# Patient Record
Sex: FEMALE | Race: WHITE | NOT HISPANIC OR LATINO | Employment: FULL TIME | URBAN - METROPOLITAN AREA
[De-identification: names, ages, dates, MRNs, and addresses within clinical notes are randomized per-mention and may not be internally consistent; named-entity substitution may affect disease eponyms.]

---

## 2019-02-19 ENCOUNTER — HOSPITAL ENCOUNTER (OUTPATIENT)
Dept: URGENT CARE | Facility: CLINIC | Age: 24
Discharge: HOME | End: 2019-02-19
Attending: NURSE PRACTITIONER

## 2019-02-22 LAB — BACTERIA SPEC AEROBE CULT: NORMAL

## 2019-03-26 LAB
BASOPHILS # BLD AUTO: 0.02 10*3/UL (ref 0–0.2)
BASOPHILS NFR BLD AUTO: 0.4 % (ref 0–3)
CONV ABS IMM GRAN: 0.01 10*3/UL (ref 0–0.2)
CONV IMMATURE GRAN: 0.2 % (ref 0–1.8)
DEPRECATED RDW RBC AUTO: 41.1 FL (ref 36.4–46.3)
EOSINOPHIL # BLD AUTO: 0.04 10*3/UL (ref 0–0.7)
EOSINOPHIL # BLD AUTO: 0.8 % (ref 0–7)
ERYTHROCYTE [DISTWIDTH] IN BLOOD BY AUTOMATED COUNT: 13 % (ref 11.7–14.4)
HBA1C MFR BLD: 13.7 G/DL (ref 12–16)
HCG SERPL-SCNC: NEGATIVE MMOL/L
HCT VFR BLD AUTO: 41.4 % (ref 37–47)
LYMPHOCYTES # BLD AUTO: 2.24 10*3/UL (ref 1–5)
MCH RBC QN AUTO: 28.7 PG (ref 27–31)
MCHC RBC AUTO-ENTMCNC: 33.1 G/DL (ref 33–37)
MCV RBC AUTO: 86.8 FL (ref 81–99)
MONOCYTES # BLD AUTO: 0.32 10*3/UL (ref 0.2–1.2)
MONOCYTES NFR BLD AUTO: 6.3 % (ref 3–10)
NEUTROPHILS # BLD AUTO: 2.44 10*3/UL (ref 2–8)
NEUTROPHILS NFR BLD AUTO: 48.1 % (ref 30–85)
NRBC CBCN: 0 % (ref 0–0.7)
PLATELET # BLD AUTO: 174 10*3/UL (ref 130–400)
PMV BLD AUTO: 10.7 FL (ref 9.4–12.3)
RBC # BLD AUTO: 4.77 10*6/UL (ref 4.2–5.4)
VARIANT LYMPHS NFR BLD MANUAL: 44.2 % (ref 20–45)
WBC # BLD AUTO: 5.07 10*3/UL (ref 4.8–10.8)

## 2019-03-28 ENCOUNTER — HOSPITAL ENCOUNTER (OUTPATIENT)
Dept: PERIOP | Facility: HOSPITAL | Age: 24
Setting detail: HOSPITAL OUTPATIENT SURGERY
Discharge: HOME | End: 2019-03-28
Attending: OBSTETRICS & GYNECOLOGY

## 2020-01-27 ENCOUNTER — HOSPITAL ENCOUNTER (OUTPATIENT)
Dept: URGENT CARE | Facility: CLINIC | Age: 25
Discharge: HOME | End: 2020-01-27
Attending: NURSE PRACTITIONER

## 2020-01-29 LAB — BACTERIA SPEC AEROBE CULT: NORMAL

## 2021-04-08 ENCOUNTER — HOSPITAL ENCOUNTER (OUTPATIENT)
Dept: URGENT CARE | Facility: CLINIC | Age: 26
Discharge: HOME | End: 2021-04-08
Attending: NURSE PRACTITIONER

## 2021-04-23 ENCOUNTER — HOSPITAL ENCOUNTER (OUTPATIENT)
Dept: URGENT CARE | Facility: CLINIC | Age: 26
Discharge: HOME | End: 2021-04-23
Attending: FAMILY MEDICINE

## 2021-05-13 ENCOUNTER — HOSPITAL ENCOUNTER (OUTPATIENT)
Dept: URGENT CARE | Facility: CLINIC | Age: 26
Discharge: HOME | End: 2021-05-13
Attending: NURSE PRACTITIONER

## 2021-05-14 LAB — SARS-COV-2 RNA SPEC QL NAA+PROBE: NOT DETECTED

## 2021-11-30 PROCEDURE — 87635 SARS-COV-2 COVID-19 AMP PRB: CPT | Performed by: NURSE PRACTITIONER

## 2021-12-01 ENCOUNTER — TELEPHONE (OUTPATIENT)
Dept: URGENT CARE | Facility: CLINIC | Age: 26
End: 2021-12-01

## 2021-12-01 NOTE — TELEPHONE ENCOUNTER
----- Message from MARIO Griffin sent at 11/30/2021  4:57 PM EST -----  Please call the patient regarding her negative result.

## 2022-09-06 ENCOUNTER — OFFICE VISIT (OUTPATIENT)
Dept: FAMILY MEDICINE CLINIC | Age: 27
End: 2022-09-06

## 2022-09-06 VITALS
SYSTOLIC BLOOD PRESSURE: 115 MMHG | DIASTOLIC BLOOD PRESSURE: 67 MMHG | WEIGHT: 140.4 LBS | HEIGHT: 63 IN | TEMPERATURE: 98.1 F | BODY MASS INDEX: 24.88 KG/M2 | HEART RATE: 71 BPM

## 2022-09-06 DIAGNOSIS — D22.9 ATYPICAL MOLE: ICD-10-CM

## 2022-09-06 DIAGNOSIS — R49.0 HOARSENESS OF VOICE: ICD-10-CM

## 2022-09-06 DIAGNOSIS — K21.9 GASTROESOPHAGEAL REFLUX DISEASE, UNSPECIFIED WHETHER ESOPHAGITIS PRESENT: ICD-10-CM

## 2022-09-06 DIAGNOSIS — J30.9 ALLERGIC RHINITIS, UNSPECIFIED SEASONALITY, UNSPECIFIED TRIGGER: Primary | ICD-10-CM

## 2022-09-06 PROCEDURE — 99214 OFFICE O/P EST MOD 30 MIN: CPT | Performed by: NURSE PRACTITIONER

## 2022-09-06 RX ORDER — MONTELUKAST SODIUM 10 MG/1
10 TABLET ORAL NIGHTLY
Qty: 90 TABLET | Refills: 3 | Status: SHIPPED | OUTPATIENT
Start: 2022-09-06 | End: 2022-10-21

## 2022-09-06 RX ORDER — OMEPRAZOLE 20 MG/1
20 CAPSULE, DELAYED RELEASE ORAL DAILY
Qty: 14 CAPSULE | Refills: 0 | Status: SHIPPED | OUTPATIENT
Start: 2022-09-06 | End: 2022-11-02

## 2022-09-06 RX ORDER — FLUTICASONE PROPIONATE 50 MCG
2 SPRAY, SUSPENSION (ML) NASAL DAILY
Qty: 18 ML | Refills: 1 | Status: SHIPPED | OUTPATIENT
Start: 2022-09-06 | End: 2023-01-09 | Stop reason: SDUPTHER

## 2022-09-06 NOTE — PROGRESS NOTES
Assessment and Plan   Diagnoses and all orders for this visit:    1. Allergic rhinitis, unspecified seasonality, unspecified trigger (Primary)  Comments:  try allergy medication to improve symptoms    Orders:  -     montelukast (Singulair) 10 MG tablet; Take 1 tablet by mouth Every Night.  Dispense: 90 tablet; Refill: 3  -     fluticasone (Flonase) 50 MCG/ACT nasal spray; 2 sprays into the nostril(s) as directed by provider Daily.  Dispense: 18 mL; Refill: 1    2. Hoarseness of voice  Comments:  advised to try allergy medication and if persists, follow up     3. Gastroesophageal reflux disease, unspecified whether esophagitis present  -     omeprazole (priLOSEC) 20 MG capsule; Take 1 capsule by mouth Daily.  Dispense: 14 capsule; Refill: 0    4. Atypical mole  -     Ambulatory Referral to Dermatology                  Follow Up   No follow-ups on file.    Chief Complaint  Marilee Watts presents to [facility] for Earache (Left), Hoarse (Allergy related), and atypical mole (L side of cheek, need referral to derm)    Subjective          2 moles on left side face  Starting to irritate. She has had 2 removed in the past that are reported as benign.  She is wanting referral Derm.     Left ear has been painful x 2 weeks- was told in past fluid in ear - was given medication but unable to take due to drowsiness.  She does report that she has also noted hoarseness but will come and go   Denies any acid reflux  Positive for 'severe' allergies  - which she is taking zyrtec switching with  claritin         Review of Systems   Constitutional: Negative for chills and fever.   HENT: Positive for congestion, ear pain, sore throat and voice change. Negative for sneezing.    Respiratory: Positive for cough.    Allergic/Immunologic: Positive for environmental allergies.       Objective     Vitals:    09/06/22 1307   BP: 115/67   BP Location: Right arm   Patient Position: Sitting   Pulse: 71   Temp:  "98.1 °F (36.7 °C)   TempSrc: Oral   Weight: 63.7 kg (140 lb 6.4 oz)   Height: 160 cm (63\")     Body mass index is 24.87 kg/m².     Physical Exam  Constitutional:       General: She is not in acute distress.     Appearance: Normal appearance.   HENT:      Head: Normocephalic.   Cardiovascular:      Rate and Rhythm: Normal rate and regular rhythm.   Pulmonary:      Effort: Pulmonary effort is normal.      Breath sounds: Normal breath sounds.   Musculoskeletal:         General: Normal range of motion.   Skin:     Findings: Lesion present. Papular rash: mole.   Neurological:      General: No focal deficit present.      Mental Status: She is alert and oriented to person, place, and time.   Psychiatric:         Mood and Affect: Mood normal.         Behavior: Behavior normal.         Result Review                        No Known Allergies   Past Medical History:   Diagnosis Date   • Asthma     AS CHILD   • COVID-19 vaccine administered      Current Outpatient Medications   Medication Sig Dispense Refill   • fluticasone (Flonase) 50 MCG/ACT nasal spray 2 sprays into the nostril(s) as directed by provider Daily. 18 mL 1   • montelukast (Singulair) 10 MG tablet Take 1 tablet by mouth Every Night. 90 tablet 3   • omeprazole (priLOSEC) 20 MG capsule Take 1 capsule by mouth Daily. 14 capsule 0     No current facility-administered medications for this visit.     Past Surgical History:   Procedure Laterality Date   •  SECTION     • SKIN BIOPSY     • TUBAL ABDOMINAL LIGATION        Health Maintenance Due   Topic Date Due   • ANNUAL PHYSICAL  Never done   • COVID-19 Vaccine (3 - Booster for Pfizer series) 11/10/2021   • HEPATITIS C SCREENING  Never done      Immunization History   Administered Date(s) Administered   • COVID-19 (PFIZER) PURPLE CAP 2021, 06/10/2021   • DTaP, Unspecified 1999   • HPV Quadrivalent 2009, 2009, 12/15/2009   • Hepatitis A 2018   • MMR 1999   • Meningococcal, " Unspecified 02/24/2012   • OPV 04/16/1999   • Td 06/26/2006   • Tdap 07/09/2014   • Varicella 04/16/1999

## 2022-09-08 ENCOUNTER — PATIENT ROUNDING (BHMG ONLY) (OUTPATIENT)
Dept: FAMILY MEDICINE CLINIC | Age: 27
End: 2022-09-08

## 2022-09-08 NOTE — PROGRESS NOTES
September 8, 2022    Hello, may I speak with Marilee Watts?    My name is AJ     I am  with [facility]  [address] [facility] [address]  [facility] [address] [phone].    Before we get started may I verify your date of birth? 1995    I am calling to officially welcome you to our practice and ask about your recent visit. Is this a good time to talk? YES    Tell me about your visit with us. What things went well?  Everything went well. Sakina Alvarez was great.       We're always looking for ways to make our patients' experiences even better. Do you have recommendations on ways we may improve?  NO    Overall were you satisfied with your first visit to our practice? YES       I appreciate you taking the time to speak with me today. Is there anything else I can do for you? NO      Thank you, and have a great day.

## 2022-09-13 PROBLEM — J30.9 ALLERGIC RHINITIS: Status: ACTIVE | Noted: 2022-09-13

## 2022-09-13 PROBLEM — K21.9 GASTROESOPHAGEAL REFLUX DISEASE: Status: ACTIVE | Noted: 2022-09-13

## 2022-10-19 ENCOUNTER — TELEPHONE (OUTPATIENT)
Dept: FAMILY MEDICINE CLINIC | Age: 27
End: 2022-10-19

## 2022-10-21 ENCOUNTER — OFFICE VISIT (OUTPATIENT)
Dept: FAMILY MEDICINE CLINIC | Age: 27
End: 2022-10-21

## 2022-10-21 VITALS — HEIGHT: 63 IN | OXYGEN SATURATION: 100 % | WEIGHT: 140 LBS | BODY MASS INDEX: 24.8 KG/M2 | TEMPERATURE: 97.6 F

## 2022-10-21 DIAGNOSIS — Z13.6 SCREENING FOR CARDIOVASCULAR CONDITION: ICD-10-CM

## 2022-10-21 DIAGNOSIS — R42 DIZZINESS: Primary | ICD-10-CM

## 2022-10-21 PROCEDURE — 99214 OFFICE O/P EST MOD 30 MIN: CPT | Performed by: NURSE PRACTITIONER

## 2022-10-21 NOTE — PROGRESS NOTES
"Assessment and Plan   Diagnoses and all orders for this visit:    1. Dizziness (Primary)  Comments:  We will get a holter, labs   Orders:  -     Holter monitor - 48 hour; Future    2. Screening for cardiovascular condition  -     Lipid panel; Future  -     Comprehensive metabolic panel; Future  -     CBC No Differential; Future  -     TSH; Future                  Follow Up   Return if symptoms worsen or fail to improve.    Chief Complaint  Marilee Watts presents to [facility] for Dizziness (Bending over and doing activities trigger dizzy spells. Worsening over past 2 months )    Subjective          History of Present Illness  Dizziness  Started about 2 months ago.  Triggering activities include bending over, changing position .  She does have a history of allergies.  She notices it as she changes positions.  She is no longer taking singulair  Due to the drowsiness - using flonase daily   She does take zyrtec/claritin alternating  But has not seen improvement no associated HA        Review of Systems   HENT: Positive for congestion (mostly at night), postnasal drip and sinus pressure.    Respiratory: Negative for cough, chest tightness and wheezing.    Cardiovascular: Negative for chest pain.   Allergic/Immunologic: Positive for environmental allergies.   Neurological: Positive for dizziness.       Objective     Vitals:    10/21/22 1029   Temp: 97.6 °F (36.4 °C)   TempSrc: Oral   SpO2: 100%   Weight: 63.5 kg (140 lb)   Height: 160 cm (63\")     Body mass index is 24.8 kg/m².     Physical Exam  Constitutional:       General: She is not in acute distress.     Appearance: Normal appearance.   HENT:      Head: Normocephalic.      Right Ear: Tympanic membrane normal.      Left Ear: Tympanic membrane normal.   Cardiovascular:      Rate and Rhythm: Normal rate and regular rhythm.   Pulmonary:      Effort: Pulmonary effort is normal.      Breath sounds: Normal breath sounds. "   Musculoskeletal:         General: Normal range of motion.   Neurological:      General: No focal deficit present.      Mental Status: She is alert and oriented to person, place, and time.   Psychiatric:         Mood and Affect: Mood normal.         Behavior: Behavior normal.         Result Review                        No Known Allergies   Past Medical History:   Diagnosis Date   • Asthma     AS CHILD   • COVID-19 vaccine administered      Current Outpatient Medications   Medication Sig Dispense Refill   • fluticasone (Flonase) 50 MCG/ACT nasal spray 2 sprays into the nostril(s) as directed by provider Daily. 18 mL 1   • omeprazole (priLOSEC) 20 MG capsule Take 1 capsule by mouth Daily. 14 capsule 0     No current facility-administered medications for this visit.     Past Surgical History:   Procedure Laterality Date   •  SECTION     • SKIN BIOPSY     • TUBAL ABDOMINAL LIGATION        Health Maintenance Due   Topic Date Due   • ANNUAL PHYSICAL  Never done   • COVID-19 Vaccine (3 - Booster for Pfizer series) 2021   • INFLUENZA VACCINE  Never done   • HEPATITIS C SCREENING  Never done      Immunization History   Administered Date(s) Administered   • COVID-19 (PFIZER) PURPLE CAP 2021, 06/10/2021   • DTaP, Unspecified 1999   • HPV Quadrivalent 2009, 2009, 12/15/2009   • Hepatitis A 2018   • MMR 1999   • Meningococcal, Unspecified 2012   • OPV 1999   • Td 2006   • Tdap 2014   • Varicella 1999

## 2022-10-24 ENCOUNTER — LAB (OUTPATIENT)
Dept: LAB | Facility: HOSPITAL | Age: 27
End: 2022-10-24

## 2022-10-24 ENCOUNTER — CLINICAL SUPPORT (OUTPATIENT)
Dept: FAMILY MEDICINE CLINIC | Age: 27
End: 2022-10-24

## 2022-10-24 DIAGNOSIS — Z13.6 SCREENING FOR CARDIOVASCULAR CONDITION: ICD-10-CM

## 2022-10-24 LAB
ALBUMIN SERPL-MCNC: 4.1 G/DL (ref 3.5–5.2)
ALBUMIN/GLOB SERPL: 1.7 G/DL
ALP SERPL-CCNC: 54 U/L (ref 39–117)
ALT SERPL W P-5'-P-CCNC: 16 U/L (ref 1–33)
ANION GAP SERPL CALCULATED.3IONS-SCNC: 8 MMOL/L (ref 5–15)
AST SERPL-CCNC: 17 U/L (ref 1–32)
BILIRUB SERPL-MCNC: 0.4 MG/DL (ref 0–1.2)
BUN SERPL-MCNC: 11 MG/DL (ref 6–20)
BUN/CREAT SERPL: 20.4 (ref 7–25)
CALCIUM SPEC-SCNC: 8.9 MG/DL (ref 8.6–10.5)
CHLORIDE SERPL-SCNC: 108 MMOL/L (ref 98–107)
CHOLEST SERPL-MCNC: 171 MG/DL (ref 0–200)
CO2 SERPL-SCNC: 27 MMOL/L (ref 22–29)
CREAT SERPL-MCNC: 0.54 MG/DL (ref 0.57–1)
DEPRECATED RDW RBC AUTO: 40 FL (ref 37–54)
EGFRCR SERPLBLD CKD-EPI 2021: 128.8 ML/MIN/1.73
ERYTHROCYTE [DISTWIDTH] IN BLOOD BY AUTOMATED COUNT: 12.3 % (ref 12.3–15.4)
GLOBULIN UR ELPH-MCNC: 2.4 GM/DL
GLUCOSE SERPL-MCNC: 93 MG/DL (ref 65–99)
HCT VFR BLD AUTO: 40.6 % (ref 34–46.6)
HDLC SERPL-MCNC: 58 MG/DL (ref 40–60)
HGB BLD-MCNC: 13.1 G/DL (ref 12–15.9)
LDLC SERPL CALC-MCNC: 98 MG/DL (ref 0–100)
LDLC/HDLC SERPL: 1.67 {RATIO}
MCH RBC QN AUTO: 28.3 PG (ref 26.6–33)
MCHC RBC AUTO-ENTMCNC: 32.3 G/DL (ref 31.5–35.7)
MCV RBC AUTO: 87.7 FL (ref 79–97)
PLATELET # BLD AUTO: 177 10*3/MM3 (ref 140–450)
PMV BLD AUTO: 10.2 FL (ref 6–12)
POTASSIUM SERPL-SCNC: 4.1 MMOL/L (ref 3.5–5.2)
PROT SERPL-MCNC: 6.5 G/DL (ref 6–8.5)
RBC # BLD AUTO: 4.63 10*6/MM3 (ref 3.77–5.28)
SODIUM SERPL-SCNC: 143 MMOL/L (ref 136–145)
TRIGL SERPL-MCNC: 82 MG/DL (ref 0–150)
TSH SERPL DL<=0.05 MIU/L-ACNC: 1.73 UIU/ML (ref 0.27–4.2)
VLDLC SERPL-MCNC: 15 MG/DL (ref 5–40)
WBC NRBC COR # BLD: 4.78 10*3/MM3 (ref 3.4–10.8)

## 2022-10-24 PROCEDURE — 80061 LIPID PANEL: CPT

## 2022-10-24 PROCEDURE — 84443 ASSAY THYROID STIM HORMONE: CPT

## 2022-10-24 PROCEDURE — 85027 COMPLETE CBC AUTOMATED: CPT

## 2022-10-24 PROCEDURE — 36415 COLL VENOUS BLD VENIPUNCTURE: CPT

## 2022-10-24 PROCEDURE — 80053 COMPREHEN METABOLIC PANEL: CPT

## 2022-11-02 ENCOUNTER — OFFICE VISIT (OUTPATIENT)
Dept: FAMILY MEDICINE CLINIC | Age: 27
End: 2022-11-02

## 2022-11-02 VITALS
TEMPERATURE: 98.3 F | DIASTOLIC BLOOD PRESSURE: 70 MMHG | SYSTOLIC BLOOD PRESSURE: 101 MMHG | WEIGHT: 138.6 LBS | HEART RATE: 81 BPM | BODY MASS INDEX: 24.56 KG/M2 | HEIGHT: 63 IN | OXYGEN SATURATION: 100 %

## 2022-11-02 DIAGNOSIS — R94.31 ABNORMAL HOLTER EXAM: ICD-10-CM

## 2022-11-02 DIAGNOSIS — R00.0 TACHYCARDIA: ICD-10-CM

## 2022-11-02 DIAGNOSIS — F41.0 ANXIETY ATTACK: Primary | ICD-10-CM

## 2022-11-02 DIAGNOSIS — F41.1 GAD (GENERALIZED ANXIETY DISORDER): ICD-10-CM

## 2022-11-02 PROCEDURE — 99214 OFFICE O/P EST MOD 30 MIN: CPT | Performed by: NURSE PRACTITIONER

## 2022-11-02 RX ORDER — HYDROXYZINE 50 MG/1
25-50 TABLET, FILM COATED ORAL 3 TIMES DAILY PRN
Qty: 30 TABLET | Refills: 1 | Status: SHIPPED | OUTPATIENT
Start: 2022-11-02

## 2022-11-02 RX ORDER — DESVENLAFAXINE SUCCINATE 50 MG/1
50 TABLET, EXTENDED RELEASE ORAL DAILY
Qty: 30 TABLET | Refills: 1 | Status: SHIPPED | OUTPATIENT
Start: 2022-11-02 | End: 2022-11-30 | Stop reason: SDUPTHER

## 2022-11-02 NOTE — PROGRESS NOTES
Assessment and Plan   Diagnoses and all orders for this visit:    1. Anxiety attack (Primary)  Comments:  Will give Hydroxyzine to take PRN  follow up in 4 weeks- instructed that this may cause drowsiness - use caution  Orders:  -     hydrOXYzine (ATARAX) 50 MG tablet; Take 0.5-1 tablets by mouth 3 (Three) Times a Day As Needed for Anxiety. CAUTION:  MAY CAUSE DROWSINESS  Dispense: 30 tablet; Refill: 1    2. DEE (generalized anxiety disorder)  Comments:  will start on Pristique and follow up in 4 weeks  Orders:  -     desvenlafaxine (Pristiq) 50 MG 24 hr tablet; Take 1 tablet by mouth Daily.  Dispense: 30 tablet; Refill: 1    3. Tachycardia  Comments:  referral to cardiology - recommend continue avoidance of caffeine, stress reduction   Orders:  -     Ambulatory Referral to Cardiology    4. Abnormal Holter exam  Comments:  referral to cardiology - defer treatment to cardiology  discussed use of beta blocker for symptomatic treatment   Orders:  -     Ambulatory Referral to Cardiology                  Follow Up   Return in about 4 weeks (around 11/30/2022) for Recheck.    Chief Complaint  Marilee Watts presents to [facility] for Follow-up (Follow up on lab results and go over medications. /CC: Acid reflux, mood disorder, & allergic rhinitis. )    Subjective          History of Present Illness  Marilee presents today for follow up on anxiety.    Current treatment includes :  nothing   Current, ongoing symptoms include: chest pain, difficulty concentrating, dizziness, fatigue, feelings of losing control, irritable, palpitations, psychomotor agitation, racing thoughts, shortness of breath.    She denies current suicidal and homicidal ideation.   Marilee has previously tried multiple unknown meds but never took longer than 4 weeks at the most without benefit or have experienced side effects.     Psychotherapy : No  She feels that much of her anxiety is triggered by work and social  "situations    She did recently have a holter monitor that revealed some bradycardia as well as sinus tachy with ectopy that occurred during her symptoms.  She does report that she will experience dizziness/ palpitations and nausea when this occurs.  She has cut out the caffeine and energy drinks.  She reports that much of this is from  Her job and generally is worse at work.              Review of Systems    Objective     Vitals:    11/02/22 1058   BP: 101/70   BP Location: Left arm   Patient Position: Sitting   Cuff Size: Adult   Pulse: 81   Temp: 98.3 °F (36.8 °C)   TempSrc: Oral   SpO2: 100%   Weight: 62.9 kg (138 lb 9.6 oz)   Height: 160 cm (63\")     Body mass index is 24.55 kg/m².     Physical Exam  Constitutional:       General: She is not in acute distress.     Appearance: Normal appearance.   HENT:      Head: Normocephalic.   Cardiovascular:      Rate and Rhythm: Normal rate and regular rhythm.   Pulmonary:      Effort: Pulmonary effort is normal.      Breath sounds: Normal breath sounds.   Musculoskeletal:         General: Normal range of motion.   Neurological:      General: No focal deficit present.      Mental Status: She is alert and oriented to person, place, and time.   Psychiatric:         Mood and Affect: Mood is anxious.         Behavior: Behavior normal.         Result Review                        No Known Allergies   Past Medical History:   Diagnosis Date   • Asthma     AS CHILD   • COVID-19 vaccine administered      Current Outpatient Medications   Medication Sig Dispense Refill   • fluticasone (Flonase) 50 MCG/ACT nasal spray 2 sprays into the nostril(s) as directed by provider Daily. 18 mL 1   • desvenlafaxine (Pristiq) 50 MG 24 hr tablet Take 1 tablet by mouth Daily. 30 tablet 1   • hydrOXYzine (ATARAX) 50 MG tablet Take 0.5-1 tablets by mouth 3 (Three) Times a Day As Needed for Anxiety. CAUTION:  MAY CAUSE DROWSINESS 30 tablet 1     No current facility-administered medications for this " visit.     Past Surgical History:   Procedure Laterality Date   •  SECTION     • SKIN BIOPSY     • TUBAL ABDOMINAL LIGATION        Health Maintenance Due   Topic Date Due   • ANNUAL PHYSICAL  Never done   • COVID-19 Vaccine (3 - Booster for Pfizer series) 2021   • INFLUENZA VACCINE  Never done   • HEPATITIS C SCREENING  Never done      Immunization History   Administered Date(s) Administered   • COVID-19 (PFIZER) PURPLE CAP 2021, 06/10/2021   • DTaP, Unspecified 1999   • HPV Quadrivalent 2009, 2009, 12/15/2009   • Hepatitis A 2018   • MMR 1999   • Meningococcal, Unspecified 2012   • OPV 1999   • Td 2006   • Tdap 2014   • Varicella 1999

## 2022-11-30 ENCOUNTER — OFFICE VISIT (OUTPATIENT)
Dept: FAMILY MEDICINE CLINIC | Age: 27
End: 2022-11-30

## 2022-11-30 VITALS
BODY MASS INDEX: 24.77 KG/M2 | WEIGHT: 139.8 LBS | DIASTOLIC BLOOD PRESSURE: 65 MMHG | SYSTOLIC BLOOD PRESSURE: 103 MMHG | HEIGHT: 63 IN | OXYGEN SATURATION: 99 % | TEMPERATURE: 97.8 F | HEART RATE: 61 BPM

## 2022-11-30 DIAGNOSIS — F41.1 GAD (GENERALIZED ANXIETY DISORDER): ICD-10-CM

## 2022-11-30 PROCEDURE — 99213 OFFICE O/P EST LOW 20 MIN: CPT | Performed by: NURSE PRACTITIONER

## 2022-11-30 RX ORDER — DESVENLAFAXINE SUCCINATE 50 MG/1
50 TABLET, EXTENDED RELEASE ORAL DAILY
Qty: 30 TABLET | Refills: 3 | Status: SHIPPED | OUTPATIENT
Start: 2022-11-30 | End: 2022-11-30 | Stop reason: SDUPTHER

## 2022-11-30 RX ORDER — DESVENLAFAXINE SUCCINATE 50 MG/1
50 TABLET, EXTENDED RELEASE ORAL DAILY
Qty: 30 TABLET | Refills: 5 | Status: SHIPPED | OUTPATIENT
Start: 2022-11-30 | End: 2023-01-09 | Stop reason: SDUPTHER

## 2022-11-30 NOTE — PROGRESS NOTES
"Assessment and Plan   Diagnoses and all orders for this visit:    1. DEE (generalized anxiety disorder)  Comments:  much improvement - continue current treatment at curren dose -  OK to follow up in 6 months  sooner if symptoms return or changes   Orders:  -     Discontinue: desvenlafaxine (Pristiq) 50 MG 24 hr tablet; Take 1 tablet by mouth Daily.  Dispense: 30 tablet; Refill: 3  -     desvenlafaxine (Pristiq) 50 MG 24 hr tablet; Take 1 tablet by mouth Daily.  Dispense: 30 tablet; Refill: 5                  Follow Up   No follow-ups on file.    Chief Complaint  Marilee Watts presents to [facility] for Follow-up and Anxiety (4 week f/u /Patient feels like prestiq is helping significantly with her anxiety. )    Subjective          History of Present Illness  Marilee is here for follow up on anxiety/panic attack.  She is reporting that Her medication is working well without side effects.  Current treatment includes Pristique and hydroxyzine PRN .  She is really pleased with the medication .  SE may be HA but that may be related to something else as well as this has just been over the past few days.  Has not had to use the PRN medication at this time        Review of Systems    Objective     Vitals:    11/30/22 1133   BP: 103/65   BP Location: Right arm   Patient Position: Sitting   Cuff Size: Adult   Pulse: 61   Temp: 97.8 °F (36.6 °C)   TempSrc: Oral   SpO2: 99%   Weight: 63.4 kg (139 lb 12.8 oz)   Height: 160 cm (63\")     Body mass index is 24.76 kg/m².     Physical Exam  Constitutional:       General: She is not in acute distress.     Appearance: Normal appearance.   HENT:      Head: Normocephalic.   Cardiovascular:      Rate and Rhythm: Normal rate and regular rhythm.   Pulmonary:      Effort: Pulmonary effort is normal.      Breath sounds: Normal breath sounds.   Musculoskeletal:         General: Normal range of motion.   Neurological:      General: No focal deficit present. "      Mental Status: She is alert and oriented to person, place, and time.   Psychiatric:         Mood and Affect: Mood normal.         Behavior: Behavior normal.         Result Review                        No Known Allergies   Past Medical History:   Diagnosis Date   • Asthma     AS CHILD   • COVID-19 vaccine administered      Current Outpatient Medications   Medication Sig Dispense Refill   • desvenlafaxine (Pristiq) 50 MG 24 hr tablet Take 1 tablet by mouth Daily. 30 tablet 5   • fluticasone (Flonase) 50 MCG/ACT nasal spray 2 sprays into the nostril(s) as directed by provider Daily. 18 mL 1   • hydrOXYzine (ATARAX) 50 MG tablet Take 0.5-1 tablets by mouth 3 (Three) Times a Day As Needed for Anxiety. CAUTION:  MAY CAUSE DROWSINESS 30 tablet 1     No current facility-administered medications for this visit.     Past Surgical History:   Procedure Laterality Date   •  SECTION     • SKIN BIOPSY     • TUBAL ABDOMINAL LIGATION        Health Maintenance Due   Topic Date Due   • ANNUAL PHYSICAL  Never done   • COVID-19 Vaccine (3 - Booster for Pfizer series) 2021   • INFLUENZA VACCINE  Never done   • HEPATITIS C SCREENING  Never done      Immunization History   Administered Date(s) Administered   • COVID-19 (PFIZER) PURPLE CAP 2021, 06/10/2021   • DTaP, Unspecified 1999   • HPV Quadrivalent 2009, 2009, 12/15/2009   • Hepatitis A 2018   • MMR 1999   • Meningococcal, Unspecified 2012   • OPV 1999   • Td 2006   • Tdap 2014   • Varicella 1999

## 2023-01-04 ENCOUNTER — TELEPHONE (OUTPATIENT)
Dept: FAMILY MEDICINE CLINIC | Age: 28
End: 2023-01-04
Payer: COMMERCIAL

## 2023-01-04 DIAGNOSIS — F41.1 GAD (GENERALIZED ANXIETY DISORDER): ICD-10-CM

## 2023-01-04 RX ORDER — DESVENLAFAXINE SUCCINATE 50 MG/1
50 TABLET, EXTENDED RELEASE ORAL DAILY
Qty: 30 TABLET | Refills: 5 | OUTPATIENT
Start: 2023-01-04

## 2023-01-04 NOTE — TELEPHONE ENCOUNTER
Caller: Marilee Watts    Relationship: Self        Requested Prescriptions:   Requested Prescriptions     Pending Prescriptions Disp Refills   • desvenlafaxine (Pristiq) 50 MG 24 hr tablet 30 tablet 5     Sig: Take 1 tablet by mouth Daily.        Pharmacy where request should be sent:  [facility] [id number] [place], [place] - [address] - [phone]  - [phone] FX  [phone]        Does the patient have less than a 3 day supply:  [x] Yes  [] No    Would you like a call back once the refill request has been completed: [x] Yes [] No    If the office needs to give you a call back, can they leave a voicemail: [x] Yes [] No    Yvonne Dunaway Rep   01/04/23 11:47 EST

## 2023-01-05 NOTE — TELEPHONE ENCOUNTER
Pt is calling and she feels like her Pristiq is not doing what it was when she started taking it she is getting back to having as much anxiety as she was before she started it she is wanting to know if it can be increased or does she need to be seen ?

## 2023-01-09 ENCOUNTER — OFFICE VISIT (OUTPATIENT)
Dept: FAMILY MEDICINE CLINIC | Age: 28
End: 2023-01-09
Payer: COMMERCIAL

## 2023-01-09 VITALS
DIASTOLIC BLOOD PRESSURE: 75 MMHG | HEART RATE: 87 BPM | WEIGHT: 139.2 LBS | BODY MASS INDEX: 24.66 KG/M2 | TEMPERATURE: 98.1 F | OXYGEN SATURATION: 100 % | HEIGHT: 63 IN | SYSTOLIC BLOOD PRESSURE: 104 MMHG

## 2023-01-09 DIAGNOSIS — J30.9 ALLERGIC RHINITIS, UNSPECIFIED SEASONALITY, UNSPECIFIED TRIGGER: ICD-10-CM

## 2023-01-09 DIAGNOSIS — F41.1 GAD (GENERALIZED ANXIETY DISORDER): ICD-10-CM

## 2023-01-09 PROCEDURE — 99214 OFFICE O/P EST MOD 30 MIN: CPT | Performed by: NURSE PRACTITIONER

## 2023-01-09 RX ORDER — DESVENLAFAXINE SUCCINATE 50 MG/1
50 TABLET, EXTENDED RELEASE ORAL DAILY
Qty: 30 TABLET | Refills: 0 | Status: SHIPPED | OUTPATIENT
Start: 2023-01-09 | End: 2023-03-27 | Stop reason: SDUPTHER

## 2023-01-09 RX ORDER — FLUTICASONE PROPIONATE 50 MCG
2 SPRAY, SUSPENSION (ML) NASAL DAILY
Qty: 18 ML | Refills: 1 | Status: SHIPPED | OUTPATIENT
Start: 2023-01-09

## 2023-01-09 RX ORDER — DESVENLAFAXINE 100 MG/1
100 TABLET, EXTENDED RELEASE ORAL DAILY
Qty: 30 TABLET | Refills: 4 | Status: SHIPPED | OUTPATIENT
Start: 2023-01-09 | End: 2023-03-27 | Stop reason: DRUGHIGH

## 2023-01-09 NOTE — PROGRESS NOTES
Chief Complaint  Marilee Watts presents to [facility] for Follow-up (Follow up on medication, patient states that desvenlafaxine )      Subjective     History of Present Illness  Marilee is here today to follow-up on her anxiety.  She reports that she has been out of her medications for about 3 to 4 days and was unaware that she needed to pick her refills up from [facility].  She reports that she felt like the medicine was not as effective as it was in the beginning.  She is interested in increasing her dose.  She does report that having been off the medication for few days she has been having ongoing headaches but other than that no acute concerns or worse symptoms with the medication.    She is also reporting that she needs a refill on her allergy medication of Flonase.  She reports that this is working well for her        Assessment and Plan       Diagnoses and all orders for this visit:    1. DEE (generalized anxiety disorder)  Comments:  I will give her 30-day supply 50 mg so she can wean back into a higher dosing and have her increase to 100 mg after 1 to 2 weeks follow-up as scheduled in May  Orders:  -     desvenlafaxine (Pristiq) 50 MG 24 hr tablet; Take 1 tablet by mouth Daily. Take one per day x 1 week then double up to 100mg  Dispense: 30 tablet; Refill: 0  -     desvenlafaxine (Pristiq) 100 MG 24 hr tablet; Take 1 tablet by mouth Daily.  Dispense: 30 tablet; Refill: 4    2. Allergic rhinitis, unspecified seasonality, unspecified trigger  Comments:  Refills provided on Flonase today follow-up at next scheduled appointment  Orders:  -     fluticasone (Flonase) 50 MCG/ACT nasal spray; 2 sprays into the nostril(s) as directed by provider Daily.  Dispense: 18 mL; Refill: 1        New Medications Ordered This Visit   Medications   • desvenlafaxine (Pristiq) 50 MG 24 hr tablet     Sig: Take 1 tablet by mouth Daily. Take one per day x 1 week then double up to 100mg      Dispense:  30 tablet     Refill:  0     Updated refill  - 6 month supply   • fluticasone (Flonase) 50 MCG/ACT nasal spray     Si sprays into the nostril(s) as directed by provider Daily.     Dispense:  18 mL     Refill:  1   • desvenlafaxine (Pristiq) 100 MG 24 hr tablet     Sig: Take 1 tablet by mouth Daily.     Dispense:  30 tablet     Refill:  4       Medications Discontinued During This Encounter   Medication Reason   • fluticasone (Flonase) 50 MCG/ACT nasal spray Reorder   • desvenlafaxine (Pristiq) 50 MG 24 hr tablet Reorder       Follow Up   Return for Next scheduled follow up.         Review of Systems    Objective     Vitals:    23 1321   BP: 104/75   BP Location: Right arm   Patient Position: Sitting   Cuff Size: Adult   Pulse: 87   Temp: 98.1 °F (36.7 °C)   TempSrc: Oral   SpO2: 100%   Weight: 63.1 kg (139 lb 3.2 oz)   Height: 160 cm (63\")     Body mass index is 24.66 kg/m².     Physical Exam  Constitutional:       General: She is not in acute distress.     Appearance: Normal appearance.   HENT:      Head: Normocephalic.   Cardiovascular:      Rate and Rhythm: Normal rate and regular rhythm.   Pulmonary:      Effort: Pulmonary effort is normal.      Breath sounds: Normal breath sounds.   Musculoskeletal:         General: Normal range of motion.   Neurological:      General: No focal deficit present.      Mental Status: She is alert and oriented to person, place, and time.   Psychiatric:         Mood and Affect: Mood is anxious.         Behavior: Behavior normal.            Result Review                       No Known Allergies   Past Medical History:   Diagnosis Date   • Asthma     AS CHILD   • COVID-19 vaccine administered      Current Outpatient Medications   Medication Sig Dispense Refill   • desvenlafaxine (Pristiq) 50 MG 24 hr tablet Take 1 tablet by mouth Daily. Take one per day x 1 week then double up to 100mg 30 tablet 0   • fluticasone (Flonase) 50 MCG/ACT nasal spray 2 sprays into the  nostril(s) as directed by provider Daily. 18 mL 1   • hydrOXYzine (ATARAX) 50 MG tablet Take 0.5-1 tablets by mouth 3 (Three) Times a Day As Needed for Anxiety. CAUTION:  MAY CAUSE DROWSINESS 30 tablet 1   • desvenlafaxine (Pristiq) 100 MG 24 hr tablet Take 1 tablet by mouth Daily. 30 tablet 4     No current facility-administered medications for this visit.     Past Surgical History:   Procedure Laterality Date   •  SECTION     • SKIN BIOPSY     • TUBAL ABDOMINAL LIGATION        Health Maintenance Due   Topic Date Due   • COVID-19 Vaccine (3 - Booster for Pfizer series) 2021   • INFLUENZA VACCINE  Never done   • HEPATITIS C SCREENING  Never done   • ANNUAL PHYSICAL  Never done      Immunization History   Administered Date(s) Administered   • COVID-19 (PFIZER) PURPLE CAP 2021, 06/10/2021   • DTaP, Unspecified 1999   • HPV Quadrivalent 2009, 2009, 12/15/2009   • Hepatitis A 2018   • MMR 1999   • Meningococcal, Unspecified 2012   • OPV 1999   • Td 2006   • Tdap 2014   • Varicella 1999

## 2023-02-23 DIAGNOSIS — F41.1 GAD (GENERALIZED ANXIETY DISORDER): ICD-10-CM

## 2023-02-23 RX ORDER — DESVENLAFAXINE SUCCINATE 50 MG/1
TABLET, EXTENDED RELEASE ORAL
Qty: 30 TABLET | Refills: 0 | OUTPATIENT
Start: 2023-02-23

## 2023-03-27 ENCOUNTER — OFFICE VISIT (OUTPATIENT)
Dept: FAMILY MEDICINE CLINIC | Age: 28
End: 2023-03-27
Payer: COMMERCIAL

## 2023-03-27 VITALS
WEIGHT: 150.2 LBS | HEART RATE: 74 BPM | SYSTOLIC BLOOD PRESSURE: 114 MMHG | HEIGHT: 63 IN | OXYGEN SATURATION: 100 % | TEMPERATURE: 97.5 F | BODY MASS INDEX: 26.61 KG/M2 | DIASTOLIC BLOOD PRESSURE: 68 MMHG

## 2023-03-27 DIAGNOSIS — G43.009 MIGRAINE WITHOUT AURA AND WITHOUT STATUS MIGRAINOSUS, NOT INTRACTABLE: ICD-10-CM

## 2023-03-27 DIAGNOSIS — F41.1 GAD (GENERALIZED ANXIETY DISORDER): Primary | ICD-10-CM

## 2023-03-27 PROCEDURE — 1160F RVW MEDS BY RX/DR IN RCRD: CPT | Performed by: NURSE PRACTITIONER

## 2023-03-27 PROCEDURE — 1159F MED LIST DOCD IN RCRD: CPT | Performed by: NURSE PRACTITIONER

## 2023-03-27 PROCEDURE — 99214 OFFICE O/P EST MOD 30 MIN: CPT | Performed by: NURSE PRACTITIONER

## 2023-03-27 RX ORDER — IBUPROFEN 200 MG
400 TABLET ORAL EVERY 6 HOURS PRN
COMMUNITY

## 2023-03-27 RX ORDER — DESVENLAFAXINE SUCCINATE 50 MG/1
50 TABLET, EXTENDED RELEASE ORAL DAILY
Qty: 30 TABLET | Refills: 0 | Status: SHIPPED | OUTPATIENT
Start: 2023-03-27

## 2023-03-27 RX ORDER — ARIPIPRAZOLE 2 MG/1
2 TABLET ORAL DAILY
Qty: 30 TABLET | Refills: 1 | Status: SHIPPED | OUTPATIENT
Start: 2023-03-27 | End: 2023-03-30 | Stop reason: SDUPTHER

## 2023-03-27 RX ORDER — ACETAMINOPHEN 500 MG
500 TABLET ORAL EVERY 6 HOURS PRN
COMMUNITY

## 2023-03-27 RX ORDER — CETIRIZINE HYDROCHLORIDE 10 MG/1
2 CAPSULE, LIQUID FILLED ORAL DAILY
COMMUNITY

## 2023-03-27 NOTE — PROGRESS NOTES
Chief Complaint  Marilee Watts presents to [facility] for Anxiety (Pt states that medication is having adverse effects: increased anxiety & headaches )      Subjective     History of Present Illness  Marilee presents today for follow up on anxiety.   She reports that the current treatment is not working well at the current dose.  Current treatment includes : Pristiq 100 mg daily  Current, ongoing symptoms include: difficulty concentrating, feelings of losing control, palpitations, racing thoughts.    She denies current suicidal and homicidal ideation.   Marilee has previously tried Pristiq at a lower dose without benefit or have experienced side effects.     She complains of the following side effects from the treatment: headache.   Psychotherapy : No    Marilee has also been having problems with ongoing daily migraines.  She takes abortive therapy over-the-counter and has never been put on any medication for preventative treatment.  She has never had any as needed use of medication prescribed.  She reports that she was in the process of having an official evaluation but she wanted pregnant with her son and so that stopped and she did not follow back up since that time which was several years ago.  She is interested in pursuing treatment for her ongoing headaches          Assessment and Plan       Diagnoses and all orders for this visit:    1. DEE (generalized anxiety disorder) (Primary)  Comments:  We will reduce her back to 50 mg daily and add on Abilify if ineffective consider alternative  Orders:  -     desvenlafaxine (Pristiq) 50 MG 24 hr tablet; Take 1 tablet by mouth Daily.  Dispense: 30 tablet; Refill: 0  -     ARIPiprazole (Abilify) 2 MG tablet; Take 1 tablet by mouth Daily.  Dispense: 30 tablet; Refill: 1    2. Migraine without aura and without status migrainosus, not intractable  Comments:  To avoid restarting new medicines at the same time we will defer treatment and  "plan to start her on Topamax at her next appointment for preventative migraine th        Follow Up   Return in about 4 weeks (around 4/24/2023) for Recheck.      New Medications Ordered This Visit   Medications   • desvenlafaxine (Pristiq) 50 MG 24 hr tablet     Sig: Take 1 tablet by mouth Daily.     Dispense:  30 tablet     Refill:  0   • ARIPiprazole (Abilify) 2 MG tablet     Sig: Take 1 tablet by mouth Daily.     Dispense:  30 tablet     Refill:  1       Medications Discontinued During This Encounter   Medication Reason   • desvenlafaxine (Pristiq) 100 MG 24 hr tablet Dose adjustment   • desvenlafaxine (Pristiq) 50 MG 24 hr tablet Reorder            Review of Systems    Objective     Vitals:    03/27/23 1606   BP: 114/68   BP Location: Right arm   Patient Position: Sitting   Cuff Size: Adult   Pulse: 74   Temp: 97.5 °F (36.4 °C)   TempSrc: Oral   SpO2: 100%   Weight: 68.1 kg (150 lb 3.2 oz)   Height: 160 cm (63\")     Body mass index is 26.61 kg/m².     Physical Exam  Constitutional:       General: She is not in acute distress.     Appearance: Normal appearance.   HENT:      Head: Normocephalic.   Cardiovascular:      Rate and Rhythm: Normal rate and regular rhythm.   Pulmonary:      Effort: Pulmonary effort is normal.      Breath sounds: Normal breath sounds.   Musculoskeletal:         General: Normal range of motion.   Neurological:      General: No focal deficit present.      Mental Status: She is alert and oriented to person, place, and time.   Psychiatric:         Mood and Affect: Mood is anxious.         Behavior: Behavior normal.            Result Review                       No Known Allergies   Past Medical History:   Diagnosis Date   • Asthma     AS CHILD   • COVID-19 vaccine administered      Current Outpatient Medications   Medication Sig Dispense Refill   • Cetirizine HCl (ZyrTEC Allergy) 10 MG capsule Take 2 tablets by mouth Daily.     • desvenlafaxine (Pristiq) 50 MG 24 hr tablet Take 1 tablet by " mouth Daily. 30 tablet 0   • ibuprofen (ADVIL,MOTRIN) 200 MG tablet Take 2 tablets by mouth Every 6 (Six) Hours As Needed.     • acetaminophen (TYLENOL) 500 MG tablet Take 1 tablet by mouth Every 6 (Six) Hours As Needed. (Patient not taking: Reported on 3/27/2023)     • ARIPiprazole (Abilify) 2 MG tablet Take 1 tablet by mouth Daily. 30 tablet 1   • fluticasone (Flonase) 50 MCG/ACT nasal spray 2 sprays into the nostril(s) as directed by provider Daily. (Patient not taking: Reported on 3/27/2023) 18 mL 1   • hydrOXYzine (ATARAX) 50 MG tablet Take 0.5-1 tablets by mouth 3 (Three) Times a Day As Needed for Anxiety. CAUTION:  MAY CAUSE DROWSINESS (Patient not taking: Reported on 3/27/2023) 30 tablet 1     No current facility-administered medications for this visit.     Past Surgical History:   Procedure Laterality Date   •  SECTION     • SKIN BIOPSY     • TUBAL ABDOMINAL LIGATION        Health Maintenance Due   Topic Date Due   • HEPATITIS C SCREENING  Never done   • ANNUAL PHYSICAL  Never done      Immunization History   Administered Date(s) Administered   • COVID-19 (PFIZER) PURPLE CAP 2021, 06/10/2021   • DTaP, Unspecified 1999   • HPV Quadrivalent 2009, 2009, 12/15/2009   • Hepatitis A 2018   • MMR 1999   • Meningococcal, Unspecified 2012   • OPV 1999   • Td 2006   • Tdap 2014   • Varicella 1999         Part of this note may be an electronic transcription/translation of spoken language to printed   text using the Dragon Dictation System.      MARIO Bullock

## 2023-03-28 ENCOUNTER — PRIOR AUTHORIZATION (OUTPATIENT)
Dept: FAMILY MEDICINE CLINIC | Age: 28
End: 2023-03-28
Payer: COMMERCIAL

## 2023-03-28 NOTE — TELEPHONE ENCOUNTER
PA for Aripiprazole  Key: [id number]    Prescribed for Anxiety (F41.1) which would not be covered - does pt have any of the following?       Dementias (ICD-10 Disease Groups F01, F02, F03, F06)   Dissociative and conversion disorders (ICD-10 Disease Group F44)   Episodic Mood Disorders (ICD-10 Disease Groups F30, F31, F39)   Pondera's disease (ICD-10 Disease Group G10)   Major depressive disorder (ICD-10 Disease Groups F32, F33)   Oppositional defiant disorder (ICD-10 = F91.3)   Pervasive developmental disorders (ICD-10 Disease Group F84)   Schizophrenic Disorders (ICD-10 Disease Group F20, F25.9; ICD-10 = F60.1)   Substance use disorders and related conditions (F11.150, F11.250, F11.950, F12.150, F12.250, F12.950, F13.150, F13.250, F13.950, F14.150, F14.250, F14.950, F15.150, F15.250, F15.950, F16.150, F16.250, F16.950, F18.150, F18.250, F18.950, F19.150, F19.250, F19.950, F11.151, F11.251, F11.951, F12.151, F12.251, F12.951, F13.151, F13.251, F13.951, F14.151, F14.251, F14.951, F15.151, F15.251, F15.951, F16.151, F16.251, F16.951, F18.151, F18.251, F18.951, F19.151, F19.251, F19.951, F11.159)   Tic disorder (ICD-10 Disease Group F95)

## 2023-03-30 DIAGNOSIS — F39 MOOD DISORDER: Primary | ICD-10-CM

## 2023-03-30 DIAGNOSIS — F41.1 GAD (GENERALIZED ANXIETY DISORDER): ICD-10-CM

## 2023-03-30 RX ORDER — ARIPIPRAZOLE 2 MG/1
2 TABLET ORAL DAILY
Qty: 30 TABLET | Refills: 1 | Status: SHIPPED | OUTPATIENT
Start: 2023-03-30

## 2023-05-03 ENCOUNTER — OFFICE VISIT (OUTPATIENT)
Dept: FAMILY MEDICINE CLINIC | Age: 28
End: 2023-05-03
Payer: COMMERCIAL

## 2023-05-03 VITALS
HEIGHT: 63 IN | SYSTOLIC BLOOD PRESSURE: 101 MMHG | WEIGHT: 148.4 LBS | OXYGEN SATURATION: 100 % | DIASTOLIC BLOOD PRESSURE: 59 MMHG | BODY MASS INDEX: 26.29 KG/M2 | HEART RATE: 66 BPM | TEMPERATURE: 98.1 F

## 2023-05-03 DIAGNOSIS — G43.009 MIGRAINE WITHOUT AURA AND WITHOUT STATUS MIGRAINOSUS, NOT INTRACTABLE: Primary | ICD-10-CM

## 2023-05-03 DIAGNOSIS — F41.1 GAD (GENERALIZED ANXIETY DISORDER): ICD-10-CM

## 2023-05-03 PROCEDURE — 99214 OFFICE O/P EST MOD 30 MIN: CPT | Performed by: NURSE PRACTITIONER

## 2023-05-03 RX ORDER — TOPIRAMATE 25 MG/1
25 TABLET ORAL NIGHTLY
Qty: 30 TABLET | Refills: 1 | Status: SHIPPED | OUTPATIENT
Start: 2023-05-03

## 2023-05-03 RX ORDER — DESVENLAFAXINE SUCCINATE 50 MG/1
50 TABLET, EXTENDED RELEASE ORAL DAILY
Qty: 30 TABLET | Refills: 5 | Status: SHIPPED | OUTPATIENT
Start: 2023-05-03

## 2023-05-03 RX ORDER — DESVENLAFAXINE 25 MG/1
25 TABLET, EXTENDED RELEASE ORAL DAILY
Qty: 30 TABLET | Refills: 5 | Status: SHIPPED | OUTPATIENT
Start: 2023-05-03

## 2023-05-03 NOTE — PROGRESS NOTES
Chief Complaint  Marilee Watts presents to [facility] for Anxiety (6 mo. F/U )      Subjective     History of Present Illness  Last visit, Marilee was reduced on her Pristiq and added Abilify 2mg for her anxiety.  She was previously taking pristiq 100mg and felt like with the increase in her dose from 50-100mg, her symptoms worsened as well as worsened her headaches..  She reports today that she stopped the abilify and is still using the pristiq 50 mg      Regarding HA continueing   Ibuprofen is not helping- Excedrin Migraine is not working.  She reports this has been a long ongoing problem.  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  She does have a child at home so she reports that very rarely does she get more than 3 hours of sleep a day.  She reports that never having been on a daily headache regimen.  She has not been to neurology in the past for management.  She reports that her headaches are mostly like a tight band across the front of her head.  She has become more sensitive to lights and sounds.  She reports the headache is all day every day just lessens in severity.         Assessment and Plan   I have advised Marilee that preventative headache medication is probably in her best interest rather than using the amount of ibuprofen and Excedrin that she has been using which may be worsening her headaches.  I have advised her to cut back on the administration of these medicines and attempt to use Tylenol in the place.  I have started her on some Topamax today as a prevention.  She will reach out if this is either effective or ineffective and we will plan on a referral to neurology for further recommendations.  I suspect much of the component may be her lack of sleep.    Regarding her generalized anxiety disorder, she reports that she thinks the Pristiq is working throughout much of the  day however it feels like it is wearing off as the day goes on she would like to try 25 mg later in the day to see if this improves her symptoms.  Did discuss the possibility of those symptoms returning with the increased dose as had in the past.  She will try this over a 2-week timeframe and then if no problems, this is when she will start the Topamax trial.    She is okay to return in 6 months for follow-up unless problems arise she will come in sooner    Diagnoses and all orders for this visit:    1. Migraine without aura and without status migrainosus, not intractable (Primary)  -     topiramate (TOPAMAX) 25 MG tablet; Take 1 tablet by mouth Every Night.  Dispense: 30 tablet; Refill: 1    2. DEE (generalized anxiety disorder)  Comments:  We will reduce her back to 50 mg daily and add on Abilify if ineffective consider alternative  Orders:  -     Desvenlafaxine Succinate ER (Pristiq) 25 MG tablet sustained-release 24 hour; Take 1 tablet by mouth Daily. Total 75 mg per day  Dispense: 30 tablet; Refill: 5  -     desvenlafaxine (Pristiq) 50 MG 24 hr tablet; Take 1 tablet by mouth Daily. Take total 75mg daily  Dispense: 30 tablet; Refill: 5        Follow Up   Return in about 6 months (around 11/3/2023) for Recheck, Annual physical.      New Medications Ordered This Visit   Medications   • Desvenlafaxine Succinate ER (Pristiq) 25 MG tablet sustained-release 24 hour     Sig: Take 1 tablet by mouth Daily. Total 75 mg per day     Dispense:  30 tablet     Refill:  5   • desvenlafaxine (Pristiq) 50 MG 24 hr tablet     Sig: Take 1 tablet by mouth Daily. Take total 75mg daily     Dispense:  30 tablet     Refill:  5   • topiramate (TOPAMAX) 25 MG tablet     Sig: Take 1 tablet by mouth Every Night.     Dispense:  30 tablet     Refill:  1       Medications Discontinued During This Encounter   Medication Reason   • ARIPiprazole (Abilify) 2 MG tablet Side effects   • hydrOXYzine (ATARAX) 50 MG tablet Not Efficacious   •  "desvenlafaxine (Pristiq) 50 MG 24 hr tablet Reorder            Review of Systems    Objective     Vitals:    05/03/23 1129   BP: 101/59   BP Location: Left arm   Patient Position: Sitting   Cuff Size: Adult   Pulse: 66   Temp: 98.1 °F (36.7 °C)   TempSrc: Oral   SpO2: 100%   Weight: 67.3 kg (148 lb 6.4 oz)   Height: 160 cm (63\")     Body mass index is 26.29 kg/m².     Physical Exam  Constitutional:       General: She is not in acute distress.     Appearance: Normal appearance.   HENT:      Head: Normocephalic.   Cardiovascular:      Rate and Rhythm: Normal rate and regular rhythm.   Pulmonary:      Effort: Pulmonary effort is normal.      Breath sounds: Normal breath sounds.   Musculoskeletal:         General: Normal range of motion.   Neurological:      General: No focal deficit present.      Mental Status: She is alert and oriented to person, place, and time.      Cranial Nerves: No facial asymmetry.      Gait: Gait is intact.   Psychiatric:         Mood and Affect: Mood normal.         Behavior: Behavior normal.            Result Review                       No Known Allergies   Past Medical History:   Diagnosis Date   • Asthma     AS CHILD   • COVID-19 vaccine administered      Current Outpatient Medications   Medication Sig Dispense Refill   • acetaminophen (TYLENOL) 500 MG tablet Take 1 tablet by mouth Every 6 (Six) Hours As Needed.     • Cetirizine HCl (ZyrTEC Allergy) 10 MG capsule Take 2 tablets by mouth Daily.     • desvenlafaxine (Pristiq) 50 MG 24 hr tablet Take 1 tablet by mouth Daily. Take total 75mg daily 30 tablet 5   • fluticasone (Flonase) 50 MCG/ACT nasal spray 2 sprays into the nostril(s) as directed by provider Daily. 18 mL 1   • ibuprofen (ADVIL,MOTRIN) 200 MG tablet Take 2 tablets by mouth Every 6 (Six) Hours As Needed.     • Desvenlafaxine Succinate ER (Pristiq) 25 MG tablet sustained-release 24 hour Take 1 tablet by mouth Daily. Total 75 mg per day 30 tablet 5   • topiramate (TOPAMAX) 25 MG " tablet Take 1 tablet by mouth Every Night. 30 tablet 1     No current facility-administered medications for this visit.     Past Surgical History:   Procedure Laterality Date   •  SECTION     • SKIN BIOPSY     • TUBAL ABDOMINAL LIGATION        Health Maintenance Due   Topic Date Due   • HEPATITIS C SCREENING  Never done   • ANNUAL PHYSICAL  Never done      Immunization History   Administered Date(s) Administered   • COVID-19 (PFIZER) Purple Cap Monovalent 2021, 06/10/2021   • DTaP, Unspecified 1999   • HPV Quadrivalent 2009, 2009, 12/15/2009   • Hepatitis A 2018   • MMR 1999   • Meningococcal, Unspecified 2012   • OPV 1999   • Td 2006   • Tdap 2014   • Varicella 1999         Part of this note may be an electronic transcription/translation of spoken language to printed   text using the Dragon Dictation System.      MARIO Bullock

## 2023-05-15 ENCOUNTER — TELEPHONE (OUTPATIENT)
Dept: FAMILY MEDICINE CLINIC | Age: 28
End: 2023-05-15
Payer: COMMERCIAL

## 2023-05-15 DIAGNOSIS — F41.1 GAD (GENERALIZED ANXIETY DISORDER): Primary | ICD-10-CM

## 2023-05-15 DIAGNOSIS — F41.0 ANXIETY ATTACK: ICD-10-CM

## 2023-05-15 DIAGNOSIS — F39 MOOD DISORDER: ICD-10-CM

## 2023-05-15 NOTE — TELEPHONE ENCOUNTER
Caller: Marilee Watts    Relationship to patient: Self    Best call back number: [phone]    Patient is needing: PATIENT STATED THAT WAS PRESCRIBED THE  Desvenlafaxine Succinate ER (Pristiq) 25 MG tablet sustained-release 24 hour A COUPLE WEEKS AGO AND IT IS MAKING HER JITTERY AND IS GIVING HER HEADACHES. 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 PLEASE CALL THE PATIENT AS SOON AS POSSIBLE.

## 2023-05-19 ENCOUNTER — OFFICE VISIT (OUTPATIENT)
Dept: FAMILY MEDICINE CLINIC | Age: 28
End: 2023-05-19
Payer: COMMERCIAL

## 2023-05-19 ENCOUNTER — TELEPHONE (OUTPATIENT)
Dept: FAMILY MEDICINE CLINIC | Age: 28
End: 2023-05-19

## 2023-05-19 VITALS
TEMPERATURE: 97.9 F | OXYGEN SATURATION: 97 % | WEIGHT: 144.4 LBS | HEART RATE: 67 BPM | DIASTOLIC BLOOD PRESSURE: 89 MMHG | HEIGHT: 63 IN | SYSTOLIC BLOOD PRESSURE: 96 MMHG | BODY MASS INDEX: 25.59 KG/M2

## 2023-05-19 DIAGNOSIS — J01.01 ACUTE RECURRENT MAXILLARY SINUSITIS: Primary | ICD-10-CM

## 2023-05-19 DIAGNOSIS — J02.9 SORE THROAT: ICD-10-CM

## 2023-05-19 DIAGNOSIS — G43.009 MIGRAINE WITHOUT AURA AND WITHOUT STATUS MIGRAINOSUS, NOT INTRACTABLE: ICD-10-CM

## 2023-05-19 LAB
EXPIRATION DATE: NORMAL
INTERNAL CONTROL: NORMAL
Lab: NORMAL
S PYO AG THROAT QL: NEGATIVE

## 2023-05-19 PROCEDURE — 99213 OFFICE O/P EST LOW 20 MIN: CPT | Performed by: NURSE PRACTITIONER

## 2023-05-19 PROCEDURE — 87880 STREP A ASSAY W/OPTIC: CPT | Performed by: NURSE PRACTITIONER

## 2023-05-19 PROCEDURE — 87081 CULTURE SCREEN ONLY: CPT | Performed by: NURSE PRACTITIONER

## 2023-05-19 RX ORDER — DESVENLAFAXINE SUCCINATE 50 MG/1
50 TABLET, EXTENDED RELEASE ORAL DAILY
COMMUNITY
Start: 2023-05-03

## 2023-05-19 RX ORDER — RIMEGEPANT SULFATE 75 MG/75MG
75 TABLET, ORALLY DISINTEGRATING ORAL EVERY OTHER DAY
Qty: 15 TABLET | Refills: 1 | Status: SHIPPED | OUTPATIENT
Start: 2023-05-19

## 2023-05-19 RX ORDER — AMOXICILLIN AND CLAVULANATE POTASSIUM 875; 125 MG/1; MG/1
1 TABLET, FILM COATED ORAL 2 TIMES DAILY
Qty: 14 TABLET | Refills: 0 | Status: SHIPPED | OUTPATIENT
Start: 2023-05-19 | End: 2023-05-26

## 2023-05-19 RX ORDER — FLUCONAZOLE 150 MG/1
150 TABLET ORAL ONCE
Qty: 1 TABLET | Refills: 0 | Status: SHIPPED | OUTPATIENT
Start: 2023-05-19 | End: 2023-05-19

## 2023-05-19 NOTE — PROGRESS NOTES
Chief Complaint  Marilee Watts presents to [facility] for Sore Throat (X 1 week ), Migraine (1 week ), Cough (Coughing up green mucus X 1 week ), Chest Pressure (X 1 week ), and Generalized Body Aches      Subjective     History of Present Illness  Marilee  here today for concerns of URI symptoms     Known Exposure to positive case?  No  Date of exposure?  n/a  Date of symptoms start?   7 days   (Symptoms may appear 2-14 days after exposure )    Fever or chills?  No  Cough?   yes  Shortness of breath or difficulty breathing?  no  Fatigue?   no  Muscle or body aches?  yes   Headache?   yes  New loss of taste or smell? no  Sore throat?  yes  Congestion or runny nose? yes  Nausea or vomiting?   no  Diarrhea?   no    Taking any medications at home to help with symptoms?Yes, describe: allegra/aleve d, ibuprofen  Nasal spray  Any prior vaccine to covid?   no  Any prior vaccine to flu this season? no  Any significant health problems / existing lung / heart problems?  No      Any  emergency warning signs for COVID-19. No  Trouble breathing?    Persistent pain or pressure in the chest?    New confusion?   Inability to wake or stay awake?  Pale, gray, or blue-colored skin, lips, or nail beds, depending on skin tone?         Also regarding headaches, Marilee reports that the Topamax that we did try on her last visit caused severe headaches and severe insomnia.  She has tried preventative medicine over the years but is unable to tolerate a lot of these medications due to the side effect of drowsiness.  She is wishing to have a referral to neurologist for possible Botox injections.  She reports that she has had a similar type of injection in her forehead and temples for cosmetics and does state that she thought that this helped in the past from what she can recall and is interested in pursuing possibility of this.  We have not tried her on the new her treatment regimens and we will plan on  trying those while waiting on a referral appointment for neurology.        Assessment and Plan       Diagnoses and all orders for this visit:    1. Acute recurrent maxillary sinusitis (Primary)  -     amoxicillin-clavulanate (Augmentin) 875-125 MG per tablet; Take 1 tablet by mouth 2 (Two) Times a Day for 7 days.  Dispense: 14 tablet; Refill: 0  -     fluconazole (Diflucan) 150 MG tablet; Take 1 tablet by mouth 1 (One) Time for 1 dose.  Dispense: 1 tablet; Refill: 0    2. Sore throat  -     POCT rapid strep A  -     Beta Strep Culture, Throat - , Throat; Future  -     Beta Strep Culture, Throat - Swab, Throat    3. Migraine without aura and without status migrainosus, not intractable  Comments:  will try nurtec and refer to neurology - Ben for management of HA  Orders:  -     Rimegepant Sulfate (Nurtec) 75 MG tablet dispersible tablet; Take 1 tablet by mouth Every Other Day. Or may choose to take PRN one time daily at the onset of migraine.  Dispense: 15 tablet; Refill: 1  -     Ambulatory Referral to Neurology        Follow Up   No follow-ups on file.      New Medications Ordered This Visit   Medications   • amoxicillin-clavulanate (Augmentin) 875-125 MG per tablet     Sig: Take 1 tablet by mouth 2 (Two) Times a Day for 7 days.     Dispense:  14 tablet     Refill:  0   • fluconazole (Diflucan) 150 MG tablet     Sig: Take 1 tablet by mouth 1 (One) Time for 1 dose.     Dispense:  1 tablet     Refill:  0   • Rimegepant Sulfate (Nurtec) 75 MG tablet dispersible tablet     Sig: Take 1 tablet by mouth Every Other Day. Or may choose to take PRN one time daily at the onset of migraine.     Dispense:  15 tablet     Refill:  1       Medications Discontinued During This Encounter   Medication Reason   • topiramate (TOPAMAX) 25 MG tablet Side effects            Review of Systems    Objective     Vitals:    05/19/23 0905   BP: 96/89   BP Location: Left arm   Patient Position: Sitting   Cuff Size: Adult   Pulse: 67   Temp:  "97.9 °F (36.6 °C)   TempSrc: Oral   SpO2: 97%   Weight: 65.5 kg (144 lb 6.4 oz)   Height: 160 cm (63\")     Body mass index is 25.58 kg/m².     Physical Exam  Constitutional:       General: She is not in acute distress.     Appearance: Normal appearance. She is ill-appearing.   HENT:      Head: Normocephalic.      Right Ear: Swelling present. No middle ear effusion. Tympanic membrane is erythematous.      Left Ear: Swelling present.  No middle ear effusion.   Cardiovascular:      Rate and Rhythm: Normal rate and regular rhythm.   Pulmonary:      Effort: Pulmonary effort is normal.      Breath sounds: Normal breath sounds.   Musculoskeletal:         General: Normal range of motion.   Neurological:      General: No focal deficit present.      Mental Status: She is alert and oriented to person, place, and time.   Psychiatric:         Mood and Affect: Mood normal.         Behavior: Behavior normal.            Result Review                       No Known Allergies   Past Medical History:   Diagnosis Date   • Asthma     AS CHILD   • COVID-19 vaccine administered      Current Outpatient Medications   Medication Sig Dispense Refill   • acetaminophen (TYLENOL) 500 MG tablet Take 1 tablet by mouth Every 6 (Six) Hours As Needed.     • Cetirizine HCl (ZyrTEC Allergy) 10 MG capsule Take 2 tablets by mouth Daily.     • desvenlafaxine (PRISTIQ) 50 MG 24 hr tablet Take 1 tablet by mouth Daily.     • fluticasone (Flonase) 50 MCG/ACT nasal spray 2 sprays into the nostril(s) as directed by provider Daily. 18 mL 1   • ibuprofen (ADVIL,MOTRIN) 200 MG tablet Take 2 tablets by mouth Every 6 (Six) Hours As Needed.     • amoxicillin-clavulanate (Augmentin) 875-125 MG per tablet Take 1 tablet by mouth 2 (Two) Times a Day for 7 days. 14 tablet 0   • fluconazole (Diflucan) 150 MG tablet Take 1 tablet by mouth 1 (One) Time for 1 dose. 1 tablet 0   • Rimegepant Sulfate (Nurtec) 75 MG tablet dispersible tablet Take 1 tablet by mouth Every Other " Day. Or may choose to take PRN one time daily at the onset of migraine. 15 tablet 1     No current facility-administered medications for this visit.     Past Surgical History:   Procedure Laterality Date   •  SECTION     • SKIN BIOPSY     • TUBAL ABDOMINAL LIGATION        Health Maintenance Due   Topic Date Due   • HEPATITIS C SCREENING  Never done   • ANNUAL PHYSICAL  Never done      Immunization History   Administered Date(s) Administered   • COVID-19 (PFIZER) Purple Cap Monovalent 2021, 06/10/2021   • DTaP, Unspecified 1999   • HPV Quadrivalent 2009, 2009, 12/15/2009   • Hepatitis A 2018   • MMR 1999   • Meningococcal, Unspecified 2012   • OPV 1999   • Td 2006   • Tdap 2014   • Varicella 1999         Part of this note may be an electronic transcription/translation of spoken language to printed   text using the Dragon Dictation System.      MARIO Bullock

## 2023-05-22 LAB — BACTERIA SPEC AEROBE CULT: NORMAL

## 2023-05-23 NOTE — TELEPHONE ENCOUNTER
Approved 05/19/2023-08/18/2023  Quantity limit of 18 tablets per 30 days.    Pt informed.   Parent(s)

## 2023-06-16 ENCOUNTER — CONSULT (OUTPATIENT)
Dept: ONCOLOGY | Facility: HOSPITAL | Age: 28
End: 2023-06-16
Payer: COMMERCIAL

## 2023-06-16 VITALS
TEMPERATURE: 97.8 F | BODY MASS INDEX: 25.93 KG/M2 | WEIGHT: 146.39 LBS | RESPIRATION RATE: 18 BRPM | DIASTOLIC BLOOD PRESSURE: 75 MMHG | SYSTOLIC BLOOD PRESSURE: 103 MMHG | HEART RATE: 69 BPM | OXYGEN SATURATION: 100 %

## 2023-06-16 DIAGNOSIS — Z01.818 ENCOUNTER FOR ECHOCARDIOGRAM BEFORE INITIATION OF CHEMOTHERAPY: Primary | ICD-10-CM

## 2023-06-16 DIAGNOSIS — C50.919 METAPLASTIC CARCINOMA OF BREAST: ICD-10-CM

## 2023-06-16 PROBLEM — D24.1 BREAST FIBROADENOMA, RIGHT: Status: ACTIVE | Noted: 2023-05-22

## 2023-06-16 PROCEDURE — G0463 HOSPITAL OUTPT CLINIC VISIT: HCPCS | Performed by: INTERNAL MEDICINE

## 2023-06-16 RX ORDER — HYDROXYZINE HYDROCHLORIDE 25 MG/1
TABLET, FILM COATED ORAL
COMMUNITY
Start: 2023-06-07

## 2023-06-16 RX ORDER — DOCUSATE SODIUM 100 MG/1
CAPSULE, LIQUID FILLED ORAL
COMMUNITY
Start: 2023-05-26

## 2023-06-16 RX ORDER — OXYCODONE HYDROCHLORIDE 5 MG/1
TABLET ORAL
COMMUNITY
Start: 2023-05-26

## 2023-06-16 RX ORDER — ARIPIPRAZOLE 2 MG/1
2 TABLET ORAL DAILY
COMMUNITY
Start: 2023-03-30

## 2023-06-16 RX ORDER — FLUCONAZOLE 150 MG/1
TABLET ORAL
COMMUNITY
Start: 2023-05-19

## 2023-06-16 RX ORDER — TOPIRAMATE 25 MG/1
25 TABLET ORAL DAILY
COMMUNITY
Start: 2023-05-03

## 2023-06-16 NOTE — PROGRESS NOTES
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 She has history of fibroadenoma of right breast that was removed over 10 years ago. She had it return and was stable for several years. It was about 2 cm in size. She then got it biopsied and a few days after the biopsy it became painful and swollen. It then became red. These symptoms then worsened. Biopsy returned as fibroadenoma, but due to the symptoms she elected to have it removed. Lumpectomy on 5/26/23 per Dr. Laz Velazquez showed a 4.5 cm mass with metaplastic triple negative cancer interspersed with fibroadenoma. She has upcoming breast MRI, ultrasound, and mammogram. She reports it is  and swollen. She is worried about it coming back in other breast. No family history of breast cancer. Aunt with ovarian cancer. Works part-time. Has two sons, age 5 and 8. Often works at night.     Oncology/Hematology History Overview Note   2012: Lumpectomy of fibroadenoma of right breast    3/2023: Started to have pain and swelling of known area of fibroadenoma of right breast. Started after trauma to breast.     5/26/23: Right breast lumpectomy: Invasive metaplastic carcinoma, high-grade, 4.5 x 4.0 x 3.3 cm with interspersed fibroadenoma, negative margins. ER 0%, DE 0%, HER2 0 by IHC. PDL1 CPS of 50.      Metaplastic carcinoma of breast   6/16/2023 Initial Diagnosis    Metaplastic carcinoma of breast           Review of Systems   Genitourinary:  Positive for breast pain (RIght breast discomfort).   Neurological:  Positive for headache.   All other systems reviewed and are  negative.  Current Outpatient Medications on File Prior to Visit   Medication Sig Dispense Refill   • ARIPiprazole (ABILIFY) 2 MG tablet Take 1 tablet by mouth Daily.     • docusate sodium (COLACE) 100 MG capsule      • fluconazole (DIFLUCAN) 150 MG tablet      • hydrOXYzine (ATARAX) 25 MG tablet Take  by mouth.     • oxyCODONE (ROXICODONE) 5 MG immediate release tablet      • topiramate (TOPAMAX) 25 MG tablet Take 1 tablet by mouth Daily.     • acetaminophen (TYLENOL) 500 MG tablet Take 1 tablet by mouth Every 6 (Six) Hours As Needed.     • Cetirizine HCl (ZyrTEC Allergy) 10 MG capsule Take 2 tablets by mouth Daily.     • desvenlafaxine (PRISTIQ) 50 MG 24 hr tablet Take 1 tablet by mouth Daily.     • fluticasone (Flonase) 50 MCG/ACT nasal spray 2 sprays into the nostril(s) as directed by provider Daily. 18 mL 1   • ibuprofen (ADVIL,MOTRIN) 200 MG tablet Take 2 tablets by mouth Every 6 (Six) Hours As Needed.     • Rimegepant Sulfate (Nurtec) 75 MG tablet dispersible tablet Take 1 tablet by mouth Every Other Day. Or may choose to take PRN one time daily at the onset of migraine. 15 tablet 1     No current facility-administered medications on file prior to visit.       No Known Allergies  Past Medical History:   Diagnosis Date   • Asthma     AS CHILD   • COVID-19 vaccine administered      Past Surgical History:   Procedure Laterality Date   •  SECTION     • SKIN BIOPSY     • TUBAL ABDOMINAL LIGATION       Social History     Socioeconomic History   • Marital status: Single   • Number of children: 2   Tobacco Use   • Smoking status: Never   • Smokeless tobacco: Never   Vaping Use   • Vaping Use: Never used   Substance and Sexual Activity   • Alcohol use: Never   • Drug use: Never     Family History   Problem Relation Age of Onset   • COPD Mother    • Hypertension Father    • Hyperlipidemia Father    • Diabetes Father    • Skin cancer Paternal Grandmother        Objective   Physical Exam  Well appearing patient  in no acute distress on RA  Anicteric sclerae, no rash on exposed skin  Respirations non-labored  Awake, alert, and oriented x 4. Speech intact. No gross neurologic deficit  Appropriate mood and affect    Vitals:    06/16/23 1300   BP: 103/75   Pulse: 69   Resp: 18   Temp: 97.8 °F (36.6 °C)   TempSrc: Temporal   SpO2: 100%   Weight: 66.4 kg (146 lb 6.2 oz)   PainSc:   5   PainLoc: Breast     ECOG score: 0         PHQ-9 Total Score:                      Result Review :   The following data was reviewed by: Ramin Shine MD on 06/16/23:  Lab Results   Component Value Date    HGB 13.1 10/24/2022    HCT 40.6 10/24/2022    MCV 87.7 10/24/2022     10/24/2022    WBC 4.78 10/24/2022    NEUTROABS 2.44 03/26/2019    LYMPHSABS 2.24 03/26/2019    MONOSABS 0.32 03/26/2019    EOSABS 0.04 03/26/2019    BASOSABS 0.02 03/26/2019     Lab Results   Component Value Date    GLUCOSE 93 10/24/2022    BUN 11 10/24/2022    CREATININE 0.54 (L) 10/24/2022     10/24/2022    K 4.1 10/24/2022     (H) 10/24/2022    CO2 27.0 10/24/2022    CALCIUM 8.9 10/24/2022    PROTEINTOT 6.5 10/24/2022    ALBUMIN 4.10 10/24/2022    BILITOT 0.4 10/24/2022    ALKPHOS 54 10/24/2022    AST 17 10/24/2022    ALT 16 10/24/2022     Lab Results   Component Value Date    TSH 1.730 10/24/2022     Labs personally reviewed    Pathology report personally reviewed           Surgery notes by Dr. Velazquez personally reviewed      Ultrasound-guided needle placement    Result Date: 5/26/2023  Successful needle localization of the right breast mass which has increased in size from 2022. It now measures approximately 4 cm. Satisfactory lumpectomy. Images reviewed, interpreted, and dictated by Kristian Welsh DO MM digital mammo diagnostic right    Result Date: 5/26/2023  Successful needle localization of the right breast mass which has increased in size from 2022. It now measures approximately 4 cm. Satisfactory lumpectomy. Images reviewed, interpreted, and  dictated by Kristian Welsh DO    Mammography breast specimen    Result Date: 5/26/2023  Successful needle localization of the right breast mass which has increased in size from 2022. It now measures approximately 4 cm. Satisfactory lumpectomy. Images reviewed, interpreted, and dictated by Kristian Welsh DO         Assessment and Plan    Diagnoses and all orders for this visit:    1. Encounter for echocardiogram before initiation of chemotherapy (Primary)  -     Adult Transthoracic Echo Complete W/ Cont if Necessary Per Protocol; Future    2. Metaplastic carcinoma of breast  -     Adult Transthoracic Echo Complete W/ Cont if Necessary Per Protocol; Future  -     Ambulatory Referral to General Surgery  -     Ambulatory Referral to Plastic Surgery    Metaplastic Carcinoma of Right Breast  Patient is s/p lumpectomy of 4.5 cm lesion with triple negative metaplastic carcinoma, interspersed with fibroadenoma. Current staging is cT0Nx. We are awaiting MRI breast, u/s, and mammogram for further workup. Patient is deciding between double mastectomy or not. Believe patient would benefit from sentinel lymph node biopsy as she did not have this done at time of lumpectomy. Due to size and triple negative status, recommend adjuvant chemotherapy. She has been referred for port. Chemotherapy would be dose dense Doxorubicin combined with Cyclophosphamide for 4 cycles every 2 weeks followed by weekly Paclitaxel. Risk, benefit, and side effect profile discussed with patient. If mastectomy performed, would wait about 1 month post surgery to start. If patient decides against mastectomy would prefer to start after sentinel node biopsy if performed. Patient understands. PRN anti-emetics prescribed.    Plan to refer to radiation oncology after completion of chemotherapy for consideration of adjuvant radiation.     Genetic testing has been completed and pending    This is an acute or chronic illness that poses a threat to life or bodily function.  The above treatment plan involves a high risk of complications and/or mortality of patient management.    The patient was seen and examined. Work by the provider also included review and/or ordering of lab tests, review and/or ordering of radiology tests, review and/or ordering of medicine tests, discussion with other physicians or providers, independent review of data, obtaining old records, review/summation of old records, and/or other review.     I have reviewed the family history, social history, and past medical history for this patient. Previous information and data has been reviewed and updated as needed. I have reviewed and verified the chief complaint, history, and other documentation. The patient was interviewed and examined at the bedside and the chart reviewed. The previous observations, recommendations, and conclusions were reviewed including those of other providers.     The plan was discussed with the patient and/or family. The patient was given time to ask questions and these questions were answered. At the conclusion of their visit they had no additional questions or concerns and all questions were answered to their satisfaction.      I spent 75 minutes caring for Marilee on this date of service. This time includes time spent by me in the following activities:preparing for the visit, reviewing tests, obtaining and/or reviewing a separately obtained history, performing a medically appropriate examination and/or evaluation , counseling and educating the patient/family/caregiver, ordering medications, tests, or procedures, referring and communicating with other health care professionals , documenting information in the medical record, independently interpreting results and communicating that information with the patient/family/caregiver and care coordination    Patient Follow Up: 6 weeks or sooner if needed  Patient was given instructions and counseling regarding her condition or for health maintenance  advice. Please see specific information pulled into the AVS if appropriate.

## 2023-06-19 DIAGNOSIS — C50.919 METAPLASTIC CARCINOMA OF BREAST: Primary | ICD-10-CM

## 2023-06-23 PROBLEM — N65.1 DISPROPORTION BETWEEN NATIVE BREAST AND RECONSTRUCTED BREAST: Status: ACTIVE | Noted: 2023-06-23

## 2023-06-30 PROBLEM — Z17.421 TRIPLE NEGATIVE BREAST CANCER: Chronic | Status: ACTIVE | Noted: 2023-06-28

## 2023-06-30 PROBLEM — C50.919 TRIPLE NEGATIVE BREAST CANCER: Chronic | Status: ACTIVE | Noted: 2023-06-28

## 2023-07-11 PROBLEM — Z45.2 FITTING AND ADJUSTMENT OF VASCULAR CATHETER: Status: ACTIVE | Noted: 2023-07-11

## 2023-07-12 ENCOUNTER — TELEPHONE (OUTPATIENT)
Dept: ONCOLOGY | Facility: HOSPITAL | Age: 28
End: 2023-07-12

## 2023-07-12 NOTE — TELEPHONE ENCOUNTER
Caller: Marilee Watts    Relationship: Self    Best call back number: [phone]    What is the best time to reach you: ANYTIME    Who are you requesting to speak with (clinical staff, provider,  specific staff member): NURSE      What was the call regarding:     HAD MEDICATION QUESTION AND CHEMO FRIDAY HAD SOME QUESTIONS ABOUT THAT WOULD LIKE TO DISCUSS WITH NURSE.       Is it okay if the provider responds through MyChart: YES

## 2023-07-13 PROBLEM — Z17.421 TRIPLE NEGATIVE BREAST CANCER: Chronic | Status: RESOLVED | Noted: 2023-06-28 | Resolved: 2023-07-13

## 2023-07-13 PROBLEM — C50.919 TRIPLE NEGATIVE BREAST CANCER: Chronic | Status: RESOLVED | Noted: 2023-06-28 | Resolved: 2023-07-13

## 2023-07-14 ENCOUNTER — TELEPHONE (OUTPATIENT)
Dept: ONCOLOGY | Facility: HOSPITAL | Age: 28
End: 2023-07-14

## 2023-07-14 NOTE — TELEPHONE ENCOUNTER
Caller: MAYO    Relationship: [facility]    Best call back number: [phone]     What is the best time to reach you: ANYTIME AFTER 1:30 PM - OKAY TO LEAVE A VM THE PHARMACIST CHECK EVERY 45 MINUTES (PLEASE USE PROVIDER LINE)    Who are you requesting to speak with (clinical staff, provider,  specific staff member): CLINICAL    What was the call regardin [employer] IS ON BACKORDER, BUT THEY ARE ABLE TO ORDER FIRST [employer] M[employer]THWASH WHICH IS ALREADY MIXED AND IS ALL OF THE COMPONENTS PLUS SIM[employer]. IF THAT IS OKAY THEY CAN GET THAT ORDERED AND HAVE IT IN BY MONDAY.

## 2023-07-25 ENCOUNTER — TELEPHONE (OUTPATIENT)
Dept: ONCOLOGY | Facility: HOSPITAL | Age: 28
End: 2023-07-25
Payer: COMMERCIAL

## 2023-07-25 DIAGNOSIS — C50.919 METAPLASTIC CARCINOMA OF BREAST: Primary | ICD-10-CM

## 2023-07-25 RX ORDER — PROPRANOLOL HYDROCHLORIDE 20 MG/1
20 TABLET ORAL 2 TIMES DAILY
Qty: 60 TABLET | Refills: 5 | Status: SHIPPED | OUTPATIENT
Start: 2023-07-25

## 2023-07-25 RX ORDER — IBUPROFEN 600 MG/1
600 TABLET ORAL EVERY 6 HOURS PRN
Qty: 60 TABLET | Refills: 5 | Status: SHIPPED | OUTPATIENT
Start: 2023-07-25

## 2023-07-25 NOTE — TELEPHONE ENCOUNTER
Caller: LISA    Relationship: Other    Best call back number: [phone]    What is the best time to reach you: ANYTIME    Who are you requesting to speak with (clinical staff, provider, specific staff member): CLINICAL    What was the call regarding: LISA WITH [facility] CALLING FOR CLARIFICATION ON PATIENT'S IBUPROFEN - THEY ONLY HAVE LIQUID FORM  MG. LISA IF  MG TABLET CAN BE PRESCRIBED INSTEAD OF 6 TABLETS  MG. ALSO PLEASE CLARIFY THE QUANTITY.

## 2023-07-26 ENCOUNTER — TELEPHONE (OUTPATIENT)
Dept: ONCOLOGY | Facility: HOSPITAL | Age: 28
End: 2023-07-26
Payer: COMMERCIAL

## 2023-07-26 NOTE — TELEPHONE ENCOUNTER
Caller: Marilee Watts    Relationship: Self    Best call back number: [phone]    What is the best time to reach you: ANYTIME    Who are you requesting to speak with (clinical staff, provider, specific staff member): SAVANNAH - INFUSION SCHEDULING    What was the call regarding: PATIENT NEEDS TO R/S HER 7/28 INF APPT TIME FROM 8 AM -9 AM. SHE ACTUALLY NEEDS TO R/S ALL OF HER INF APPT TIMES  AM INSTEAD OF 8 AM. PLEASE CALL TO ADVISE.

## 2023-07-27 ENCOUNTER — TELEPHONE (OUTPATIENT)
Dept: SURGERY | Facility: CLINIC | Age: 28
End: 2023-07-27
Payer: COMMERCIAL

## 2023-07-28 ENCOUNTER — HOSPITAL ENCOUNTER (OUTPATIENT)
Dept: ONCOLOGY | Facility: HOSPITAL | Age: 28
Discharge: HOME | End: 2023-07-28
Payer: COMMERCIAL

## 2023-07-28 ENCOUNTER — TELEPHONE (OUTPATIENT)
Dept: SURGERY | Facility: CLINIC | Age: 28
End: 2023-07-28
Payer: COMMERCIAL

## 2023-07-28 VITALS
BODY MASS INDEX: 26.67 KG/M2 | HEART RATE: 71 BPM | TEMPERATURE: 97.6 F | WEIGHT: 153 LBS | SYSTOLIC BLOOD PRESSURE: 120 MMHG | DIASTOLIC BLOOD PRESSURE: 69 MMHG | OXYGEN SATURATION: 100 %

## 2023-07-28 DIAGNOSIS — Z45.2 FITTING AND ADJUSTMENT OF VASCULAR CATHETER: Primary | ICD-10-CM

## 2023-07-28 DIAGNOSIS — C50.919 METAPLASTIC CARCINOMA OF BREAST: ICD-10-CM

## 2023-07-28 LAB
ALBUMIN SERPL-MCNC: 3.7 G/DL (ref 3.5–5.2)
ALBUMIN/GLOB SERPL: 1.8 G/DL
ALP SERPL-CCNC: 69 U/L (ref 39–117)
ALT SERPL W P-5'-P-CCNC: 23 U/L (ref 1–33)
ANION GAP SERPL CALCULATED.3IONS-SCNC: 7.8 MMOL/L (ref 5–15)
AST SERPL-CCNC: 18 U/L (ref 1–32)
BASOPHILS # BLD AUTO: 0.02 10*3/MM3 (ref 0–0.2)
BASOPHILS NFR BLD AUTO: 0.7 % (ref 0–1.5)
BILIRUB SERPL-MCNC: <0.2 MG/DL (ref 0–1.2)
BUN SERPL-MCNC: 11 MG/DL (ref 6–20)
BUN/CREAT SERPL: 26.2 (ref 7–25)
CALCIUM SPEC-SCNC: 8 MG/DL (ref 8.6–10.5)
CHLORIDE SERPL-SCNC: 112 MMOL/L (ref 98–107)
CO2 SERPL-SCNC: 18.2 MMOL/L (ref 22–29)
CREAT SERPL-MCNC: 0.42 MG/DL (ref 0.57–1)
DEPRECATED RDW RBC AUTO: 42.7 FL (ref 37–54)
EGFRCR SERPLBLD CKD-EPI 2021: 136 ML/MIN/1.73
EOSINOPHIL # BLD AUTO: 0 10*3/MM3 (ref 0–0.4)
EOSINOPHIL NFR BLD AUTO: 0 % (ref 0.3–6.2)
ERYTHROCYTE [DISTWIDTH] IN BLOOD BY AUTOMATED COUNT: 14.1 % (ref 12.3–15.4)
GLOBULIN UR ELPH-MCNC: 2.1 GM/DL
GLUCOSE SERPL-MCNC: 86 MG/DL (ref 65–99)
HCT VFR BLD AUTO: 33.8 % (ref 34–46.6)
HGB BLD-MCNC: 10.9 G/DL (ref 12–15.9)
IMM GRANULOCYTES # BLD AUTO: 0.01 10*3/MM3 (ref 0–0.05)
IMM GRANULOCYTES NFR BLD AUTO: 0.3 % (ref 0–0.5)
LYMPHOCYTES # BLD AUTO: 1.56 10*3/MM3 (ref 0.7–3.1)
LYMPHOCYTES NFR BLD AUTO: 52.5 % (ref 19.6–45.3)
MCH RBC QN AUTO: 27.5 PG (ref 26.6–33)
MCHC RBC AUTO-ENTMCNC: 32.2 G/DL (ref 31.5–35.7)
MCV RBC AUTO: 85.1 FL (ref 79–97)
MONOCYTES # BLD AUTO: 0.26 10*3/MM3 (ref 0.1–0.9)
MONOCYTES NFR BLD AUTO: 8.8 % (ref 5–12)
NEUTROPHILS NFR BLD AUTO: 1.12 10*3/MM3 (ref 1.7–7)
NEUTROPHILS NFR BLD AUTO: 37.7 % (ref 42.7–76)
PLATELET # BLD AUTO: 182 10*3/MM3 (ref 140–450)
PMV BLD AUTO: 9.7 FL (ref 6–12)
POTASSIUM SERPL-SCNC: 3.5 MMOL/L (ref 3.5–5.2)
PROT SERPL-MCNC: 5.8 G/DL (ref 6–8.5)
RBC # BLD AUTO: 3.97 10*6/MM3 (ref 3.77–5.28)
SODIUM SERPL-SCNC: 138 MMOL/L (ref 136–145)
WBC NRBC COR # BLD: 2.97 10*3/MM3 (ref 3.4–10.8)

## 2023-07-28 PROCEDURE — 25010000002 DIPHENHYDRAMINE PER 50 MG: Performed by: INTERNAL MEDICINE

## 2023-07-28 PROCEDURE — 96375 TX/PRO/DX INJ NEW DRUG ADDON: CPT

## 2023-07-28 PROCEDURE — 85025 COMPLETE CBC W/AUTO DIFF WBC: CPT | Performed by: INTERNAL MEDICINE

## 2023-07-28 PROCEDURE — 80053 COMPREHEN METABOLIC PANEL: CPT | Performed by: INTERNAL MEDICINE

## 2023-07-28 PROCEDURE — 25010000002 DEXAMETHASONE PER 1 MG: Performed by: INTERNAL MEDICINE

## 2023-07-28 PROCEDURE — 96413 CHEMO IV INFUSION 1 HR: CPT

## 2023-07-28 PROCEDURE — 25010000002 PACLITAXEL PER 1 MG: Performed by: INTERNAL MEDICINE

## 2023-07-28 PROCEDURE — 25010000002 HEPARIN LOCK FLUSH PER 10 UNITS: Performed by: INTERNAL MEDICINE

## 2023-07-28 RX ORDER — SODIUM CHLORIDE 0.9 % (FLUSH) 0.9 %
20 SYRINGE (ML) INJECTION AS NEEDED
OUTPATIENT
Start: 2023-07-28

## 2023-07-28 RX ORDER — HEPARIN SODIUM (PORCINE) LOCK FLUSH IV SOLN 100 UNIT/ML 100 UNIT/ML
500 SOLUTION INTRAVENOUS AS NEEDED
OUTPATIENT
Start: 2023-07-28

## 2023-07-28 RX ORDER — SODIUM CHLORIDE 9 MG/ML
250 INJECTION, SOLUTION INTRAVENOUS ONCE
Status: COMPLETED | OUTPATIENT
Start: 2023-07-28 | End: 2023-07-28

## 2023-07-28 RX ORDER — HEPARIN SODIUM (PORCINE) LOCK FLUSH IV SOLN 100 UNIT/ML 100 UNIT/ML
500 SOLUTION INTRAVENOUS AS NEEDED
Status: DISCONTINUED | OUTPATIENT
Start: 2023-07-28 | End: 2023-07-29 | Stop reason: HOSPADM

## 2023-07-28 RX ORDER — SODIUM CHLORIDE 0.9 % (FLUSH) 0.9 %
20 SYRINGE (ML) INJECTION AS NEEDED
Status: DISCONTINUED | OUTPATIENT
Start: 2023-07-28 | End: 2023-07-29 | Stop reason: HOSPADM

## 2023-07-28 RX ORDER — DEXAMETHASONE SODIUM PHOSPHATE 4 MG/ML
4 INJECTION, SOLUTION INTRA-ARTICULAR; INTRALESIONAL; INTRAMUSCULAR; INTRAVENOUS; SOFT TISSUE ONCE
Status: COMPLETED | OUTPATIENT
Start: 2023-07-28 | End: 2023-07-28

## 2023-07-28 RX ORDER — FAMOTIDINE 10 MG/ML
20 INJECTION, SOLUTION INTRAVENOUS ONCE
Status: COMPLETED | OUTPATIENT
Start: 2023-07-28 | End: 2023-07-28

## 2023-07-28 RX ADMIN — FAMOTIDINE 20 MG: 10 INJECTION INTRAVENOUS at 10:11

## 2023-07-28 RX ADMIN — PACLITAXEL 135 MG: 6 INJECTION, SOLUTION, CONCENTRATE INTRAVENOUS at 10:40

## 2023-07-28 RX ADMIN — DIPHENHYDRAMINE HYDROCHLORIDE 25 MG: 50 INJECTION, SOLUTION INTRAMUSCULAR; INTRAVENOUS at 10:13

## 2023-07-28 RX ADMIN — Medication 20 ML: at 11:56

## 2023-07-28 RX ADMIN — HEPARIN SODIUM (PORCINE) LOCK FLUSH IV SOLN 100 UNIT/ML 500 UNITS: 100 SOLUTION at 11:57

## 2023-07-28 RX ADMIN — SODIUM CHLORIDE 250 ML: 9 INJECTION, SOLUTION INTRAVENOUS at 09:52

## 2023-07-28 RX ADMIN — DEXAMETHASONE SODIUM PHOSPHATE 4 MG: 4 INJECTION, SOLUTION INTRA-ARTICULAR; INTRALESIONAL; INTRAMUSCULAR; INTRAVENOUS; SOFT TISSUE at 10:07

## 2023-08-03 ENCOUNTER — HOSPITAL ENCOUNTER (OUTPATIENT)
Dept: ONCOLOGY | Facility: HOSPITAL | Age: 28
Discharge: HOME | End: 2023-08-03
Admitting: INTERNAL MEDICINE
Payer: COMMERCIAL

## 2023-08-03 ENCOUNTER — OFFICE VISIT (OUTPATIENT)
Dept: ONCOLOGY | Facility: HOSPITAL | Age: 28
End: 2023-08-03
Payer: COMMERCIAL

## 2023-08-03 VITALS
RESPIRATION RATE: 18 BRPM | DIASTOLIC BLOOD PRESSURE: 68 MMHG | TEMPERATURE: 97.5 F | HEIGHT: 64 IN | SYSTOLIC BLOOD PRESSURE: 103 MMHG | WEIGHT: 154.54 LBS | HEART RATE: 69 BPM | OXYGEN SATURATION: 100 % | BODY MASS INDEX: 26.38 KG/M2

## 2023-08-03 DIAGNOSIS — C50.919 METAPLASTIC CARCINOMA OF BREAST: Primary | ICD-10-CM

## 2023-08-03 LAB
ALBUMIN SERPL-MCNC: 4.4 G/DL (ref 3.5–5.2)
ALBUMIN/GLOB SERPL: 1.8 G/DL
ALP SERPL-CCNC: 76 U/L (ref 39–117)
ALT SERPL W P-5'-P-CCNC: 58 U/L (ref 1–33)
ANION GAP SERPL CALCULATED.3IONS-SCNC: 9.6 MMOL/L (ref 5–15)
AST SERPL-CCNC: 28 U/L (ref 1–32)
BASOPHILS # BLD AUTO: 0.02 10*3/MM3 (ref 0–0.2)
BASOPHILS NFR BLD AUTO: 0.7 % (ref 0–1.5)
BILIRUB SERPL-MCNC: 0.3 MG/DL (ref 0–1.2)
BUN SERPL-MCNC: 9 MG/DL (ref 6–20)
BUN/CREAT SERPL: 18.4 (ref 7–25)
CALCIUM SPEC-SCNC: 9 MG/DL (ref 8.6–10.5)
CHLORIDE SERPL-SCNC: 106 MMOL/L (ref 98–107)
CO2 SERPL-SCNC: 23.4 MMOL/L (ref 22–29)
CREAT SERPL-MCNC: 0.49 MG/DL (ref 0.57–1)
DEPRECATED RDW RBC AUTO: 46.8 FL (ref 37–54)
EGFRCR SERPLBLD CKD-EPI 2021: 131 ML/MIN/1.73
EOSINOPHIL # BLD AUTO: 0.01 10*3/MM3 (ref 0–0.4)
EOSINOPHIL NFR BLD AUTO: 0.3 % (ref 0.3–6.2)
ERYTHROCYTE [DISTWIDTH] IN BLOOD BY AUTOMATED COUNT: 15 % (ref 12.3–15.4)
GLOBULIN UR ELPH-MCNC: 2.5 GM/DL
GLUCOSE SERPL-MCNC: 95 MG/DL (ref 65–99)
HCT VFR BLD AUTO: 32.7 % (ref 34–46.6)
HGB BLD-MCNC: 10.8 G/DL (ref 12–15.9)
IMM GRANULOCYTES # BLD AUTO: 0.02 10*3/MM3 (ref 0–0.05)
IMM GRANULOCYTES NFR BLD AUTO: 0.7 % (ref 0–0.5)
LYMPHOCYTES # BLD AUTO: 1.54 10*3/MM3 (ref 0.7–3.1)
LYMPHOCYTES NFR BLD AUTO: 52 % (ref 19.6–45.3)
MCH RBC QN AUTO: 28.6 PG (ref 26.6–33)
MCHC RBC AUTO-ENTMCNC: 33 G/DL (ref 31.5–35.7)
MCV RBC AUTO: 86.5 FL (ref 79–97)
MONOCYTES # BLD AUTO: 0.17 10*3/MM3 (ref 0.1–0.9)
MONOCYTES NFR BLD AUTO: 5.7 % (ref 5–12)
NEUTROPHILS NFR BLD AUTO: 1.2 10*3/MM3 (ref 1.7–7)
NEUTROPHILS NFR BLD AUTO: 40.6 % (ref 42.7–76)
NRBC BLD AUTO-RTO: 0 /100 WBC (ref 0–0.2)
PLATELET # BLD AUTO: 119 10*3/MM3 (ref 140–450)
PMV BLD AUTO: 9.5 FL (ref 6–12)
POTASSIUM SERPL-SCNC: 3.8 MMOL/L (ref 3.5–5.2)
PROT SERPL-MCNC: 6.9 G/DL (ref 6–8.5)
RBC # BLD AUTO: 3.78 10*6/MM3 (ref 3.77–5.28)
SODIUM SERPL-SCNC: 139 MMOL/L (ref 136–145)
WBC NRBC COR # BLD: 2.96 10*3/MM3 (ref 3.4–10.8)

## 2023-08-03 PROCEDURE — 80053 COMPREHEN METABOLIC PANEL: CPT | Performed by: INTERNAL MEDICINE

## 2023-08-03 PROCEDURE — 36415 COLL VENOUS BLD VENIPUNCTURE: CPT

## 2023-08-03 PROCEDURE — G0463 HOSPITAL OUTPT CLINIC VISIT: HCPCS | Performed by: INTERNAL MEDICINE

## 2023-08-03 PROCEDURE — 85025 COMPLETE CBC W/AUTO DIFF WBC: CPT | Performed by: INTERNAL MEDICINE

## 2023-08-03 RX ORDER — FAMOTIDINE 10 MG/ML
20 INJECTION, SOLUTION INTRAVENOUS ONCE
Status: CANCELLED | OUTPATIENT
Start: 2023-08-04

## 2023-08-03 RX ORDER — OLANZAPINE 5 MG/1
5 TABLET ORAL ONCE
Status: CANCELLED | OUTPATIENT
Start: 2023-08-04 | End: 2023-08-04

## 2023-08-03 RX ORDER — DIPHENHYDRAMINE HYDROCHLORIDE 50 MG/ML
50 INJECTION INTRAMUSCULAR; INTRAVENOUS AS NEEDED
Status: CANCELLED | OUTPATIENT
Start: 2023-08-04

## 2023-08-03 RX ORDER — FAMOTIDINE 10 MG/ML
20 INJECTION, SOLUTION INTRAVENOUS AS NEEDED
Status: CANCELLED | OUTPATIENT
Start: 2023-08-04

## 2023-08-03 RX ORDER — SODIUM CHLORIDE 9 MG/ML
250 INJECTION, SOLUTION INTRAVENOUS ONCE
Status: CANCELLED | OUTPATIENT
Start: 2023-08-04

## 2023-08-03 RX ORDER — PALONOSETRON 0.05 MG/ML
0.25 INJECTION, SOLUTION INTRAVENOUS ONCE
Status: CANCELLED | OUTPATIENT
Start: 2023-08-04

## 2023-08-03 RX ORDER — DEXAMETHASONE SODIUM PHOSPHATE 4 MG/ML
4 INJECTION, SOLUTION INTRA-ARTICULAR; INTRALESIONAL; INTRAMUSCULAR; INTRAVENOUS; SOFT TISSUE ONCE
Status: CANCELLED
Start: 2023-08-04 | End: 2023-08-04

## 2023-08-04 ENCOUNTER — HOSPITAL ENCOUNTER (OUTPATIENT)
Dept: ONCOLOGY | Facility: HOSPITAL | Age: 28
Discharge: HOME | End: 2023-08-04
Payer: COMMERCIAL

## 2023-08-04 ENCOUNTER — DOCUMENTATION (OUTPATIENT)
Dept: ONCOLOGY | Facility: HOSPITAL | Age: 28
End: 2023-08-04
Payer: COMMERCIAL

## 2023-08-04 VITALS
TEMPERATURE: 97.9 F | DIASTOLIC BLOOD PRESSURE: 56 MMHG | BODY MASS INDEX: 26.48 KG/M2 | RESPIRATION RATE: 20 BRPM | WEIGHT: 151.9 LBS | SYSTOLIC BLOOD PRESSURE: 96 MMHG | OXYGEN SATURATION: 100 % | HEART RATE: 60 BPM

## 2023-08-04 DIAGNOSIS — Z45.2 FITTING AND ADJUSTMENT OF VASCULAR CATHETER: ICD-10-CM

## 2023-08-04 DIAGNOSIS — C50.919 METAPLASTIC CARCINOMA OF BREAST: Primary | ICD-10-CM

## 2023-08-04 PROCEDURE — 25010000002 DEXAMETHASONE PER 1 MG: Performed by: INTERNAL MEDICINE

## 2023-08-04 PROCEDURE — 96417 CHEMO IV INFUS EACH ADDL SEQ: CPT

## 2023-08-04 PROCEDURE — 96413 CHEMO IV INFUSION 1 HR: CPT

## 2023-08-04 PROCEDURE — 25010000002 FOSAPREPITANT PER 1 MG: Performed by: INTERNAL MEDICINE

## 2023-08-04 PROCEDURE — 96367 TX/PROPH/DG ADDL SEQ IV INF: CPT

## 2023-08-04 PROCEDURE — 25010000002 DIPHENHYDRAMINE PER 50 MG: Performed by: INTERNAL MEDICINE

## 2023-08-04 PROCEDURE — 25010000002 PALONOSETRON PER 25 MCG: Performed by: INTERNAL MEDICINE

## 2023-08-04 PROCEDURE — 25010000002 CARBOPLATIN PER 50 MG: Performed by: INTERNAL MEDICINE

## 2023-08-04 PROCEDURE — 25010000002 PACLITAXEL PER 1 MG: Performed by: INTERNAL MEDICINE

## 2023-08-04 PROCEDURE — 25010000002 HEPARIN LOCK FLUSH PER 10 UNITS: Performed by: INTERNAL MEDICINE

## 2023-08-04 PROCEDURE — 96375 TX/PRO/DX INJ NEW DRUG ADDON: CPT

## 2023-08-04 RX ORDER — SODIUM CHLORIDE 9 MG/ML
250 INJECTION, SOLUTION INTRAVENOUS ONCE
Status: COMPLETED | OUTPATIENT
Start: 2023-08-04 | End: 2023-08-04

## 2023-08-04 RX ORDER — SODIUM CHLORIDE 0.9 % (FLUSH) 0.9 %
20 SYRINGE (ML) INJECTION AS NEEDED
Status: DISCONTINUED | OUTPATIENT
Start: 2023-08-04 | End: 2023-08-05 | Stop reason: HOSPADM

## 2023-08-04 RX ORDER — HEPARIN SODIUM (PORCINE) LOCK FLUSH IV SOLN 100 UNIT/ML 100 UNIT/ML
500 SOLUTION INTRAVENOUS AS NEEDED
OUTPATIENT
Start: 2023-08-04

## 2023-08-04 RX ORDER — SODIUM CHLORIDE 0.9 % (FLUSH) 0.9 %
20 SYRINGE (ML) INJECTION AS NEEDED
OUTPATIENT
Start: 2023-08-04

## 2023-08-04 RX ORDER — FAMOTIDINE 10 MG/ML
20 INJECTION, SOLUTION INTRAVENOUS ONCE
Status: COMPLETED | OUTPATIENT
Start: 2023-08-04 | End: 2023-08-04

## 2023-08-04 RX ORDER — DEXAMETHASONE SODIUM PHOSPHATE 4 MG/ML
4 INJECTION, SOLUTION INTRA-ARTICULAR; INTRALESIONAL; INTRAMUSCULAR; INTRAVENOUS; SOFT TISSUE ONCE
Status: COMPLETED | OUTPATIENT
Start: 2023-08-04 | End: 2023-08-04

## 2023-08-04 RX ORDER — HEPARIN SODIUM (PORCINE) LOCK FLUSH IV SOLN 100 UNIT/ML 100 UNIT/ML
500 SOLUTION INTRAVENOUS AS NEEDED
Status: DISCONTINUED | OUTPATIENT
Start: 2023-08-04 | End: 2023-08-05 | Stop reason: HOSPADM

## 2023-08-04 RX ORDER — OLANZAPINE 2.5 MG/1
5 TABLET ORAL ONCE
Status: COMPLETED | OUTPATIENT
Start: 2023-08-04 | End: 2023-08-04

## 2023-08-04 RX ORDER — PALONOSETRON 0.05 MG/ML
0.25 INJECTION, SOLUTION INTRAVENOUS ONCE
Status: COMPLETED | OUTPATIENT
Start: 2023-08-04 | End: 2023-08-04

## 2023-08-04 RX ADMIN — HEPARIN SODIUM (PORCINE) LOCK FLUSH IV SOLN 100 UNIT/ML 500 UNITS: 100 SOLUTION at 12:02

## 2023-08-04 RX ADMIN — CARBOPLATIN 750 MG: 10 INJECTION, SOLUTION INTRAVENOUS at 11:26

## 2023-08-04 RX ADMIN — OLANZAPINE 5 MG: 2.5 TABLET, FILM COATED ORAL at 09:11

## 2023-08-04 RX ADMIN — PALONOSETRON 0.25 MG: 0.05 INJECTION, SOLUTION INTRAVENOUS at 09:18

## 2023-08-04 RX ADMIN — Medication 20 ML: at 12:02

## 2023-08-04 RX ADMIN — DIPHENHYDRAMINE HYDROCHLORIDE 25 MG: 50 INJECTION, SOLUTION INTRAMUSCULAR; INTRAVENOUS at 09:10

## 2023-08-04 RX ADMIN — SODIUM CHLORIDE 250 ML: 9 INJECTION, SOLUTION INTRAVENOUS at 09:09

## 2023-08-04 RX ADMIN — DEXAMETHASONE SODIUM PHOSPHATE 4 MG: 4 INJECTION, SOLUTION INTRA-ARTICULAR; INTRALESIONAL; INTRAMUSCULAR; INTRAVENOUS; SOFT TISSUE at 09:15

## 2023-08-04 RX ADMIN — FAMOTIDINE 20 MG: 10 INJECTION INTRAVENOUS at 09:26

## 2023-08-04 RX ADMIN — PACLITAXEL 135 MG: 6 INJECTION, SOLUTION, CONCENTRATE INTRAVENOUS at 10:00

## 2023-08-04 RX ADMIN — FOSAPREPITANT 100 ML: 150 INJECTION, POWDER, LYOPHILIZED, FOR SOLUTION INTRAVENOUS at 09:25

## 2023-08-09 ENCOUNTER — TELEPHONE (OUTPATIENT)
Dept: ONCOLOGY | Facility: HOSPITAL | Age: 28
End: 2023-08-09
Payer: COMMERCIAL

## 2023-08-09 NOTE — TELEPHONE ENCOUNTER
Diagnosis: Triple negative breast cancer    Reason for Referral: Financial assistance    Content of Visit: OSW received a VM from Ms. Watts this afternoon inquiring an update on her [employer] and [employer] applications. OSW attempted to return her phone call, but was unable to leave a VM due to not having a VM box set up at at this time. Will send Ms. Watts a message through [employer] to provide her with a status update on these applications.

## 2023-08-11 ENCOUNTER — HOSPITAL ENCOUNTER (OUTPATIENT)
Dept: ONCOLOGY | Facility: HOSPITAL | Age: 28
Discharge: HOME | End: 2023-08-11
Payer: COMMERCIAL

## 2023-08-11 VITALS
TEMPERATURE: 97.5 F | SYSTOLIC BLOOD PRESSURE: 108 MMHG | OXYGEN SATURATION: 100 % | RESPIRATION RATE: 17 BRPM | BODY MASS INDEX: 26.02 KG/M2 | WEIGHT: 149.25 LBS | DIASTOLIC BLOOD PRESSURE: 84 MMHG | HEART RATE: 80 BPM

## 2023-08-11 DIAGNOSIS — C50.919 METAPLASTIC CARCINOMA OF BREAST: Primary | ICD-10-CM

## 2023-08-11 DIAGNOSIS — Z45.2 FITTING AND ADJUSTMENT OF VASCULAR CATHETER: ICD-10-CM

## 2023-08-11 LAB
ALBUMIN SERPL-MCNC: 4.4 G/DL (ref 3.5–5.2)
ALBUMIN/GLOB SERPL: 2 G/DL
ALP SERPL-CCNC: 80 U/L (ref 39–117)
ALT SERPL W P-5'-P-CCNC: 34 U/L (ref 1–33)
ANION GAP SERPL CALCULATED.3IONS-SCNC: 9.1 MMOL/L (ref 5–15)
AST SERPL-CCNC: 27 U/L (ref 1–32)
BASOPHILS # BLD AUTO: 0.01 10*3/MM3 (ref 0–0.2)
BASOPHILS NFR BLD AUTO: 0.4 % (ref 0–1.5)
BILIRUB SERPL-MCNC: 0.6 MG/DL (ref 0–1.2)
BUN SERPL-MCNC: 13 MG/DL (ref 6–20)
BUN/CREAT SERPL: 23.2 (ref 7–25)
CALCIUM SPEC-SCNC: 9.4 MG/DL (ref 8.6–10.5)
CHLORIDE SERPL-SCNC: 100 MMOL/L (ref 98–107)
CO2 SERPL-SCNC: 26.9 MMOL/L (ref 22–29)
CREAT SERPL-MCNC: 0.56 MG/DL (ref 0.57–1)
DEPRECATED RDW RBC AUTO: 44.3 FL (ref 37–54)
EGFRCR SERPLBLD CKD-EPI 2021: 126.9 ML/MIN/1.73
EOSINOPHIL # BLD AUTO: 0 10*3/MM3 (ref 0–0.4)
EOSINOPHIL NFR BLD AUTO: 0 % (ref 0.3–6.2)
ERYTHROCYTE [DISTWIDTH] IN BLOOD BY AUTOMATED COUNT: 14.3 % (ref 12.3–15.4)
GLOBULIN UR ELPH-MCNC: 2.2 GM/DL
GLUCOSE SERPL-MCNC: 147 MG/DL (ref 65–99)
HCT VFR BLD AUTO: 33 % (ref 34–46.6)
HGB BLD-MCNC: 10.7 G/DL (ref 12–15.9)
IMM GRANULOCYTES # BLD AUTO: 0.01 10*3/MM3 (ref 0–0.05)
IMM GRANULOCYTES NFR BLD AUTO: 0.4 % (ref 0–0.5)
LYMPHOCYTES # BLD AUTO: 1.38 10*3/MM3 (ref 0.7–3.1)
LYMPHOCYTES NFR BLD AUTO: 53.9 % (ref 19.6–45.3)
MCH RBC QN AUTO: 27.7 PG (ref 26.6–33)
MCHC RBC AUTO-ENTMCNC: 32.4 G/DL (ref 31.5–35.7)
MCV RBC AUTO: 85.5 FL (ref 79–97)
MONOCYTES # BLD AUTO: 0.14 10*3/MM3 (ref 0.1–0.9)
MONOCYTES NFR BLD AUTO: 5.5 % (ref 5–12)
NEUTROPHILS NFR BLD AUTO: 1.02 10*3/MM3 (ref 1.7–7)
NEUTROPHILS NFR BLD AUTO: 39.8 % (ref 42.7–76)
PLATELET # BLD AUTO: 110 10*3/MM3 (ref 140–450)
PMV BLD AUTO: 9.2 FL (ref 6–12)
POTASSIUM SERPL-SCNC: 3 MMOL/L (ref 3.5–5.2)
PROT SERPL-MCNC: 6.6 G/DL (ref 6–8.5)
RBC # BLD AUTO: 3.86 10*6/MM3 (ref 3.77–5.28)
SODIUM SERPL-SCNC: 136 MMOL/L (ref 136–145)
WBC NRBC COR # BLD: 2.56 10*3/MM3 (ref 3.4–10.8)

## 2023-08-11 PROCEDURE — 96375 TX/PRO/DX INJ NEW DRUG ADDON: CPT

## 2023-08-11 PROCEDURE — 25010000002 PACLITAXEL PER 1 MG: Performed by: INTERNAL MEDICINE

## 2023-08-11 PROCEDURE — 25010000002 DIPHENHYDRAMINE PER 50 MG: Performed by: INTERNAL MEDICINE

## 2023-08-11 PROCEDURE — 25010000002 DEXAMETHASONE PER 1 MG: Performed by: INTERNAL MEDICINE

## 2023-08-11 PROCEDURE — 96367 TX/PROPH/DG ADDL SEQ IV INF: CPT

## 2023-08-11 PROCEDURE — 80053 COMPREHEN METABOLIC PANEL: CPT | Performed by: INTERNAL MEDICINE

## 2023-08-11 PROCEDURE — 96413 CHEMO IV INFUSION 1 HR: CPT

## 2023-08-11 PROCEDURE — 25010000002 HEPARIN LOCK FLUSH PER 10 UNITS: Performed by: INTERNAL MEDICINE

## 2023-08-11 PROCEDURE — 85025 COMPLETE CBC W/AUTO DIFF WBC: CPT | Performed by: INTERNAL MEDICINE

## 2023-08-11 RX ORDER — SODIUM CHLORIDE 9 MG/ML
250 INJECTION, SOLUTION INTRAVENOUS ONCE
Status: COMPLETED | OUTPATIENT
Start: 2023-08-11 | End: 2023-08-11

## 2023-08-11 RX ORDER — DIPHENHYDRAMINE HYDROCHLORIDE 50 MG/ML
50 INJECTION INTRAMUSCULAR; INTRAVENOUS AS NEEDED
Status: CANCELLED | OUTPATIENT
Start: 2023-08-11

## 2023-08-11 RX ORDER — SODIUM CHLORIDE 0.9 % (FLUSH) 0.9 %
20 SYRINGE (ML) INJECTION AS NEEDED
OUTPATIENT
Start: 2023-08-11

## 2023-08-11 RX ORDER — HEPARIN SODIUM (PORCINE) LOCK FLUSH IV SOLN 100 UNIT/ML 100 UNIT/ML
500 SOLUTION INTRAVENOUS AS NEEDED
OUTPATIENT
Start: 2023-08-11

## 2023-08-11 RX ORDER — DIPHENHYDRAMINE HYDROCHLORIDE 50 MG/ML
50 INJECTION INTRAMUSCULAR; INTRAVENOUS AS NEEDED
Status: DISCONTINUED | OUTPATIENT
Start: 2023-08-11 | End: 2023-08-12 | Stop reason: HOSPADM

## 2023-08-11 RX ORDER — SODIUM CHLORIDE 9 MG/ML
250 INJECTION, SOLUTION INTRAVENOUS ONCE
Status: CANCELLED | OUTPATIENT
Start: 2023-08-11

## 2023-08-11 RX ORDER — POTASSIUM CHLORIDE 20 MEQ/1
20 TABLET, EXTENDED RELEASE ORAL DAILY
Qty: 14 TABLET | Refills: 2 | Status: SHIPPED | OUTPATIENT
Start: 2023-08-11

## 2023-08-11 RX ORDER — DESVENLAFAXINE SUCCINATE 50 MG/1
50 TABLET, EXTENDED RELEASE ORAL DAILY
Qty: 90 TABLET | Refills: 1 | Status: SHIPPED | OUTPATIENT
Start: 2023-08-11

## 2023-08-11 RX ORDER — DEXAMETHASONE SODIUM PHOSPHATE 4 MG/ML
4 INJECTION, SOLUTION INTRA-ARTICULAR; INTRALESIONAL; INTRAMUSCULAR; INTRAVENOUS; SOFT TISSUE ONCE
Status: COMPLETED | OUTPATIENT
Start: 2023-08-11 | End: 2023-08-11

## 2023-08-11 RX ORDER — FAMOTIDINE 10 MG/ML
20 INJECTION, SOLUTION INTRAVENOUS AS NEEDED
OUTPATIENT
Start: 2023-08-18

## 2023-08-11 RX ORDER — DEXAMETHASONE SODIUM PHOSPHATE 4 MG/ML
4 INJECTION, SOLUTION INTRA-ARTICULAR; INTRALESIONAL; INTRAMUSCULAR; INTRAVENOUS; SOFT TISSUE ONCE
Status: CANCELLED
Start: 2023-08-11 | End: 2023-08-11

## 2023-08-11 RX ORDER — FAMOTIDINE 10 MG/ML
20 INJECTION, SOLUTION INTRAVENOUS ONCE
Status: COMPLETED | OUTPATIENT
Start: 2023-08-11 | End: 2023-08-11

## 2023-08-11 RX ORDER — FAMOTIDINE 10 MG/ML
20 INJECTION, SOLUTION INTRAVENOUS ONCE
OUTPATIENT
Start: 2023-08-18

## 2023-08-11 RX ORDER — FAMOTIDINE 10 MG/ML
20 INJECTION, SOLUTION INTRAVENOUS AS NEEDED
Status: DISCONTINUED | OUTPATIENT
Start: 2023-08-11 | End: 2023-08-12 | Stop reason: HOSPADM

## 2023-08-11 RX ORDER — SODIUM CHLORIDE 9 MG/ML
250 INJECTION, SOLUTION INTRAVENOUS ONCE
OUTPATIENT
Start: 2023-08-18

## 2023-08-11 RX ORDER — DEXAMETHASONE SODIUM PHOSPHATE 4 MG/ML
4 INJECTION, SOLUTION INTRA-ARTICULAR; INTRALESIONAL; INTRAMUSCULAR; INTRAVENOUS; SOFT TISSUE ONCE
Start: 2023-08-18 | End: 2023-08-18

## 2023-08-11 RX ORDER — FAMOTIDINE 10 MG/ML
20 INJECTION, SOLUTION INTRAVENOUS ONCE
Status: CANCELLED | OUTPATIENT
Start: 2023-08-11

## 2023-08-11 RX ORDER — SODIUM CHLORIDE 0.9 % (FLUSH) 0.9 %
20 SYRINGE (ML) INJECTION AS NEEDED
Status: DISCONTINUED | OUTPATIENT
Start: 2023-08-11 | End: 2023-08-12 | Stop reason: HOSPADM

## 2023-08-11 RX ORDER — DIPHENHYDRAMINE HYDROCHLORIDE 50 MG/ML
50 INJECTION INTRAMUSCULAR; INTRAVENOUS AS NEEDED
OUTPATIENT
Start: 2023-08-18

## 2023-08-11 RX ORDER — HEPARIN SODIUM (PORCINE) LOCK FLUSH IV SOLN 100 UNIT/ML 100 UNIT/ML
500 SOLUTION INTRAVENOUS AS NEEDED
Status: DISCONTINUED | OUTPATIENT
Start: 2023-08-11 | End: 2023-08-12 | Stop reason: HOSPADM

## 2023-08-11 RX ORDER — FAMOTIDINE 10 MG/ML
20 INJECTION, SOLUTION INTRAVENOUS AS NEEDED
Status: CANCELLED | OUTPATIENT
Start: 2023-08-11

## 2023-08-11 RX ADMIN — HEPARIN SODIUM (PORCINE) LOCK FLUSH IV SOLN 100 UNIT/ML 500 UNITS: 100 SOLUTION at 12:17

## 2023-08-11 RX ADMIN — FAMOTIDINE 20 MG: 10 INJECTION INTRAVENOUS at 10:31

## 2023-08-11 RX ADMIN — Medication 20 ML: at 12:17

## 2023-08-11 RX ADMIN — PACLITAXEL 135 MG: 6 INJECTION, SOLUTION, CONCENTRATE INTRAVENOUS at 11:01

## 2023-08-11 RX ADMIN — DEXAMETHASONE SODIUM PHOSPHATE 4 MG: 4 INJECTION, SOLUTION INTRA-ARTICULAR; INTRALESIONAL; INTRAMUSCULAR; INTRAVENOUS; SOFT TISSUE at 10:34

## 2023-08-11 RX ADMIN — SODIUM CHLORIDE 250 ML: 9 INJECTION, SOLUTION INTRAVENOUS at 10:20

## 2023-08-11 RX ADMIN — DIPHENHYDRAMINE HYDROCHLORIDE 25 MG: 50 INJECTION, SOLUTION INTRAMUSCULAR; INTRAVENOUS at 10:39

## 2023-08-14 ENCOUNTER — TELEPHONE (OUTPATIENT)
Dept: ONCOLOGY | Facility: HOSPITAL | Age: 28
End: 2023-08-14
Payer: COMMERCIAL

## 2023-08-14 NOTE — TELEPHONE ENCOUNTER
This is likely not an infusion reaction as it did not occur while getting the paclitaxel. Some facial edema can occur as a side effect of chemotherapy. She can try benadryl at night.

## 2023-08-14 NOTE — TELEPHONE ENCOUNTER
This nurse called the pt and instructed her to take Benadryl at HS until her s/s resolve. This nurse instructed the pt to call back if not better in a couple days. The pt voiced understanding.

## 2023-08-14 NOTE — TELEPHONE ENCOUNTER
The pt called and c/o upper eys lids swollen upon waking but improving through out the day x2 days, swelling under her chin x2 days, and tight throat/painful swallowing. The pt states that she had a Paclitaxel infusion on 8/11/23. The pt states that when she woke up yesterday morning the her bilateral upper eye lids were swollen but her lower eye lids were not. The pt states that she also had swelling under her chin. The pt states that it hurts when she swallows, it hurts her throat and it feels like it puts pressure on the side next to her port and makes a clicking feeling. No sob noted, also the pt's voice is loud and clear on the phone. The pt states that her swelling improved throughout the day yesterday but was back this am. The pt states that the swelling is not as bad this am but not by much. The pt states that she is still having the painful swallowing with the clicking feeling today as well.

## 2023-08-16 ENCOUNTER — DOCUMENTATION (OUTPATIENT)
Dept: ONCOLOGY | Facility: HOSPITAL | Age: 28
End: 2023-08-16
Payer: COMMERCIAL

## 2023-08-16 NOTE — PROGRESS NOTES
Diagnosis: Triple negative breast cancer    Reason for Referral: Financial assistance with monthly living expenses    Content of Visit: OSW received notification from [employer] that they spoke with Ms. Watts this morning regarding a payment they sent to [employer] in the amount of $284.07. Martir Lazo is also mailing a check to [employer] for her vehicle payment. OSW support remains available.     OSW also received a notification from [employer] that they've approved Ms. Watts for assistance and paid $250 to [employer]. OSW forwarded this notification to her via secure email. She should now have a credit on her [employer] account moving forward. OSW support remains available.     Resources/Referrals Provided: Dolores Avalos

## 2023-08-18 ENCOUNTER — HOSPITAL ENCOUNTER (OUTPATIENT)
Dept: ONCOLOGY | Facility: HOSPITAL | Age: 28
Discharge: HOME | End: 2023-08-18
Payer: COMMERCIAL

## 2023-08-18 VITALS
BODY MASS INDEX: 26.2 KG/M2 | SYSTOLIC BLOOD PRESSURE: 107 MMHG | DIASTOLIC BLOOD PRESSURE: 73 MMHG | WEIGHT: 153.44 LBS | OXYGEN SATURATION: 100 % | HEIGHT: 64 IN | RESPIRATION RATE: 16 BRPM | TEMPERATURE: 98.5 F | HEART RATE: 69 BPM

## 2023-08-18 DIAGNOSIS — C50.919 METAPLASTIC CARCINOMA OF BREAST: ICD-10-CM

## 2023-08-18 DIAGNOSIS — Z45.2 FITTING AND ADJUSTMENT OF VASCULAR CATHETER: Primary | ICD-10-CM

## 2023-08-18 LAB
ALBUMIN SERPL-MCNC: 4.1 G/DL (ref 3.5–5.2)
ALBUMIN/GLOB SERPL: 1.9 G/DL
ALP SERPL-CCNC: 71 U/L (ref 39–117)
ALT SERPL W P-5'-P-CCNC: 31 U/L (ref 1–33)
ANION GAP SERPL CALCULATED.3IONS-SCNC: 9.2 MMOL/L (ref 5–15)
AST SERPL-CCNC: 23 U/L (ref 1–32)
BASOPHILS # BLD AUTO: 0.01 10*3/MM3 (ref 0–0.2)
BASOPHILS NFR BLD AUTO: 0.4 % (ref 0–1.5)
BILIRUB SERPL-MCNC: 0.2 MG/DL (ref 0–1.2)
BUN SERPL-MCNC: 16 MG/DL (ref 6–20)
BUN/CREAT SERPL: 34 (ref 7–25)
CALCIUM SPEC-SCNC: 8.8 MG/DL (ref 8.6–10.5)
CHLORIDE SERPL-SCNC: 106 MMOL/L (ref 98–107)
CO2 SERPL-SCNC: 22.8 MMOL/L (ref 22–29)
CREAT SERPL-MCNC: 0.47 MG/DL (ref 0.57–1)
DEPRECATED RDW RBC AUTO: 46 FL (ref 37–54)
EGFRCR SERPLBLD CKD-EPI 2021: 132.4 ML/MIN/1.73
EOSINOPHIL # BLD AUTO: 0 10*3/MM3 (ref 0–0.4)
EOSINOPHIL NFR BLD AUTO: 0 % (ref 0.3–6.2)
ERYTHROCYTE [DISTWIDTH] IN BLOOD BY AUTOMATED COUNT: 15 % (ref 12.3–15.4)
GLOBULIN UR ELPH-MCNC: 2.2 GM/DL
GLUCOSE SERPL-MCNC: 110 MG/DL (ref 65–99)
HCT VFR BLD AUTO: 30.9 % (ref 34–46.6)
HGB BLD-MCNC: 10.3 G/DL (ref 12–15.9)
IMM GRANULOCYTES # BLD AUTO: 0.01 10*3/MM3 (ref 0–0.05)
IMM GRANULOCYTES NFR BLD AUTO: 0.4 % (ref 0–0.5)
LYMPHOCYTES # BLD AUTO: 1.55 10*3/MM3 (ref 0.7–3.1)
LYMPHOCYTES NFR BLD AUTO: 60.5 % (ref 19.6–45.3)
MCH RBC QN AUTO: 28.5 PG (ref 26.6–33)
MCHC RBC AUTO-ENTMCNC: 33.3 G/DL (ref 31.5–35.7)
MCV RBC AUTO: 85.6 FL (ref 79–97)
MONOCYTES # BLD AUTO: 0.28 10*3/MM3 (ref 0.1–0.9)
MONOCYTES NFR BLD AUTO: 10.9 % (ref 5–12)
NEUTROPHILS NFR BLD AUTO: 0.71 10*3/MM3 (ref 1.7–7)
NEUTROPHILS NFR BLD AUTO: 27.8 % (ref 42.7–76)
PLATELET # BLD AUTO: 176 10*3/MM3 (ref 140–450)
PMV BLD AUTO: 9.2 FL (ref 6–12)
POTASSIUM SERPL-SCNC: 3.7 MMOL/L (ref 3.5–5.2)
PROT SERPL-MCNC: 6.3 G/DL (ref 6–8.5)
RBC # BLD AUTO: 3.61 10*6/MM3 (ref 3.77–5.28)
SODIUM SERPL-SCNC: 138 MMOL/L (ref 136–145)
WBC NRBC COR # BLD: 2.56 10*3/MM3 (ref 3.4–10.8)

## 2023-08-18 PROCEDURE — 85025 COMPLETE CBC W/AUTO DIFF WBC: CPT | Performed by: INTERNAL MEDICINE

## 2023-08-18 PROCEDURE — 25010000002 HEPARIN LOCK FLUSH PER 10 UNITS: Performed by: INTERNAL MEDICINE

## 2023-08-18 PROCEDURE — 36591 DRAW BLOOD OFF VENOUS DEVICE: CPT

## 2023-08-18 PROCEDURE — 80053 COMPREHEN METABOLIC PANEL: CPT | Performed by: INTERNAL MEDICINE

## 2023-08-18 RX ORDER — HEPARIN SODIUM (PORCINE) LOCK FLUSH IV SOLN 100 UNIT/ML 100 UNIT/ML
500 SOLUTION INTRAVENOUS AS NEEDED
OUTPATIENT
Start: 2023-08-18

## 2023-08-18 RX ORDER — SODIUM CHLORIDE 0.9 % (FLUSH) 0.9 %
20 SYRINGE (ML) INJECTION AS NEEDED
Status: DISCONTINUED | OUTPATIENT
Start: 2023-08-18 | End: 2023-08-19 | Stop reason: HOSPADM

## 2023-08-18 RX ORDER — SODIUM CHLORIDE 0.9 % (FLUSH) 0.9 %
20 SYRINGE (ML) INJECTION AS NEEDED
OUTPATIENT
Start: 2023-08-18

## 2023-08-18 RX ORDER — HEPARIN SODIUM (PORCINE) LOCK FLUSH IV SOLN 100 UNIT/ML 100 UNIT/ML
500 SOLUTION INTRAVENOUS AS NEEDED
Status: DISCONTINUED | OUTPATIENT
Start: 2023-08-18 | End: 2023-08-19 | Stop reason: HOSPADM

## 2023-08-18 RX ADMIN — HEPARIN SODIUM (PORCINE) LOCK FLUSH IV SOLN 100 UNIT/ML 500 UNITS: 100 SOLUTION at 10:06

## 2023-08-18 RX ADMIN — Medication 20 ML: at 10:05

## 2023-08-18 NOTE — NURSING NOTE
"At 0900 contacted  in regards to patient reporting 2 days after her last treatment having swelling above her eyes and in her neck. Reports that the swelling in her neck was so significant that when she swallowed it would feel like something popped. She said that it took about 3 days for the swelling to go away. Patient states she called in and the issue was addressed. She scheduled a scan as ordered. I wanted to verify that we are ok to proceed as planned.     At approximately 0910  responded, \"yes it's fine to proceed\"    At approximately 0936 contacted  regarding ANC resulting below parameters.     ordered to hold treatment today and resume with cycle 3 next week as scheduled.     Approximately 1002 contacted  about CMP results. Potassium level had previously low and patient ordered to take potassium replacement. Today potassium is 3.7. Patient asked if she needs to continue to take potassium pills.  said that she can hold.     "

## 2023-08-22 ENCOUNTER — OFFICE VISIT (OUTPATIENT)
Dept: NEUROLOGY | Facility: CLINIC | Age: 28
End: 2023-08-22
Payer: COMMERCIAL

## 2023-08-22 VITALS
BODY MASS INDEX: 27.07 KG/M2 | HEIGHT: 63 IN | DIASTOLIC BLOOD PRESSURE: 78 MMHG | SYSTOLIC BLOOD PRESSURE: 109 MMHG | WEIGHT: 152.8 LBS | HEART RATE: 74 BPM

## 2023-08-22 DIAGNOSIS — G43.019 INTRACTABLE MIGRAINE WITHOUT AURA AND WITHOUT STATUS MIGRAINOSUS: Primary | ICD-10-CM

## 2023-08-22 RX ORDER — GALCANEZUMAB 120 MG/ML
120 INJECTION, SOLUTION SUBCUTANEOUS
Qty: 1 EACH | Refills: 5 | Status: SHIPPED | OUTPATIENT
Start: 2023-08-22

## 2023-08-22 NOTE — PATIENT INSTRUCTIONS
Migraine Headache  A migraine headache is an intense, throbbing pain on one side or both sides of the head. Migraine headaches may also cause other symptoms, such as nausea, vomiting, and sensitivity to light and noise. A migraine headache can last from 4 hours to 3 days. Talk with your doctor about what things may bring on (trigger) your migraine headaches.  What are the causes?  The exact cause of this condition is not known. However, a migraine may be caused when nerves in the brain become irritated and release chemicals that cause inflammation of blood vessels. This inflammation causes pain. This condition may be triggered or caused by:  Drinking alcohol.  Smoking.  Taking medicines, such as:  Medicine used to treat chest pain (nitroglycerin).  Birth control pills.  Estrogen.  Certain blood pressure medicines.  Eating or drinking products that contain nitrates, glutamate, aspartame, or tyramine. Aged cheeses, chocolate, or caffeine may also be triggers.  Doing physical activity.  Other things that may trigger a migraine headache include:  Menstruation.  Pregnancy.  Hunger.  Stress.  Lack of sleep or too much sleep.  Weather changes.  Fatigue.  What increases the risk?  The following factors may make you more likely to experience migraine headaches:  Being a certain age. This condition is more common in people who are 25-55 years old.  Being female.  Having a family history of migraine headaches.  Being .  Having a mental health condition, such as depression or anxiety.  Being obese.  What are the signs or symptoms?  The main symptom of this condition is pulsating or throbbing pain. This pain may:  Happen in any area of the head, such as on one side or both sides.  Interfere with daily activities.  Get worse with physical activity.  Get worse with exposure to bright lights or loud noises.  Other symptoms may include:  Nausea.  Vomiting.  Dizziness.  General sensitivity to bright lights, loud noises, or  smells.  Before you get a migraine headache, you may get warning signs (an aura). An aura may include:  Seeing flashing lights or having blind spots.  Seeing bright spots, halos, or zigzag lines.  Having tunnel vision or blurred vision.  Having numbness or a tingling feeling.  Having trouble talking.  Having muscle weakness.  Some people have symptoms after a migraine headache (postdromal phase), such as:  Feeling tired.  Difficulty concentrating.  How is this diagnosed?  A migraine headache can be diagnosed based on:  Your symptoms.  A physical exam.  Tests, such as:  CT scan or an MRI of the head. These imaging tests can help rule out other causes of headaches.  Taking fluid from the spine (lumbar puncture) and analyzing it (cerebrospinal fluid analysis, or CSF analysis).  How is this treated?  This condition may be treated with medicines that:  Relieve pain.  Relieve nausea.  Prevent migraine headaches.  Treatment for this condition may also include:  Acupuncture.  Lifestyle changes like avoiding foods that trigger migraine headaches.  Biofeedback.  Cognitive behavioral therapy.  Follow these instructions at home:  Medicines  Take over-the-counter and prescription medicines only as told by your health care provider.  Ask your health care provider if the medicine prescribed to you:  Requires you to avoid driving or using heavy machinery.  Can cause constipation. You may need to take these actions to prevent or treat constipation:  Drink enough fluid to keep your urine pale yellow.  Take over-the-counter or prescription medicines.  Eat foods that are high in fiber, such as beans, whole grains, and fresh fruits and vegetables.  Limit foods that are high in fat and processed sugars, such as fried or sweet foods.  Lifestyle  Do not drink alcohol.  Do not use any products that contain nicotine or tobacco, such as cigarettes, e-cigarettes, and chewing tobacco. If you need help quitting, ask your health care  provider.  Get at least 8 hours of sleep every night.  Find ways to manage stress, such as meditation, deep breathing, or yoga.  General instructions         Keep a journal to find out what may trigger your migraine headaches. For example, write down:  What you eat and drink.  How much sleep you get.  Any change to your diet or medicines.  If you have a migraine headache:  Avoid things that make your symptoms worse, such as bright lights.  It may help to lie down in a dark, quiet room.  Do not drive or use heavy machinery.  Ask your health care provider what activities are safe for you while you are experiencing symptoms.  Keep all follow-up visits as told by your health care provider. This is important.  Contact a health care provider if:  You develop symptoms that are different or more severe than your usual migraine headache symptoms.  You have more than 15 headache days in one month.  Get help right away if:  Your migraine headache becomes severe.  Your migraine headache lasts longer than 72 hours.  You have a fever.  You have a stiff neck.  You have vision loss.  Your muscles feel weak or like you cannot control them.  You start to lose your balance often.  You have trouble walking.  You faint.  You have a seizure.  Summary  A migraine headache is an intense, throbbing pain on one side or both sides of the head. Migraines may also cause other symptoms, such as nausea, vomiting, and sensitivity to light and noise.  This condition may be treated with medicines and lifestyle changes. You may also need to avoid certain things that trigger a migraine headache.  Keep a journal to find out what may trigger your migraine headaches.  Contact your health care provider if you have more than 15 headache days in a month or you develop symptoms that are different or more severe than your usual migraine headache symptoms.  This information is not intended to replace advice given to you by your health care provider. Make sure  you discuss any questions you have with your health care provider.  Document Revised: 04/10/2020 Document Reviewed: 01/30/2020  Elsevier Patient Education c 2023 [employer]

## 2023-08-22 NOTE — PROGRESS NOTES
"Chief Complaint  Migraine    Subjective          Marilee Watts presents to [facility] NEUROLOGY & NEUROSURGERY  History of Present Illness  She reports that she developed headaches many years ago. Since that time, her headaches have progressively worsened.   Currently, she reports headaches that are located frontal. She characterizes the headaches as 8/10 in severity, throbbing  in nature with associated photophobia, phonophobia, and nausea. She reports headaches last 8+ hours. She reports 25 headache days per month. She denies associated aura. She denies focal numbness, weakness, speech, and vision changes.   Triggers: stress and menstrual cycle   Symptoms improved by: Dark quiet room and Sleep   She states she is sleeping fairly well. Reports getting 6 hours of sleep per night. denies snoring. Reports refreshing sleep.   Prior prophylactic medications include: topiramate, propranolol, TCAs contraindicated d/t medication interactions  She  uses abortive therapy such as: triptans contraindicated d/t risk for serotonin syndrome, Nurtec,   Caffeine Use: 2 servings daily  Childbearing potential : tubal ligation   History of Kidney Stones: No  She denies a family history of cerebral aneurysm.          Objective   Vital Signs:   /78   Pulse 74   Ht 160 cm (63\")   Wt 69.3 kg (152 lb 12.8 oz)   BMI 27.07 kg/mý     Physical Exam  HENT:      Head: Normocephalic.   Pulmonary:      Effort: Pulmonary effort is normal.   Neurological:      Mental Status: She is alert and oriented to person, place, and time.      Sensory: Sensation is intact.      Motor: Motor function is intact.      Coordination: Coordination is intact.      Deep Tendon Reflexes: Reflexes are normal and symmetric.      Neurologic Exam     Mental Status   Oriented to person, place, and time.      Result Review :               Assessment and Plan    Diagnoses and all orders for this visit:    1. Intractable migraine without aura and " without status migrainosus (Primary)  Assessment & Plan:  Will start Emgality for preventative therapy and Ubrelvy PRN for abortive therapy.          Other orders  -     galcanezumab-gnlm (Emgality) 120 MG/ML auto-injector pen; Inject 1 mL under the skin into the appropriate area as directed Every 30 (Thirty) Days.  Dispense: 1 each; Refill: 5  -     ubrogepant (Ubrelvy) 100 MG tablet; Take 1 tablet by mouth 1 (One) Time As Needed (migraine) for up to 1 dose. One tablet at HA onset, may repeat once in 2 hours if needed.  Dispense: 10 tablet; Refill: 5        Follow Up   Return in about 4 months (around 12/22/2023) for Migraine f/u.  Patient was given instructions and counseling regarding her condition or for health maintenance advice. Please see specific information pulled into the AVS if appropriate.

## 2023-08-23 ENCOUNTER — TELEPHONE (OUTPATIENT)
Dept: ONCOLOGY | Facility: HOSPITAL | Age: 28
End: 2023-08-23

## 2023-08-23 NOTE — TELEPHONE ENCOUNTER
Caller: Marilee Watts    Relationship: Self    Best call back number: [phone]     Who are you requesting to speak with (clinical staff, provider,  specific staff member): REFERRALS    What was the call regarding: PATIENT REQUESTING CONTACT INFORMATION FO BREAST SURGEON MD. [facility]

## 2023-08-24 ENCOUNTER — OFFICE VISIT (OUTPATIENT)
Dept: ONCOLOGY | Facility: HOSPITAL | Age: 28
End: 2023-08-24
Payer: COMMERCIAL

## 2023-08-24 ENCOUNTER — PATIENT OUTREACH (OUTPATIENT)
Dept: ONCOLOGY | Facility: HOSPITAL | Age: 28
End: 2023-08-24
Payer: COMMERCIAL

## 2023-08-24 ENCOUNTER — HOSPITAL ENCOUNTER (OUTPATIENT)
Dept: ONCOLOGY | Facility: HOSPITAL | Age: 28
Discharge: HOME | End: 2023-08-24
Payer: COMMERCIAL

## 2023-08-24 VITALS
DIASTOLIC BLOOD PRESSURE: 69 MMHG | OXYGEN SATURATION: 100 % | RESPIRATION RATE: 16 BRPM | SYSTOLIC BLOOD PRESSURE: 115 MMHG | WEIGHT: 152.56 LBS | TEMPERATURE: 97.8 F | HEART RATE: 71 BPM | BODY MASS INDEX: 27.03 KG/M2 | HEIGHT: 63 IN

## 2023-08-24 DIAGNOSIS — Z45.2 FITTING AND ADJUSTMENT OF VASCULAR CATHETER: ICD-10-CM

## 2023-08-24 DIAGNOSIS — G25.81 RESTLESS LEG: ICD-10-CM

## 2023-08-24 DIAGNOSIS — C50.919 METAPLASTIC CARCINOMA OF BREAST: Primary | ICD-10-CM

## 2023-08-24 LAB
ALBUMIN SERPL-MCNC: 4.2 G/DL (ref 3.5–5.2)
ALBUMIN/GLOB SERPL: 1.9 G/DL
ALP SERPL-CCNC: 82 U/L (ref 39–117)
ALT SERPL W P-5'-P-CCNC: 31 U/L (ref 1–33)
ANION GAP SERPL CALCULATED.3IONS-SCNC: 10.1 MMOL/L (ref 5–15)
AST SERPL-CCNC: 23 U/L (ref 1–32)
BASOPHILS # BLD AUTO: 0 10*3/MM3 (ref 0–0.2)
BASOPHILS NFR BLD AUTO: 0 % (ref 0–1.5)
BILIRUB SERPL-MCNC: 0.2 MG/DL (ref 0–1.2)
BUN SERPL-MCNC: 13 MG/DL (ref 6–20)
BUN/CREAT SERPL: 24.5 (ref 7–25)
CALCIUM SPEC-SCNC: 9.2 MG/DL (ref 8.6–10.5)
CHLORIDE SERPL-SCNC: 106 MMOL/L (ref 98–107)
CO2 SERPL-SCNC: 24.9 MMOL/L (ref 22–29)
CREAT SERPL-MCNC: 0.53 MG/DL (ref 0.57–1)
DEPRECATED RDW RBC AUTO: 46.2 FL (ref 37–54)
EGFRCR SERPLBLD CKD-EPI 2021: 128.6 ML/MIN/1.73
EOSINOPHIL # BLD AUTO: 0 10*3/MM3 (ref 0–0.4)
EOSINOPHIL NFR BLD AUTO: 0 % (ref 0.3–6.2)
ERYTHROCYTE [DISTWIDTH] IN BLOOD BY AUTOMATED COUNT: 14.9 % (ref 12.3–15.4)
GLOBULIN UR ELPH-MCNC: 2.2 GM/DL
GLUCOSE SERPL-MCNC: 105 MG/DL (ref 65–99)
HCT VFR BLD AUTO: 32 % (ref 34–46.6)
HGB BLD-MCNC: 10.6 G/DL (ref 12–15.9)
IMM GRANULOCYTES # BLD AUTO: 0.01 10*3/MM3 (ref 0–0.05)
IMM GRANULOCYTES NFR BLD AUTO: 0.3 % (ref 0–0.5)
LYMPHOCYTES # BLD AUTO: 1.71 10*3/MM3 (ref 0.7–3.1)
LYMPHOCYTES NFR BLD AUTO: 55.3 % (ref 19.6–45.3)
MCH RBC QN AUTO: 28.3 PG (ref 26.6–33)
MCHC RBC AUTO-ENTMCNC: 33.1 G/DL (ref 31.5–35.7)
MCV RBC AUTO: 85.3 FL (ref 79–97)
MONOCYTES # BLD AUTO: 0.27 10*3/MM3 (ref 0.1–0.9)
MONOCYTES NFR BLD AUTO: 8.7 % (ref 5–12)
NEUTROPHILS NFR BLD AUTO: 1.1 10*3/MM3 (ref 1.7–7)
NEUTROPHILS NFR BLD AUTO: 35.7 % (ref 42.7–76)
PLATELET # BLD AUTO: 154 10*3/MM3 (ref 140–450)
PMV BLD AUTO: 9.2 FL (ref 6–12)
POTASSIUM SERPL-SCNC: 3.7 MMOL/L (ref 3.5–5.2)
PROT SERPL-MCNC: 6.4 G/DL (ref 6–8.5)
RBC # BLD AUTO: 3.75 10*6/MM3 (ref 3.77–5.28)
SODIUM SERPL-SCNC: 141 MMOL/L (ref 136–145)
T4 FREE SERPL-MCNC: 1.21 NG/DL (ref 0.93–1.7)
TSH SERPL DL<=0.05 MIU/L-ACNC: 1.73 UIU/ML (ref 0.27–4.2)
WBC NRBC COR # BLD: 3.09 10*3/MM3 (ref 3.4–10.8)

## 2023-08-24 PROCEDURE — 80053 COMPREHEN METABOLIC PANEL: CPT | Performed by: INTERNAL MEDICINE

## 2023-08-24 PROCEDURE — 84439 ASSAY OF FREE THYROXINE: CPT | Performed by: INTERNAL MEDICINE

## 2023-08-24 PROCEDURE — 36591 DRAW BLOOD OFF VENOUS DEVICE: CPT

## 2023-08-24 PROCEDURE — 84443 ASSAY THYROID STIM HORMONE: CPT | Performed by: INTERNAL MEDICINE

## 2023-08-24 PROCEDURE — 85025 COMPLETE CBC W/AUTO DIFF WBC: CPT | Performed by: INTERNAL MEDICINE

## 2023-08-24 PROCEDURE — 25010000002 HEPARIN LOCK FLUSH PER 10 UNITS: Performed by: INTERNAL MEDICINE

## 2023-08-24 RX ORDER — SODIUM CHLORIDE 0.9 % (FLUSH) 0.9 %
20 SYRINGE (ML) INJECTION AS NEEDED
Status: CANCELLED | OUTPATIENT
Start: 2023-08-24

## 2023-08-24 RX ORDER — FAMOTIDINE 10 MG/ML
20 INJECTION, SOLUTION INTRAVENOUS ONCE
Status: CANCELLED | OUTPATIENT
Start: 2023-08-25

## 2023-08-24 RX ORDER — PALONOSETRON 0.05 MG/ML
0.25 INJECTION, SOLUTION INTRAVENOUS ONCE
Status: CANCELLED | OUTPATIENT
Start: 2023-08-25

## 2023-08-24 RX ORDER — SODIUM CHLORIDE 9 MG/ML
250 INJECTION, SOLUTION INTRAVENOUS ONCE
OUTPATIENT
Start: 2023-09-08

## 2023-08-24 RX ORDER — HEPARIN SODIUM (PORCINE) LOCK FLUSH IV SOLN 100 UNIT/ML 100 UNIT/ML
500 SOLUTION INTRAVENOUS AS NEEDED
Status: DISCONTINUED | OUTPATIENT
Start: 2023-08-24 | End: 2023-08-25 | Stop reason: HOSPADM

## 2023-08-24 RX ORDER — HEPARIN SODIUM (PORCINE) LOCK FLUSH IV SOLN 100 UNIT/ML 100 UNIT/ML
500 SOLUTION INTRAVENOUS AS NEEDED
Status: CANCELLED | OUTPATIENT
Start: 2023-08-24

## 2023-08-24 RX ORDER — SODIUM CHLORIDE 9 MG/ML
250 INJECTION, SOLUTION INTRAVENOUS ONCE
OUTPATIENT
Start: 2023-09-01

## 2023-08-24 RX ORDER — FAMOTIDINE 10 MG/ML
20 INJECTION, SOLUTION INTRAVENOUS AS NEEDED
OUTPATIENT
Start: 2023-09-01

## 2023-08-24 RX ORDER — DIPHENHYDRAMINE HYDROCHLORIDE 50 MG/ML
50 INJECTION INTRAMUSCULAR; INTRAVENOUS AS NEEDED
Status: CANCELLED | OUTPATIENT
Start: 2023-08-25

## 2023-08-24 RX ORDER — DEXAMETHASONE SODIUM PHOSPHATE 4 MG/ML
4 INJECTION, SOLUTION INTRA-ARTICULAR; INTRALESIONAL; INTRAMUSCULAR; INTRAVENOUS; SOFT TISSUE ONCE
Start: 2023-09-01 | End: 2023-09-01

## 2023-08-24 RX ORDER — FAMOTIDINE 10 MG/ML
20 INJECTION, SOLUTION INTRAVENOUS ONCE
OUTPATIENT
Start: 2023-09-01

## 2023-08-24 RX ORDER — SODIUM CHLORIDE 9 MG/ML
250 INJECTION, SOLUTION INTRAVENOUS ONCE
Status: CANCELLED | OUTPATIENT
Start: 2023-08-25

## 2023-08-24 RX ORDER — FAMOTIDINE 10 MG/ML
20 INJECTION, SOLUTION INTRAVENOUS AS NEEDED
OUTPATIENT
Start: 2023-09-08

## 2023-08-24 RX ORDER — DEXAMETHASONE SODIUM PHOSPHATE 4 MG/ML
4 INJECTION, SOLUTION INTRA-ARTICULAR; INTRALESIONAL; INTRAMUSCULAR; INTRAVENOUS; SOFT TISSUE ONCE
Start: 2023-09-08 | End: 2023-09-08

## 2023-08-24 RX ORDER — DIPHENHYDRAMINE HYDROCHLORIDE 50 MG/ML
50 INJECTION INTRAMUSCULAR; INTRAVENOUS AS NEEDED
OUTPATIENT
Start: 2023-09-01

## 2023-08-24 RX ORDER — FAMOTIDINE 10 MG/ML
20 INJECTION, SOLUTION INTRAVENOUS AS NEEDED
Status: CANCELLED | OUTPATIENT
Start: 2023-08-25

## 2023-08-24 RX ORDER — FAMOTIDINE 10 MG/ML
20 INJECTION, SOLUTION INTRAVENOUS ONCE
OUTPATIENT
Start: 2023-09-08

## 2023-08-24 RX ORDER — DIPHENHYDRAMINE HYDROCHLORIDE 50 MG/ML
50 INJECTION INTRAMUSCULAR; INTRAVENOUS AS NEEDED
OUTPATIENT
Start: 2023-09-08

## 2023-08-24 RX ORDER — DEXAMETHASONE SODIUM PHOSPHATE 4 MG/ML
4 INJECTION, SOLUTION INTRA-ARTICULAR; INTRALESIONAL; INTRAMUSCULAR; INTRAVENOUS; SOFT TISSUE ONCE
Status: CANCELLED
Start: 2023-08-25 | End: 2023-08-25

## 2023-08-24 RX ORDER — SODIUM CHLORIDE 0.9 % (FLUSH) 0.9 %
20 SYRINGE (ML) INJECTION AS NEEDED
Status: DISCONTINUED | OUTPATIENT
Start: 2023-08-24 | End: 2023-08-25 | Stop reason: HOSPADM

## 2023-08-24 RX ADMIN — HEPARIN SODIUM (PORCINE) LOCK FLUSH IV SOLN 100 UNIT/ML 500 UNITS: 100 SOLUTION at 09:57

## 2023-08-24 RX ADMIN — Medication 20 ML: at 09:57

## 2023-08-24 NOTE — PROGRESS NOTES
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 MRI with concern right axilla LN involvement. Right axilla LN biopsied on 7/7 consistent with metastatic breast carcinoma. Due for C3 chemoimmunotherapy tomorrow. Overall tolerating well. She has lost her hair. She does have eye and neck swelling occur after the chemotherapy. This does resolve on its own. She has mild trouble swallowing from the neck swelling. It is equal on both sides and not painful. Recent ultrasound was normal. Scar from port has become red but her breast inflammation has improved. She has mild nausea some mornings but this resolves with eating. She has not needed the anti-emetics. No diarrhea, abdominal pain, constipation neuropathy. She continues to work. She continues to work. She is very active at work and has muscle pains from all the activity. Ropinirole helping with leg cramps/pain. She has been approved for short term disability. She has chronic headaches with migraines.  D15 paclitaxel with C2 held due to neutropenia. Olanzapine to be held due to prior sedation while on it.     Oncology/Hematology History Overview Note   2012: Lumpectomy of fibroadenoma of right breast    3/2023: Started to have pain and swelling of known area of fibroadenoma of right breast. Started after trauma to breast.     5/26/23: Right breast lumpectomy: Invasive metaplastic carcinoma, high-grade, 4.5 x 4.0 x 3.3 cm with interspersed fibroadenoma, negative margins. ER 0%, OR 0%, HER2 0 by IHC. PDL1 CPS of 50.     6/21/23: MRI breast: Diffuse edema, hypervascularity, and  skin thickening of the right breast raising the possibility of inflammatory breast cancer. 2. 4.7 cm seroma in the lateral breast in area of known recent malignant lumpectomy. 3. Metastatic right axillary adenopathy. 4. No evidence of contralateral left breast malignancy. BI-RADS Category 6, known biopsy-proven malignancy.     7/7/23: Right axillary LN biopsy: metastatic adenocarcinoma, consistent with metaplastic breast carcinoma.     7/11/23: CT Chest, abdomen, pelvis: No definite metastatic disease. 1. Chest:  Pathologically enlarged right axillary lymphadenopathy.  Masslike opacity right upper outer breast with postsurgical changes.  Skin thickening right breast.  Correlate with tissue diagnosis and breast MRI.Small semi solid nodules in the right lung.  These are nonspecific.  A follow-up CT chest in 3 months time is recommended.  Abdomen pelvis:  No findings of metastatic disease to the abdomen or pelvis.     7/12/23: NM bone scan: negative for osseous mets    7/14/23: C1D1 Carboplatin/Paclitaxel/Pembrolizumab  8/4/23: C2D1 Carboplatin/Paclitaxel/Pembrolizumab. C2D15 held due to neutropenia  8/25/23: C3D1 Carboplatin/Paclitaxel/Pembrolizumab     Metaplastic carcinoma of breast   6/16/2023 Initial Diagnosis    Metaplastic carcinoma of breast     6/16/2023 - 6/16/2023 Chemotherapy    OP BREAST AC DD DOXOrubicin / Cyclophosphamide       7/13/2023 Cancer Staged    Staging form: Breast, AJCC 8th Edition  - Clinical: Stage IIIC (cT4d, cN2, cM0, G3, ER-, GA-, HER2-) - Signed by Ramin Shine MD on 7/13/2023 7/14/2023 -  Chemotherapy    OP BREAST Pembrolizumab 400 mg / PACLitaxel / CARBOplatin AUC=5       8/12/2023 - 8/12/2023 Chemotherapy    OP BREAST PACLitaxel Adjuvant (Weekly X 12)       10/7/2023 -  Chemotherapy    OP BREAST Pembrolizumab 200 mg / DOXOrubicin / Cyclophosphamide        Triple negative breast cancer (Resolved)   6/28/2023 Initial Diagnosis    Triple negative breast cancer            Review of Systems   Constitutional:  Positive for fatigue.   Genitourinary:  Negative for breast pain.   Neurological:  Positive for headache (Getting better).   Psychiatric/Behavioral:  The patient is nervous/anxious (worsening).    All other systems reviewed and are negative.  Current Outpatient Medications on File Prior to Visit   Medication Sig Dispense Refill    acetaminophen (TYLENOL) 500 MG tablet Take 1 tablet by mouth Every 6 (Six) Hours As Needed.      ALPRAZolam (XANAX) 0.5 MG tablet Take 1 tablet by mouth 2 (Two) Times a Day As Needed for Anxiety. (Patient not taking: Reported on 8/22/2023) 60 tablet 0    ARIPiprazole (ABILIFY) 2 MG tablet Take 1 tablet by mouth Daily. (Patient not taking: Reported on 6/21/2023)      Cetirizine HCl (ZyrTEC Allergy) 10 MG capsule Take 2 tablets by mouth Daily. (Patient not taking: Reported on 6/21/2023)      desvenlafaxine (PRISTIQ) 50 MG 24 hr tablet Take 1 tablet by mouth Daily. 90 tablet 1    docusate sodium (COLACE) 100 MG capsule  (Patient not taking: Reported on 6/21/2023)      fluconazole (DIFLUCAN) 150 MG tablet  (Patient not taking: Reported on 6/21/2023)      galcanezumab-gnlm (Emgality) 120 MG/ML auto-injector pen Inject 1 mL under the skin into the appropriate area as directed Every 30 (Thirty) Days. 1 each 5    hydrOXYzine (ATARAX) 25 MG tablet Take  by mouth. (Patient not taking: Reported on 6/21/2023)      ibuprofen (ADVIL,MOTRIN) 600 MG tablet  (Patient not taking: Reported on 6/21/2023)      ibuprofen (ADVIL,MOTRIN) 600 MG tablet Take 1 tablet by mouth Every 6 (Six) Hours As Needed for Mild Pain. 60 tablet 5    lidocaine-prilocaine (EMLA) 2.5-2.5 % cream Apply 1 application  topically to the appropriate area as directed As Needed for Mild Pain. 30 g 1    LORazepam (ATIVAN) 0.5 MG tablet Take 1 tablet by mouth Every 8 (Eight) Hours As Needed for Anxiety. (Patient not taking: Reported on 8/22/2023) 90 tablet 0    OLANZapine (zyPREXA) 5 MG tablet  Take 1 tablet by mouth Every Night. Take on days 2, 3 and 4 after chemotherapy. (Patient not taking: Reported on 2023) 3 tablet 3    ondansetron (ZOFRAN) 8 MG tablet Take 1 tablet by mouth 3 (Three) Times a Day As Needed for Nausea or Vomiting. (Patient not taking: Reported on 2023) 30 tablet 3    potassium chloride (K-DUR,KLOR-CON) 20 MEQ CR tablet Take 1 tablet by mouth Daily. (Patient not taking: Reported on 2023) 14 tablet 2    propranolol (INDERAL) 20 MG tablet Take 1 tablet by mouth 2 (Two) Times a Day. (Patient not taking: Reported on 2023) 60 tablet 5    rOPINIRole (REQUIP) 0.25 MG tablet Take 1 tablet by mouth Every Night. Take 1 hour before bedtime. 30 tablet 5    topiramate (TOPAMAX) 25 MG tablet Take 1 tablet by mouth Daily.      ubrogepant (Ubrelvy) 100 MG tablet Take 1 tablet by mouth 1 (One) Time As Needed (migraine) for up to 1 dose. One tablet at HA onset, may repeat once in 2 hours if needed. 10 tablet 5     Current Facility-Administered Medications on File Prior to Visit   Medication Dose Route Frequency Provider Last Rate Last Admin    heparin injection 500 Units  500 Units Intravenous PRN Ramin Shine MD        sodium chloride 0.9 % flush 20 mL  20 mL Intravenous PRN Ramin Shine MD           Allergies   Allergen Reactions    Tegaderm Ag Mesh [Silver] Itching     Past Medical History:   Diagnosis Date    Asthma     AS CHILD    Breast cancer     COVID-19 vaccine administered      Past Surgical History:   Procedure Laterality Date     SECTION      SKIN BIOPSY      TUBAL ABDOMINAL LIGATION       Social History     Socioeconomic History    Marital status: Single    Number of children: 2   Tobacco Use    Smoking status: Never     Passive exposure: Never    Smokeless tobacco: Never   Vaping Use    Vaping Use: Never used   Substance and Sexual Activity    Alcohol use: Never    Drug use: Never    Sexual activity: Defer     Family History   Problem  "Relation Age of Onset    COPD Mother     Hypertension Father     Hyperlipidemia Father     Diabetes Father     Skin cancer Paternal Grandmother        Objective   Physical Exam  Well appearing patient in no acute distress on RA  Anicteric sclerae, right breast erythema much improved, port site scar appears healing   Respirations non-labored  Awake, alert, and oriented x 4. Speech intact. No gross neurologic deficit  Appropriate mood and affect    Vitals:    08/24/23 1001   BP: 115/69   Pulse: 71   Resp: 16   Temp: 97.8 øF (36.6 øC)   SpO2: 100%   Weight: 69.2 kg (152 lb 8.9 oz)   Height: 160 cm (62.99\")   PainSc: 0-No pain               PHQ-9 Total Score:           Result Review :   The following data was reviewed by: Ramin Shine MD on 08/24/23:  Lab Results   Component Value Date    HGB 10.6 (L) 08/24/2023    HCT 32.0 (L) 08/24/2023    MCV 85.3 08/24/2023     08/24/2023    WBC 3.09 (L) 08/24/2023    NEUTROABS 1.10 (L) 08/24/2023    LYMPHSABS 1.71 08/24/2023    MONOSABS 0.27 08/24/2023    EOSABS 0.00 08/24/2023    BASOSABS 0.00 08/24/2023     Lab Results   Component Value Date    GLUCOSE 110 (H) 08/18/2023    BUN 16 08/18/2023    CREATININE 0.47 (L) 08/18/2023     08/18/2023    K 3.7 08/18/2023     08/18/2023    CO2 22.8 08/18/2023    CALCIUM 8.8 08/18/2023    PROTEINTOT 6.3 08/18/2023    ALBUMIN 4.1 08/18/2023    BILITOT 0.2 08/18/2023    ALKPHOS 71 08/18/2023    AST 23 08/18/2023    ALT 31 08/18/2023     Lab Results   Component Value Date    FREET4 1.12 07/14/2023    TSH 2.020 07/14/2023     Labs personally reviewed. Mild anemia. ANC normal. Normal creatinine    Pathology report personally reviewed    Surgery notes by Dr. Velazquez personally reviewed    Us duplex 8/23/23 personally reviewed      US DOPPLER VENOUS ARM RIGHT    Result Date: 8/23/2023  No evidence of thrombosis of the venous system of the upper extremity . Images reviewed, interpreted, and dictated by ELISABETH Davis, " MD         Assessment and Plan    Diagnoses and all orders for this visit:    1. Metaplastic carcinoma of breast (Primary)    2. Restless leg      Metaplastic Carcinoma of Right Breast, triple negative  Patient is s/p lumpectomy of 4.5 cm lesion with triple negative metaplastic carcinoma, interspersed with fibroadenoma. Patient has clinical inflammatory breast cancer. MRI breast with concern for inflammatory breast cancer as well as right sided axillary involvement. Left breast without lesions per MRI. Lymph node biopsy 7/7/23 confirmed right axillary involvement by breast carcinoma.  Will proceed with neoadjuvant chemotherapy combined with immunotherapy. This is with IV Carboplatin and Paclitaxel combined with Pembrolizumab every 3 weeks for 4 cycles followed by Doxorubicin and Cyclophosphamide with Pembro every 3 weeks for 4 additional cycles. Patient will then get surgery: patient is deciding between single or double mastectomy. This will be followed by adjuvant immunotherapy and radiation. Baseline ECHO normal. Baseline CT CAP without any definite metastatic disease. Bone scan negative for osseous mets. C1D1 Carboplatin/Paclitaxel/Pembrolizumab 7/14/23.     C2D15 paclitaxel held due to neutropenia.     C3D1 Carboplatin/Paclitaxel/Pembrolizumab 8/25/23. Labs appropriate to proceed. Hold olanzapine pre-med due to sedation.     Genetic testing has been completed and pending.    This is an acute or chronic illness that poses a threat to life or bodily function. The above treatment plan involves a high risk of complications and/or mortality of patient management. Continue to monitor for severe toxicities with labs     Neck swelling  Non-painful. Likely effect from chemo due to time course. Vascular U/s 8/23/23 with no thrombosis. Symptoms resolve on their own    Right breast erythema and firmness  Likely inflammatory breast cancer. Patient finished course of antibiotics to treat any co-existing cellulitis. Improving  with above chemotherapy     Anxiety  Xanax PRN added.     Restless leg  Low dose ropinirole helps    Pulmonary nodules  Too small for biopsy or characterization by PET. Repeat CT Chest in 3 months.     Please note that portions of this note were completed with a voice recognition program.        I spent 25 minutes caring for Marilee on this date of service. This time includes time spent by me in the following activities:preparing for the visit, reviewing tests, obtaining and/or reviewing a separately obtained history, performing a medically appropriate examination and/or evaluation , counseling and educating the patient/family/caregiver, ordering medications, tests, or procedures, referring and communicating with other health care professionals , documenting information in the medical record, independently interpreting results and communicating that information with the patient/family/caregiver and care coordination    Patient Follow Up: C4 chemoimmuno  Patient was given instructions and counseling regarding her condition or for health maintenance advice. Please see specific information pulled into the AVS if appropriate.

## 2023-08-25 ENCOUNTER — HOSPITAL ENCOUNTER (OUTPATIENT)
Dept: ONCOLOGY | Facility: HOSPITAL | Age: 28
Discharge: HOME | End: 2023-08-25
Payer: COMMERCIAL

## 2023-08-25 ENCOUNTER — DOCUMENTATION (OUTPATIENT)
Dept: ONCOLOGY | Facility: HOSPITAL | Age: 28
End: 2023-08-25
Payer: COMMERCIAL

## 2023-08-25 VITALS
TEMPERATURE: 97.6 F | OXYGEN SATURATION: 100 % | DIASTOLIC BLOOD PRESSURE: 73 MMHG | WEIGHT: 153.44 LBS | BODY MASS INDEX: 27.19 KG/M2 | HEIGHT: 63 IN | RESPIRATION RATE: 18 BRPM | SYSTOLIC BLOOD PRESSURE: 108 MMHG | HEART RATE: 77 BPM

## 2023-08-25 DIAGNOSIS — Z45.2 FITTING AND ADJUSTMENT OF VASCULAR CATHETER: ICD-10-CM

## 2023-08-25 DIAGNOSIS — C50.919 METAPLASTIC CARCINOMA OF BREAST: Primary | ICD-10-CM

## 2023-08-25 PROBLEM — G43.019 INTRACTABLE MIGRAINE WITHOUT AURA AND WITHOUT STATUS MIGRAINOSUS: Status: ACTIVE | Noted: 2023-08-25

## 2023-08-25 PROCEDURE — 25010000002 DIPHENHYDRAMINE PER 50 MG: Performed by: INTERNAL MEDICINE

## 2023-08-25 PROCEDURE — 25010000002 CARBOPLATIN PER 50 MG: Performed by: INTERNAL MEDICINE

## 2023-08-25 PROCEDURE — 25010000002 PEMBROLIZUMAB 100 MG/4ML SOLUTION 4 ML VIAL: Performed by: INTERNAL MEDICINE

## 2023-08-25 PROCEDURE — 25010000002 PACLITAXEL PER 1 MG: Performed by: INTERNAL MEDICINE

## 2023-08-25 PROCEDURE — 96413 CHEMO IV INFUSION 1 HR: CPT

## 2023-08-25 PROCEDURE — 96417 CHEMO IV INFUS EACH ADDL SEQ: CPT

## 2023-08-25 PROCEDURE — 25010000002 FOSAPREPITANT PER 1 MG: Performed by: INTERNAL MEDICINE

## 2023-08-25 PROCEDURE — 25010000002 HEPARIN LOCK FLUSH PER 10 UNITS: Performed by: INTERNAL MEDICINE

## 2023-08-25 PROCEDURE — 25010000002 PALONOSETRON PER 25 MCG: Performed by: INTERNAL MEDICINE

## 2023-08-25 PROCEDURE — 25010000002 DEXAMETHASONE PER 1 MG: Performed by: INTERNAL MEDICINE

## 2023-08-25 PROCEDURE — 96375 TX/PRO/DX INJ NEW DRUG ADDON: CPT

## 2023-08-25 RX ORDER — SODIUM CHLORIDE 9 MG/ML
250 INJECTION, SOLUTION INTRAVENOUS ONCE
Status: COMPLETED | OUTPATIENT
Start: 2023-08-25 | End: 2023-08-25

## 2023-08-25 RX ORDER — SODIUM CHLORIDE 0.9 % (FLUSH) 0.9 %
20 SYRINGE (ML) INJECTION AS NEEDED
Status: DISCONTINUED | OUTPATIENT
Start: 2023-08-25 | End: 2023-08-26 | Stop reason: HOSPADM

## 2023-08-25 RX ORDER — HEPARIN SODIUM (PORCINE) LOCK FLUSH IV SOLN 100 UNIT/ML 100 UNIT/ML
500 SOLUTION INTRAVENOUS AS NEEDED
OUTPATIENT
Start: 2023-08-25

## 2023-08-25 RX ORDER — FAMOTIDINE 10 MG/ML
20 INJECTION, SOLUTION INTRAVENOUS ONCE
Status: COMPLETED | OUTPATIENT
Start: 2023-08-25 | End: 2023-08-25

## 2023-08-25 RX ORDER — DEXAMETHASONE SODIUM PHOSPHATE 4 MG/ML
4 INJECTION, SOLUTION INTRA-ARTICULAR; INTRALESIONAL; INTRAMUSCULAR; INTRAVENOUS; SOFT TISSUE ONCE
Status: COMPLETED | OUTPATIENT
Start: 2023-08-25 | End: 2023-08-25

## 2023-08-25 RX ORDER — PALONOSETRON 0.05 MG/ML
0.25 INJECTION, SOLUTION INTRAVENOUS ONCE
Status: COMPLETED | OUTPATIENT
Start: 2023-08-25 | End: 2023-08-25

## 2023-08-25 RX ORDER — HEPARIN SODIUM (PORCINE) LOCK FLUSH IV SOLN 100 UNIT/ML 100 UNIT/ML
500 SOLUTION INTRAVENOUS AS NEEDED
Status: DISCONTINUED | OUTPATIENT
Start: 2023-08-25 | End: 2023-08-26 | Stop reason: HOSPADM

## 2023-08-25 RX ORDER — SODIUM CHLORIDE 0.9 % (FLUSH) 0.9 %
20 SYRINGE (ML) INJECTION AS NEEDED
OUTPATIENT
Start: 2023-08-25

## 2023-08-25 RX ADMIN — PACLITAXEL 135 MG: 6 INJECTION, SOLUTION, CONCENTRATE INTRAVENOUS at 10:57

## 2023-08-25 RX ADMIN — FOSAPREPITANT 100 ML: 150 INJECTION, POWDER, LYOPHILIZED, FOR SOLUTION INTRAVENOUS at 10:19

## 2023-08-25 RX ADMIN — CARBOPLATIN 750 MG: 450 INJECTION, SOLUTION INTRAVENOUS at 12:19

## 2023-08-25 RX ADMIN — FAMOTIDINE 20 MG: 10 INJECTION INTRAVENOUS at 10:15

## 2023-08-25 RX ADMIN — Medication 20 ML: at 12:59

## 2023-08-25 RX ADMIN — PALONOSETRON 0.25 MG: 0.05 INJECTION, SOLUTION INTRAVENOUS at 10:09

## 2023-08-25 RX ADMIN — SODIUM CHLORIDE 400 MG: 9 INJECTION, SOLUTION INTRAVENOUS at 09:58

## 2023-08-25 RX ADMIN — DEXAMETHASONE SODIUM PHOSPHATE 4 MG: 4 INJECTION, SOLUTION INTRA-ARTICULAR; INTRALESIONAL; INTRAMUSCULAR; INTRAVENOUS; SOFT TISSUE at 09:13

## 2023-08-25 RX ADMIN — SODIUM CHLORIDE 250 ML: 9 INJECTION, SOLUTION INTRAVENOUS at 09:07

## 2023-08-25 RX ADMIN — HEPARIN SODIUM (PORCINE) LOCK FLUSH IV SOLN 100 UNIT/ML 500 UNITS: 100 SOLUTION at 12:59

## 2023-08-25 NOTE — PROGRESS NOTES
Diagnosis: Triple negative breast cancer    Reason for Referral: Financial assistance with monthly living expenses    Content of Visit: OSW met with Ms. Watts in the medical oncology infusion center this afternoon. She provided her recent paycheck stubs, W-2s, and letter from employer. Advised Ms. Watts that she also needs to provide the first 2 pages of her tax return and 2 months bank statements to complete her [employer] application.   Ms. Watts reports she's been approved social security disability benefits that will begin in November, however, will receive SSI in the interim. She is eagerly waiting to receive $500 mohinder through [employer] to use until her SSI benefits begin. She has continued to work some in the meantime, but reports difficulty due to the nature of her job and side effects related to chemotherapy. OSW support remains available.

## 2023-08-28 ENCOUNTER — PATIENT OUTREACH (OUTPATIENT)
Dept: ONCOLOGY | Facility: HOSPITAL | Age: 28
End: 2023-08-28
Payer: COMMERCIAL

## 2023-08-28 ENCOUNTER — PRIOR AUTHORIZATION (OUTPATIENT)
Dept: NEUROLOGY | Facility: CLINIC | Age: 28
End: 2023-08-28
Payer: COMMERCIAL

## 2023-08-28 NOTE — TELEPHONE ENCOUNTER
Approvedtoday  The request has been approved. The authorization is effective from 08/28/2023 to 11/27/2023, as long as the member is enrolled in their current health plan. The request was approved as submitted. A written notification letter will follow with additional details.

## 2023-09-01 ENCOUNTER — HOSPITAL ENCOUNTER (OUTPATIENT)
Dept: ONCOLOGY | Facility: HOSPITAL | Age: 28
Discharge: HOME | End: 2023-09-01
Payer: COMMERCIAL

## 2023-09-01 VITALS
WEIGHT: 153.88 LBS | BODY MASS INDEX: 27.27 KG/M2 | SYSTOLIC BLOOD PRESSURE: 109 MMHG | DIASTOLIC BLOOD PRESSURE: 81 MMHG | OXYGEN SATURATION: 100 % | HEART RATE: 81 BPM | RESPIRATION RATE: 18 BRPM | HEIGHT: 63 IN | TEMPERATURE: 98 F

## 2023-09-01 DIAGNOSIS — R30.0 DYSURIA: Primary | ICD-10-CM

## 2023-09-01 DIAGNOSIS — R30.0 DYSURIA: ICD-10-CM

## 2023-09-01 DIAGNOSIS — C50.919 METAPLASTIC CARCINOMA OF BREAST: ICD-10-CM

## 2023-09-01 DIAGNOSIS — Z45.2 FITTING AND ADJUSTMENT OF VASCULAR CATHETER: Primary | ICD-10-CM

## 2023-09-01 LAB
ALBUMIN SERPL-MCNC: 4.2 G/DL (ref 3.5–5.2)
ALBUMIN/GLOB SERPL: 1.9 G/DL
ALP SERPL-CCNC: 87 U/L (ref 39–117)
ALT SERPL W P-5'-P-CCNC: 30 U/L (ref 1–33)
ANION GAP SERPL CALCULATED.3IONS-SCNC: 8.1 MMOL/L (ref 5–15)
AST SERPL-CCNC: 24 U/L (ref 1–32)
BACTERIA UR QL AUTO: ABNORMAL /HPF
BASOPHILS # BLD AUTO: 0.01 10*3/MM3 (ref 0–0.2)
BASOPHILS NFR BLD AUTO: 0.3 % (ref 0–1.5)
BILIRUB SERPL-MCNC: 0.2 MG/DL (ref 0–1.2)
BILIRUB UR QL STRIP: NEGATIVE
BUN SERPL-MCNC: 13 MG/DL (ref 6–20)
BUN/CREAT SERPL: 27.1 (ref 7–25)
CALCIUM SPEC-SCNC: 9.2 MG/DL (ref 8.6–10.5)
CHLORIDE SERPL-SCNC: 105 MMOL/L (ref 98–107)
CLARITY UR: CLEAR
CO2 SERPL-SCNC: 25.9 MMOL/L (ref 22–29)
COLOR UR: YELLOW
CREAT SERPL-MCNC: 0.48 MG/DL (ref 0.57–1)
DEPRECATED RDW RBC AUTO: 45.4 FL (ref 37–54)
EGFRCR SERPLBLD CKD-EPI 2021: 131.7 ML/MIN/1.73
EOSINOPHIL # BLD AUTO: 0 10*3/MM3 (ref 0–0.4)
EOSINOPHIL NFR BLD AUTO: 0 % (ref 0.3–6.2)
ERYTHROCYTE [DISTWIDTH] IN BLOOD BY AUTOMATED COUNT: 14.4 % (ref 12.3–15.4)
GLOBULIN UR ELPH-MCNC: 2.2 GM/DL
GLUCOSE SERPL-MCNC: 108 MG/DL (ref 65–99)
GLUCOSE UR STRIP-MCNC: NEGATIVE MG/DL
HCT VFR BLD AUTO: 32.4 % (ref 34–46.6)
HGB BLD-MCNC: 10.7 G/DL (ref 12–15.9)
HGB UR QL STRIP.AUTO: NEGATIVE
HYALINE CASTS UR QL AUTO: ABNORMAL /LPF
IMM GRANULOCYTES # BLD AUTO: 0.01 10*3/MM3 (ref 0–0.05)
IMM GRANULOCYTES NFR BLD AUTO: 0.3 % (ref 0–0.5)
KETONES UR QL STRIP: NEGATIVE
LEUKOCYTE ESTERASE UR QL STRIP.AUTO: NEGATIVE
LYMPHOCYTES # BLD AUTO: 1.34 10*3/MM3 (ref 0.7–3.1)
LYMPHOCYTES NFR BLD AUTO: 40.6 % (ref 19.6–45.3)
MCH RBC QN AUTO: 28.2 PG (ref 26.6–33)
MCHC RBC AUTO-ENTMCNC: 33 G/DL (ref 31.5–35.7)
MCV RBC AUTO: 85.3 FL (ref 79–97)
MONOCYTES # BLD AUTO: 0.18 10*3/MM3 (ref 0.1–0.9)
MONOCYTES NFR BLD AUTO: 5.5 % (ref 5–12)
NEUTROPHILS NFR BLD AUTO: 1.76 10*3/MM3 (ref 1.7–7)
NEUTROPHILS NFR BLD AUTO: 53.3 % (ref 42.7–76)
NITRITE UR QL STRIP: NEGATIVE
PH UR STRIP.AUTO: 5.5 [PH] (ref 5–8)
PLATELET # BLD AUTO: 143 10*3/MM3 (ref 140–450)
PMV BLD AUTO: 9.2 FL (ref 6–12)
POTASSIUM SERPL-SCNC: 3.3 MMOL/L (ref 3.5–5.2)
PROT SERPL-MCNC: 6.4 G/DL (ref 6–8.5)
PROT UR QL STRIP: NEGATIVE
RBC # BLD AUTO: 3.8 10*6/MM3 (ref 3.77–5.28)
RBC # UR STRIP: ABNORMAL /HPF
REF LAB TEST METHOD: ABNORMAL
SODIUM SERPL-SCNC: 139 MMOL/L (ref 136–145)
SP GR UR STRIP: 1.02 (ref 1–1.03)
SQUAMOUS #/AREA URNS HPF: ABNORMAL /HPF
UROBILINOGEN UR QL STRIP: NORMAL
WBC # UR STRIP: ABNORMAL /HPF
WBC NRBC COR # BLD: 3.3 10*3/MM3 (ref 3.4–10.8)

## 2023-09-01 PROCEDURE — 25010000002 PACLITAXEL PER 1 MG: Performed by: INTERNAL MEDICINE

## 2023-09-01 PROCEDURE — 85025 COMPLETE CBC W/AUTO DIFF WBC: CPT | Performed by: INTERNAL MEDICINE

## 2023-09-01 PROCEDURE — 25010000002 DIPHENHYDRAMINE PER 50 MG: Performed by: INTERNAL MEDICINE

## 2023-09-01 PROCEDURE — 25010000002 HEPARIN LOCK FLUSH PER 10 UNITS: Performed by: INTERNAL MEDICINE

## 2023-09-01 PROCEDURE — 96413 CHEMO IV INFUSION 1 HR: CPT

## 2023-09-01 PROCEDURE — 25010000002 DEXAMETHASONE PER 1 MG: Performed by: INTERNAL MEDICINE

## 2023-09-01 PROCEDURE — 81001 URINALYSIS AUTO W/SCOPE: CPT | Performed by: INTERNAL MEDICINE

## 2023-09-01 PROCEDURE — 96375 TX/PRO/DX INJ NEW DRUG ADDON: CPT

## 2023-09-01 PROCEDURE — 80053 COMPREHEN METABOLIC PANEL: CPT | Performed by: INTERNAL MEDICINE

## 2023-09-01 RX ORDER — HEPARIN SODIUM (PORCINE) LOCK FLUSH IV SOLN 100 UNIT/ML 100 UNIT/ML
500 SOLUTION INTRAVENOUS AS NEEDED
Status: DISCONTINUED | OUTPATIENT
Start: 2023-09-01 | End: 2023-09-02 | Stop reason: HOSPADM

## 2023-09-01 RX ORDER — DEXAMETHASONE SODIUM PHOSPHATE 4 MG/ML
4 INJECTION, SOLUTION INTRA-ARTICULAR; INTRALESIONAL; INTRAMUSCULAR; INTRAVENOUS; SOFT TISSUE ONCE
Status: COMPLETED | OUTPATIENT
Start: 2023-09-01 | End: 2023-09-01

## 2023-09-01 RX ORDER — SODIUM CHLORIDE 9 MG/ML
250 INJECTION, SOLUTION INTRAVENOUS ONCE
Status: COMPLETED | OUTPATIENT
Start: 2023-09-01 | End: 2023-09-01

## 2023-09-01 RX ORDER — HEPARIN SODIUM (PORCINE) LOCK FLUSH IV SOLN 100 UNIT/ML 100 UNIT/ML
500 SOLUTION INTRAVENOUS AS NEEDED
OUTPATIENT
Start: 2023-09-01

## 2023-09-01 RX ORDER — SODIUM CHLORIDE 0.9 % (FLUSH) 0.9 %
20 SYRINGE (ML) INJECTION AS NEEDED
OUTPATIENT
Start: 2023-09-01

## 2023-09-01 RX ORDER — SODIUM CHLORIDE 0.9 % (FLUSH) 0.9 %
20 SYRINGE (ML) INJECTION AS NEEDED
Status: DISCONTINUED | OUTPATIENT
Start: 2023-09-01 | End: 2023-09-02 | Stop reason: HOSPADM

## 2023-09-01 RX ORDER — FAMOTIDINE 10 MG/ML
20 INJECTION, SOLUTION INTRAVENOUS ONCE
Status: COMPLETED | OUTPATIENT
Start: 2023-09-01 | End: 2023-09-01

## 2023-09-01 RX ADMIN — HEPARIN SODIUM (PORCINE) LOCK FLUSH IV SOLN 100 UNIT/ML 500 UNITS: 100 SOLUTION at 12:08

## 2023-09-01 RX ADMIN — DIPHENHYDRAMINE HYDROCHLORIDE 25 MG: 50 INJECTION, SOLUTION INTRAMUSCULAR; INTRAVENOUS at 10:32

## 2023-09-01 RX ADMIN — SODIUM CHLORIDE 250 ML: 9 INJECTION, SOLUTION INTRAVENOUS at 10:26

## 2023-09-01 RX ADMIN — FAMOTIDINE 20 MG: 10 INJECTION INTRAVENOUS at 10:29

## 2023-09-01 RX ADMIN — PACLITAXEL 135 MG: 6 INJECTION, SOLUTION, CONCENTRATE INTRAVENOUS at 11:00

## 2023-09-01 RX ADMIN — Medication 20 ML: at 12:08

## 2023-09-01 RX ADMIN — DEXAMETHASONE SODIUM PHOSPHATE 4 MG: 4 INJECTION, SOLUTION INTRA-ARTICULAR; INTRALESIONAL; INTRAMUSCULAR; INTRAVENOUS; SOFT TISSUE at 10:27

## 2023-09-08 ENCOUNTER — DOCUMENTATION (OUTPATIENT)
Dept: ONCOLOGY | Facility: HOSPITAL | Age: 28
End: 2023-09-08
Payer: COMMERCIAL

## 2023-09-08 ENCOUNTER — HOSPITAL ENCOUNTER (OUTPATIENT)
Dept: ONCOLOGY | Facility: HOSPITAL | Age: 28
Discharge: HOME | End: 2023-09-08
Payer: COMMERCIAL

## 2023-09-08 VITALS
DIASTOLIC BLOOD PRESSURE: 70 MMHG | TEMPERATURE: 97.5 F | HEART RATE: 83 BPM | HEIGHT: 63 IN | SYSTOLIC BLOOD PRESSURE: 110 MMHG | OXYGEN SATURATION: 100 % | BODY MASS INDEX: 27.46 KG/M2 | WEIGHT: 154.98 LBS

## 2023-09-08 DIAGNOSIS — C50.919 METAPLASTIC CARCINOMA OF BREAST: Primary | ICD-10-CM

## 2023-09-08 DIAGNOSIS — Z45.2 FITTING AND ADJUSTMENT OF VASCULAR CATHETER: ICD-10-CM

## 2023-09-08 LAB
ALBUMIN SERPL-MCNC: 4.1 G/DL (ref 3.5–5.2)
ALBUMIN/GLOB SERPL: 2 G/DL
ALP SERPL-CCNC: 84 U/L (ref 39–117)
ALT SERPL W P-5'-P-CCNC: 28 U/L (ref 1–33)
ANION GAP SERPL CALCULATED.3IONS-SCNC: 6.9 MMOL/L (ref 5–15)
AST SERPL-CCNC: 23 U/L (ref 1–32)
BASOPHILS # BLD AUTO: 0 10*3/MM3 (ref 0–0.2)
BASOPHILS NFR BLD AUTO: 0 % (ref 0–1.5)
BILIRUB SERPL-MCNC: 0.2 MG/DL (ref 0–1.2)
BUN SERPL-MCNC: 11 MG/DL (ref 6–20)
BUN/CREAT SERPL: 23.9 (ref 7–25)
CALCIUM SPEC-SCNC: 9 MG/DL (ref 8.6–10.5)
CHLORIDE SERPL-SCNC: 107 MMOL/L (ref 98–107)
CO2 SERPL-SCNC: 24.1 MMOL/L (ref 22–29)
CREAT SERPL-MCNC: 0.46 MG/DL (ref 0.57–1)
DEPRECATED RDW RBC AUTO: 47 FL (ref 37–54)
EGFRCR SERPLBLD CKD-EPI 2021: 133 ML/MIN/1.73
EOSINOPHIL # BLD AUTO: 0.01 10*3/MM3 (ref 0–0.4)
EOSINOPHIL NFR BLD AUTO: 0.3 % (ref 0.3–6.2)
ERYTHROCYTE [DISTWIDTH] IN BLOOD BY AUTOMATED COUNT: 14.9 % (ref 12.3–15.4)
GLOBULIN UR ELPH-MCNC: 2.1 GM/DL
GLUCOSE SERPL-MCNC: 105 MG/DL (ref 65–99)
HCT VFR BLD AUTO: 31.8 % (ref 34–46.6)
HGB BLD-MCNC: 10.5 G/DL (ref 12–15.9)
IMM GRANULOCYTES # BLD AUTO: 0 10*3/MM3 (ref 0–0.05)
IMM GRANULOCYTES NFR BLD AUTO: 0 % (ref 0–0.5)
LYMPHOCYTES # BLD AUTO: 1.62 10*3/MM3 (ref 0.7–3.1)
LYMPHOCYTES NFR BLD AUTO: 50.9 % (ref 19.6–45.3)
MCH RBC QN AUTO: 28.4 PG (ref 26.6–33)
MCHC RBC AUTO-ENTMCNC: 33 G/DL (ref 31.5–35.7)
MCV RBC AUTO: 85.9 FL (ref 79–97)
MONOCYTES # BLD AUTO: 0.25 10*3/MM3 (ref 0.1–0.9)
MONOCYTES NFR BLD AUTO: 7.9 % (ref 5–12)
NEUTROPHILS NFR BLD AUTO: 1.3 10*3/MM3 (ref 1.7–7)
NEUTROPHILS NFR BLD AUTO: 40.9 % (ref 42.7–76)
PLATELET # BLD AUTO: 191 10*3/MM3 (ref 140–450)
PMV BLD AUTO: 9.1 FL (ref 6–12)
POTASSIUM SERPL-SCNC: 3.4 MMOL/L (ref 3.5–5.2)
PROT SERPL-MCNC: 6.2 G/DL (ref 6–8.5)
RBC # BLD AUTO: 3.7 10*6/MM3 (ref 3.77–5.28)
SODIUM SERPL-SCNC: 138 MMOL/L (ref 136–145)
WBC NRBC COR # BLD: 3.18 10*3/MM3 (ref 3.4–10.8)

## 2023-09-08 PROCEDURE — 25010000002 HEPARIN LOCK FLUSH PER 10 UNITS: Performed by: INTERNAL MEDICINE

## 2023-09-08 PROCEDURE — 96413 CHEMO IV INFUSION 1 HR: CPT

## 2023-09-08 PROCEDURE — 96375 TX/PRO/DX INJ NEW DRUG ADDON: CPT

## 2023-09-08 PROCEDURE — 25010000002 DEXAMETHASONE PER 1 MG: Performed by: INTERNAL MEDICINE

## 2023-09-08 PROCEDURE — 25010000002 DIPHENHYDRAMINE PER 50 MG: Performed by: INTERNAL MEDICINE

## 2023-09-08 PROCEDURE — 25010000002 PACLITAXEL PER 1 MG: Performed by: INTERNAL MEDICINE

## 2023-09-08 PROCEDURE — 85025 COMPLETE CBC W/AUTO DIFF WBC: CPT | Performed by: INTERNAL MEDICINE

## 2023-09-08 PROCEDURE — 80053 COMPREHEN METABOLIC PANEL: CPT | Performed by: INTERNAL MEDICINE

## 2023-09-08 RX ORDER — DEXAMETHASONE SODIUM PHOSPHATE 4 MG/ML
4 INJECTION, SOLUTION INTRA-ARTICULAR; INTRALESIONAL; INTRAMUSCULAR; INTRAVENOUS; SOFT TISSUE ONCE
Status: COMPLETED | OUTPATIENT
Start: 2023-09-08 | End: 2023-09-08

## 2023-09-08 RX ORDER — HEPARIN SODIUM (PORCINE) LOCK FLUSH IV SOLN 100 UNIT/ML 100 UNIT/ML
500 SOLUTION INTRAVENOUS AS NEEDED
Status: DISCONTINUED | OUTPATIENT
Start: 2023-09-08 | End: 2023-09-09 | Stop reason: HOSPADM

## 2023-09-08 RX ORDER — SODIUM CHLORIDE 0.9 % (FLUSH) 0.9 %
20 SYRINGE (ML) INJECTION AS NEEDED
OUTPATIENT
Start: 2023-09-08

## 2023-09-08 RX ORDER — SODIUM CHLORIDE 9 MG/ML
250 INJECTION, SOLUTION INTRAVENOUS ONCE
Status: COMPLETED | OUTPATIENT
Start: 2023-09-08 | End: 2023-09-08

## 2023-09-08 RX ORDER — FAMOTIDINE 10 MG/ML
20 INJECTION, SOLUTION INTRAVENOUS ONCE
Status: COMPLETED | OUTPATIENT
Start: 2023-09-08 | End: 2023-09-08

## 2023-09-08 RX ORDER — HEPARIN SODIUM (PORCINE) LOCK FLUSH IV SOLN 100 UNIT/ML 100 UNIT/ML
500 SOLUTION INTRAVENOUS AS NEEDED
OUTPATIENT
Start: 2023-09-08

## 2023-09-08 RX ORDER — SODIUM CHLORIDE 0.9 % (FLUSH) 0.9 %
20 SYRINGE (ML) INJECTION AS NEEDED
Status: DISCONTINUED | OUTPATIENT
Start: 2023-09-08 | End: 2023-09-09 | Stop reason: HOSPADM

## 2023-09-08 RX ADMIN — DIPHENHYDRAMINE HYDROCHLORIDE 25 MG: 50 INJECTION, SOLUTION INTRAMUSCULAR; INTRAVENOUS at 10:29

## 2023-09-08 RX ADMIN — DEXAMETHASONE SODIUM PHOSPHATE 4 MG: 4 INJECTION, SOLUTION INTRA-ARTICULAR; INTRALESIONAL; INTRAMUSCULAR; INTRAVENOUS; SOFT TISSUE at 10:26

## 2023-09-08 RX ADMIN — FAMOTIDINE 20 MG: 10 INJECTION INTRAVENOUS at 10:23

## 2023-09-08 RX ADMIN — SODIUM CHLORIDE 250 ML: 9 INJECTION, SOLUTION INTRAVENOUS at 10:22

## 2023-09-08 RX ADMIN — HEPARIN SODIUM (PORCINE) LOCK FLUSH IV SOLN 100 UNIT/ML 500 UNITS: 100 SOLUTION at 11:59

## 2023-09-08 RX ADMIN — Medication 20 ML: at 11:59

## 2023-09-08 RX ADMIN — PACLITAXEL 135 MG: 6 INJECTION, SOLUTION, CONCENTRATE INTRAVENOUS at 10:50

## 2023-09-08 NOTE — PROGRESS NOTES
Diagnosis: Triple negative breast cancer    Reason for Referral: Financial assistance with monthly expenses     Content of Visit: OSW met with Ms. Watts in the medical oncology infusion center this morning to assist her in finalizing her [employer] application. OSW is waiting to receive her last two months bank statements from her bank in order to submit this application. She no longer has her apartment due to financial constraints and is living with her significant other again. Ms. Watts confirmed she received her $500 mohinder from [employer] this morning. OSW attempted to contact [facility] with Ms. Watts this morning to see if they have a financial mohinder available to her, but unfortunately all of their social workers were busy at the time. Will try to facilitate this call another time.  OSW received a phone call from Tina with the [facility] ([phone]) to confirm Ms. Watts is under active treatment. Provided confirmation and advised she is in fact here today. OSW support remains available.

## 2023-09-11 NOTE — PROGRESS NOTES
"Discuss Breast Reconstruction  (Discuss options for Reconstruction)            History of Present Illness  Marilee Watts is a 27 y.o. female who presents to [facility] as a consult from Dr. Whiting for combo case for breast recon, and to discuss breast reconstructive options.  Patient considering nipple sparing mastectomy.      Last chemo treatment is in 1/2024  She wears 34C-D bra size and would like to be same size.        Subjective      Tegaderm ag mesh [silver]  Allergies Reconciled.    Review of Systems  All system were reviewed and were negative, except the ones noted above.     @Objective     /83 (BP Location: Left arm)   Pulse 87   Temp 98 °F (36.7 °C) (Temporal)   Ht 160 cm (63\")   Wt 70.5 kg (155 lb 6.4 oz)   SpO2 97%   BMI 27.53 kg/m²     Body mass index is 27.53 kg/m².    Physical Exam   Cardiovascular: Normal rate.     Pulmonary/Chest  Effort normal.   Physical exam:  Patient awake, alert, oriented  Respirations are non elaborated  Patient is not tachycardic    Breast exam:   Rt breast previous lumpectomy scar.    Axillary Lymphadenopathy: No axillary lymphadenopathy  SN-N (Right Breast): 21.5  SN-N (Left Breast): 22  N-IMF (Right Breast): 9  N-IMF (Left Breast): 8  Base Width (Right Breast): 15  Base Width (Left Breast): 15.5      Result Review :              Assessment and Plan      Diagnoses and all orders for this visit:    1. Metaplastic carcinoma of breast (Primary)    2. Disproportion between native breast and reconstructed breast          Additional Order(s):         Breast reconstruction options (Tissue Expander/Implant versus Autologous) were all discussed with the patient at length.  In addition, we discussed options for symmetry procedures and nipple reconstruction.  The patient understands that her reconstructed breast will not have the same sensation as a natural breast and that visible incision lines will always be " present.  In addition, patient understand that breast reconstruction is a multi-stage procedure that can take months to years to complete.   After thorough discussion of risk and benefits of each procedure the patient has elected to proceed.   We will send information for prior authorization.  Photos were obtained.   Patient was given the breast reconstruction pamphlet as well as directed to the ASPS website for additional information.   The patient was made aware that the FDA has discovered rare occurrences between an uncommon form of lymphoma (anaplastic large cell) and women with breast implants.  While the incidence is quite low, the risk of development is real; therefore, any unusual symptoms or signs (most commonly a late fluid collection years later) should be brought to the attention of your physician.  Patient also counseled on risks and benefits of saline versus silicone implants, lifting restrictions, scar placement, risk of capsular contracture, animation deformity, need for likely revision of breast implants at some point in her life, FDA recommendation of MRI every three years to monitor for rupture, and continued mammography for breast cancer surveillance.   We had a long discussion with the patient and her family today regarding her reconstructive options.  These include no reconstruction, reconstruction at the time of mastectomy or lumpectomy, and finally delayed reconstruction.  We discussed specific types of reconstruction, including: tissue expander and/or implants, and autologous reconstruction with either pedicled or free flap.  We also covered the later stages of reconstruction, including nipple reconstruction and areola tattooing, fat grafting, and symmetry procedures if indicated or desired.   I described the typical ellen-operative course of each procedure, including the nature of the procedure, hospital stay, necessity for drains, post-operative activity restriction, and postoperative  visits (including those for tissue expansion).  We also discussed the time frame for reconstruction.  I shared information about saline vs. silicone implants, including their differences, risk of capsular contracture, rippling, or leak/rupture, and safety/monitoring issues.  I described Alloderm and its use for implant reconstruction. We discussed that radiation therapy, if she ultimately requires it, may alter the reconstructive options.     We reviewed surgical risks including, but not limited to, infection, bleeding, hematoma, seroma, pain, scarring, numbness, skin or nipple loss, wound healing problems, flap failures including partial or complete flap loss, asymmetry, need for revisions or further surgeries,  and risks associated with anesthesia.   Additional risks with autologous reconstruction include donor wound morbidities, such as hernias or bulges, wound healing problems, muscle weakness, partial or complete flap loss, and typical surgical risks as listed above.   The use of biological mesh is not for soft tissue reinforcement. The use of mesh is necessary because the mastectomy dissection pocket is larger than the implant itself. The intention of the mesh is to restrain the displacement of the implant to the axilla, which is a very common complication requiring revision. In my experience, the use of mesh avoids that specific complication and very often avoids a second surgery. The use of mesh also allows me to perform a safer procedure since it holds the implant in place and alleviates the pressure over the skin that depends only on the subdermal blood supply. Without the mesh, I believe I wouldn't be able to perform direct implant reconstruction and give the optimal result that patient desires and deserves.      Specifically on her case,  I understand she is having bilateral skin sparing mastectomies. In this case, I recommend bilateral breast reconstruction with pre pectoral placement of implant and  mesh, use of spy. There is also a possibility of use of expanders.   Consent:  bilateral breast reconstruction with implants and mesh, use of spy, possible use of expanders.   Target implant size 550 cc Extra Projection       CPT codes:   03575-01 - immediate insertion of breast prosthesis following reconstruction   93930-04  implantation of biologic implant  72863- intravenous injection of agent (SPY)      Implants Sientra:  Sientra Smooth round gel implant  HP 87017-709, 505, 535, 565 x2                                                          XP 94723-577, 510 x2  Expander: [id number] x2  Single use sizer [id number] x1    Mesh:    Mesh:  alloderm rectangle 16x20 perforated [id number] x2   OR time: 3 hours     Jackie Sanders MD, PhD  NPI: [id number]    Women's Health and Cancer Rights Act (WHCRA)  The Women's Health and Cancer Rights Act of 1998 (WHCRA) is a federal law that provides protections to patients who choose to have breast reconstruction in connection with a mastectomy.  Coverage must be provided for:    1.All stages of reconstruction of the breast on which the mastectomy has been performed;  2.Surgery and reconstruction of the other breast to produce a symmetrical appearance; and  Prostheses and treatment of physical complications of all stages of the mastectomy, including lymphedema.    This law applies to two different types of coverage:    1.Group health plans (provided by an employer or union);  2.Individual health insurance policies (not based on employment).    Plan:  Given these options, the patient has verbally expressed an understanding of the risks of surgery and finds these risks acceptable. We will proceed with surgery as soon as possible.    Discussed surgery after chemo 1/24        Scribed by Kate Sharma, acting as a scribe for Jackie Sanders MD, 06/21/23 15:39 EDT.  Jackie Sanders MD's signature on the note affirms that the note adequately documents the care  provided.        I spent 60 minutes caring for Marilee on this date of service. This time includes time spent by me in the following activities:performing a medically appropriate examination and/or evaluation , counseling and educating the patient/family/caregiver, documenting information in the medical record, and care coordination        Follow Up     Return Pt will reach out to schedule after chemo.    Patient was given instructions and counseling regarding her condition. Please see specific information pulled into the AVS if appropriate.     Jackie Sanders MD  09/20/2023

## 2023-09-13 ENCOUNTER — OFFICE VISIT (OUTPATIENT)
Dept: SURGERY | Facility: CLINIC | Age: 28
End: 2023-09-13
Payer: COMMERCIAL

## 2023-09-13 DIAGNOSIS — C50.919 METAPLASTIC CARCINOMA OF BREAST: Primary | ICD-10-CM

## 2023-09-13 PROCEDURE — 1159F MED LIST DOCD IN RCRD: CPT | Performed by: SURGERY

## 2023-09-13 PROCEDURE — 1160F RVW MEDS BY RX/DR IN RCRD: CPT | Performed by: SURGERY

## 2023-09-13 PROCEDURE — 99213 OFFICE O/P EST LOW 20 MIN: CPT | Performed by: SURGERY

## 2023-09-13 NOTE — PROGRESS NOTES
Chief Complaint: Follow-up    Subjective         History of Present Illness  Marilee Watts is a 27 y.o. female presents to [facility] to be seen for right breast cancer.   Her pathology and imaging are shown below:    DIAGNOSIS:   RIGHT BREAST, MASS, LUMPECTOMY:   Invasive metaplastic carcinoma, high grade   Maximum dimensions: 4.5 x 4.0 x 3.3 cm   Interspersed fibroadenomata   Margins free of tumor   Estrogen receptor: Negative   Progesterone receptor: Negative   HER2/suzie: Negative   PD-L1 (22C3) Combined Positive Score: 50   See CAP Template     INVASIVE CARCINOMA OF THE BREAST     SPECIMEN:   Procedure: Excision (less than total mastectomy)   Specimen Laterality: Right     TUMOR:   Histologic Type: Metaplastic carcinoma   Ductal Carcinoma In Situ (DCIS): Not identified   Lymphovascular Invasion: Not identified   Treatment Effect in the Breast: No known presurgical therapy     MARGINS:   Margin Status for Invasive Carcinoma:   - All margins negative for invasive carcinoma   Distance from Invasive Carcinoma to Closest Margin: 1 mm, posterior     REGIONAL LYMPH NODES:   Regional Lymph Node Status: Not applicable (no regional lymph nodes   submitted or found)     ADDITIONAL FINDINGS: There are interspersed fibroadenomata     PATHOLOGIC STAGE CLASSIFICATION (pTNM, AJCC 8th Edition): pT2 NX    CLINICAL HISTORY:   Benign neoplasm of right breast     SPECIMENS RECEIVED:   RIGHT BREAST , MASS     MICROSCOPIC DESCRIPTION:   Tissue blocks are prepared and slides are examined microscopically on all   specimens. See diagnosis for details. IMMUNOHISTOCHEMICAL RESULTS   (IHC):   Estrogen Receptor      RESULT: Negative   0%   0+ (INTENSITY SCORE)     Progesterone Receptor         RESULT:  Negative  0%   0+   (INTENSITY SCORE)     Her2/suzie oncoprotein    RESULT: Negative   0%   0+ (INTENSITY   SCORE)       Imaging:  Right axillary adenopathy, likely metastatic disease related to  recently  diagnosed right breast carcinoma developed in prior area of  fibroadenoma.    BI-RADS CATEGORY: 6 , KNOWN BIOPSY PROVEN MALIGNANCY.        RECOMMENDED FOLLOW-UP:  Core biopsy of the right axillary adenopathy.    Narrative    BILATERAL DIAGNOSTIC DIGITAL MAMMOGRAPHY, RIGHT AXILLARY ULTRASOUND,  LEFT AXILLARY/BREAST ULTRASOUND    CLINICAL INDICATION: Triple negative right breast cancer.    TECHNIQUE: Bilateral CC, exaggerated CC and MLO views were obtained with  2-D and 3-D digital acquisitions. The study was read with the assistance  of CAD.      COMPARISON: Previous studies back to 2022.    FINDINGS:    Heterogeneously dense: The breasts are heterogeneously dense, which may  obscure small masses.    Surgical clips in the lateral right breast from lumpectomy. No mass in  the right breast. There are enlarged lymph nodes in the right axilla.  Ultrasound shows two abnormal lymph nodes in the right axilla, the  largest 2.9 x 1.7 cm with a markedly thickened cortex up to 1.4 cm. The  other adjacent node measures 2.5 x 1.3 cm with a cortical thickness of 5  mm. Findings are consistent with metastatic adenopathy.    Complete left breast ultrasound is normal. Left axillary ultrasound is  normal.      She is here today to review surgical options.       Objective     Past Medical History:   Diagnosis Date    Asthma     AS CHILD    Breast cancer     COVID-19 vaccine administered        Past Surgical History:   Procedure Laterality Date     SECTION      SKIN BIOPSY      TUBAL ABDOMINAL LIGATION           Current Outpatient Medications:     acetaminophen (TYLENOL) 500 MG tablet, Take 1 tablet by mouth Every 6 (Six) Hours As Needed., Disp: , Rfl:     desvenlafaxine (PRISTIQ) 50 MG 24 hr tablet, Take 1 tablet by mouth Daily., Disp: 90 tablet, Rfl: 1    fluconazole (DIFLUCAN) 150 MG tablet, , Disp: , Rfl:     galcanezumab-gnlm (Emgality) 120 MG/ML auto-injector pen, Inject 1 mL under the skin into the  appropriate area as directed Every 30 (Thirty) Days., Disp: 1 each, Rfl: 5    ibuprofen (ADVIL,MOTRIN) 600 MG tablet, Take 1 tablet by mouth Every 6 (Six) Hours As Needed for Mild Pain., Disp: 60 tablet, Rfl: 5    lidocaine-prilocaine (EMLA) 2.5-2.5 % cream, Apply 1 application  topically to the appropriate area as directed As Needed for Mild Pain., Disp: 30 g, Rfl: 1    OLANZapine (zyPREXA) 5 MG tablet, Take 1 tablet by mouth Every Night. Take on days 2, 3 and 4 after chemotherapy., Disp: 3 tablet, Rfl: 3    ondansetron (ZOFRAN) 8 MG tablet, Take 1 tablet by mouth 3 (Three) Times a Day As Needed for Nausea or Vomiting., Disp: 30 tablet, Rfl: 3    rOPINIRole (REQUIP) 0.25 MG tablet, Take 1 tablet by mouth Every Night. Take 1 hour before bedtime., Disp: 30 tablet, Rfl: 5    ubrogepant (Ubrelvy) 100 MG tablet, Take 1 tablet by mouth 1 (One) Time As Needed (migraine) for up to 1 dose. One tablet at HA onset, may repeat once in 2 hours if needed., Disp: 10 tablet, Rfl: 5    ALPRAZolam (XANAX) 0.5 MG tablet, Take 1 tablet by mouth 2 (Two) Times a Day As Needed for Anxiety. (Patient not taking: Reported on 8/22/2023), Disp: 60 tablet, Rfl: 0    ARIPiprazole (ABILIFY) 2 MG tablet, Take 1 tablet by mouth Daily. (Patient not taking: Reported on 6/21/2023), Disp: , Rfl:     Cetirizine HCl (ZyrTEC Allergy) 10 MG capsule, Take 2 tablets by mouth Daily. (Patient not taking: Reported on 6/21/2023), Disp: , Rfl:     docusate sodium (COLACE) 100 MG capsule, , Disp: , Rfl:     hydrOXYzine (ATARAX) 25 MG tablet, Take  by mouth. (Patient not taking: Reported on 6/21/2023), Disp: , Rfl:     ibuprofen (ADVIL,MOTRIN) 600 MG tablet, , Disp: , Rfl:     LORazepam (ATIVAN) 0.5 MG tablet, Take 1 tablet by mouth Every 8 (Eight) Hours As Needed for Anxiety. (Patient not taking: Reported on 8/22/2023), Disp: 90 tablet, Rfl: 0    potassium chloride (K-DUR,KLOR-CON) 20 MEQ CR tablet, Take 1 tablet by mouth Daily. (Patient not taking: Reported on  8/22/2023), Disp: 14 tablet, Rfl: 2    propranolol (INDERAL) 20 MG tablet, Take 1 tablet by mouth 2 (Two) Times a Day. (Patient not taking: Reported on 8/22/2023), Disp: 60 tablet, Rfl: 5    topiramate (TOPAMAX) 25 MG tablet, Take 1 tablet by mouth Daily. (Patient not taking: Reported on 9/8/2023), Disp: , Rfl:     Allergies   Allergen Reactions    Tegaderm Ag Mesh [Silver] Itching        Family History   Problem Relation Age of Onset    COPD Mother     Hypertension Father     Hyperlipidemia Father     Diabetes Father     Skin cancer Paternal Grandmother        Social History     Socioeconomic History    Marital status: Single    Number of children: 2   Tobacco Use    Smoking status: Never     Passive exposure: Never    Smokeless tobacco: Never   Vaping Use    Vaping Use: Never used   Substance and Sexual Activity    Alcohol use: Never    Drug use: Never    Sexual activity: Defer       Vital Signs:   There were no vitals taken for this visit.   Review of Systems    Physical Exam  Vitals and nursing note reviewed.   Constitutional:       Appearance: Normal appearance.   HENT:      Head: Normocephalic and atraumatic.   Eyes:      Extraocular Movements: Extraocular movements intact.      Pupils: Pupils are equal, round, and reactive to light.   Cardiovascular:      Pulses: Normal pulses.   Pulmonary:      Effort: Pulmonary effort is normal. No accessory muscle usage or respiratory distress.   Chest:   Breasts:     Right: Normal. Mass present. No inverted nipple, nipple discharge or skin change.      Left: Normal. No inverted nipple, nipple discharge or skin change.   Abdominal:      General: Abdomen is flat.      Palpations: Abdomen is soft.      Tenderness: There is no abdominal tenderness. There is no guarding.   Musculoskeletal:         General: No swelling, tenderness or deformity.      Cervical back: Neck supple.   Lymphadenopathy:      Upper Body:      Right upper body: Axillary adenopathy present. No  supraclavicular adenopathy.      Left upper body: No supraclavicular or axillary adenopathy.   Skin:     General: Skin is warm and dry.   Neurological:      General: No focal deficit present.      Mental Status: She is alert and oriented to person, place, and time.   Psychiatric:         Mood and Affect: Mood normal.         Thought Content: Thought content normal.        Result Review :               Assessment and Plan    Diagnoses and all orders for this visit:    1. Metaplastic carcinoma of breast (Primary)    Will plan for combination surgery with plastics- discussed surgical options with her again today and will plan for MRI when closer to end of chemotherapy    Follow Up   No follow-ups on file.  Patient was given instructions and counseling regarding her condition or for health maintenance advice. Please see specific information pulled into the AVS if appropriate.         This document has been electronically signed by Christiana Whiting MD  September 13, 2023 11:36 EDT

## 2023-09-14 ENCOUNTER — HOSPITAL ENCOUNTER (OUTPATIENT)
Dept: ONCOLOGY | Facility: HOSPITAL | Age: 28
Discharge: HOME | End: 2023-09-14
Payer: COMMERCIAL

## 2023-09-14 ENCOUNTER — OFFICE VISIT (OUTPATIENT)
Dept: ONCOLOGY | Facility: HOSPITAL | Age: 28
End: 2023-09-14
Payer: COMMERCIAL

## 2023-09-14 VITALS
BODY MASS INDEX: 27.5 KG/M2 | WEIGHT: 155.2 LBS | SYSTOLIC BLOOD PRESSURE: 112 MMHG | DIASTOLIC BLOOD PRESSURE: 86 MMHG | TEMPERATURE: 97.8 F | OXYGEN SATURATION: 99 % | RESPIRATION RATE: 18 BRPM | HEART RATE: 69 BPM

## 2023-09-14 DIAGNOSIS — R11.0 CHEMOTHERAPY-INDUCED NAUSEA: ICD-10-CM

## 2023-09-14 DIAGNOSIS — C50.919 METAPLASTIC CARCINOMA OF BREAST: Primary | ICD-10-CM

## 2023-09-14 DIAGNOSIS — Z45.2 FITTING AND ADJUSTMENT OF VASCULAR CATHETER: ICD-10-CM

## 2023-09-14 DIAGNOSIS — T45.1X5A CHEMOTHERAPY-INDUCED NAUSEA: ICD-10-CM

## 2023-09-14 LAB
ALBUMIN SERPL-MCNC: 3.9 G/DL (ref 3.5–5.2)
ALBUMIN/GLOB SERPL: 1.6 G/DL
ALP SERPL-CCNC: 87 U/L (ref 39–117)
ALT SERPL W P-5'-P-CCNC: 39 U/L (ref 1–33)
ANION GAP SERPL CALCULATED.3IONS-SCNC: 6.7 MMOL/L (ref 5–15)
AST SERPL-CCNC: 30 U/L (ref 1–32)
BASOPHILS # BLD AUTO: 0.01 10*3/MM3 (ref 0–0.2)
BASOPHILS NFR BLD AUTO: 0.4 % (ref 0–1.5)
BILIRUB SERPL-MCNC: 0.3 MG/DL (ref 0–1.2)
BUN SERPL-MCNC: 10 MG/DL (ref 6–20)
BUN/CREAT SERPL: 17.9 (ref 7–25)
CALCIUM SPEC-SCNC: 8.9 MG/DL (ref 8.6–10.5)
CHLORIDE SERPL-SCNC: 106 MMOL/L (ref 98–107)
CO2 SERPL-SCNC: 23.3 MMOL/L (ref 22–29)
CREAT SERPL-MCNC: 0.56 MG/DL (ref 0.57–1)
DEPRECATED RDW RBC AUTO: 46.4 FL (ref 37–54)
EGFRCR SERPLBLD CKD-EPI 2021: 126.9 ML/MIN/1.73
EOSINOPHIL # BLD AUTO: 0 10*3/MM3 (ref 0–0.4)
EOSINOPHIL NFR BLD AUTO: 0 % (ref 0.3–6.2)
ERYTHROCYTE [DISTWIDTH] IN BLOOD BY AUTOMATED COUNT: 14.9 % (ref 12.3–15.4)
GLOBULIN UR ELPH-MCNC: 2.5 GM/DL
GLUCOSE SERPL-MCNC: 133 MG/DL (ref 65–99)
HCT VFR BLD AUTO: 32.5 % (ref 34–46.6)
HGB BLD-MCNC: 10.9 G/DL (ref 12–15.9)
IMM GRANULOCYTES # BLD AUTO: 0.01 10*3/MM3 (ref 0–0.05)
IMM GRANULOCYTES NFR BLD AUTO: 0.4 % (ref 0–0.5)
LYMPHOCYTES # BLD AUTO: 1.48 10*3/MM3 (ref 0.7–3.1)
LYMPHOCYTES NFR BLD AUTO: 57.6 % (ref 19.6–45.3)
MCH RBC QN AUTO: 28.6 PG (ref 26.6–33)
MCHC RBC AUTO-ENTMCNC: 33.5 G/DL (ref 31.5–35.7)
MCV RBC AUTO: 85.3 FL (ref 79–97)
MONOCYTES # BLD AUTO: 0.17 10*3/MM3 (ref 0.1–0.9)
MONOCYTES NFR BLD AUTO: 6.6 % (ref 5–12)
NEUTROPHILS NFR BLD AUTO: 0.9 10*3/MM3 (ref 1.7–7)
NEUTROPHILS NFR BLD AUTO: 35 % (ref 42.7–76)
PLATELET # BLD AUTO: 122 10*3/MM3 (ref 140–450)
PMV BLD AUTO: 9.4 FL (ref 6–12)
POTASSIUM SERPL-SCNC: 3.6 MMOL/L (ref 3.5–5.2)
PROT SERPL-MCNC: 6.4 G/DL (ref 6–8.5)
RBC # BLD AUTO: 3.81 10*6/MM3 (ref 3.77–5.28)
SODIUM SERPL-SCNC: 136 MMOL/L (ref 136–145)
WBC NRBC COR # BLD: 2.57 10*3/MM3 (ref 3.4–10.8)

## 2023-09-14 PROCEDURE — G0463 HOSPITAL OUTPT CLINIC VISIT: HCPCS | Performed by: INTERNAL MEDICINE

## 2023-09-14 PROCEDURE — 80053 COMPREHEN METABOLIC PANEL: CPT | Performed by: INTERNAL MEDICINE

## 2023-09-14 PROCEDURE — 36415 COLL VENOUS BLD VENIPUNCTURE: CPT

## 2023-09-14 PROCEDURE — 85025 COMPLETE CBC W/AUTO DIFF WBC: CPT | Performed by: INTERNAL MEDICINE

## 2023-09-14 RX ORDER — DIPHENHYDRAMINE HYDROCHLORIDE 50 MG/ML
50 INJECTION INTRAMUSCULAR; INTRAVENOUS AS NEEDED
OUTPATIENT
Start: 2023-09-29

## 2023-09-14 RX ORDER — HEPARIN SODIUM (PORCINE) LOCK FLUSH IV SOLN 100 UNIT/ML 100 UNIT/ML
500 SOLUTION INTRAVENOUS AS NEEDED
Status: CANCELLED | OUTPATIENT
Start: 2023-09-14

## 2023-09-14 RX ORDER — DEXAMETHASONE SODIUM PHOSPHATE 4 MG/ML
4 INJECTION, SOLUTION INTRA-ARTICULAR; INTRALESIONAL; INTRAMUSCULAR; INTRAVENOUS; SOFT TISSUE ONCE
Status: CANCELLED
Start: 2023-09-15 | End: 2023-09-15

## 2023-09-14 RX ORDER — SODIUM CHLORIDE 0.9 % (FLUSH) 0.9 %
20 SYRINGE (ML) INJECTION AS NEEDED
Status: DISCONTINUED | OUTPATIENT
Start: 2023-09-14 | End: 2023-09-15 | Stop reason: HOSPADM

## 2023-09-14 RX ORDER — FAMOTIDINE 10 MG/ML
20 INJECTION, SOLUTION INTRAVENOUS AS NEEDED
Status: CANCELLED | OUTPATIENT
Start: 2023-09-15

## 2023-09-14 RX ORDER — SODIUM CHLORIDE 9 MG/ML
250 INJECTION, SOLUTION INTRAVENOUS ONCE
OUTPATIENT
Start: 2023-09-29

## 2023-09-14 RX ORDER — FAMOTIDINE 10 MG/ML
20 INJECTION, SOLUTION INTRAVENOUS AS NEEDED
OUTPATIENT
Start: 2023-09-29

## 2023-09-14 RX ORDER — SODIUM CHLORIDE 9 MG/ML
250 INJECTION, SOLUTION INTRAVENOUS ONCE
OUTPATIENT
Start: 2023-09-22

## 2023-09-14 RX ORDER — HEPARIN SODIUM (PORCINE) LOCK FLUSH IV SOLN 100 UNIT/ML 100 UNIT/ML
500 SOLUTION INTRAVENOUS AS NEEDED
Status: DISCONTINUED | OUTPATIENT
Start: 2023-09-14 | End: 2023-09-15 | Stop reason: HOSPADM

## 2023-09-14 RX ORDER — DEXAMETHASONE SODIUM PHOSPHATE 4 MG/ML
4 INJECTION, SOLUTION INTRA-ARTICULAR; INTRALESIONAL; INTRAMUSCULAR; INTRAVENOUS; SOFT TISSUE ONCE
Start: 2023-09-22 | End: 2023-09-22

## 2023-09-14 RX ORDER — DIPHENHYDRAMINE HYDROCHLORIDE 50 MG/ML
50 INJECTION INTRAMUSCULAR; INTRAVENOUS AS NEEDED
Status: CANCELLED | OUTPATIENT
Start: 2023-09-15

## 2023-09-14 RX ORDER — DEXAMETHASONE SODIUM PHOSPHATE 4 MG/ML
4 INJECTION, SOLUTION INTRA-ARTICULAR; INTRALESIONAL; INTRAMUSCULAR; INTRAVENOUS; SOFT TISSUE ONCE
Start: 2023-09-29 | End: 2023-09-29

## 2023-09-14 RX ORDER — FAMOTIDINE 10 MG/ML
20 INJECTION, SOLUTION INTRAVENOUS ONCE
OUTPATIENT
Start: 2023-09-29

## 2023-09-14 RX ORDER — SODIUM CHLORIDE 0.9 % (FLUSH) 0.9 %
20 SYRINGE (ML) INJECTION AS NEEDED
Status: CANCELLED | OUTPATIENT
Start: 2023-09-14

## 2023-09-14 RX ORDER — DIPHENHYDRAMINE HYDROCHLORIDE 50 MG/ML
50 INJECTION INTRAMUSCULAR; INTRAVENOUS AS NEEDED
OUTPATIENT
Start: 2023-09-22

## 2023-09-14 RX ORDER — FAMOTIDINE 10 MG/ML
20 INJECTION, SOLUTION INTRAVENOUS ONCE
OUTPATIENT
Start: 2023-09-22

## 2023-09-14 RX ORDER — FAMOTIDINE 10 MG/ML
20 INJECTION, SOLUTION INTRAVENOUS ONCE
Status: CANCELLED | OUTPATIENT
Start: 2023-09-15

## 2023-09-14 RX ORDER — PALONOSETRON 0.05 MG/ML
0.25 INJECTION, SOLUTION INTRAVENOUS ONCE
Status: CANCELLED | OUTPATIENT
Start: 2023-09-15

## 2023-09-14 RX ORDER — FAMOTIDINE 10 MG/ML
20 INJECTION, SOLUTION INTRAVENOUS AS NEEDED
OUTPATIENT
Start: 2023-09-22

## 2023-09-14 RX ORDER — SODIUM CHLORIDE 9 MG/ML
250 INJECTION, SOLUTION INTRAVENOUS ONCE
Status: CANCELLED | OUTPATIENT
Start: 2023-09-15

## 2023-09-14 RX ORDER — PROMETHAZINE HYDROCHLORIDE 25 MG/1
25 TABLET ORAL EVERY 6 HOURS PRN
Qty: 30 TABLET | Refills: 3 | Status: SHIPPED | OUTPATIENT
Start: 2023-09-14

## 2023-09-14 RX ORDER — LIDOCAINE AND PRILOCAINE 25; 25 MG/G; MG/G
1 CREAM TOPICAL AS NEEDED
Qty: 30 G | Refills: 1 | Status: SHIPPED | OUTPATIENT
Start: 2023-09-14

## 2023-09-14 NOTE — PROGRESS NOTES
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 MRI with concern right axilla LN involvement. Right axilla LN biopsied on 7/7 consistent with metastatic breast carcinoma. Due for C4 chemoimmunotherapy tomorrow. Overall tolerating chemotherapy well.  Eye and neck swelling has resolved.  Port area has become sore but the EMLA cream helps. She reports nausea with the D8 paclitaxel that did not resolve with zofran and was associated with vomiting. Normally nausea resolves with eating.  No diarrhea, abdominal pain, constipation, neuropathy. She is now off work. Muscle cramps at night still occur but ropinirole helps.  She has chronic headaches with migraines but the rescue medicine she is on helps with them.      Oncology/Hematology History Overview Note   2012: Lumpectomy of fibroadenoma of right breast    3/2023: Started to have pain and swelling of known area of fibroadenoma of right breast. Started after trauma to breast.     5/26/23: Right breast lumpectomy: Invasive metaplastic carcinoma, high-grade, 4.5 x 4.0 x 3.3 cm with interspersed fibroadenoma, negative margins. ER 0%, MD 0%, HER2 0 by IHC. PDL1 CPS of 50.     6/21/23: MRI breast: Diffuse edema, hypervascularity, and skin thickening of the right breast raising the possibility of inflammatory breast cancer. 2. 4.7 cm seroma in the lateral breast in area of known recent malignant lumpectomy. 3. Metastatic right axillary adenopathy. 4. No evidence of contralateral left breast malignancy. BI-RADS Category 6, known biopsy-proven malignancy.     7/7/23: Right axillary LN  biopsy: metastatic adenocarcinoma, consistent with metaplastic breast carcinoma.     7/11/23: CT Chest, abdomen, pelvis: No definite metastatic disease. 1. Chest:  Pathologically enlarged right axillary lymphadenopathy.  Masslike opacity right upper outer breast with postsurgical changes.  Skin thickening right breast.  Correlate with tissue diagnosis and breast MRI.Small semi solid nodules in the right lung.  These are nonspecific.  A follow-up CT chest in 3 months time is recommended.  Abdomen pelvis:  No findings of metastatic disease to the abdomen or pelvis.     7/12/23: NM bone scan: negative for osseous mets    7/14/23: C1D1 Carboplatin/Paclitaxel/Pembrolizumab  8/4/23: C2D1 Carboplatin/Paclitaxel/Pembrolizumab. C2D15 held due to neutropenia  8/25/23: C3D1 Carboplatin/Paclitaxel/Pembrolizumab. All doses given  9/15/23: C4D1 Carboplatin/Paclitaxel/Pembrolizumab     Metaplastic carcinoma of breast   6/16/2023 Initial Diagnosis    Metaplastic carcinoma of breast     6/16/2023 - 6/16/2023 Chemotherapy    OP BREAST AC DD DOXOrubicin / Cyclophosphamide       7/13/2023 Cancer Staged    Staging form: Breast, AJCC 8th Edition  - Clinical: Stage IIIC (cT4d, cN2, cM0, G3, ER-, IN-, HER2-) - Signed by Ramin Shine MD on 7/13/2023 7/14/2023 -  Chemotherapy    OP BREAST Pembrolizumab 400 mg / PACLitaxel / CARBOplatin AUC=5       8/12/2023 - 8/12/2023 Chemotherapy    OP BREAST PACLitaxel Adjuvant (Weekly X 12)       10/7/2023 -  Chemotherapy    OP BREAST Pembrolizumab 200 mg / DOXOrubicin / Cyclophosphamide        Triple negative breast cancer (Resolved)   6/28/2023 Initial Diagnosis    Triple negative breast cancer           Review of Systems   Constitutional:  Negative for fatigue.   Genitourinary:  Negative for breast pain.   Neurological:  Positive for headache (Getting better).   Psychiatric/Behavioral:  The patient is nervous/anxious (worsening).    All other systems reviewed and are negative.  Current  Outpatient Medications on File Prior to Visit   Medication Sig Dispense Refill    acetaminophen (TYLENOL) 500 MG tablet Take 1 tablet by mouth Every 6 (Six) Hours As Needed.      ALPRAZolam (XANAX) 0.5 MG tablet Take 1 tablet by mouth 2 (Two) Times a Day As Needed for Anxiety. (Patient not taking: Reported on 8/22/2023) 60 tablet 0    ARIPiprazole (ABILIFY) 2 MG tablet Take 1 tablet by mouth Daily. (Patient not taking: Reported on 6/21/2023)      Cetirizine HCl (ZyrTEC Allergy) 10 MG capsule Take 2 tablets by mouth Daily. (Patient not taking: Reported on 6/21/2023)      desvenlafaxine (PRISTIQ) 50 MG 24 hr tablet Take 1 tablet by mouth Daily. 90 tablet 1    docusate sodium (COLACE) 100 MG capsule  (Patient not taking: Reported on 6/21/2023)      fluconazole (DIFLUCAN) 150 MG tablet       galcanezumab-gnlm (Emgality) 120 MG/ML auto-injector pen Inject 1 mL under the skin into the appropriate area as directed Every 30 (Thirty) Days. 1 each 5    hydrOXYzine (ATARAX) 25 MG tablet Take  by mouth. (Patient not taking: Reported on 6/21/2023)      ibuprofen (ADVIL,MOTRIN) 600 MG tablet  (Patient not taking: Reported on 6/21/2023)      ibuprofen (ADVIL,MOTRIN) 600 MG tablet Take 1 tablet by mouth Every 6 (Six) Hours As Needed for Mild Pain. 60 tablet 5    lidocaine-prilocaine (EMLA) 2.5-2.5 % cream Apply 1 application  topically to the appropriate area as directed As Needed for Mild Pain. 30 g 1    LORazepam (ATIVAN) 0.5 MG tablet Take 1 tablet by mouth Every 8 (Eight) Hours As Needed for Anxiety. (Patient not taking: Reported on 8/22/2023) 90 tablet 0    OLANZapine (zyPREXA) 5 MG tablet Take 1 tablet by mouth Every Night. Take on days 2, 3 and 4 after chemotherapy. 3 tablet 3    ondansetron (ZOFRAN) 8 MG tablet Take 1 tablet by mouth 3 (Three) Times a Day As Needed for Nausea or Vomiting. 30 tablet 3    potassium chloride (K-DUR,KLOR-CON) 20 MEQ CR tablet Take 1 tablet by mouth Daily. (Patient not taking: Reported on  2023) 14 tablet 2    propranolol (INDERAL) 20 MG tablet Take 1 tablet by mouth 2 (Two) Times a Day. (Patient not taking: Reported on 2023) 60 tablet 5    rOPINIRole (REQUIP) 0.25 MG tablet Take 1 tablet by mouth Every Night. Take 1 hour before bedtime. 30 tablet 5    topiramate (TOPAMAX) 25 MG tablet Take 1 tablet by mouth Daily. (Patient not taking: Reported on 2023)      ubrogepant (Ubrelvy) 100 MG tablet Take 1 tablet by mouth 1 (One) Time As Needed (migraine) for up to 1 dose. One tablet at HA onset, may repeat once in 2 hours if needed. 10 tablet 5     Current Facility-Administered Medications on File Prior to Visit   Medication Dose Route Frequency Provider Last Rate Last Admin    heparin injection 500 Units  500 Units Intravenous PRN Ramin Shine MD        sodium chloride 0.9 % flush 20 mL  20 mL Intravenous PRN Ramin Shine MD           Allergies   Allergen Reactions    Tegaderm Ag Mesh [Silver] Itching     Past Medical History:   Diagnosis Date    Asthma     AS CHILD    Breast cancer     COVID-19 vaccine administered      Past Surgical History:   Procedure Laterality Date     SECTION      SKIN BIOPSY      TUBAL ABDOMINAL LIGATION       Social History     Socioeconomic History    Marital status: Single    Number of children: 2   Tobacco Use    Smoking status: Never     Passive exposure: Never    Smokeless tobacco: Never   Vaping Use    Vaping Use: Never used   Substance and Sexual Activity    Alcohol use: Never    Drug use: Never    Sexual activity: Defer     Family History   Problem Relation Age of Onset    COPD Mother     Hypertension Father     Hyperlipidemia Father     Diabetes Father     Skin cancer Paternal Grandmother        Objective   Physical Exam  Well appearing patient in no acute distress on RA  Anicteric sclerae, right breast erythema much improved, port site scar appears healing   Respirations non-labored  Awake, alert, and oriented x 4. Speech  intact. No gross neurologic deficit  Appropriate mood and affect    Vitals:    09/14/23 0901   BP: 112/86   Pulse: 69   Resp: 18   Temp: 97.8 °F (36.6 °C)   TempSrc: Temporal   SpO2: 99%   Weight: 70.4 kg (155 lb 3.3 oz)   PainSc: 0-No pain               PHQ-9 Total Score:           Result Review :   The following data was reviewed by: Ramin Shine MD on 09/14/23:  Lab Results   Component Value Date    HGB 10.9 (L) 09/14/2023    HCT 32.5 (L) 09/14/2023    MCV 85.3 09/14/2023     (L) 09/14/2023    WBC 2.57 (L) 09/14/2023    NEUTROABS 0.90 (L) 09/14/2023    LYMPHSABS 1.48 09/14/2023    MONOSABS 0.17 09/14/2023    EOSABS 0.00 09/14/2023    BASOSABS 0.01 09/14/2023     Lab Results   Component Value Date    GLUCOSE 133 (H) 09/14/2023    BUN 10 09/14/2023    CREATININE 0.56 (L) 09/14/2023     09/14/2023    K 3.6 09/14/2023     09/14/2023    CO2 23.3 09/14/2023    CALCIUM 8.9 09/14/2023    PROTEINTOT 6.4 09/14/2023    ALBUMIN 3.9 09/14/2023    BILITOT 0.3 09/14/2023    ALKPHOS 87 09/14/2023    AST 30 09/14/2023    ALT 39 (H) 09/14/2023     Lab Results   Component Value Date    FREET4 1.21 08/24/2023    TSH 1.730 08/24/2023     Labs personally reviewed. Mild anemia. ANC low. Normal creatinine    Pathology report personally reviewed    Surgery notes by Dr. Velazquez personally reviewed    Us duplex 8/23/23 personally reviewed      US DOPPLER VENOUS ARM RIGHT    Result Date: 8/23/2023  No evidence of thrombosis of the venous system of the upper extremity . Images reviewed, interpreted, and dictated by ELISABETH Davis MD         Assessment and Plan    Diagnoses and all orders for this visit:    1. Metaplastic carcinoma of breast (Primary)  -     CBC and Differential; Future  -     Comprehensive metabolic panel; Future  -     CBC and Differential; Future  -     Comprehensive metabolic panel; Future    2. Fitting and adjustment of vascular catheter  -     lidocaine-prilocaine (EMLA) 2.5-2.5 % cream;  Apply 1 application  topically to the appropriate area as directed As Needed for Mild Pain.  Dispense: 30 g; Refill: 1    3. Chemotherapy-induced nausea    Other orders  -     promethazine (PHENERGAN) 25 MG tablet; Take 1 tablet by mouth Every 6 (Six) Hours As Needed for Nausea or Vomiting.  Dispense: 30 tablet; Refill: 3  -     sodium chloride 0.9 % infusion 250 mL  -     dexAMETHasone (DECADRON) injection 4 mg  -     famotidine (PEPCID) injection 20 mg  -     diphenhydrAMINE (BENADRYL) IVPB 25 mg  -     palonosetron (ALOXI) injection 0.25 mg  -     fosaprepitant (EMEND) 150 mg in sodium chloride 0.9 % 100 mL IVPB  -     PACLitaxel (TAXOL) 135 mg in sodium chloride 0.9 % 272.5 mL chemo IVPB  -     CARBOplatin (PARAPLATIN) 750 mg in sodium chloride 0.9 % 175 mL chemo IVPB  -     Hydrocortisone Sod Suc (PF) (Solu-CORTEF) injection 100 mg  -     diphenhydrAMINE (BENADRYL) injection 50 mg  -     famotidine (PEPCID) injection 20 mg  -     sodium chloride 0.9 % infusion 250 mL  -     dexAMETHasone (DECADRON) injection 4 mg  -     famotidine (PEPCID) injection 20 mg  -     diphenhydrAMINE (BENADRYL) IVPB 25 mg  -     PACLitaxel (TAXOL) 135 mg in sodium chloride 0.9 % 272.5 mL chemo IVPB  -     Hydrocortisone Sod Suc (PF) (Solu-CORTEF) injection 100 mg  -     diphenhydrAMINE (BENADRYL) injection 50 mg  -     famotidine (PEPCID) injection 20 mg  -     sodium chloride 0.9 % infusion 250 mL  -     dexAMETHasone (DECADRON) injection 4 mg  -     famotidine (PEPCID) injection 20 mg  -     diphenhydrAMINE (BENADRYL) IVPB 25 mg  -     PACLitaxel (TAXOL) 135 mg in sodium chloride 0.9 % 272.5 mL chemo IVPB  -     Hydrocortisone Sod Suc (PF) (Solu-CORTEF) injection 100 mg  -     diphenhydrAMINE (BENADRYL) injection 50 mg  -     famotidine (PEPCID) injection 20 mg      Metaplastic Carcinoma of Right Breast, triple negative  Patient is s/p lumpectomy of 4.5 cm lesion with triple negative metaplastic carcinoma, interspersed with  fibroadenoma. Patient has clinical inflammatory breast cancer. MRI breast with concern for inflammatory breast cancer as well as right sided axillary involvement. Left breast without lesions per MRI. Lymph node biopsy 7/7/23 confirmed right axillary involvement by breast carcinoma.  Will proceed with neoadjuvant chemotherapy combined with immunotherapy. This is with IV Carboplatin and Paclitaxel combined with Pembrolizumab every 3 weeks for 4 cycles followed by Doxorubicin and Cyclophosphamide with Pembro every 3 weeks for 4 additional cycles. Patient will then get surgery: patient is deciding between single or double mastectomy. This will be followed by adjuvant immunotherapy and radiation. Baseline ECHO normal. Baseline CT CAP without any definite metastatic disease. Bone scan negative for osseous mets. C1D1 Carboplatin/Paclitaxel/Pembrolizumab 7/14/23.       C4D1 Carboplatin/Paclitaxel/Pembrolizumab 9/15/23. Labs appropriate to proceed. Ok to treat for ANC of 0.9. Hold olanzapine pre-med due to sedation.     This is last cycle of this component of therapy. RTC 3 weeks to start first cycle of doxorubicin, cyclophosphamide and pembrolizumab, planned for 4 additional cycles.     Genetic testing has been completed and pending.    She has met with Dr. Whiting with general surgery with plans for breast MRI near completion of chemo and then proceeding with mastectomy.     This is an acute or chronic illness that poses a threat to life or bodily function. The above treatment plan involves a high risk of complications and/or mortality of patient management. Continue to monitor for severe toxicities with labs     Neck swelling  Non-painful. Likely effect from chemo due to time course. Vascular U/s 8/23/23 with no thrombosis. Currently resolved    Chemo induced nausea  G1. Phenergan to alternate with zofran.     Right breast erythema and firmness  2/2 inflammatory breast cancer. Patient finished course of antibiotics to  treat any co-existing cellulitis. Improving with above chemotherapy     Anxiety  Xanax PRN added.     Restless leg  Low dose ropinirole helps    Pulmonary nodules  Too small for biopsy or characterization by PET. Repeat CT Chest in 3 months.     Please note that portions of this note were completed with a voice recognition program.      I spent 25 minutes caring for Marilee on this date of service. This time includes time spent by me in the following activities:preparing for the visit, reviewing tests, obtaining and/or reviewing a separately obtained history, performing a medically appropriate examination and/or evaluation , counseling and educating the patient/family/caregiver, ordering medications, tests, or procedures, referring and communicating with other health care professionals , documenting information in the medical record, independently interpreting results and communicating that information with the patient/family/caregiver and care coordination    Patient Follow Up: C5 chemoimmuno  Patient was given instructions and counseling regarding her condition or for health maintenance advice. Please see specific information pulled into the AVS if appropriate.

## 2023-09-15 ENCOUNTER — DOCUMENTATION (OUTPATIENT)
Dept: ONCOLOGY | Facility: HOSPITAL | Age: 28
End: 2023-09-15
Payer: COMMERCIAL

## 2023-09-15 ENCOUNTER — HOSPITAL ENCOUNTER (OUTPATIENT)
Dept: ONCOLOGY | Facility: HOSPITAL | Age: 28
Discharge: HOME | End: 2023-09-15
Payer: COMMERCIAL

## 2023-09-15 VITALS
OXYGEN SATURATION: 100 % | RESPIRATION RATE: 16 BRPM | DIASTOLIC BLOOD PRESSURE: 74 MMHG | SYSTOLIC BLOOD PRESSURE: 108 MMHG | BODY MASS INDEX: 27.42 KG/M2 | TEMPERATURE: 97.7 F | WEIGHT: 154.76 LBS | HEART RATE: 74 BPM

## 2023-09-15 DIAGNOSIS — Z45.2 FITTING AND ADJUSTMENT OF VASCULAR CATHETER: ICD-10-CM

## 2023-09-15 DIAGNOSIS — C50.919 METAPLASTIC CARCINOMA OF BREAST: Primary | ICD-10-CM

## 2023-09-15 PROCEDURE — 25010000002 DEXAMETHASONE PER 1 MG: Performed by: INTERNAL MEDICINE

## 2023-09-15 PROCEDURE — 96417 CHEMO IV INFUS EACH ADDL SEQ: CPT

## 2023-09-15 PROCEDURE — 96413 CHEMO IV INFUSION 1 HR: CPT

## 2023-09-15 PROCEDURE — 25010000002 CARBOPLATIN PER 50 MG: Performed by: INTERNAL MEDICINE

## 2023-09-15 PROCEDURE — 25010000002 FOSAPREPITANT PER 1 MG: Performed by: INTERNAL MEDICINE

## 2023-09-15 PROCEDURE — 96367 TX/PROPH/DG ADDL SEQ IV INF: CPT

## 2023-09-15 PROCEDURE — 25010000002 DIPHENHYDRAMINE PER 50 MG: Performed by: INTERNAL MEDICINE

## 2023-09-15 PROCEDURE — 96375 TX/PRO/DX INJ NEW DRUG ADDON: CPT

## 2023-09-15 PROCEDURE — 25010000002 PALONOSETRON PER 25 MCG: Performed by: INTERNAL MEDICINE

## 2023-09-15 PROCEDURE — 25010000002 HEPARIN LOCK FLUSH PER 10 UNITS: Performed by: INTERNAL MEDICINE

## 2023-09-15 PROCEDURE — 25010000002 PACLITAXEL PER 1 MG: Performed by: INTERNAL MEDICINE

## 2023-09-15 RX ORDER — SODIUM CHLORIDE 9 MG/ML
250 INJECTION, SOLUTION INTRAVENOUS ONCE
Status: COMPLETED | OUTPATIENT
Start: 2023-09-15 | End: 2023-09-15

## 2023-09-15 RX ORDER — HEPARIN SODIUM (PORCINE) LOCK FLUSH IV SOLN 100 UNIT/ML 100 UNIT/ML
500 SOLUTION INTRAVENOUS AS NEEDED
Status: DISCONTINUED | OUTPATIENT
Start: 2023-09-15 | End: 2023-09-16 | Stop reason: HOSPADM

## 2023-09-15 RX ORDER — FAMOTIDINE 10 MG/ML
20 INJECTION, SOLUTION INTRAVENOUS ONCE
Status: COMPLETED | OUTPATIENT
Start: 2023-09-15 | End: 2023-09-15

## 2023-09-15 RX ORDER — PALONOSETRON 0.05 MG/ML
0.25 INJECTION, SOLUTION INTRAVENOUS ONCE
Status: COMPLETED | OUTPATIENT
Start: 2023-09-15 | End: 2023-09-15

## 2023-09-15 RX ORDER — SODIUM CHLORIDE 0.9 % (FLUSH) 0.9 %
20 SYRINGE (ML) INJECTION AS NEEDED
Status: DISCONTINUED | OUTPATIENT
Start: 2023-09-15 | End: 2023-09-16 | Stop reason: HOSPADM

## 2023-09-15 RX ORDER — DEXAMETHASONE SODIUM PHOSPHATE 4 MG/ML
4 INJECTION, SOLUTION INTRA-ARTICULAR; INTRALESIONAL; INTRAMUSCULAR; INTRAVENOUS; SOFT TISSUE ONCE
Status: COMPLETED | OUTPATIENT
Start: 2023-09-15 | End: 2023-09-15

## 2023-09-15 RX ORDER — HEPARIN SODIUM (PORCINE) LOCK FLUSH IV SOLN 100 UNIT/ML 100 UNIT/ML
500 SOLUTION INTRAVENOUS AS NEEDED
OUTPATIENT
Start: 2023-09-15

## 2023-09-15 RX ORDER — ROPINIROLE 0.5 MG/1
0.5 TABLET, FILM COATED ORAL NIGHTLY
Qty: 30 TABLET | Refills: 3 | Status: SHIPPED | OUTPATIENT
Start: 2023-09-15

## 2023-09-15 RX ORDER — SODIUM CHLORIDE 0.9 % (FLUSH) 0.9 %
20 SYRINGE (ML) INJECTION AS NEEDED
OUTPATIENT
Start: 2023-09-15

## 2023-09-15 RX ADMIN — DEXAMETHASONE SODIUM PHOSPHATE 4 MG: 4 INJECTION, SOLUTION INTRA-ARTICULAR; INTRALESIONAL; INTRAMUSCULAR; INTRAVENOUS; SOFT TISSUE at 09:33

## 2023-09-15 RX ADMIN — DIPHENHYDRAMINE HYDROCHLORIDE 25 MG: 50 INJECTION, SOLUTION INTRAMUSCULAR; INTRAVENOUS at 09:35

## 2023-09-15 RX ADMIN — FOSAPREPITANT 100 ML: 150 INJECTION, POWDER, LYOPHILIZED, FOR SOLUTION INTRAVENOUS at 09:55

## 2023-09-15 RX ADMIN — PALONOSETRON 0.25 MG: 0.05 INJECTION, SOLUTION INTRAVENOUS at 09:28

## 2023-09-15 RX ADMIN — PACLITAXEL 135 MG: 6 INJECTION, SOLUTION, CONCENTRATE INTRAVENOUS at 10:42

## 2023-09-15 RX ADMIN — HEPARIN SODIUM (PORCINE) LOCK FLUSH IV SOLN 100 UNIT/ML 500 UNITS: 100 SOLUTION at 12:29

## 2023-09-15 RX ADMIN — FAMOTIDINE 20 MG: 10 INJECTION INTRAVENOUS at 09:30

## 2023-09-15 RX ADMIN — CARBOPLATIN 750 MG: 10 INJECTION, SOLUTION INTRAVENOUS at 11:55

## 2023-09-15 RX ADMIN — Medication 20 ML: at 12:29

## 2023-09-15 RX ADMIN — SODIUM CHLORIDE 250 ML: 9 INJECTION, SOLUTION INTRAVENOUS at 09:20

## 2023-09-15 NOTE — PROGRESS NOTES
Diagnosis: Triple negative breast cancer    Reason for Referral: Financial assistance     Content of Visit: OSW met with Ms. Watts in the medical oncology infusion center this morning. Advised if she provides copies of her utility bills, [employer] may be able to assist with these expenses as well. Ms. Watts v/u and will send these to [facility] shortly. Ms. Watts repors she received an SSI award letter recently, indicating her benefits will only be $116/month, less than the initial quoted amount. Her full disability benefits won't begin until November/December. Fortunately, her bank is working with her and has placed a hold on her car loan, which won't affect her payments. OSW assisted Ms. Watts in contacting [facility] via telephone this morning to apply for their financial grants, including a pet care mohinder for a [employer] gift card. [facility] submitted Ms. Watts's finalized application via fax this afternoon. Ms. Watts will get her pet verification form completed by the vet office to apply for that program. She has two dachshunds under a year of age. Ms. Watts was very pleasant this morning as usual. She is hopeful to have minimal side effects from today's treatment so she can enjoy her weekend outdoors with her children and family. Encouraged [employer] support remains available. She expressed gratitude.    Ms. Watts sent [facility] an email Friday afternoon inquiring about food resources. She currently receives SNAP benefits, but is worried she will lose these due to moving back in with her significant other. Advised Ms. Watts that since her income has decreased substantially, even with her significant other's income the household may still qualify for assistance. However, provided her with a list of local food pantries in [facility]. [facility] support remains available.    Resources/Referrals Provided: Financial assistance - [facility], food resources

## 2023-09-20 ENCOUNTER — OFFICE VISIT (OUTPATIENT)
Dept: PLASTIC SURGERY | Facility: CLINIC | Age: 28
End: 2023-09-20
Payer: COMMERCIAL

## 2023-09-20 VITALS
SYSTOLIC BLOOD PRESSURE: 120 MMHG | HEIGHT: 63 IN | OXYGEN SATURATION: 97 % | HEART RATE: 87 BPM | BODY MASS INDEX: 27.54 KG/M2 | DIASTOLIC BLOOD PRESSURE: 83 MMHG | WEIGHT: 155.4 LBS | TEMPERATURE: 98 F

## 2023-09-20 DIAGNOSIS — C50.919 METAPLASTIC CARCINOMA OF BREAST: Primary | ICD-10-CM

## 2023-09-20 DIAGNOSIS — N65.1 DISPROPORTION BETWEEN NATIVE BREAST AND RECONSTRUCTED BREAST: ICD-10-CM

## 2023-09-20 PROCEDURE — 1159F MED LIST DOCD IN RCRD: CPT | Performed by: SURGERY

## 2023-09-20 PROCEDURE — 99205 OFFICE O/P NEW HI 60 MIN: CPT | Performed by: SURGERY

## 2023-09-20 PROCEDURE — 1160F RVW MEDS BY RX/DR IN RCRD: CPT | Performed by: SURGERY

## 2023-09-20 RX ORDER — AMOXICILLIN 875 MG/1
875 TABLET, COATED ORAL 2 TIMES DAILY
COMMUNITY
Start: 2023-09-14

## 2023-09-22 ENCOUNTER — HOSPITAL ENCOUNTER (OUTPATIENT)
Dept: ONCOLOGY | Facility: HOSPITAL | Age: 28
Discharge: HOME | End: 2023-09-22
Payer: COMMERCIAL

## 2023-09-22 ENCOUNTER — TELEPHONE (OUTPATIENT)
Dept: NEUROLOGY | Facility: OTHER | Age: 28
End: 2023-09-22
Payer: COMMERCIAL

## 2023-09-22 ENCOUNTER — PATIENT MESSAGE (OUTPATIENT)
Dept: NEUROLOGY | Facility: CLINIC | Age: 28
End: 2023-09-22

## 2023-09-22 ENCOUNTER — DOCUMENTATION (OUTPATIENT)
Dept: ONCOLOGY | Facility: HOSPITAL | Age: 28
End: 2023-09-22
Payer: COMMERCIAL

## 2023-09-22 VITALS
HEART RATE: 88 BPM | HEIGHT: 63 IN | RESPIRATION RATE: 20 BRPM | DIASTOLIC BLOOD PRESSURE: 80 MMHG | OXYGEN SATURATION: 100 % | BODY MASS INDEX: 27.77 KG/M2 | WEIGHT: 156.75 LBS | SYSTOLIC BLOOD PRESSURE: 125 MMHG | TEMPERATURE: 98.1 F

## 2023-09-22 DIAGNOSIS — Z45.2 FITTING AND ADJUSTMENT OF VASCULAR CATHETER: ICD-10-CM

## 2023-09-22 DIAGNOSIS — C50.919 METAPLASTIC CARCINOMA OF BREAST: Primary | ICD-10-CM

## 2023-09-22 LAB
ALBUMIN SERPL-MCNC: 3.9 G/DL (ref 3.5–5.2)
ALBUMIN/GLOB SERPL: 1.8 G/DL
ALP SERPL-CCNC: 83 U/L (ref 39–117)
ALT SERPL W P-5'-P-CCNC: 29 U/L (ref 1–33)
ANION GAP SERPL CALCULATED.3IONS-SCNC: 7.5 MMOL/L (ref 5–15)
AST SERPL-CCNC: 22 U/L (ref 1–32)
BASOPHILS # BLD AUTO: 0.01 10*3/MM3 (ref 0–0.2)
BASOPHILS NFR BLD AUTO: 0.3 % (ref 0–1.5)
BILIRUB SERPL-MCNC: 0.2 MG/DL (ref 0–1.2)
BUN SERPL-MCNC: 11 MG/DL (ref 6–20)
BUN/CREAT SERPL: 22.4 (ref 7–25)
CALCIUM SPEC-SCNC: 8.6 MG/DL (ref 8.6–10.5)
CHLORIDE SERPL-SCNC: 106 MMOL/L (ref 98–107)
CO2 SERPL-SCNC: 24.5 MMOL/L (ref 22–29)
CREAT SERPL-MCNC: 0.49 MG/DL (ref 0.57–1)
DEPRECATED RDW RBC AUTO: 46.5 FL (ref 37–54)
EGFRCR SERPLBLD CKD-EPI 2021: 131 ML/MIN/1.73
EOSINOPHIL # BLD AUTO: 0 10*3/MM3 (ref 0–0.4)
EOSINOPHIL NFR BLD AUTO: 0 % (ref 0.3–6.2)
ERYTHROCYTE [DISTWIDTH] IN BLOOD BY AUTOMATED COUNT: 14.7 % (ref 12.3–15.4)
GLOBULIN UR ELPH-MCNC: 2.2 GM/DL
GLUCOSE SERPL-MCNC: 108 MG/DL (ref 65–99)
HCT VFR BLD AUTO: 30.3 % (ref 34–46.6)
HGB BLD-MCNC: 10 G/DL (ref 12–15.9)
IMM GRANULOCYTES # BLD AUTO: 0.01 10*3/MM3 (ref 0–0.05)
IMM GRANULOCYTES NFR BLD AUTO: 0.3 % (ref 0–0.5)
LYMPHOCYTES # BLD AUTO: 1.8 10*3/MM3 (ref 0.7–3.1)
LYMPHOCYTES NFR BLD AUTO: 55.2 % (ref 19.6–45.3)
MCH RBC QN AUTO: 28.8 PG (ref 26.6–33)
MCHC RBC AUTO-ENTMCNC: 33 G/DL (ref 31.5–35.7)
MCV RBC AUTO: 87.3 FL (ref 79–97)
MONOCYTES # BLD AUTO: 0.17 10*3/MM3 (ref 0.1–0.9)
MONOCYTES NFR BLD AUTO: 5.2 % (ref 5–12)
NEUTROPHILS NFR BLD AUTO: 1.27 10*3/MM3 (ref 1.7–7)
NEUTROPHILS NFR BLD AUTO: 39 % (ref 42.7–76)
PLATELET # BLD AUTO: 86 10*3/MM3 (ref 140–450)
PMV BLD AUTO: 9.6 FL (ref 6–12)
POTASSIUM SERPL-SCNC: 3.5 MMOL/L (ref 3.5–5.2)
PROT SERPL-MCNC: 6.1 G/DL (ref 6–8.5)
RBC # BLD AUTO: 3.47 10*6/MM3 (ref 3.77–5.28)
SODIUM SERPL-SCNC: 138 MMOL/L (ref 136–145)
WBC NRBC COR # BLD: 3.26 10*3/MM3 (ref 3.4–10.8)

## 2023-09-22 PROCEDURE — 25010000002 PACLITAXEL PER 1 MG: Performed by: INTERNAL MEDICINE

## 2023-09-22 PROCEDURE — 25010000002 HEPARIN LOCK FLUSH PER 10 UNITS: Performed by: INTERNAL MEDICINE

## 2023-09-22 PROCEDURE — 25010000002 DIPHENHYDRAMINE PER 50 MG: Performed by: INTERNAL MEDICINE

## 2023-09-22 PROCEDURE — 80053 COMPREHEN METABOLIC PANEL: CPT | Performed by: INTERNAL MEDICINE

## 2023-09-22 PROCEDURE — 96375 TX/PRO/DX INJ NEW DRUG ADDON: CPT

## 2023-09-22 PROCEDURE — 85025 COMPLETE CBC W/AUTO DIFF WBC: CPT | Performed by: INTERNAL MEDICINE

## 2023-09-22 PROCEDURE — 96413 CHEMO IV INFUSION 1 HR: CPT

## 2023-09-22 PROCEDURE — 25010000002 DEXAMETHASONE PER 1 MG: Performed by: INTERNAL MEDICINE

## 2023-09-22 RX ORDER — DEXAMETHASONE SODIUM PHOSPHATE 4 MG/ML
4 INJECTION, SOLUTION INTRA-ARTICULAR; INTRALESIONAL; INTRAMUSCULAR; INTRAVENOUS; SOFT TISSUE ONCE
Status: COMPLETED | OUTPATIENT
Start: 2023-09-22 | End: 2023-09-22

## 2023-09-22 RX ORDER — FAMOTIDINE 10 MG/ML
20 INJECTION, SOLUTION INTRAVENOUS ONCE
Status: COMPLETED | OUTPATIENT
Start: 2023-09-22 | End: 2023-09-22

## 2023-09-22 RX ORDER — SODIUM CHLORIDE 0.9 % (FLUSH) 0.9 %
20 SYRINGE (ML) INJECTION AS NEEDED
Status: DISCONTINUED | OUTPATIENT
Start: 2023-09-22 | End: 2023-09-23 | Stop reason: HOSPADM

## 2023-09-22 RX ORDER — HEPARIN SODIUM (PORCINE) LOCK FLUSH IV SOLN 100 UNIT/ML 100 UNIT/ML
500 SOLUTION INTRAVENOUS AS NEEDED
Status: DISCONTINUED | OUTPATIENT
Start: 2023-09-22 | End: 2023-09-23 | Stop reason: HOSPADM

## 2023-09-22 RX ORDER — SODIUM CHLORIDE 0.9 % (FLUSH) 0.9 %
20 SYRINGE (ML) INJECTION AS NEEDED
OUTPATIENT
Start: 2023-09-22

## 2023-09-22 RX ORDER — SODIUM CHLORIDE 9 MG/ML
250 INJECTION, SOLUTION INTRAVENOUS ONCE
Status: COMPLETED | OUTPATIENT
Start: 2023-09-22 | End: 2023-09-22

## 2023-09-22 RX ORDER — HEPARIN SODIUM (PORCINE) LOCK FLUSH IV SOLN 100 UNIT/ML 100 UNIT/ML
500 SOLUTION INTRAVENOUS AS NEEDED
OUTPATIENT
Start: 2023-09-22

## 2023-09-22 RX ADMIN — DIPHENHYDRAMINE HYDROCHLORIDE 25 MG: 50 INJECTION, SOLUTION INTRAMUSCULAR; INTRAVENOUS at 10:24

## 2023-09-22 RX ADMIN — PACLITAXEL 135 MG: 6 INJECTION, SOLUTION, CONCENTRATE INTRAVENOUS at 10:44

## 2023-09-22 RX ADMIN — Medication 20 ML: at 11:51

## 2023-09-22 RX ADMIN — DEXAMETHASONE SODIUM PHOSPHATE 4 MG: 4 INJECTION, SOLUTION INTRA-ARTICULAR; INTRALESIONAL; INTRAMUSCULAR; INTRAVENOUS; SOFT TISSUE at 10:25

## 2023-09-22 RX ADMIN — FAMOTIDINE 20 MG: 10 INJECTION INTRAVENOUS at 10:24

## 2023-09-22 RX ADMIN — HEPARIN SODIUM (PORCINE) LOCK FLUSH IV SOLN 100 UNIT/ML 500 UNITS: 100 SOLUTION at 11:52

## 2023-09-22 RX ADMIN — SODIUM CHLORIDE 250 ML: 9 INJECTION, SOLUTION INTRAVENOUS at 10:22

## 2023-09-22 NOTE — PROGRESS NOTES
Diagnosis: Triple negative breast cancer    Reason for Referral: Financial assistance    Content of Visit: [facility] assistance requested by Ms. Watts this morning. OSW met with her in the medical oncology infusion center. Ms. Watts presented in a very pleasant mood and provided OSW with her completed pet verification form to submit to [facility]. [facility]W successfully faxed this to [facility] this morning. [employer] support remains available.    Resources/Referrals Provided: [employer] - pet care assistance

## 2023-09-22 NOTE — TELEPHONE ENCOUNTER
PATIENT CALLING TO ADVISE, INJECTION DID NOT WORK, SHE STILL HAS MIGRAINE.    IS THERE ANOTHER MEDICATION SHE CAN TAKE?    PHARMACY IS:    [facility] [id number] - [place] - [address] [facility] [phone] [facility] [phone] FX     PLEASE ADVISE PATIENT    THANK YOU

## 2023-09-25 ENCOUNTER — TELEPHONE (OUTPATIENT)
Dept: ONCOLOGY | Facility: HOSPITAL | Age: 28
End: 2023-09-25

## 2023-09-25 RX ORDER — POTASSIUM CHLORIDE 1500 MG/1
TABLET, EXTENDED RELEASE ORAL
Qty: 14 TABLET | Refills: 2 | Status: SHIPPED | OUTPATIENT
Start: 2023-09-25

## 2023-09-25 NOTE — TELEPHONE ENCOUNTER
Spoke with pt on the phone. Stated she does not remember the date that she took the loading dose of Emgality. Let her know that she has a 6 month supply available at her pharmacy. Instructed pt to keep up with injection dates and be sure she takes them every 30 days consistently as prescribed for best efficacy and to use Ubrelvy for break through migraine. Reinforced that it would take 2-3 months to feel relief after starting injections.     Asked if there was anything she could take until injections take effect. States she has a migraine just about every day currently.  Reinforced to refrain from OTC's as they can cause rebound headaches.

## 2023-09-25 NOTE — TELEPHONE ENCOUNTER
Can have some injection bruising and redness, that is normal. Shot is every 30 days. Needs to give the Emgality more time

## 2023-09-25 NOTE — TELEPHONE ENCOUNTER
Patient had questions about scheduling a root canal while she is on chemotherapy. Advised it would be better to wait until chemo is completed before scheduling the root canal. If she needs it done sooner, we would delay her chemotherapy. Patient does not want to delay treatment and will hold off on root canal for now.

## 2023-09-25 NOTE — TELEPHONE ENCOUNTER
Unfortunately there is nothing she can take daily for migraine, that is why we have started the preventative. Have to give it time.

## 2023-09-27 DIAGNOSIS — C50.919 METAPLASTIC CARCINOMA OF BREAST: ICD-10-CM

## 2023-09-27 RX ORDER — OLANZAPINE 5 MG/1
5 TABLET ORAL NIGHTLY
Qty: 3 TABLET | Refills: 3 | Status: SHIPPED | OUTPATIENT
Start: 2023-09-27

## 2023-09-29 ENCOUNTER — HOSPITAL ENCOUNTER (OUTPATIENT)
Dept: ONCOLOGY | Facility: HOSPITAL | Age: 28
Discharge: HOME | End: 2023-09-29
Payer: COMMERCIAL

## 2023-09-29 VITALS
HEART RATE: 92 BPM | HEIGHT: 63 IN | TEMPERATURE: 98.4 F | OXYGEN SATURATION: 100 % | DIASTOLIC BLOOD PRESSURE: 80 MMHG | RESPIRATION RATE: 18 BRPM | SYSTOLIC BLOOD PRESSURE: 119 MMHG | BODY MASS INDEX: 27.89 KG/M2 | WEIGHT: 157.41 LBS

## 2023-09-29 DIAGNOSIS — C50.919 METAPLASTIC CARCINOMA OF BREAST: ICD-10-CM

## 2023-09-29 DIAGNOSIS — Z45.2 FITTING AND ADJUSTMENT OF VASCULAR CATHETER: Primary | ICD-10-CM

## 2023-09-29 LAB
ALBUMIN SERPL-MCNC: 4 G/DL (ref 3.5–5.2)
ALBUMIN/GLOB SERPL: 1.7 G/DL
ALP SERPL-CCNC: 81 U/L (ref 39–117)
ALT SERPL W P-5'-P-CCNC: 36 U/L (ref 1–33)
ANION GAP SERPL CALCULATED.3IONS-SCNC: 5.5 MMOL/L (ref 5–15)
AST SERPL-CCNC: 25 U/L (ref 1–32)
BASOPHILS # BLD AUTO: 0 10*3/MM3 (ref 0–0.2)
BASOPHILS NFR BLD AUTO: 0 % (ref 0–1.5)
BILIRUB SERPL-MCNC: 0.2 MG/DL (ref 0–1.2)
BUN SERPL-MCNC: 12 MG/DL (ref 6–20)
BUN/CREAT SERPL: 25.5 (ref 7–25)
CALCIUM SPEC-SCNC: 9.3 MG/DL (ref 8.6–10.5)
CHLORIDE SERPL-SCNC: 107 MMOL/L (ref 98–107)
CO2 SERPL-SCNC: 26.5 MMOL/L (ref 22–29)
CREAT SERPL-MCNC: 0.47 MG/DL (ref 0.57–1)
DEPRECATED RDW RBC AUTO: 48.9 FL (ref 37–54)
EGFRCR SERPLBLD CKD-EPI 2021: 132.4 ML/MIN/1.73
EOSINOPHIL # BLD AUTO: 0 10*3/MM3 (ref 0–0.4)
EOSINOPHIL NFR BLD AUTO: 0 % (ref 0.3–6.2)
ERYTHROCYTE [DISTWIDTH] IN BLOOD BY AUTOMATED COUNT: 15.5 % (ref 12.3–15.4)
GLOBULIN UR ELPH-MCNC: 2.3 GM/DL
GLUCOSE SERPL-MCNC: 109 MG/DL (ref 65–99)
HCT VFR BLD AUTO: 30.1 % (ref 34–46.6)
HGB BLD-MCNC: 10.1 G/DL (ref 12–15.9)
IMM GRANULOCYTES # BLD AUTO: 0 10*3/MM3 (ref 0–0.05)
IMM GRANULOCYTES NFR BLD AUTO: 0 % (ref 0–0.5)
LYMPHOCYTES # BLD AUTO: 1.48 10*3/MM3 (ref 0.7–3.1)
LYMPHOCYTES NFR BLD AUTO: 56.5 % (ref 19.6–45.3)
MCH RBC QN AUTO: 29.6 PG (ref 26.6–33)
MCHC RBC AUTO-ENTMCNC: 33.6 G/DL (ref 31.5–35.7)
MCV RBC AUTO: 88.3 FL (ref 79–97)
MONOCYTES # BLD AUTO: 0.25 10*3/MM3 (ref 0.1–0.9)
MONOCYTES NFR BLD AUTO: 9.5 % (ref 5–12)
NEUTROPHILS NFR BLD AUTO: 0.89 10*3/MM3 (ref 1.7–7)
NEUTROPHILS NFR BLD AUTO: 34 % (ref 42.7–76)
PLATELET # BLD AUTO: 132 10*3/MM3 (ref 140–450)
PMV BLD AUTO: 10.1 FL (ref 6–12)
POTASSIUM SERPL-SCNC: 3.6 MMOL/L (ref 3.5–5.2)
PROT SERPL-MCNC: 6.3 G/DL (ref 6–8.5)
RBC # BLD AUTO: 3.41 10*6/MM3 (ref 3.77–5.28)
SODIUM SERPL-SCNC: 139 MMOL/L (ref 136–145)
WBC NRBC COR # BLD: 2.62 10*3/MM3 (ref 3.4–10.8)

## 2023-09-29 PROCEDURE — 96413 CHEMO IV INFUSION 1 HR: CPT

## 2023-09-29 PROCEDURE — 85025 COMPLETE CBC W/AUTO DIFF WBC: CPT | Performed by: INTERNAL MEDICINE

## 2023-09-29 PROCEDURE — 36591 DRAW BLOOD OFF VENOUS DEVICE: CPT

## 2023-09-29 PROCEDURE — 25010000002 HEPARIN LOCK FLUSH PER 10 UNITS: Performed by: INTERNAL MEDICINE

## 2023-09-29 PROCEDURE — 80053 COMPREHEN METABOLIC PANEL: CPT | Performed by: INTERNAL MEDICINE

## 2023-09-29 RX ORDER — HEPARIN SODIUM (PORCINE) LOCK FLUSH IV SOLN 100 UNIT/ML 100 UNIT/ML
500 SOLUTION INTRAVENOUS AS NEEDED
Status: DISCONTINUED | OUTPATIENT
Start: 2023-09-29 | End: 2023-09-30 | Stop reason: HOSPADM

## 2023-09-29 RX ORDER — HEPARIN SODIUM (PORCINE) LOCK FLUSH IV SOLN 100 UNIT/ML 100 UNIT/ML
500 SOLUTION INTRAVENOUS AS NEEDED
OUTPATIENT
Start: 2023-09-29

## 2023-09-29 RX ORDER — SODIUM CHLORIDE 0.9 % (FLUSH) 0.9 %
20 SYRINGE (ML) INJECTION AS NEEDED
OUTPATIENT
Start: 2023-09-29

## 2023-09-29 RX ORDER — SODIUM CHLORIDE 0.9 % (FLUSH) 0.9 %
20 SYRINGE (ML) INJECTION AS NEEDED
Status: DISCONTINUED | OUTPATIENT
Start: 2023-09-29 | End: 2023-09-30 | Stop reason: HOSPADM

## 2023-09-29 RX ADMIN — Medication 20 ML: at 10:40

## 2023-09-29 RX ADMIN — HEPARIN SODIUM (PORCINE) LOCK FLUSH IV SOLN 100 UNIT/ML 500 UNITS: 100 SOLUTION at 10:40

## 2023-10-04 ENCOUNTER — PATIENT OUTREACH (OUTPATIENT)
Dept: ONCOLOGY | Facility: HOSPITAL | Age: 28
End: 2023-10-04
Payer: COMMERCIAL

## 2023-10-04 ENCOUNTER — DOCUMENTATION (OUTPATIENT)
Dept: ONCOLOGY | Facility: HOSPITAL | Age: 28
End: 2023-10-04
Payer: COMMERCIAL

## 2023-10-04 NOTE — PROGRESS NOTES
MICHI and Ms. Stefanie received an email from [employer] confirming that they've received her application and will process this within the next 60 days.

## 2023-10-05 ENCOUNTER — TELEPHONE (OUTPATIENT)
Dept: ONCOLOGY | Facility: HOSPITAL | Age: 28
End: 2023-10-05

## 2023-10-05 ENCOUNTER — OFFICE VISIT (OUTPATIENT)
Dept: ONCOLOGY | Facility: HOSPITAL | Age: 28
End: 2023-10-05
Payer: COMMERCIAL

## 2023-10-05 ENCOUNTER — HOSPITAL ENCOUNTER (OUTPATIENT)
Dept: ONCOLOGY | Facility: HOSPITAL | Age: 28
Discharge: HOME | End: 2023-10-05
Admitting: INTERNAL MEDICINE
Payer: COMMERCIAL

## 2023-10-05 VITALS
WEIGHT: 158 LBS | SYSTOLIC BLOOD PRESSURE: 110 MMHG | BODY MASS INDEX: 28 KG/M2 | TEMPERATURE: 98.1 F | DIASTOLIC BLOOD PRESSURE: 84 MMHG | HEART RATE: 68 BPM | OXYGEN SATURATION: 100 % | RESPIRATION RATE: 18 BRPM

## 2023-10-05 DIAGNOSIS — Z45.2 FITTING AND ADJUSTMENT OF VASCULAR CATHETER: ICD-10-CM

## 2023-10-05 DIAGNOSIS — Z01.818 ENCOUNTER FOR ECHOCARDIOGRAM BEFORE INITIATION OF CHEMOTHERAPY: Primary | ICD-10-CM

## 2023-10-05 DIAGNOSIS — C50.919 METAPLASTIC CARCINOMA OF BREAST: ICD-10-CM

## 2023-10-05 DIAGNOSIS — C50.919 METAPLASTIC CARCINOMA OF BREAST: Primary | ICD-10-CM

## 2023-10-05 DIAGNOSIS — K12.1 STOMATITIS: ICD-10-CM

## 2023-10-05 LAB
ALBUMIN SERPL-MCNC: 4.4 G/DL (ref 3.5–5.2)
ALBUMIN/GLOB SERPL: 1.6 G/DL
ALP SERPL-CCNC: 96 U/L (ref 39–117)
ALT SERPL W P-5'-P-CCNC: 31 U/L (ref 1–33)
ANION GAP SERPL CALCULATED.3IONS-SCNC: 10.1 MMOL/L (ref 5–15)
AST SERPL-CCNC: 22 U/L (ref 1–32)
B-HCG UR QL: NEGATIVE
BASOPHILS # BLD AUTO: 0.01 10*3/MM3 (ref 0–0.2)
BASOPHILS NFR BLD AUTO: 0.3 % (ref 0–1.5)
BILIRUB SERPL-MCNC: 0.2 MG/DL (ref 0–1.2)
BUN SERPL-MCNC: 12 MG/DL (ref 6–20)
BUN/CREAT SERPL: 25 (ref 7–25)
CALCIUM SPEC-SCNC: 9.6 MG/DL (ref 8.6–10.5)
CHLORIDE SERPL-SCNC: 106 MMOL/L (ref 98–107)
CO2 SERPL-SCNC: 23.9 MMOL/L (ref 22–29)
CORTIS SERPL-MCNC: 7.74 MCG/DL
CREAT SERPL-MCNC: 0.48 MG/DL (ref 0.57–1)
DEPRECATED RDW RBC AUTO: 52.5 FL (ref 37–54)
EGFRCR SERPLBLD CKD-EPI 2021: 131.7 ML/MIN/1.73
EOSINOPHIL # BLD AUTO: 0 10*3/MM3 (ref 0–0.4)
EOSINOPHIL NFR BLD AUTO: 0 % (ref 0.3–6.2)
ERYTHROCYTE [DISTWIDTH] IN BLOOD BY AUTOMATED COUNT: 16.3 % (ref 12.3–15.4)
GLOBULIN UR ELPH-MCNC: 2.7 GM/DL
GLUCOSE SERPL-MCNC: 87 MG/DL (ref 65–99)
HCT VFR BLD AUTO: 34.1 % (ref 34–46.6)
HGB BLD-MCNC: 11.7 G/DL (ref 12–15.9)
IMM GRANULOCYTES # BLD AUTO: 0 10*3/MM3 (ref 0–0.05)
IMM GRANULOCYTES NFR BLD AUTO: 0 % (ref 0–0.5)
LYMPHOCYTES # BLD AUTO: 1.68 10*3/MM3 (ref 0.7–3.1)
LYMPHOCYTES NFR BLD AUTO: 54.7 % (ref 19.6–45.3)
MCH RBC QN AUTO: 30.3 PG (ref 26.6–33)
MCHC RBC AUTO-ENTMCNC: 34.3 G/DL (ref 31.5–35.7)
MCV RBC AUTO: 88.3 FL (ref 79–97)
MONOCYTES # BLD AUTO: 0.33 10*3/MM3 (ref 0.1–0.9)
MONOCYTES NFR BLD AUTO: 10.7 % (ref 5–12)
NEUTROPHILS NFR BLD AUTO: 1.05 10*3/MM3 (ref 1.7–7)
NEUTROPHILS NFR BLD AUTO: 34.3 % (ref 42.7–76)
PLATELET # BLD AUTO: 129 10*3/MM3 (ref 140–450)
PMV BLD AUTO: 9 FL (ref 6–12)
POTASSIUM SERPL-SCNC: 3.5 MMOL/L (ref 3.5–5.2)
PROT SERPL-MCNC: 7.1 G/DL (ref 6–8.5)
RBC # BLD AUTO: 3.86 10*6/MM3 (ref 3.77–5.28)
SODIUM SERPL-SCNC: 140 MMOL/L (ref 136–145)
T4 FREE SERPL-MCNC: 1.12 NG/DL (ref 0.93–1.7)
TSH SERPL DL<=0.05 MIU/L-ACNC: 3.68 UIU/ML (ref 0.27–4.2)
WBC NRBC COR # BLD: 3.07 10*3/MM3 (ref 3.4–10.8)

## 2023-10-05 PROCEDURE — 81025 URINE PREGNANCY TEST: CPT | Performed by: INTERNAL MEDICINE

## 2023-10-05 PROCEDURE — 84443 ASSAY THYROID STIM HORMONE: CPT | Performed by: INTERNAL MEDICINE

## 2023-10-05 PROCEDURE — 82533 TOTAL CORTISOL: CPT | Performed by: INTERNAL MEDICINE

## 2023-10-05 PROCEDURE — 85025 COMPLETE CBC W/AUTO DIFF WBC: CPT | Performed by: INTERNAL MEDICINE

## 2023-10-05 PROCEDURE — 84439 ASSAY OF FREE THYROXINE: CPT | Performed by: INTERNAL MEDICINE

## 2023-10-05 PROCEDURE — 80053 COMPREHEN METABOLIC PANEL: CPT | Performed by: INTERNAL MEDICINE

## 2023-10-05 PROCEDURE — 36415 COLL VENOUS BLD VENIPUNCTURE: CPT

## 2023-10-05 RX ORDER — SODIUM CHLORIDE 0.9 % (FLUSH) 0.9 %
20 SYRINGE (ML) INJECTION AS NEEDED
Status: CANCELLED | OUTPATIENT
Start: 2023-10-05

## 2023-10-05 RX ORDER — HEPARIN SODIUM (PORCINE) LOCK FLUSH IV SOLN 100 UNIT/ML 100 UNIT/ML
500 SOLUTION INTRAVENOUS AS NEEDED
Status: CANCELLED | OUTPATIENT
Start: 2023-10-05

## 2023-10-05 RX ORDER — CAMPHOR/PHENOL 10.8-4.7%
1 GEL (GRAM) TOPICAL EVERY 4 HOURS PRN
Qty: 6.5 G | Refills: 2 | Status: SHIPPED | OUTPATIENT
Start: 2023-10-05

## 2023-10-05 RX ORDER — SODIUM CHLORIDE 9 MG/ML
250 INJECTION, SOLUTION INTRAVENOUS ONCE
Status: CANCELLED | OUTPATIENT
Start: 2023-10-06

## 2023-10-05 RX ORDER — PALONOSETRON 0.05 MG/ML
0.25 INJECTION, SOLUTION INTRAVENOUS ONCE
Status: CANCELLED | OUTPATIENT
Start: 2023-10-06

## 2023-10-05 RX ORDER — HEPARIN SODIUM (PORCINE) LOCK FLUSH IV SOLN 100 UNIT/ML 100 UNIT/ML
500 SOLUTION INTRAVENOUS AS NEEDED
Status: DISCONTINUED | OUTPATIENT
Start: 2023-10-05 | End: 2023-10-06 | Stop reason: HOSPADM

## 2023-10-05 RX ORDER — PROCHLORPERAZINE MALEATE 5 MG/1
5 TABLET ORAL ONCE
Status: CANCELLED
Start: 2023-10-06 | End: 2023-10-07

## 2023-10-05 RX ORDER — DOXORUBICIN HYDROCHLORIDE 2 MG/ML
60 INJECTION, SOLUTION INTRAVENOUS ONCE
Status: CANCELLED | OUTPATIENT
Start: 2023-10-06

## 2023-10-05 RX ORDER — CAMPHOR/PHENOL 10.8-4.7%
1 GEL (GRAM) TOPICAL EVERY 4 HOURS PRN
Qty: 6.5 G | Refills: 2 | Status: SHIPPED | OUTPATIENT
Start: 2023-10-05 | End: 2023-10-05 | Stop reason: SDUPTHER

## 2023-10-05 RX ORDER — SODIUM CHLORIDE 0.9 % (FLUSH) 0.9 %
20 SYRINGE (ML) INJECTION AS NEEDED
Status: DISCONTINUED | OUTPATIENT
Start: 2023-10-05 | End: 2023-10-06 | Stop reason: HOSPADM

## 2023-10-05 NOTE — PROGRESS NOTES
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 MRI with concern right axilla LN involvement. Right axilla LN biopsied on 7/7 consistent with metastatic breast carcinoma. Due for C5 chemoimmunotherapy tomorrow with first dose of Doxorubicin and Cyclophosphamide.  Overall tolerating chemotherapy well.  She is nervous about the new chemo. She has mouth sore that has been gettign bigger and is more painful. She is in need of both a new filling and a root canal on her back teeth. She feels well overall. She is fatigued with the chemo but is staying active. Some days are much better than others. Gaining weight. Reports some hair growth.     Oncology/Hematology History Overview Note   2012: Lumpectomy of fibroadenoma of right breast    3/2023: Started to have pain and swelling of known area of fibroadenoma of right breast. Started after trauma to breast.     5/26/23: Right breast lumpectomy: Invasive metaplastic carcinoma, high-grade, 4.5 x 4.0 x 3.3 cm with interspersed fibroadenoma, negative margins. ER 0%, KS 0%, HER2 0 by IHC. PDL1 CPS of 50.     6/21/23: MRI breast: Diffuse edema, hypervascularity, and skin thickening of the right breast raising the possibility of inflammatory breast cancer. 2. 4.7 cm seroma in the lateral breast in area of known recent malignant lumpectomy. 3. Metastatic right axillary adenopathy. 4. No evidence of contralateral left breast malignancy. BI-RADS Category 6, known biopsy-proven malignancy.     7/7/23: Right axillary LN biopsy: metastatic adenocarcinoma, consistent with metaplastic breast  carcinoma.     7/11/23: CT Chest, abdomen, pelvis: No definite metastatic disease. 1. Chest:  Pathologically enlarged right axillary lymphadenopathy.  Masslike opacity right upper outer breast with postsurgical changes.  Skin thickening right breast.  Correlate with tissue diagnosis and breast MRI.Small semi solid nodules in the right lung.  These are nonspecific.  A follow-up CT chest in 3 months time is recommended.  Abdomen pelvis:  No findings of metastatic disease to the abdomen or pelvis.     7/12/23: NM bone scan: negative for osseous mets    7/14/23: C1D1 Carboplatin/Paclitaxel/Pembrolizumab  8/4/23: C2D1 Carboplatin/Paclitaxel/Pembrolizumab. C2D15 held due to neutropenia  8/25/23: C3D1 Carboplatin/Paclitaxel/Pembrolizumab. All doses given  9/15/23: C4D1 Carboplatin/Paclitaxel/Pembrolizumab. C4D15 held due to neutropenia  10/6/23: C1D1 Doxorubicin/Cyclophosphamide/Pembrolizumab     Metaplastic carcinoma of breast   6/16/2023 Initial Diagnosis    Metaplastic carcinoma of breast     6/16/2023 - 6/16/2023 Chemotherapy    OP BREAST AC DD DOXOrubicin / Cyclophosphamide       7/13/2023 Cancer Staged    Staging form: Breast, AJCC 8th Edition  - Clinical: Stage IIIC (cT4d, cN2, cM0, G3, ER-, MI-, HER2-) - Signed by Ramin Shine MD on 7/13/2023 7/14/2023 - 9/22/2023 Chemotherapy    OP BREAST Pembrolizumab 400 mg / PACLitaxel / CARBOplatin AUC=5       8/12/2023 - 8/12/2023 Chemotherapy    OP BREAST PACLitaxel Adjuvant (Weekly X 12)       10/7/2023 -  Chemotherapy    OP BREAST Pembrolizumab 200 mg / DOXOrubicin / Cyclophosphamide        Triple negative breast cancer (Resolved)   6/28/2023 Initial Diagnosis    Triple negative breast cancer           Review of Systems   Constitutional:  Positive for diaphoresis (PT having hot flashed after treatment). Negative for fatigue.   Genitourinary:  Negative for breast pain.   Neurological:  Positive for headache (Getting better).   Psychiatric/Behavioral:   Positive for sleep disturbance. The patient is nervous/anxious (worsening).    All other systems reviewed and are negative.  Current Outpatient Medications on File Prior to Visit   Medication Sig Dispense Refill    acetaminophen (TYLENOL) 500 MG tablet Take 1 tablet by mouth Every 6 (Six) Hours As Needed. (Patient not taking: Reported on 9/20/2023)      ALPRAZolam (XANAX) 0.5 MG tablet Take 1 tablet by mouth 2 (Two) Times a Day As Needed for Anxiety. (Patient not taking: Reported on 9/20/2023) 60 tablet 0    amoxicillin (AMOXIL) 875 MG tablet Take 1 tablet by mouth 2 (Two) Times a Day.      ARIPiprazole (ABILIFY) 2 MG tablet Take 1 tablet by mouth Daily. (Patient not taking: Reported on 9/20/2023)      Cetirizine HCl (ZyrTEC Allergy) 10 MG capsule Take 2 tablets by mouth Daily. (Patient not taking: Reported on 6/21/2023)      desvenlafaxine (PRISTIQ) 50 MG 24 hr tablet Take 1 tablet by mouth Daily. 90 tablet 1    docusate sodium (COLACE) 100 MG capsule  (Patient not taking: Reported on 6/21/2023)      fluconazole (DIFLUCAN) 150 MG tablet  (Patient not taking: Reported on 9/20/2023)      galcanezumab-gnlm (Emgality) 120 MG/ML auto-injector pen Inject 1 mL under the skin into the appropriate area as directed Every 30 (Thirty) Days. (Patient not taking: Reported on 9/20/2023) 1 each 5    hydrOXYzine (ATARAX) 25 MG tablet Take  by mouth. (Patient not taking: Reported on 6/21/2023)      ibuprofen (ADVIL,MOTRIN) 600 MG tablet  (Patient not taking: Reported on 6/21/2023)      ibuprofen (ADVIL,MOTRIN) 600 MG tablet Take 1 tablet by mouth Every 6 (Six) Hours As Needed for Mild Pain. (Patient not taking: Reported on 9/20/2023) 60 tablet 5    KLOR-CON 20 MEQ CR tablet TAKE 1 TABLET BY MOUTH DAILY 14 tablet 2    lidocaine-prilocaine (EMLA) 2.5-2.5 % cream Apply 1 application  topically to the appropriate area as directed As Needed for Mild Pain. 30 g 1    LORazepam (ATIVAN) 0.5 MG tablet Take 1 tablet by mouth Every 8 (Eight)  Hours As Needed for Anxiety. 90 tablet 0    OLANZapine (zyPREXA) 5 MG tablet Take 1 tablet by mouth Every Night. Take on days 2, 3 and 4 after chemotherapy. 3 tablet 3    ondansetron (ZOFRAN) 8 MG tablet Take 1 tablet by mouth 3 (Three) Times a Day As Needed for Nausea or Vomiting. (Patient not taking: Reported on 2023) 30 tablet 3    promethazine (PHENERGAN) 25 MG tablet Take 1 tablet by mouth Every 6 (Six) Hours As Needed for Nausea or Vomiting. (Patient not taking: Reported on 2023) 30 tablet 3    propranolol (INDERAL) 20 MG tablet Take 1 tablet by mouth 2 (Two) Times a Day. (Patient not taking: Reported on 2023) 60 tablet 5    rOPINIRole (REQUIP) 0.5 MG tablet Take 1 tablet by mouth Every Night. Take 1 hour before bedtime. 30 tablet 3    topiramate (TOPAMAX) 25 MG tablet Take 1 tablet by mouth Daily. (Patient not taking: Reported on 2023)      ubrogepant (Ubrelvy) 100 MG tablet Take 1 tablet by mouth 1 (One) Time As Needed (migraine) for up to 1 dose. One tablet at HA onset, may repeat once in 2 hours if needed. 10 tablet 5     Current Facility-Administered Medications on File Prior to Visit   Medication Dose Route Frequency Provider Last Rate Last Admin    heparin injection 500 Units  500 Units Intravenous PRN Ramin Shine MD        sodium chloride 0.9 % flush 20 mL  20 mL Intravenous PRN Ramin Shine MD           Allergies   Allergen Reactions    Tegaderm Ag Mesh [Silver] Itching     Past Medical History:   Diagnosis Date    Asthma     AS CHILD    Breast cancer     COVID-19 vaccine administered      Past Surgical History:   Procedure Laterality Date     SECTION      SKIN BIOPSY      TUBAL ABDOMINAL LIGATION       Social History     Socioeconomic History    Marital status: Single    Number of children: 2   Tobacco Use    Smoking status: Never     Passive exposure: Never    Smokeless tobacco: Never   Vaping Use    Vaping Use: Never used   Substance and Sexual  Activity    Alcohol use: Never    Drug use: Never    Sexual activity: Defer     Family History   Problem Relation Age of Onset    COPD Mother     Hypertension Father     Hyperlipidemia Father     Diabetes Father     Skin cancer Paternal Grandmother        Objective   Physical Exam  Well appearing patient in no acute distress on RA  Anicteric sclerae, right breast erythema much improved, port site scar appears healing   Respirations non-labored  Awake, alert, and oriented x 4. Speech intact. No gross neurologic deficit  Appropriate mood and affect    There were no vitals filed for this visit.              PHQ-9 Total Score:           Result Review :   The following data was reviewed by: Ramin Shine MD on 10/05/23:  Lab Results   Component Value Date    HGB 11.7 (L) 10/05/2023    HCT 34.1 10/05/2023    MCV 88.3 10/05/2023     (L) 10/05/2023    WBC 3.07 (L) 10/05/2023    NEUTROABS 1.05 (L) 10/05/2023    LYMPHSABS 1.68 10/05/2023    MONOSABS 0.33 10/05/2023    EOSABS 0.00 10/05/2023    BASOSABS 0.01 10/05/2023     Lab Results   Component Value Date    GLUCOSE 87 10/05/2023    BUN 12 10/05/2023    CREATININE 0.48 (L) 10/05/2023     10/05/2023    K 3.5 10/05/2023     10/05/2023    CO2 23.9 10/05/2023    CALCIUM 9.6 10/05/2023    PROTEINTOT 7.1 10/05/2023    ALBUMIN 4.4 10/05/2023    BILITOT 0.2 10/05/2023    ALKPHOS 96 10/05/2023    AST 22 10/05/2023    ALT 31 10/05/2023     Lab Results   Component Value Date    FREET4 1.21 08/24/2023    TSH 1.730 08/24/2023     Labs personally reviewed. Mild anemia. ANC low. Normal creatinine    Pathology report personally reviewed    Surgery notes by Dr. Velazquez personally reviewed    Us duplex 8/23/23 personally reviewed      No radiology results for the last 30 days.        Assessment and Plan    Diagnoses and all orders for this visit:    1. Encounter for echocardiogram before initiation of chemotherapy (Primary)  -     CT Chest With Contrast; Future    2.  Stomatitis  -     Discontinue: MAGIC MOUTHWASH 1/1/1 SUSP; Swish and spit 5 mL Every 4 (Four) Hours As Needed for Stomatitis or Mouth Pain. Swish and spit 5 ml  every 4 hours as needed  Dispense: 600 mL; Refill: 1  -     Discontinue: Camphor-Phenol (Campho Phenique Maximum St) 10.8-4.7 % gel; Apply 1 application  topically Every 4 (Four) Hours As Needed (mouth pain).  Dispense: 6.5 g; Refill: 2  -     MAGIC MOUTHWASH 1/1/1 SUSP; Swish and spit 5 mL Every 4 (Four) Hours As Needed for Stomatitis or Mouth Pain. Swish and spit 5 ml  every 4 hours as needed  Dispense: 600 mL; Refill: 1  -     Camphor-Phenol (Campho Phenique Maximum St) 10.8-4.7 % gel; Apply 1 application  topically Every 4 (Four) Hours As Needed (mouth pain).  Dispense: 6.5 g; Refill: 2    3. Metaplastic carcinoma of breast  -     CT Chest With Contrast; Future  -     Lab Appointment Request; Future  -     Clinic Appointment Request; Future  -     Infusion Appointment Request 3; Future    Other orders  -     sodium chloride 0.9 % infusion 250 mL  -     Pembrolizumab (KEYTRUDA) 200 mg in sodium chloride 0.9 % 50 mL chemo IVPB  -     prochlorperazine (COMPAZINE) tablet 5 mg  -     palonosetron (ALOXI) injection 0.25 mg  -     fosaprepitant (EMEND) 150 mg in sodium chloride 0.9 % 100 mL IVPB  -     dexAMETHasone (DECADRON) 12 mg in sodium chloride 0.9 % IVPB  -     DOXOrubicin (ADRIAMYCIN) chemo injection 102 mg  -     cyclophosphamide (CYTOXAN) 1,010 mg in sodium chloride 0.9 % 300.5 mL chemo IVPB      Metaplastic Carcinoma of Right Breast, triple negative  Patient is s/p lumpectomy of 4.5 cm lesion with triple negative metaplastic carcinoma, interspersed with fibroadenoma. Patient has clinical inflammatory breast cancer. MRI breast with concern for inflammatory breast cancer as well as right sided axillary involvement. Left breast without lesions per MRI. Lymph node biopsy 7/7/23 confirmed right axillary involvement by breast carcinoma.  Will proceed with  neoadjuvant chemotherapy combined with immunotherapy. This is with IV Carboplatin and Paclitaxel combined with Pembrolizumab every 3 weeks for 4 cycles followed by Doxorubicin and Cyclophosphamide with Pembro every 3 weeks for 4 additional cycles. Patient will then get surgery: patient is deciding between single or double mastectomy. This will be followed by adjuvant immunotherapy and radiation. Baseline ECHO normal. Baseline CT CAP without any definite metastatic disease. Bone scan negative for osseous mets. C1D1 Carboplatin/Paclitaxel/Pembrolizumab 7/14/23. C4D1 Carboplatin/Paclitaxel/Pembrolizumab 9/15/23.     C5 chemo, C1D1 Doxorubicin, cyclophosphamide, pembrolizumab 10/6/23. Labs appropriate to proceed. Hold pre-med of olanzapine due to sedation. Compazine oral in place of.     Labs appropriate to proceed.     Plan for 4 additional cycles chemotherapy.     Genetic testing has been completed and pending.    She has met with Dr. Whiting with general surgery with plans for breast MRI near completion of chemo and then proceeding with mastectomy.     This is an acute or chronic illness that poses a threat to life or bodily function. The above treatment plan involves a high risk of complications and/or mortality of patient management. Continue to monitor for severe toxicities with labs       Chemo induced nausea  G1. Phenergan to alternate with zofran.     Right breast erythema and firmness  2/2 inflammatory breast cancer. Patient finished course of antibiotics to treat any co-existing cellulitis. Improving with above chemotherapy     Anxiety  Xanax PRN added.     Restless leg  Low dose ropinirole helps, now up to 0.5 mg    Pulmonary nodules  Too small for biopsy or characterization by PET. Repeat CT Chest in 3 months is due, will order.     Please note that portions of this note were completed with a voice recognition program.      I spent 35 minutes caring for Marilee on this date of service. This time includes  time spent by me in the following activities:preparing for the visit, reviewing tests, obtaining and/or reviewing a separately obtained history, performing a medically appropriate examination and/or evaluation , counseling and educating the patient/family/caregiver, ordering medications, tests, or procedures, referring and communicating with other health care professionals , documenting information in the medical record, independently interpreting results and communicating that information with the patient/family/caregiver and care coordination    Patient Follow Up: C6 chemoimmuno  Patient was given instructions and counseling regarding her condition or for health maintenance advice. Please see specific information pulled into the AVS if appropriate.

## 2023-10-05 NOTE — TELEPHONE ENCOUNTER
Caller: [facility] [id number] - [place], [place] - [address] [facility] - [phone]  - [phone]    Relationship: Pharmacy    Best call back number: [phone]    What was the call regarding: PHARMACY CALLED REGARDING MAGIC MOUTH WASH THEY DO NOT COMPOUND BUT THEY HAVE A SIMILAR MOUTH WASH THEY CAN FILL FOR PATIENT    PLEASE CALL TO ADVISE

## 2023-10-06 ENCOUNTER — HOSPITAL ENCOUNTER (OUTPATIENT)
Dept: ONCOLOGY | Facility: HOSPITAL | Age: 28
Discharge: HOME | End: 2023-10-06
Payer: COMMERCIAL

## 2023-10-06 VITALS
DIASTOLIC BLOOD PRESSURE: 73 MMHG | BODY MASS INDEX: 28.2 KG/M2 | OXYGEN SATURATION: 100 % | HEART RATE: 79 BPM | TEMPERATURE: 98.2 F | RESPIRATION RATE: 16 BRPM | WEIGHT: 159.17 LBS | HEIGHT: 63 IN | SYSTOLIC BLOOD PRESSURE: 108 MMHG

## 2023-10-06 DIAGNOSIS — C50.919 METAPLASTIC CARCINOMA OF BREAST: Primary | ICD-10-CM

## 2023-10-06 DIAGNOSIS — Z45.2 FITTING AND ADJUSTMENT OF VASCULAR CATHETER: ICD-10-CM

## 2023-10-06 PROCEDURE — 96375 TX/PRO/DX INJ NEW DRUG ADDON: CPT

## 2023-10-06 PROCEDURE — 25810000003 SODIUM CHLORIDE 0.9 % SOLUTION: Performed by: INTERNAL MEDICINE

## 2023-10-06 PROCEDURE — 25810000003 SODIUM CHLORIDE 0.9 % SOLUTION 250 ML FLEX CONT: Performed by: INTERNAL MEDICINE

## 2023-10-06 PROCEDURE — 25010000002 CYCLOPHOSPHAMIDE 1 GM/5ML SOLUTION 5 ML VIAL: Performed by: INTERNAL MEDICINE

## 2023-10-06 PROCEDURE — 96417 CHEMO IV INFUS EACH ADDL SEQ: CPT

## 2023-10-06 PROCEDURE — 96411 CHEMO IV PUSH ADDL DRUG: CPT

## 2023-10-06 PROCEDURE — 96367 TX/PROPH/DG ADDL SEQ IV INF: CPT

## 2023-10-06 PROCEDURE — 96413 CHEMO IV INFUSION 1 HR: CPT

## 2023-10-06 PROCEDURE — 63710000001 PROCHLORPERAZINE MALEATE PER 5 MG: Performed by: INTERNAL MEDICINE

## 2023-10-06 PROCEDURE — 25010000002 PEMBROLIZUMAB 100 MG/4ML SOLUTION 4 ML VIAL: Performed by: INTERNAL MEDICINE

## 2023-10-06 PROCEDURE — 25010000002 DOXORUBICIN PER 10 MG: Performed by: INTERNAL MEDICINE

## 2023-10-06 PROCEDURE — 25010000002 FOSAPREPITANT PER 1 MG: Performed by: INTERNAL MEDICINE

## 2023-10-06 PROCEDURE — 25010000002 DEXAMETHASONE SODIUM PHOSPHATE 120 MG/30ML SOLUTION: Performed by: INTERNAL MEDICINE

## 2023-10-06 PROCEDURE — 25010000002 PALONOSETRON PER 25 MCG: Performed by: INTERNAL MEDICINE

## 2023-10-06 PROCEDURE — 25010000002 HEPARIN LOCK FLUSH PER 10 UNITS: Performed by: INTERNAL MEDICINE

## 2023-10-06 RX ORDER — SODIUM CHLORIDE 9 MG/ML
250 INJECTION, SOLUTION INTRAVENOUS ONCE
Status: COMPLETED | OUTPATIENT
Start: 2023-10-06 | End: 2023-10-06

## 2023-10-06 RX ORDER — DOXORUBICIN HYDROCHLORIDE 2 MG/ML
60 INJECTION, SOLUTION INTRAVENOUS ONCE
Status: COMPLETED | OUTPATIENT
Start: 2023-10-06 | End: 2023-10-06

## 2023-10-06 RX ORDER — SODIUM CHLORIDE 0.9 % (FLUSH) 0.9 %
20 SYRINGE (ML) INJECTION AS NEEDED
Status: DISCONTINUED | OUTPATIENT
Start: 2023-10-06 | End: 2023-10-07 | Stop reason: HOSPADM

## 2023-10-06 RX ORDER — PROCHLORPERAZINE MALEATE 5 MG/1
5 TABLET ORAL ONCE
Status: COMPLETED | OUTPATIENT
Start: 2023-10-06 | End: 2023-10-06

## 2023-10-06 RX ORDER — HEPARIN SODIUM (PORCINE) LOCK FLUSH IV SOLN 100 UNIT/ML 100 UNIT/ML
500 SOLUTION INTRAVENOUS AS NEEDED
OUTPATIENT
Start: 2023-10-06

## 2023-10-06 RX ORDER — PALONOSETRON 0.05 MG/ML
0.25 INJECTION, SOLUTION INTRAVENOUS ONCE
Status: COMPLETED | OUTPATIENT
Start: 2023-10-06 | End: 2023-10-06

## 2023-10-06 RX ORDER — HEPARIN SODIUM (PORCINE) LOCK FLUSH IV SOLN 100 UNIT/ML 100 UNIT/ML
500 SOLUTION INTRAVENOUS AS NEEDED
Status: DISCONTINUED | OUTPATIENT
Start: 2023-10-06 | End: 2023-10-07 | Stop reason: HOSPADM

## 2023-10-06 RX ORDER — SODIUM CHLORIDE 0.9 % (FLUSH) 0.9 %
20 SYRINGE (ML) INJECTION AS NEEDED
OUTPATIENT
Start: 2023-10-06

## 2023-10-06 RX ADMIN — Medication 20 ML: at 12:43

## 2023-10-06 RX ADMIN — PALONOSETRON 0.25 MG: 0.05 INJECTION, SOLUTION INTRAVENOUS at 10:12

## 2023-10-06 RX ADMIN — DEXAMETHASONE SODIUM PHOSPHATE 12 MG: 4 INJECTION, SOLUTION INTRA-ARTICULAR; INTRALESIONAL; INTRAMUSCULAR; INTRAVENOUS; SOFT TISSUE at 10:02

## 2023-10-06 RX ADMIN — HEPARIN SODIUM (PORCINE) LOCK FLUSH IV SOLN 100 UNIT/ML 500 UNITS: 100 SOLUTION at 12:44

## 2023-10-06 RX ADMIN — PROCHLORPERAZINE MALEATE 5 MG: 5 TABLET ORAL at 10:12

## 2023-10-06 RX ADMIN — DOXORUBICIN HYDROCHLORIDE 102 MG: 2 INJECTION, SOLUTION INTRAVENOUS at 11:36

## 2023-10-06 RX ADMIN — SODIUM CHLORIDE 250 ML: 9 INJECTION, SOLUTION INTRAVENOUS at 10:00

## 2023-10-06 RX ADMIN — CYCLOPHOSPHAMIDE 1000 MG: 200 INJECTION, SOLUTION INTRAVENOUS at 11:59

## 2023-10-06 RX ADMIN — FOSAPREPITANT 100 ML: 150 INJECTION, POWDER, LYOPHILIZED, FOR SOLUTION INTRAVENOUS at 10:15

## 2023-10-06 RX ADMIN — SODIUM CHLORIDE 200 MG: 9 INJECTION, SOLUTION INTRAVENOUS at 10:49

## 2023-10-17 ENCOUNTER — HOSPITAL ENCOUNTER (OUTPATIENT)
Dept: CT IMAGING | Facility: HOSPITAL | Age: 28
Discharge: HOME | End: 2023-10-17
Admitting: INTERNAL MEDICINE
Payer: COMMERCIAL

## 2023-10-17 DIAGNOSIS — Z01.818 ENCOUNTER FOR ECHOCARDIOGRAM BEFORE INITIATION OF CHEMOTHERAPY: ICD-10-CM

## 2023-10-17 DIAGNOSIS — C50.919 METAPLASTIC CARCINOMA OF BREAST: ICD-10-CM

## 2023-10-17 PROCEDURE — 25510000001 IOPAMIDOL PER 1 ML: Performed by: INTERNAL MEDICINE

## 2023-10-17 PROCEDURE — 71260 CT THORAX DX C+: CPT

## 2023-10-17 RX ADMIN — IOPAMIDOL 75 ML: 755 INJECTION, SOLUTION INTRAVENOUS at 14:23

## 2023-10-24 ENCOUNTER — TELEPHONE (OUTPATIENT)
Dept: ONCOLOGY | Facility: HOSPITAL | Age: 28
End: 2023-10-24
Payer: COMMERCIAL

## 2023-10-24 NOTE — TELEPHONE ENCOUNTER
Caller: Marilee Watts    Relationship to patient: Self    Best call back number: [phone]    Patient is needing: TO GO OVER CT SCAN RESULTS FROM 10-17-23 AND HAS A COUPLE SYMPTOMS THAT SHE HAS QUESTIONS ABOUT. SHE HAS A CONSISTENT SWELLING THAT SHE IS WONDERING IF THAT IS NORMAL OR NOT.

## 2023-10-26 ENCOUNTER — HOSPITAL ENCOUNTER (OUTPATIENT)
Dept: ONCOLOGY | Facility: HOSPITAL | Age: 28
Discharge: HOME | End: 2023-10-26
Admitting: INTERNAL MEDICINE
Payer: COMMERCIAL

## 2023-10-26 ENCOUNTER — OFFICE VISIT (OUTPATIENT)
Dept: ONCOLOGY | Facility: HOSPITAL | Age: 28
End: 2023-10-26
Payer: COMMERCIAL

## 2023-10-26 VITALS
DIASTOLIC BLOOD PRESSURE: 98 MMHG | WEIGHT: 158.51 LBS | RESPIRATION RATE: 18 BRPM | BODY MASS INDEX: 28.09 KG/M2 | SYSTOLIC BLOOD PRESSURE: 142 MMHG | TEMPERATURE: 98.7 F | HEART RATE: 94 BPM | OXYGEN SATURATION: 100 %

## 2023-10-26 DIAGNOSIS — C50.919 METAPLASTIC CARCINOMA OF BREAST: ICD-10-CM

## 2023-10-26 DIAGNOSIS — C50.919 METAPLASTIC CARCINOMA OF BREAST: Primary | ICD-10-CM

## 2023-10-26 DIAGNOSIS — Z45.2 FITTING AND ADJUSTMENT OF VASCULAR CATHETER: ICD-10-CM

## 2023-10-26 DIAGNOSIS — T45.1X5A CHEMOTHERAPY-INDUCED NAUSEA: Primary | ICD-10-CM

## 2023-10-26 DIAGNOSIS — R11.0 CHEMOTHERAPY-INDUCED NAUSEA: Primary | ICD-10-CM

## 2023-10-26 LAB
ALBUMIN SERPL-MCNC: 4.2 G/DL (ref 3.5–5.2)
ALBUMIN/GLOB SERPL: 1.6 G/DL
ALP SERPL-CCNC: 89 U/L (ref 39–117)
ALT SERPL W P-5'-P-CCNC: 25 U/L (ref 1–33)
ANION GAP SERPL CALCULATED.3IONS-SCNC: 5.1 MMOL/L (ref 5–15)
AST SERPL-CCNC: 24 U/L (ref 1–32)
BASOPHILS # BLD AUTO: 0.01 10*3/MM3 (ref 0–0.2)
BASOPHILS NFR BLD AUTO: 0.3 % (ref 0–1.5)
BILIRUB SERPL-MCNC: 0.2 MG/DL (ref 0–1.2)
BUN SERPL-MCNC: 10 MG/DL (ref 6–20)
BUN/CREAT SERPL: 19.2 (ref 7–25)
CALCIUM SPEC-SCNC: 9.2 MG/DL (ref 8.6–10.5)
CHLORIDE SERPL-SCNC: 105 MMOL/L (ref 98–107)
CO2 SERPL-SCNC: 28.9 MMOL/L (ref 22–29)
CREAT SERPL-MCNC: 0.52 MG/DL (ref 0.57–1)
DEPRECATED RDW RBC AUTO: 51.5 FL (ref 37–54)
EGFRCR SERPLBLD CKD-EPI 2021: 129.2 ML/MIN/1.73
EOSINOPHIL # BLD AUTO: 0 10*3/MM3 (ref 0–0.4)
EOSINOPHIL NFR BLD AUTO: 0 % (ref 0.3–6.2)
ERYTHROCYTE [DISTWIDTH] IN BLOOD BY AUTOMATED COUNT: 15.8 % (ref 12.3–15.4)
GLOBULIN UR ELPH-MCNC: 2.6 GM/DL
GLUCOSE SERPL-MCNC: 96 MG/DL (ref 65–99)
HCT VFR BLD AUTO: 31.4 % (ref 34–46.6)
HGB BLD-MCNC: 10.2 G/DL (ref 12–15.9)
IMM GRANULOCYTES # BLD AUTO: 0.28 10*3/MM3 (ref 0–0.05)
IMM GRANULOCYTES NFR BLD AUTO: 7.2 % (ref 0–0.5)
LYMPHOCYTES # BLD AUTO: 1.57 10*3/MM3 (ref 0.7–3.1)
LYMPHOCYTES NFR BLD AUTO: 40.2 % (ref 19.6–45.3)
MCH RBC QN AUTO: 29.9 PG (ref 26.6–33)
MCHC RBC AUTO-ENTMCNC: 32.5 G/DL (ref 31.5–35.7)
MCV RBC AUTO: 92.1 FL (ref 79–97)
MONOCYTES # BLD AUTO: 0.46 10*3/MM3 (ref 0.1–0.9)
MONOCYTES NFR BLD AUTO: 11.8 % (ref 5–12)
NEUTROPHILS NFR BLD AUTO: 1.59 10*3/MM3 (ref 1.7–7)
NEUTROPHILS NFR BLD AUTO: 40.5 % (ref 42.7–76)
PLATELET # BLD AUTO: 313 10*3/MM3 (ref 140–450)
PMV BLD AUTO: 9.2 FL (ref 6–12)
POTASSIUM SERPL-SCNC: 3.7 MMOL/L (ref 3.5–5.2)
PROT SERPL-MCNC: 6.8 G/DL (ref 6–8.5)
RBC # BLD AUTO: 3.41 10*6/MM3 (ref 3.77–5.28)
SODIUM SERPL-SCNC: 139 MMOL/L (ref 136–145)
WBC NRBC COR # BLD: 3.91 10*3/MM3 (ref 3.4–10.8)

## 2023-10-26 PROCEDURE — 36415 COLL VENOUS BLD VENIPUNCTURE: CPT

## 2023-10-26 PROCEDURE — 85025 COMPLETE CBC W/AUTO DIFF WBC: CPT | Performed by: INTERNAL MEDICINE

## 2023-10-26 PROCEDURE — G0463 HOSPITAL OUTPT CLINIC VISIT: HCPCS | Performed by: INTERNAL MEDICINE

## 2023-10-26 PROCEDURE — 80053 COMPREHEN METABOLIC PANEL: CPT | Performed by: INTERNAL MEDICINE

## 2023-10-26 RX ORDER — DEXAMETHASONE 4 MG/1
4 TABLET ORAL 2 TIMES DAILY WITH MEALS
Qty: 6 TABLET | Refills: 0 | Status: SHIPPED | OUTPATIENT
Start: 2023-10-26 | End: 2023-10-29

## 2023-10-26 RX ORDER — LIDOCAINE HYDROCHLORIDE 20 MG/ML
SOLUTION OROPHARYNGEAL
COMMUNITY
Start: 2023-10-06

## 2023-10-26 RX ORDER — DOXORUBICIN HYDROCHLORIDE 2 MG/ML
60 INJECTION, SOLUTION INTRAVENOUS ONCE
Status: CANCELLED | OUTPATIENT
Start: 2023-10-27

## 2023-10-26 RX ORDER — PALONOSETRON 0.05 MG/ML
0.25 INJECTION, SOLUTION INTRAVENOUS ONCE
Status: CANCELLED | OUTPATIENT
Start: 2023-10-27

## 2023-10-26 RX ORDER — PROCHLORPERAZINE MALEATE 5 MG/1
5 TABLET ORAL ONCE
Status: CANCELLED
Start: 2023-10-27 | End: 2023-10-27

## 2023-10-26 RX ORDER — NIRMATRELVIR AND RITONAVIR 300-100 MG
KIT ORAL
COMMUNITY
Start: 2023-10-18

## 2023-10-26 RX ORDER — PROCHLORPERAZINE MALEATE 10 MG
10 TABLET ORAL EVERY 6 HOURS PRN
Qty: 60 TABLET | Refills: 2 | Status: SHIPPED | OUTPATIENT
Start: 2023-10-26

## 2023-10-26 RX ORDER — SODIUM CHLORIDE 0.9 % (FLUSH) 0.9 %
20 SYRINGE (ML) INJECTION AS NEEDED
Status: DISCONTINUED | OUTPATIENT
Start: 2023-10-26 | End: 2023-10-27 | Stop reason: HOSPADM

## 2023-10-26 RX ORDER — SODIUM CHLORIDE 0.9 % (FLUSH) 0.9 %
20 SYRINGE (ML) INJECTION AS NEEDED
Status: CANCELLED | OUTPATIENT
Start: 2023-10-26

## 2023-10-26 RX ORDER — HEPARIN SODIUM (PORCINE) LOCK FLUSH IV SOLN 100 UNIT/ML 100 UNIT/ML
500 SOLUTION INTRAVENOUS AS NEEDED
Status: DISCONTINUED | OUTPATIENT
Start: 2023-10-26 | End: 2023-10-27 | Stop reason: HOSPADM

## 2023-10-26 RX ORDER — HEPARIN SODIUM (PORCINE) LOCK FLUSH IV SOLN 100 UNIT/ML 100 UNIT/ML
500 SOLUTION INTRAVENOUS AS NEEDED
Status: CANCELLED | OUTPATIENT
Start: 2023-10-27

## 2023-10-26 RX ORDER — SODIUM CHLORIDE 9 MG/ML
250 INJECTION, SOLUTION INTRAVENOUS ONCE
Status: CANCELLED | OUTPATIENT
Start: 2023-10-27

## 2023-10-26 NOTE — PROGRESS NOTES
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 MRI with concern right axilla LN involvement. Right axilla LN biopsied on 7/7 consistent with metastatic breast carcinoma. Due for C6 chemoimmunotherapy tomorrow with second dose of Doxorubicin and Cyclophosphamide. Last chemo complicated by 5 days of significant nausea. Phenergan without any relief. She reports eyelid swelling in the last week. Also has blurry vision and a film-like sensation over her eyes in the last week. No fevers, chills. CT Chest with improved axillary nodes.      Oncology/Hematology History Overview Note   2012: Lumpectomy of fibroadenoma of right breast    3/2023: Started to have pain and swelling of known area of fibroadenoma of right breast. Started after trauma to breast.     5/26/23: Right breast lumpectomy: Invasive metaplastic carcinoma, high-grade, 4.5 x 4.0 x 3.3 cm with interspersed fibroadenoma, negative margins. ER 0%, ND 0%, HER2 0 by IHC. PDL1 CPS of 50.     6/21/23: MRI breast: Diffuse edema, hypervascularity, and skin thickening of the right breast raising the possibility of inflammatory breast cancer. 2. 4.7 cm seroma in the lateral breast in area of known recent malignant lumpectomy. 3. Metastatic right axillary adenopathy. 4. No evidence of contralateral left breast malignancy. BI-RADS Category 6, known biopsy-proven malignancy.     7/7/23: Right axillary LN biopsy: metastatic adenocarcinoma, consistent with metaplastic breast carcinoma.     7/11/23: CT Chest, abdomen, pelvis: No definite metastatic disease. 1. Chest:   Pathologically enlarged right axillary lymphadenopathy.  Masslike opacity right upper outer breast with postsurgical changes.  Skin thickening right breast.  Correlate with tissue diagnosis and breast MRI.Small semi solid nodules in the right lung.  These are nonspecific.  A follow-up CT chest in 3 months time is recommended.  Abdomen pelvis:  No findings of metastatic disease to the abdomen or pelvis.     7/12/23: NM bone scan: negative for osseous mets    7/14/23: C1D1 Carboplatin/Paclitaxel/Pembrolizumab  8/4/23: C2D1 Carboplatin/Paclitaxel/Pembrolizumab. C2D15 held due to neutropenia  8/25/23: C3D1 Carboplatin/Paclitaxel/Pembrolizumab. All doses given  9/15/23: C4D1 Carboplatin/Paclitaxel/Pembrolizumab. C4D15 held due to neutropenia  10/6/23: C1D1 Doxorubicin/Cyclophosphamide/Pembrolizumab     Metaplastic carcinoma of breast   6/16/2023 Initial Diagnosis    Metaplastic carcinoma of breast     6/16/2023 - 6/16/2023 Chemotherapy    OP BREAST AC DD DOXOrubicin / Cyclophosphamide     7/13/2023 Cancer Staged    Staging form: Breast, AJCC 8th Edition  - Clinical: Stage IIIC (cT4d, cN2, cM0, G3, ER-, UT-, HER2-) - Signed by Ramin Shine MD on 7/13/2023 7/14/2023 - 9/22/2023 Chemotherapy    OP BREAST Pembrolizumab 400 mg / PACLitaxel / CARBOplatin AUC=5     8/12/2023 - 8/12/2023 Chemotherapy    OP BREAST PACLitaxel Adjuvant (Weekly X 12)     10/6/2023 -  Chemotherapy    OP BREAST Pembrolizumab 200 mg / DOXOrubicin / Cyclophosphamide      Triple negative breast cancer (Resolved)   6/28/2023 Initial Diagnosis    Triple negative breast cancer           Review of Systems   Constitutional:  Positive for diaphoresis (PT having hot flashed after treatment). Negative for fatigue.   Respiratory:  Positive for cough.    Gastrointestinal:  Positive for nausea.   Genitourinary:  Negative for breast pain.   Neurological:  Positive for headache (Getting better).   Psychiatric/Behavioral:  Positive for sleep  disturbance. The patient is nervous/anxious (worsening).    All other systems reviewed and are negative.    Current Outpatient Medications on File Prior to Visit   Medication Sig Dispense Refill    Lidocaine Viscous HCl (XYLOCAINE) 2 % solution       Paxlovid, 300/100, 20 x 150 MG & 10 x 100MG tablet therapy pack tablet       ALPRAZolam (XANAX) 0.5 MG tablet Take 1 tablet by mouth 2 (Two) Times a Day As Needed for Anxiety. (Patient not taking: Reported on 9/20/2023) 60 tablet 0    amoxicillin (AMOXIL) 875 MG tablet Take 1 tablet by mouth 2 (Two) Times a Day.      ARIPiprazole (ABILIFY) 2 MG tablet Take 1 tablet by mouth Daily. (Patient not taking: Reported on 9/20/2023)      Camphor-Phenol (Campho Phenique Maximum St) 10.8-4.7 % gel Apply 1 application  topically Every 4 (Four) Hours As Needed (mouth pain). 6.5 g 2    desvenlafaxine (PRISTIQ) 50 MG 24 hr tablet Take 1 tablet by mouth Daily. 90 tablet 1    galcanezumab-gnlm (Emgality) 120 MG/ML auto-injector pen Inject 1 mL under the skin into the appropriate area as directed Every 30 (Thirty) Days. (Patient not taking: Reported on 9/20/2023) 1 each 5    KLOR-CON 20 MEQ CR tablet TAKE 1 TABLET BY MOUTH DAILY 14 tablet 2    lidocaine-prilocaine (EMLA) 2.5-2.5 % cream Apply 1 application  topically to the appropriate area as directed As Needed for Mild Pain. 30 g 1    LORazepam (ATIVAN) 0.5 MG tablet Take 1 tablet by mouth Every 8 (Eight) Hours As Needed for Anxiety. 90 tablet 0    MAGIC MOUTHWASH 1/1/1 SUSP Swish and spit 5 mL Every 4 (Four) Hours As Needed for Stomatitis or Mouth Pain. Swish and spit 5 ml  every 4 hours as needed 600 mL 1    OLANZapine (zyPREXA) 5 MG tablet Take 1 tablet by mouth Every Night. Take on days 2, 3 and 4 after chemotherapy. 3 tablet 3    ondansetron (ZOFRAN) 8 MG tablet Take 1 tablet by mouth 3 (Three) Times a Day As Needed for Nausea or Vomiting. (Patient not taking: Reported on 9/20/2023) 30 tablet 3    promethazine (PHENERGAN) 25 MG  tablet Take 1 tablet by mouth Every 6 (Six) Hours As Needed for Nausea or Vomiting. (Patient not taking: Reported on 2023) 30 tablet 3    propranolol (INDERAL) 20 MG tablet Take 1 tablet by mouth 2 (Two) Times a Day. (Patient not taking: Reported on 2023) 60 tablet 5    rOPINIRole (REQUIP) 0.5 MG tablet Take 1 tablet by mouth Every Night. Take 1 hour before bedtime. 30 tablet 3    topiramate (TOPAMAX) 25 MG tablet Take 1 tablet by mouth Daily. (Patient not taking: Reported on 2023)      ubrogepant (Ubrelvy) 100 MG tablet Take 1 tablet by mouth 1 (One) Time As Needed (migraine) for up to 1 dose. One tablet at HA onset, may repeat once in 2 hours if needed. 10 tablet 5     Current Facility-Administered Medications on File Prior to Visit   Medication Dose Route Frequency Provider Last Rate Last Admin    heparin injection 500 Units  500 Units Intravenous PRN Ramin Shine MD        sodium chloride 0.9 % flush 20 mL  20 mL Intravenous PRN Ramin Shine MD           Allergies   Allergen Reactions    Tegaderm Ag Mesh [Silver] Itching     Past Medical History:   Diagnosis Date    Asthma     AS CHILD    Breast cancer     COVID-19 vaccine administered      Past Surgical History:   Procedure Laterality Date     SECTION      SKIN BIOPSY      TUBAL ABDOMINAL LIGATION       Social History     Socioeconomic History    Marital status: Single    Number of children: 2   Tobacco Use    Smoking status: Never     Passive exposure: Never    Smokeless tobacco: Never   Vaping Use    Vaping Use: Never used   Substance and Sexual Activity    Alcohol use: Never    Drug use: Never    Sexual activity: Defer     Family History   Problem Relation Age of Onset    COPD Mother     Hypertension Father     Hyperlipidemia Father     Diabetes Father     Skin cancer Paternal Grandmother        Objective   Physical Exam  Well appearing patient in no acute distress on RA  Anicteric sclerae, right breast erythema  much improved, port site scar appears healing   Respirations non-labored  Awake, alert, and oriented x 4. Speech intact. No gross neurologic deficit  Appropriate mood and affect    There were no vitals filed for this visit.              PHQ-9 Total Score:           Result Review :   The following data was reviewed by: Ramin Shine MD on 10/26/23:  Lab Results   Component Value Date    HGB 11.7 (L) 10/05/2023    HCT 34.1 10/05/2023    MCV 88.3 10/05/2023     (L) 10/05/2023    WBC 3.07 (L) 10/05/2023    NEUTROABS 1.05 (L) 10/05/2023    LYMPHSABS 1.68 10/05/2023    MONOSABS 0.33 10/05/2023    EOSABS 0.00 10/05/2023    BASOSABS 0.01 10/05/2023     Lab Results   Component Value Date    GLUCOSE 87 10/05/2023    BUN 12 10/05/2023    CREATININE 0.48 (L) 10/05/2023     10/05/2023    K 3.5 10/05/2023     10/05/2023    CO2 23.9 10/05/2023    CALCIUM 9.6 10/05/2023    PROTEINTOT 7.1 10/05/2023    ALBUMIN 4.4 10/05/2023    BILITOT 0.2 10/05/2023    ALKPHOS 96 10/05/2023    AST 22 10/05/2023    ALT 31 10/05/2023     Lab Results   Component Value Date    FREET4 1.12 10/05/2023    TSH 3.680 10/05/2023     Labs personally reviewed. Mild anemia. ANC low. Normal creatinine    Pathology report personally reviewed    Surgery notes by Dr. Velazquez personally reviewed    Us duplex 8/23/23 personally reviewed      CT Chest With Contrast Diagnostic    Result Date: 10/17/2023    1. Near complete interval resolution of the semi-solid nodules in the right lung.  There is a 3 mm residual area of ground-glass density in the right upper lobe which is felt to represent an improving nonspecific infectious/inflammatory process. 2. No evidence of metastatic disease. 3. Post treatment changes in the right breast. 4. Hepatic steatosis.     MIRELLA DELACRUZ MD       Electronically Signed and Approved By: MIRELLA DELACRUZ MD on 10/17/2023 at 15:49                  Assessment and Plan    There are no diagnoses linked to this  encounter.    Metaplastic Carcinoma of Right Breast, triple negative  Patient is s/p lumpectomy of 4.5 cm lesion with triple negative metaplastic carcinoma, interspersed with fibroadenoma. Patient has clinical inflammatory breast cancer. MRI breast with concern for inflammatory breast cancer as well as right sided axillary involvement. Left breast without lesions per MRI. Lymph node biopsy 7/7/23 confirmed right axillary involvement by breast carcinoma.  Will proceed with neoadjuvant chemotherapy combined with immunotherapy. This is with IV Carboplatin and Paclitaxel combined with Pembrolizumab every 3 weeks for 4 cycles followed by Doxorubicin and Cyclophosphamide with Pembro every 3 weeks for 4 additional cycles. Patient will then get surgery: patient is deciding between single or double mastectomy. This will be followed by adjuvant immunotherapy and radiation. Baseline ECHO normal. Baseline CT CAP without any definite metastatic disease. Bone scan negative for osseous mets. C1D1 Carboplatin/Paclitaxel/Pembrolizumab 7/14/23. C4D1 Carboplatin/Paclitaxel/Pembrolizumab 9/15/23.     C6 chemo, C2D1 Doxorubicin, cyclophosphamide, pembrolizumab 10/27/23. Labs appropriate to proceed. Hold pre-med of olanzapine due to sedation. Compazine oral in place of.     Labs appropriate to proceed.     Plan for 2 additional cycles chemotherapy if tolerated.     Genetic testing has been completed and pending.    She has met with Dr. Whiting with general surgery with plans for breast MRI near completion of chemo and then proceeding with mastectomy.     This is an acute or chronic illness that poses a threat to life or bodily function. The above treatment plan involves a high risk of complications and/or mortality of patient management. Continue to monitor for severe toxicities with labs       Chemo induced nausea  G2. Worse with this component of chemotherapy. Compazine to alternate with zofran. D2-4 olanzapine to continue, patient  with sleepiness so will not increase dose. Start dex 4 mg BID x 3 days.     Right breast erythema and firmness  2/2 inflammatory breast cancer. Patient finished course of antibiotics to treat any co-existing cellulitis. Improving with above chemotherapy     Anxiety  Xanax PRN added.     Restless leg  Low dose ropinirole helps, now up to 0.5 mg.    Pulmonary nodules  Too small for biopsy or characterization by PET. Repeat CT Chest 3 months after (10/17/23) was completed with resolution of semi-solid nodules in right lung, residal 3 mm density in the RUL, likely infectious/inflammatory, unlikely realted to disease    Please note that portions of this note were completed with a voice recognition program.      I spent 35 minutes caring for Marilee on this date of service. This time includes time spent by me in the following activities:preparing for the visit, reviewing tests, obtaining and/or reviewing a separately obtained history, performing a medically appropriate examination and/or evaluation , counseling and educating the patient/family/caregiver, ordering medications, tests, or procedures, referring and communicating with other health care professionals , documenting information in the medical record, independently interpreting results and communicating that information with the patient/family/caregiver and care coordination    Patient Follow Up: C6 chemoimmuno  Patient was given instructions and counseling regarding her condition or for health maintenance advice. Please see specific information pulled into the AVS if appropriate.

## 2023-10-27 ENCOUNTER — HOSPITAL ENCOUNTER (OUTPATIENT)
Dept: ONCOLOGY | Facility: HOSPITAL | Age: 28
Discharge: HOME | End: 2023-10-27
Payer: COMMERCIAL

## 2023-10-27 VITALS
SYSTOLIC BLOOD PRESSURE: 138 MMHG | BODY MASS INDEX: 28.32 KG/M2 | WEIGHT: 159.83 LBS | RESPIRATION RATE: 16 BRPM | DIASTOLIC BLOOD PRESSURE: 84 MMHG | OXYGEN SATURATION: 100 % | TEMPERATURE: 98.3 F | HEIGHT: 63 IN | HEART RATE: 79 BPM

## 2023-10-27 DIAGNOSIS — Z45.2 FITTING AND ADJUSTMENT OF VASCULAR CATHETER: ICD-10-CM

## 2023-10-27 DIAGNOSIS — C50.919 METAPLASTIC CARCINOMA OF BREAST: Primary | ICD-10-CM

## 2023-10-27 PROCEDURE — 96367 TX/PROPH/DG ADDL SEQ IV INF: CPT

## 2023-10-27 PROCEDURE — 96417 CHEMO IV INFUS EACH ADDL SEQ: CPT

## 2023-10-27 PROCEDURE — 96411 CHEMO IV PUSH ADDL DRUG: CPT

## 2023-10-27 PROCEDURE — 25810000003 SODIUM CHLORIDE 0.9 % SOLUTION: Performed by: INTERNAL MEDICINE

## 2023-10-27 PROCEDURE — 25010000002 DEXAMETHASONE SODIUM PHOSPHATE 120 MG/30ML SOLUTION: Performed by: INTERNAL MEDICINE

## 2023-10-27 PROCEDURE — 25010000002 PEMBROLIZUMAB 100 MG/4ML SOLUTION 4 ML VIAL: Performed by: INTERNAL MEDICINE

## 2023-10-27 PROCEDURE — 25010000002 PALONOSETRON PER 25 MCG: Performed by: INTERNAL MEDICINE

## 2023-10-27 PROCEDURE — 96375 TX/PRO/DX INJ NEW DRUG ADDON: CPT

## 2023-10-27 PROCEDURE — 25010000002 FOSAPREPITANT PER 1 MG: Performed by: INTERNAL MEDICINE

## 2023-10-27 PROCEDURE — 96413 CHEMO IV INFUSION 1 HR: CPT

## 2023-10-27 PROCEDURE — 25010000002 HEPARIN LOCK FLUSH PER 10 UNITS: Performed by: INTERNAL MEDICINE

## 2023-10-27 PROCEDURE — 25810000003 SODIUM CHLORIDE 0.9 % SOLUTION 250 ML FLEX CONT: Performed by: INTERNAL MEDICINE

## 2023-10-27 PROCEDURE — 25010000002 CYCLOPHOSPHAMIDE 1 GM/5ML SOLUTION 5 ML VIAL: Performed by: INTERNAL MEDICINE

## 2023-10-27 PROCEDURE — 63710000001 PROCHLORPERAZINE MALEATE PER 5 MG: Performed by: INTERNAL MEDICINE

## 2023-10-27 PROCEDURE — 25010000002 DOXORUBICIN PER 10 MG: Performed by: INTERNAL MEDICINE

## 2023-10-27 RX ORDER — HEPARIN SODIUM (PORCINE) LOCK FLUSH IV SOLN 100 UNIT/ML 100 UNIT/ML
500 SOLUTION INTRAVENOUS AS NEEDED
OUTPATIENT
Start: 2023-10-27

## 2023-10-27 RX ORDER — SODIUM CHLORIDE 0.9 % (FLUSH) 0.9 %
20 SYRINGE (ML) INJECTION AS NEEDED
OUTPATIENT
Start: 2023-10-27

## 2023-10-27 RX ORDER — PROCHLORPERAZINE MALEATE 5 MG/1
5 TABLET ORAL ONCE
Status: COMPLETED | OUTPATIENT
Start: 2023-10-27 | End: 2023-10-27

## 2023-10-27 RX ORDER — DOXORUBICIN HYDROCHLORIDE 2 MG/ML
60 INJECTION, SOLUTION INTRAVENOUS ONCE
Status: COMPLETED | OUTPATIENT
Start: 2023-10-27 | End: 2023-10-27

## 2023-10-27 RX ORDER — SODIUM CHLORIDE 9 MG/ML
250 INJECTION, SOLUTION INTRAVENOUS ONCE
Status: COMPLETED | OUTPATIENT
Start: 2023-10-27 | End: 2023-10-27

## 2023-10-27 RX ORDER — SODIUM CHLORIDE 0.9 % (FLUSH) 0.9 %
20 SYRINGE (ML) INJECTION AS NEEDED
Status: DISCONTINUED | OUTPATIENT
Start: 2023-10-27 | End: 2023-10-28 | Stop reason: HOSPADM

## 2023-10-27 RX ORDER — PALONOSETRON 0.05 MG/ML
0.25 INJECTION, SOLUTION INTRAVENOUS ONCE
Status: COMPLETED | OUTPATIENT
Start: 2023-10-27 | End: 2023-10-27

## 2023-10-27 RX ORDER — HEPARIN SODIUM (PORCINE) LOCK FLUSH IV SOLN 100 UNIT/ML 100 UNIT/ML
500 SOLUTION INTRAVENOUS AS NEEDED
Status: DISCONTINUED | OUTPATIENT
Start: 2023-10-27 | End: 2023-10-28 | Stop reason: HOSPADM

## 2023-10-27 RX ADMIN — HEPARIN SODIUM (PORCINE) LOCK FLUSH IV SOLN 100 UNIT/ML 500 UNITS: 100 SOLUTION at 13:31

## 2023-10-27 RX ADMIN — DEXAMETHASONE SODIUM PHOSPHATE 12 MG: 4 INJECTION, SOLUTION INTRA-ARTICULAR; INTRALESIONAL; INTRAMUSCULAR; INTRAVENOUS; SOFT TISSUE at 11:08

## 2023-10-27 RX ADMIN — PALONOSETRON 0.25 MG: 0.05 INJECTION, SOLUTION INTRAVENOUS at 11:07

## 2023-10-27 RX ADMIN — SODIUM CHLORIDE 250 ML: 9 INJECTION, SOLUTION INTRAVENOUS at 09:46

## 2023-10-27 RX ADMIN — SODIUM CHLORIDE 200 MG: 9 INJECTION, SOLUTION INTRAVENOUS at 10:25

## 2023-10-27 RX ADMIN — FOSAPREPITANT 100 ML: 150 INJECTION, POWDER, LYOPHILIZED, FOR SOLUTION INTRAVENOUS at 11:35

## 2023-10-27 RX ADMIN — PROCHLORPERAZINE MALEATE 5 MG: 5 TABLET ORAL at 09:50

## 2023-10-27 RX ADMIN — DOXORUBICIN HYDROCHLORIDE 102 MG: 2 INJECTION, SOLUTION INTRAVENOUS at 12:28

## 2023-10-27 RX ADMIN — CYCLOPHOSPHAMIDE 1000 MG: 200 INJECTION, SOLUTION INTRAVENOUS at 12:41

## 2023-10-27 RX ADMIN — Medication 20 ML: at 13:31

## 2023-11-03 ENCOUNTER — OFFICE VISIT (OUTPATIENT)
Dept: FAMILY MEDICINE CLINIC | Age: 28
End: 2023-11-03
Payer: COMMERCIAL

## 2023-11-03 VITALS
DIASTOLIC BLOOD PRESSURE: 67 MMHG | SYSTOLIC BLOOD PRESSURE: 103 MMHG | OXYGEN SATURATION: 99 % | HEIGHT: 63 IN | WEIGHT: 156 LBS | HEART RATE: 69 BPM | BODY MASS INDEX: 27.64 KG/M2

## 2023-11-03 DIAGNOSIS — F41.1 GAD (GENERALIZED ANXIETY DISORDER): Primary | ICD-10-CM

## 2023-11-03 RX ORDER — DESVENLAFAXINE SUCCINATE 50 MG/1
50 TABLET, EXTENDED RELEASE ORAL DAILY
Qty: 90 TABLET | Refills: 3 | Status: SHIPPED | OUTPATIENT
Start: 2023-11-03

## 2023-11-03 NOTE — PROGRESS NOTES
Chief Complaint  Marilee Watts presents to [facility] for Annual Exam      Subjective     History of Present Illness  Marilee is here today for annual exam.   Last annual exam was 1 year  Last eye exam: 1 year  [facility]  Last dental exam:   6 months    PROVIDER:  [facility]  Last menstrual period:  none ago.  Last Completed Pap Smear            PAP SMEAR (Every 3 Years) Next due on 3/6/2025      03/06/2022  Done - [facility]  Normal                  Last Completed Mammogram            MAMMOGRAM (Yearly) Next due on 6/20/2024 06/20/2023  Outside Procedure: HC MAMMOGRAM DIAGNOSTIC BILAT DIGITAL W CAD    05/26/2023  MAMMO DIAGNOSTIC DIGITAL TOMOSYNTHESIS RIGHT W CAD    05/26/2023  SCANNED - MAMMO                    Diet / exercise:   No special diet or specific exercise program  Patient's Body mass index is 27.64 kg/m². indicating that she is overweight (BMI 25-29.9).  Satisfied with weight?  yes    Patient Care Team:  Sakina Alvarez APRN as PCP - General (Nurse Practitioner)  Yessica Acevedo, RN as Nurse Navigator  Ramin Shine MD as Consulting Physician (Hematology and Oncology)     Health Maintenance Due   Topic    HEPATITIS C SCREENING      Marilee is currently under the care of Dr. Shine for right breast cancer. She is currently going through chemotherapy and plans of a double mastectomy when able followed by radiation. She has no known family history of breast cancer.   She is doing well other than her anxiety and the side effects from the chemo.  Dr. Shine has been trying to manage her acute panic attacks with benzos in the past and she reports this has been working.  She continues to take her pristiq and feels that is ok for her overall generalized anxiety.      She is not interested in having any vaccines at this point.  She is aware of the risks and declines vaccines at this time.      Assessment and Plan     -advised of  "a well balanced diet and exercise as tolerated  -advised of annual wellness exams are recommended  -discussed health care maintenance and gaps in care and orders have been placed as necessary and as willing by the patient.     Diagnoses and all orders for this visit:    1. DEE (generalized anxiety disorder) (Primary)  -     desvenlafaxine (PRISTIQ) 50 MG 24 hr tablet; Take 1 tablet by mouth Daily.  Dispense: 90 tablet; Refill: 3             Follow Up   No follow-ups on file.      New Medications Ordered This Visit   Medications    desvenlafaxine (PRISTIQ) 50 MG 24 hr tablet     Sig: Take 1 tablet by mouth Daily.     Dispense:  90 tablet     Refill:  3       Medications Discontinued During This Encounter   Medication Reason    Paxlovid, 300/100, 20 x 150 MG & 10 x 100MG tablet therapy pack tablet *Therapy completed    ALPRAZolam (XANAX) 0.5 MG tablet *Therapy completed    ubrogepant (Ubrelvy) 100 MG tablet Side effects    topiramate (TOPAMAX) 25 MG tablet Side effects    desvenlafaxine (PRISTIQ) 50 MG 24 hr tablet Reorder    LORazepam (ATIVAN) 0.5 MG tablet Discontinued by another clinician    OLANZapine (zyPREXA) 5 MG tablet Discontinued by another clinician    ondansetron (ZOFRAN) 8 MG tablet Discontinued by another clinician    Camphor-Phenol (Campho Phenique Maximum St) 10.8-4.7 % gel *Therapy completed    MAGIC MOUTHWASH 1/1/1 SUSP *Therapy completed    ARIPiprazole (ABILIFY) 2 MG tablet Side effects    galcanezumab-gnlm (Emgality) 120 MG/ML auto-injector pen Side effects    amoxicillin (AMOXIL) 875 MG tablet *Therapy completed    Lidocaine Viscous HCl (XYLOCAINE) 2 % solution *Therapy completed            Review of Systems    Objective     Vitals:    11/03/23 1049   BP: 103/67   BP Location: Right arm   Patient Position: Sitting   Pulse: 69   SpO2: 99%   Weight: 70.8 kg (156 lb)   Height: 160 cm (62.99\")     Body mass index is 27.64 kg/m².            Physical Exam  Constitutional:       General: She is not in " acute distress.     Appearance: Normal appearance.   HENT:      Head: Normocephalic.   Cardiovascular:      Rate and Rhythm: Normal rate and regular rhythm.   Pulmonary:      Effort: Pulmonary effort is normal.      Breath sounds: Normal breath sounds.   Musculoskeletal:         General: Normal range of motion.   Neurological:      General: No focal deficit present.      Mental Status: She is alert and oriented to person, place, and time.   Psychiatric:         Mood and Affect: Mood normal.         Behavior: Behavior normal.            Result Review                       Allergies   Allergen Reactions    Tegaderm Ag Mesh [Silver] Itching      Past Medical History:   Diagnosis Date    Asthma     AS CHILD    Breast cancer     COVID-19 vaccine administered      Current Outpatient Medications   Medication Sig Dispense Refill    desvenlafaxine (PRISTIQ) 50 MG 24 hr tablet Take 1 tablet by mouth Daily. 90 tablet 3    lidocaine-prilocaine (EMLA) 2.5-2.5 % cream Apply 1 application  topically to the appropriate area as directed As Needed for Mild Pain. 30 g 1    prochlorperazine (COMPAZINE) 10 MG tablet Take 1 tablet by mouth Every 6 (Six) Hours As Needed for Nausea or Vomiting. 60 tablet 2    promethazine (PHENERGAN) 25 MG tablet Take 1 tablet by mouth Every 6 (Six) Hours As Needed for Nausea or Vomiting. 30 tablet 3    propranolol (INDERAL) 20 MG tablet Take 1 tablet by mouth 2 (Two) Times a Day. 60 tablet 5    rOPINIRole (REQUIP) 0.5 MG tablet Take 1 tablet by mouth Every Night. Take 1 hour before bedtime. 30 tablet 3    KLOR-CON 20 MEQ CR tablet TAKE 1 TABLET BY MOUTH DAILY 14 tablet 2     No current facility-administered medications for this visit.     Past Surgical History:   Procedure Laterality Date     SECTION      SKIN BIOPSY      TUBAL ABDOMINAL LIGATION        Health Maintenance Due   Topic Date Due    HEPATITIS C SCREENING  Never done      Immunization History   Administered Date(s) Administered     COVID-19 (PFIZER) Purple Cap Monovalent 05/19/2021, 06/10/2021    DTaP, Unspecified 04/16/1999    HPV Quadrivalent 06/08/2009, 08/11/2009, 12/15/2009    Hepatitis A 06/18/2018    MMR 04/16/1999    Meningococcal, Unspecified 02/24/2012    OPV 04/16/1999    Td (TDVAX) 06/26/2006    Tdap 07/09/2014    Varicella 04/16/1999         Part of this note may be an electronic transcription/translation of spoken language to printed   text using the Dragon Dictation System.      Sakina Alvarez, APRN

## 2023-11-09 ENCOUNTER — TELEPHONE (OUTPATIENT)
Dept: ONCOLOGY | Facility: HOSPITAL | Age: 28
End: 2023-11-09
Payer: COMMERCIAL

## 2023-11-09 NOTE — TELEPHONE ENCOUNTER
Caller: Marilee Watts    Relationship to patient: Self    Best call back number: [phone]    Chief complaint: PT CALLED WANTED TO RESCHEDULE THE APPOINTMENT SHE MISSED AND WANTED TO SEE IF SHE CAN COME SOMETIME THIS MORNING OR TODAY    Type of visit: FOLLOW UP    Requested date: 11-9-23     If rescheduling, when is the original appointment: 11-8-23     Additional notes:PLEASE CALL PATIENT TO RESCHEDULE, HUB UNABLE TO RESCHEDULE FOR APRN

## 2023-11-10 ENCOUNTER — OFFICE VISIT (OUTPATIENT)
Dept: ONCOLOGY | Facility: HOSPITAL | Age: 28
End: 2023-11-10
Payer: COMMERCIAL

## 2023-11-10 VITALS
TEMPERATURE: 98 F | WEIGHT: 156.31 LBS | BODY MASS INDEX: 27.7 KG/M2 | HEIGHT: 63 IN | OXYGEN SATURATION: 100 % | SYSTOLIC BLOOD PRESSURE: 103 MMHG | DIASTOLIC BLOOD PRESSURE: 67 MMHG | HEART RATE: 74 BPM | RESPIRATION RATE: 16 BRPM

## 2023-11-10 DIAGNOSIS — H02.849 EYELID GLAND SWELLING, UNSPECIFIED LATERALITY: ICD-10-CM

## 2023-11-10 DIAGNOSIS — J31.0 OTHER RHINITIS: ICD-10-CM

## 2023-11-10 DIAGNOSIS — R21 RASH AND NONSPECIFIC SKIN ERUPTION: ICD-10-CM

## 2023-11-10 DIAGNOSIS — C50.919 METAPLASTIC CARCINOMA OF BREAST: Primary | ICD-10-CM

## 2023-11-10 PROCEDURE — G0463 HOSPITAL OUTPT CLINIC VISIT: HCPCS | Performed by: NURSE PRACTITIONER

## 2023-11-10 NOTE — PROGRESS NOTES
Chief Complaint/Reason for Referral:  Appointment (Rash and nausea)    Sakina Alvarez APRN Mouser, Martina, APRN    Subjective    History of Present Illness    Ms. Marilee Watts presents for acute care visit for rash, swelling around the eyelid margins. Received cycle 2 Cyclophosphamide / Doxorubicin / Pembrolizumab on 10/27/2023. Reports she has been having mild eyelid swelling for at least a week after treatment. Left eye is worse than the right. Mildly puffy today. Reports improves after she's up and moving around. Reports runny nose since receiving current treatment. Has noticed a very mild macular papular rash to the face, outer arms and inner thighs. Nausea is about the same. Only takes the Olanzapine for a couple of nights after treatment due to increased sedation. Phenergan not really helping. Not taking the Decadron, but reports she had another med in her bag when she picked up last script.         Cancer Staging   Metaplastic carcinoma of breast  Staging form: Breast, AJCC 8th Edition  - Clinical: Stage IIIC (cT4d, cN2, cM0, G3, ER-, UT-, HER2-) - Signed by Ramin Shine MD on 7/13/2023       Treatment intent: curative    Oncology/Hematology History Overview Note   2012: Lumpectomy of fibroadenoma of right breast    3/2023: Started to have pain and swelling of known area of fibroadenoma of right breast. Started after trauma to breast.     5/26/23: Right breast lumpectomy: Invasive metaplastic carcinoma, high-grade, 4.5 x 4.0 x 3.3 cm with interspersed fibroadenoma, negative margins. ER 0%, UT 0%, HER2 0 by IHC. PDL1 CPS of 50.     6/21/23: MRI breast: Diffuse edema, hypervascularity, and skin thickening of the right breast raising the possibility of inflammatory breast cancer. 2. 4.7 cm seroma in the lateral breast in area of known recent malignant lumpectomy. 3. Metastatic right axillary adenopathy. 4. No evidence of contralateral left breast malignancy. BI-RADS Category 6, known biopsy-proven  malignancy.     7/7/23: Right axillary LN biopsy: metastatic adenocarcinoma, consistent with metaplastic breast carcinoma.     7/11/23: CT Chest, abdomen, pelvis: No definite metastatic disease. 1. Chest:  Pathologically enlarged right axillary lymphadenopathy.  Masslike opacity right upper outer breast with postsurgical changes.  Skin thickening right breast.  Correlate with tissue diagnosis and breast MRI.Small semi solid nodules in the right lung.  These are nonspecific.  A follow-up CT chest in 3 months time is recommended.  Abdomen pelvis:  No findings of metastatic disease to the abdomen or pelvis.     7/12/23: NM bone scan: negative for osseous mets    7/14/23: C1D1 Carboplatin/Paclitaxel/Pembrolizumab  8/4/23: C2D1 Carboplatin/Paclitaxel/Pembrolizumab. C2D15 held due to neutropenia  8/25/23: C3D1 Carboplatin/Paclitaxel/Pembrolizumab. All doses given  9/15/23: C4D1 Carboplatin/Paclitaxel/Pembrolizumab. C4D15 held due to neutropenia  10/6/23: C1D1 Doxorubicin/Cyclophosphamide/Pembrolizumab     Metaplastic carcinoma of breast   6/16/2023 Initial Diagnosis    Metaplastic carcinoma of breast     6/16/2023 - 6/16/2023 Chemotherapy    OP BREAST AC DD DOXOrubicin / Cyclophosphamide     7/13/2023 Cancer Staged    Staging form: Breast, AJCC 8th Edition  - Clinical: Stage IIIC (cT4d, cN2, cM0, G3, ER-, OK-, HER2-) - Signed by Ramin Shine MD on 7/13/2023 7/14/2023 - 9/22/2023 Chemotherapy    OP BREAST Pembrolizumab 400 mg / PACLitaxel / CARBOplatin AUC=5     8/12/2023 - 8/12/2023 Chemotherapy    OP BREAST PACLitaxel Adjuvant (Weekly X 12)     10/6/2023 -  Chemotherapy    OP BREAST Pembrolizumab 200 mg / DOXOrubicin / Cyclophosphamide      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 Negative for appetite change, diaphoresis, fever, unexpected weight gain and unexpected weight  loss.   HENT:  Negative for hearing loss, mouth sores, sore throat, swollen glands, trouble swallowing and voice change.    Eyes:  Positive for itching (Eye lid swelling and mild itching). Negative for blurred vision.   Respiratory:  Negative for cough, shortness of breath and wheezing.    Cardiovascular:  Negative for chest pain and palpitations.   Gastrointestinal:  Positive for nausea. Negative for abdominal pain, blood in stool, constipation, diarrhea and vomiting.   Endocrine: Negative for cold intolerance and heat intolerance.   Genitourinary:  Negative for difficulty urinating, dysuria, frequency, hematuria and urinary incontinence.   Musculoskeletal:  Negative for arthralgias, back pain and myalgias.   Skin:  Positive for rash. Negative for skin lesions and wound.   Neurological:  Negative for dizziness, seizures, weakness, numbness and headache.   Hematological:  Does not bruise/bleed easily.   Psychiatric/Behavioral:  Negative for depressed mood. The patient is not nervous/anxious.    All other systems reviewed and are negative.      Current Outpatient Medications on File Prior to Visit   Medication Sig Dispense Refill    desvenlafaxine (PRISTIQ) 50 MG 24 hr tablet Take 1 tablet by mouth Daily. 90 tablet 3    KLOR-CON 20 MEQ CR tablet TAKE 1 TABLET BY MOUTH DAILY 14 tablet 2    lidocaine-prilocaine (EMLA) 2.5-2.5 % cream Apply 1 application  topically to the appropriate area as directed As Needed for Mild Pain. 30 g 1    prochlorperazine (COMPAZINE) 10 MG tablet Take 1 tablet by mouth Every 6 (Six) Hours As Needed for Nausea or Vomiting. 60 tablet 2    promethazine (PHENERGAN) 25 MG tablet Take 1 tablet by mouth Every 6 (Six) Hours As Needed for Nausea or Vomiting. 30 tablet 3    propranolol (INDERAL) 20 MG tablet Take 1 tablet by mouth 2 (Two) Times a Day. 60 tablet 5    rOPINIRole (REQUIP) 0.5 MG tablet Take 1 tablet by mouth Every Night. Take 1 hour before bedtime. 30 tablet 3     No current  facility-administered medications on file prior to visit.       Allergies   Allergen Reactions    Tegaderm Ag Mesh [Silver] Itching     Past Medical History:   Diagnosis Date    Asthma     AS CHILD    Breast cancer     COVID-19 vaccine administered      Past Surgical History:   Procedure Laterality Date     SECTION      SKIN BIOPSY      TUBAL ABDOMINAL LIGATION       Social History     Socioeconomic History    Marital status: Single    Number of children: 2   Tobacco Use    Smoking status: Never     Passive exposure: Never    Smokeless tobacco: Never   Vaping Use    Vaping Use: Never used   Substance and Sexual Activity    Alcohol use: Never    Drug use: Never    Sexual activity: Defer     Family History   Problem Relation Age of Onset    COPD Mother     Hypertension Father     Hyperlipidemia Father     Diabetes Father     Skin cancer Paternal Grandmother      Immunization History   Administered Date(s) Administered    COVID-19 (PFIZER) Purple Cap Monovalent 2021, 06/10/2021    DTaP, Unspecified 1999    HPV Quadrivalent 2009, 2009, 12/15/2009    Hepatitis A 2018    MMR 1999    Meningococcal, Unspecified 2012    OPV 1999    Td (TDVAX) 2006    Tdap 2014    Varicella 1999       Tobacco Use: Low Risk  (11/10/2023)    Patient History     Smoking Tobacco Use: Never     Smokeless Tobacco Use: Never     Passive Exposure: Never       Objective     Physical Exam  Vitals and nursing note reviewed.   Constitutional:       Appearance: Normal appearance. She is normal weight.   HENT:      Head: Normocephalic.      Nose: Nose normal.      Mouth/Throat:      Mouth: Mucous membranes are moist.   Eyes:      Pupils: Pupils are equal, round, and reactive to light.   Cardiovascular:      Rate and Rhythm: Normal rate and regular rhythm.      Pulses: Normal pulses.      Heart sounds: Normal heart sounds.   Pulmonary:      Effort: Pulmonary effort is normal.       "Breath sounds: Normal breath sounds.   Abdominal:      General: Bowel sounds are normal. There is no distension.      Palpations: Abdomen is soft.   Musculoskeletal:         General: Normal range of motion.      Cervical back: Normal range of motion and neck supple.   Skin:     General: Skin is warm and dry.      Capillary Refill: Capillary refill takes less than 2 seconds.      Findings: Rash present.   Neurological:      General: No focal deficit present.      Mental Status: She is alert and oriented to person, place, and time.   Psychiatric:         Mood and Affect: Mood normal.         Behavior: Behavior normal.         Thought Content: Thought content normal.         Judgment: Judgment normal.         Vitals:    11/10/23 0949   BP: 103/67   Pulse: 74   Resp: 16   Temp: 98 °F (36.7 °C)   SpO2: 100%   Weight: 70.9 kg (156 lb 4.9 oz)   Height: 160 cm (62.99\")   PainSc: 0-No pain       Wt Readings from Last 3 Encounters:   11/10/23 70.9 kg (156 lb 4.9 oz)   11/03/23 70.8 kg (156 lb)   10/27/23 72.5 kg (159 lb 13.3 oz)                 ECOG score: 0         ECOG: (0) Fully Active - Able to Carry On All Pre-disease Performance Without Restriction  Fall Risk Assessment was completed, and patient is at low risk for falls.  PHQ-9 Total Score: 0       The patient is  experiencing fatigue. Fatigue score: 3    PT/OT Functional Screening: PT fx screen : No needs identified  Speech Functional Screening: Speech fx screen : No needs identified  Rehab to be ordered: Rehab to be ordered : No needs identified        Result Review :  The following data was reviewed by: MARIO Gómez on 11/10/2023:  Lab Results   Component Value Date    HGB 10.2 (L) 10/26/2023    HCT 31.4 (L) 10/26/2023    MCV 92.1 10/26/2023     10/26/2023    WBC 3.91 10/26/2023    NEUTROABS 1.59 (L) 10/26/2023    LYMPHSABS 1.57 10/26/2023    MONOSABS 0.46 10/26/2023    EOSABS 0.00 10/26/2023    BASOSABS 0.01 10/26/2023     Lab Results " "  Component Value Date    GLUCOSE 96 10/26/2023    BUN 10 10/26/2023    CREATININE 0.52 (L) 10/26/2023     10/26/2023    K 3.7 10/26/2023     10/26/2023    CO2 28.9 10/26/2023    CALCIUM 9.2 10/26/2023    PROTEINTOT 6.8 10/26/2023    ALBUMIN 4.2 10/26/2023    BILITOT 0.2 10/26/2023    ALKPHOS 89 10/26/2023    AST 24 10/26/2023    ALT 25 10/26/2023        No results found for: \"IRON\", \"LABIRON\", \"TRANSFERRIN\", \"TIBC\"  No results found for: \"LDH\", \"FERRITIN\", \"[id number]\", \"FOLATE\"  No results found for: \"PSA\", \"CEA\", \"AFP\", \"\", \"\"    CT Chest With Contrast Diagnostic    Result Date: 10/17/2023    1. Near complete interval resolution of the semi-solid nodules in the right lung.  There is a 3 mm residual area of ground-glass density in the right upper lobe which is felt to represent an improving nonspecific infectious/inflammatory process. 2. No evidence of metastatic disease. 3. Post treatment changes in the right breast. 4. Hepatic steatosis.     MIRELLA DELACRUZ MD       Electronically Signed and Approved By: MIRELLA DELACRUZ MD on 10/17/2023 at 15:49             US DOPPLER VENOUS ARM RIGHT    Result Date: 8/23/2023  No evidence of thrombosis of the venous system of the upper extremity . Images reviewed, interpreted, and dictated by ELISABETH Davis MD     Assessment and Plan:  Diagnoses and all orders for this visit:    1. Metaplastic carcinoma of breast [C50.919] (Primary)    2. Rash and nonspecific skin eruption    3. Eyelid gland swelling, unspecified laterality    4. Other rhinitis      Due for next cycle of Doxorubicin / Cytoxan and Keytruda next Friday. Will have labs decoupled on 11/16/23.     Mild macular papular rash to bilateral arms, inner thighs and face. Also with mild eye lid swelling. Reports feels like she has a film over eyes with this treatment. Rash may be secondary to Doxorubicin or possibly the Keytruda. Suggested to try Zyrtec at bedtime for the rash. Can also use Benadryl PRN " for itching. Suggested rewetting drops PRN for the eyes.     Zyrtec may help her runny nose as well.     Suggested to try Omeprazole 1 tab QD with nausea meds. Using Tums PRN as well.     Call with any questions or concerns.         Patient Follow Up: follow up with Dr. Shine as scheduled prior to next cycle.     Patient was given instructions and counseling regarding her condition or for health maintenance advice. Please see specific information pulled into the AVS if appropriate.     Koki Joseph, APRN    11/10/2023    .tob

## 2023-11-16 ENCOUNTER — OFFICE VISIT (OUTPATIENT)
Dept: ONCOLOGY | Facility: HOSPITAL | Age: 28
End: 2023-11-16
Payer: COMMERCIAL

## 2023-11-16 ENCOUNTER — LAB (OUTPATIENT)
Dept: ONCOLOGY | Facility: HOSPITAL | Age: 28
End: 2023-11-16
Payer: COMMERCIAL

## 2023-11-16 VITALS
HEART RATE: 76 BPM | TEMPERATURE: 97.4 F | SYSTOLIC BLOOD PRESSURE: 105 MMHG | DIASTOLIC BLOOD PRESSURE: 69 MMHG | OXYGEN SATURATION: 100 % | BODY MASS INDEX: 27.66 KG/M2 | WEIGHT: 156.09 LBS | RESPIRATION RATE: 18 BRPM

## 2023-11-16 DIAGNOSIS — R11.0 CHEMOTHERAPY-INDUCED NAUSEA: ICD-10-CM

## 2023-11-16 DIAGNOSIS — C50.919 METAPLASTIC CARCINOMA OF BREAST: ICD-10-CM

## 2023-11-16 DIAGNOSIS — T45.1X5A CHEMOTHERAPY-INDUCED NAUSEA: ICD-10-CM

## 2023-11-16 DIAGNOSIS — C50.919 METAPLASTIC CARCINOMA OF BREAST: Primary | ICD-10-CM

## 2023-11-16 DIAGNOSIS — R21 RASH AND NONSPECIFIC SKIN ERUPTION: Primary | ICD-10-CM

## 2023-11-16 LAB
ALBUMIN SERPL-MCNC: 4.2 G/DL (ref 3.5–5.2)
ALBUMIN/GLOB SERPL: 1.7 G/DL
ALP SERPL-CCNC: 88 U/L (ref 39–117)
ALT SERPL W P-5'-P-CCNC: 33 U/L (ref 1–33)
ANION GAP SERPL CALCULATED.3IONS-SCNC: 10.3 MMOL/L (ref 5–15)
AST SERPL-CCNC: 25 U/L (ref 1–32)
BASOPHILS # BLD AUTO: 0.01 10*3/MM3 (ref 0–0.2)
BASOPHILS NFR BLD AUTO: 0.4 % (ref 0–1.5)
BILIRUB SERPL-MCNC: 0.2 MG/DL (ref 0–1.2)
BUN SERPL-MCNC: 12 MG/DL (ref 6–20)
BUN/CREAT SERPL: 19.4 (ref 7–25)
CALCIUM SPEC-SCNC: 10.5 MG/DL (ref 8.6–10.5)
CHLORIDE SERPL-SCNC: 105 MMOL/L (ref 98–107)
CO2 SERPL-SCNC: 24.7 MMOL/L (ref 22–29)
CREAT SERPL-MCNC: 0.62 MG/DL (ref 0.57–1)
DEPRECATED RDW RBC AUTO: 56.5 FL (ref 37–54)
EGFRCR SERPLBLD CKD-EPI 2021: 123.8 ML/MIN/1.73
EOSINOPHIL # BLD AUTO: 0 10*3/MM3 (ref 0–0.4)
EOSINOPHIL NFR BLD AUTO: 0 % (ref 0.3–6.2)
ERYTHROCYTE [DISTWIDTH] IN BLOOD BY AUTOMATED COUNT: 16.4 % (ref 12.3–15.4)
GLOBULIN UR ELPH-MCNC: 2.5 GM/DL
GLUCOSE SERPL-MCNC: 75 MG/DL (ref 65–99)
HCT VFR BLD AUTO: 31.9 % (ref 34–46.6)
HGB BLD-MCNC: 10.4 G/DL (ref 12–15.9)
IMM GRANULOCYTES # BLD AUTO: 0.03 10*3/MM3 (ref 0–0.05)
IMM GRANULOCYTES NFR BLD AUTO: 1.2 % (ref 0–0.5)
LYMPHOCYTES # BLD AUTO: 1.08 10*3/MM3 (ref 0.7–3.1)
LYMPHOCYTES NFR BLD AUTO: 43.2 % (ref 19.6–45.3)
MCH RBC QN AUTO: 29.9 PG (ref 26.6–33)
MCHC RBC AUTO-ENTMCNC: 32.6 G/DL (ref 31.5–35.7)
MCV RBC AUTO: 91.7 FL (ref 79–97)
MONOCYTES # BLD AUTO: 0.78 10*3/MM3 (ref 0.1–0.9)
MONOCYTES NFR BLD AUTO: 31.2 % (ref 5–12)
NEUTROPHILS NFR BLD AUTO: 0.6 10*3/MM3 (ref 1.7–7)
NEUTROPHILS NFR BLD AUTO: 24 % (ref 42.7–76)
PLATELET # BLD AUTO: 223 10*3/MM3 (ref 140–450)
PMV BLD AUTO: 9.9 FL (ref 6–12)
POTASSIUM SERPL-SCNC: 3.6 MMOL/L (ref 3.5–5.2)
PROT SERPL-MCNC: 6.7 G/DL (ref 6–8.5)
RBC # BLD AUTO: 3.48 10*6/MM3 (ref 3.77–5.28)
SODIUM SERPL-SCNC: 140 MMOL/L (ref 136–145)
T4 FREE SERPL-MCNC: 0.96 NG/DL (ref 0.93–1.7)
TSH SERPL DL<=0.05 MIU/L-ACNC: 0.97 UIU/ML (ref 0.27–4.2)
WBC NRBC COR # BLD: 2.5 10*3/MM3 (ref 3.4–10.8)

## 2023-11-16 PROCEDURE — 85025 COMPLETE CBC W/AUTO DIFF WBC: CPT

## 2023-11-16 PROCEDURE — 84439 ASSAY OF FREE THYROXINE: CPT

## 2023-11-16 PROCEDURE — 84443 ASSAY THYROID STIM HORMONE: CPT

## 2023-11-16 PROCEDURE — 36415 COLL VENOUS BLD VENIPUNCTURE: CPT

## 2023-11-16 PROCEDURE — G0463 HOSPITAL OUTPT CLINIC VISIT: HCPCS | Performed by: INTERNAL MEDICINE

## 2023-11-16 PROCEDURE — 80053 COMPREHEN METABOLIC PANEL: CPT

## 2023-11-16 RX ORDER — PROMETHAZINE HCL 50 MG
50 TABLET ORAL EVERY 6 HOURS PRN
Qty: 30 TABLET | Refills: 0 | Status: SHIPPED | OUTPATIENT
Start: 2023-11-16

## 2023-11-16 RX ORDER — TRIAMCINOLONE ACETONIDE 1 MG/G
1 OINTMENT TOPICAL 2 TIMES DAILY
Qty: 30 G | Refills: 1 | Status: SHIPPED | OUTPATIENT
Start: 2023-11-16

## 2023-11-16 RX ORDER — DEXAMETHASONE 2 MG/1
2 TABLET ORAL 2 TIMES DAILY WITH MEALS
Qty: 6 TABLET | Refills: 0 | Status: SHIPPED | OUTPATIENT
Start: 2023-11-16

## 2023-11-16 NOTE — PROGRESS NOTES
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 MRI with concern right axilla LN involvement. Right axilla LN biopsied on 7/7 consistent with metastatic breast carcinoma. Due for C7 chemoimmunotherapy tomorrow with second dose of Doxorubicin and Cyclophosphamide. Still has significant nausea without vomiting. Nothing seems to help. Phenergan does not help. She may not have taken the dexamethasone. She still gets the swelling around the eyes after chemo. Has some tooth pain in area of recent filling. Rash present with most recently cycle on face and legs. Ceterizine with no relief. Film over eyes still occurring but random and manageable.  She is hoping this will be last cycle. Chemo to be delayed till Monday due to ANC of 0.6.     Oncology/Hematology History Overview Note   2012: Lumpectomy of fibroadenoma of right breast    3/2023: Started to have pain and swelling of known area of fibroadenoma of right breast. Started after trauma to breast.     5/26/23: Right breast lumpectomy: Invasive metaplastic carcinoma, high-grade, 4.5 x 4.0 x 3.3 cm with interspersed fibroadenoma, negative margins. ER 0%, NC 0%, HER2 0 by IHC. PDL1 CPS of 50.     6/21/23: MRI breast: Diffuse edema, hypervascularity, and skin thickening of the right breast raising the possibility of inflammatory breast cancer. 2. 4.7 cm seroma in the lateral breast in area of known recent malignant lumpectomy. 3. Metastatic right axillary adenopathy. 4. No evidence of contralateral left breast malignancy. BI-RADS Category 6, known biopsy-proven malignancy.      7/7/23: Right axillary LN biopsy: metastatic adenocarcinoma, consistent with metaplastic breast carcinoma.     7/11/23: CT Chest, abdomen, pelvis: No definite metastatic disease. 1. Chest:  Pathologically enlarged right axillary lymphadenopathy.  Masslike opacity right upper outer breast with postsurgical changes.  Skin thickening right breast.  Correlate with tissue diagnosis and breast MRI.Small semi solid nodules in the right lung.  These are nonspecific.  A follow-up CT chest in 3 months time is recommended.  Abdomen pelvis:  No findings of metastatic disease to the abdomen or pelvis.     7/12/23: NM bone scan: negative for osseous mets    7/14/23: C1D1 Carboplatin/Paclitaxel/Pembrolizumab  8/4/23: C2D1 Carboplatin/Paclitaxel/Pembrolizumab. C2D15 held due to neutropenia  8/25/23: C3D1 Carboplatin/Paclitaxel/Pembrolizumab. All doses given  9/15/23: C4D1 Carboplatin/Paclitaxel/Pembrolizumab. C4D15 held due to neutropenia  10/6/23: C1D1 Doxorubicin/Cyclophosphamide/Pembrolizumab  10/27/23: C2D1 Doxorubicin/Cyclophosphamide/Pembrolizumab  11/20/23: C3D1 Doxorubicin/Cyclophosphamide/Pembrolizumab. Delayed due to neutropenia. Last cycle of chemo prior to surgery     Metaplastic carcinoma of breast   6/16/2023 Initial Diagnosis    Metaplastic carcinoma of breast     6/16/2023 - 6/16/2023 Chemotherapy    OP BREAST AC DD DOXOrubicin / Cyclophosphamide     7/13/2023 Cancer Staged    Staging form: Breast, AJCC 8th Edition  - Clinical: Stage IIIC (cT4d, cN2, cM0, G3, ER-, TX-, HER2-) - Signed by Ramin Shine MD on 7/13/2023 7/14/2023 - 9/22/2023 Chemotherapy    OP BREAST Pembrolizumab 400 mg / PACLitaxel / CARBOplatin AUC=5     8/12/2023 - 8/12/2023 Chemotherapy    OP BREAST PACLitaxel Adjuvant (Weekly X 12)     10/6/2023 -  Chemotherapy    OP BREAST Pembrolizumab 200 mg / DOXOrubicin / Cyclophosphamide      Triple negative breast cancer (Resolved)   6/28/2023 Initial Diagnosis    Triple negative breast  cancer           Review of Systems   Constitutional:  Positive for diaphoresis (PT having hot flashed after treatment) and fatigue.   Eyes:  Positive for redness (Eyes swelling after last treatment).   Respiratory:  Positive for cough.    Gastrointestinal:  Positive for nausea.   Genitourinary:  Negative for breast pain.   Skin:  Positive for rash (Pt had a rash after last chemo treatment).   Neurological:  Positive for headache (Getting better).   Psychiatric/Behavioral:  Positive for sleep disturbance. The patient is nervous/anxious (worsening).    All other systems reviewed and are negative.    Current Outpatient Medications on File Prior to Visit   Medication Sig Dispense Refill    desvenlafaxine (PRISTIQ) 50 MG 24 hr tablet Take 1 tablet by mouth Daily. 90 tablet 3    KLOR-CON 20 MEQ CR tablet TAKE 1 TABLET BY MOUTH DAILY 14 tablet 2    lidocaine-prilocaine (EMLA) 2.5-2.5 % cream Apply 1 application  topically to the appropriate area as directed As Needed for Mild Pain. 30 g 1    prochlorperazine (COMPAZINE) 10 MG tablet Take 1 tablet by mouth Every 6 (Six) Hours As Needed for Nausea or Vomiting. 60 tablet 2    promethazine (PHENERGAN) 25 MG tablet Take 1 tablet by mouth Every 6 (Six) Hours As Needed for Nausea or Vomiting. 30 tablet 3    propranolol (INDERAL) 20 MG tablet Take 1 tablet by mouth 2 (Two) Times a Day. 60 tablet 5    rOPINIRole (REQUIP) 0.5 MG tablet Take 1 tablet by mouth Every Night. Take 1 hour before bedtime. 30 tablet 3     No current facility-administered medications on file prior to visit.       Allergies   Allergen Reactions    Tegaderm Ag Mesh [Silver] Itching     Past Medical History:   Diagnosis Date    Asthma     AS CHILD    Breast cancer     COVID-19 vaccine administered      Past Surgical History:   Procedure Laterality Date     SECTION      SKIN BIOPSY      TUBAL ABDOMINAL LIGATION       Social History     Socioeconomic History    Marital status: Single    Number of  children: 2   Tobacco Use    Smoking status: Never     Passive exposure: Never    Smokeless tobacco: Never   Vaping Use    Vaping Use: Never used   Substance and Sexual Activity    Alcohol use: Never    Drug use: Never    Sexual activity: Defer     Family History   Problem Relation Age of Onset    COPD Mother     Hypertension Father     Hyperlipidemia Father     Diabetes Father     Skin cancer Paternal Grandmother        Objective   Physical Exam  Well appearing patient in no acute distress on RA  Anicteric sclerae, no rash on exposed skin  Respirations non-labored  Awake, alert, and oriented x 4. Speech intact. No gross neurologic deficit  Appropriate mood and affect    Vitals:    11/16/23 0919   BP: 105/69   Pulse: 76   Resp: 18   Temp: 97.4 °F (36.3 °C)   TempSrc: Temporal   SpO2: 100%   Weight: 70.8 kg (156 lb 1.4 oz)   PainSc: 0-No pain                 PHQ-9 Total Score:           Result Review :   The following data was reviewed by: Ramin Shine MD on 11/16/23:  Lab Results   Component Value Date    HGB 10.4 (L) 11/16/2023    HCT 31.9 (L) 11/16/2023    MCV 91.7 11/16/2023     11/16/2023    WBC 2.50 (L) 11/16/2023    NEUTROABS 0.60 (L) 11/16/2023    LYMPHSABS 1.08 11/16/2023    MONOSABS 0.78 11/16/2023    EOSABS 0.00 11/16/2023    BASOSABS 0.01 11/16/2023     Lab Results   Component Value Date    GLUCOSE 75 11/16/2023    BUN 12 11/16/2023    CREATININE 0.62 11/16/2023     11/16/2023    K 3.6 11/16/2023     11/16/2023    CO2 24.7 11/16/2023    CALCIUM 10.5 11/16/2023    PROTEINTOT 6.7 11/16/2023    ALBUMIN 4.2 11/16/2023    BILITOT 0.2 11/16/2023    ALKPHOS 88 11/16/2023    AST 25 11/16/2023    ALT 33 11/16/2023     Lab Results   Component Value Date    FREET4 1.12 10/05/2023    TSH 3.680 10/05/2023     Labs personally reviewed. Mild anemia. ANC 0.6. Normal creatinine and LFTs. Plt normal.         CT Chest With Contrast Diagnostic    Result Date: 10/17/2023    1. Near complete interval  resolution of the semi-solid nodules in the right lung.  There is a 3 mm residual area of ground-glass density in the right upper lobe which is felt to represent an improving nonspecific infectious/inflammatory process. 2. No evidence of metastatic disease. 3. Post treatment changes in the right breast. 4. Hepatic steatosis.     MIRELLA DELACRUZ MD       Electronically Signed and Approved By: MIRELLA DELACRUZ MD on 10/17/2023 at 15:49                  Assessment and Plan    Diagnoses and all orders for this visit:    1. Rash and nonspecific skin eruption (Primary)  -     triamcinolone (KENALOG) 0.1 % ointment; Apply 1 application  topically to the appropriate area as directed 2 (Two) Times a Day.  Dispense: 30 g; Refill: 1    2. Metaplastic carcinoma of breast  -     CBC & Differential; Future  -     MRI Breast Bilateral With Contrast; Future    3. Chemotherapy-induced nausea  -     promethazine (PHENERGAN) 50 MG tablet; Take 1 tablet by mouth Every 6 (Six) Hours As Needed for Nausea or Vomiting.  Dispense: 30 tablet; Refill: 0  -     dexAMETHasone (DECADRON) 2 MG tablet; Take 1 tablet by mouth 2 (Two) Times a Day With Meals.  Dispense: 6 tablet; Refill: 0  -     ONCBCN INFUSION APPOINTMENT REQUEST 01; Future    Other orders  -     sodium chloride 0.9 % bolus 1,000 mL  -     ondansetron (ZOFRAN) 16 mg/50mL NS IVPB        Metaplastic Carcinoma of Right Breast, triple negative  Patient is s/p lumpectomy of 4.5 cm lesion with triple negative metaplastic carcinoma, interspersed with fibroadenoma. Patient has clinical inflammatory breast cancer. MRI breast with concern for inflammatory breast cancer as well as right sided axillary involvement. Left breast without lesions per MRI. Lymph node biopsy 7/7/23 confirmed right axillary involvement by breast carcinoma.  Will proceed with neoadjuvant chemotherapy combined with immunotherapy. This is with IV Carboplatin and Paclitaxel combined with Pembrolizumab every 3 weeks for 4 cycles  followed by Doxorubicin and Cyclophosphamide with Pembro every 3 weeks for 4 additional cycles. Patient will then get surgery: patient is deciding between single or double mastectomy. This will be followed by adjuvant immunotherapy and radiation. Baseline ECHO normal. Baseline CT CAP without any definite metastatic disease. Bone scan negative for osseous mets. C1D1 Carboplatin/Paclitaxel/Pembrolizumab 7/14/23. C4D1 Carboplatin/Paclitaxel/Pembrolizumab 9/15/23.     C7 chemo, C3D1 Doxorubicin, cyclophosphamide, pembrolizumab due 11/17/23, but will delay till 11/20 due to ANC of 0.6. Hold pre-med of olanzapine due to sedation. Compazine oral in place of. This will be last cycle of neoadjuvant chemo due to response so far and accumulation of toxicities.     She has met with Dr. Whiting with general surgery with plans for breast MRI near completion of chemo and then proceeding with mastectomy. MRI ordered.     Plan for follow up after surgery for adjuvant immunotherapy and further care pending results of surgery.    This is an acute or chronic illness that poses a threat to life or bodily function. The above treatment plan involves a high risk of complications and/or mortality of patient management. Continue to monitor for severe toxicities with labs     Neutropenia  Delay until Monday and repeat CBC at that time.     Rash 2/2 chemo  Nighttime zyrtec without much help. Steroid cream sent in.     Chemo induced nausea  G2. Worse with this component of chemotherapy. Compazine to alternate with zofran. D2-4 olanzapine to continue, patient with sleepiness so will not increase dose. Start dex 4 mg BID x 3 days (did not take with last cycle). PPI as well added. IV zofran on D8 with IV fluids    Right breast erythema and firmness  2/2 inflammatory breast cancer. Patient finished course of antibiotics to treat any co-existing cellulitis. Improving with above chemotherapy     Anxiety  Xanax PRN added.     Restless leg  Low dose  ropinirole helps, now up to 0.5 mg.    Pulmonary nodules  Too small for biopsy or characterization by PET. Repeat CT Chest 3 months after (10/17/23) was completed with resolution of semi-solid nodules in right lung, residal 3 mm density in the RUL, likely infectious/inflammatory, unlikely related to disease      I spent 35 minutes caring for Marilee on this date of service. This time includes time spent by me in the following activities:preparing for the visit, reviewing tests, obtaining and/or reviewing a separately obtained history, performing a medically appropriate examination and/or evaluation , counseling and educating the patient/family/caregiver, ordering medications, tests, or procedures, referring and communicating with other health care professionals , documenting information in the medical record, independently interpreting results and communicating that information with the patient/family/caregiver and care coordination    Patient Follow Up: 6 weeks  Patient was given instructions and counseling regarding her condition or for health maintenance advice. Please see specific information pulled into the AVS if appropriate.

## 2023-11-20 ENCOUNTER — PATIENT OUTREACH (OUTPATIENT)
Dept: ONCOLOGY | Facility: HOSPITAL | Age: 28
End: 2023-11-20
Payer: COMMERCIAL

## 2023-11-20 ENCOUNTER — HOSPITAL ENCOUNTER (OUTPATIENT)
Dept: ONCOLOGY | Facility: HOSPITAL | Age: 28
Discharge: HOME | End: 2023-11-20
Payer: COMMERCIAL

## 2023-11-20 ENCOUNTER — HOSPITAL ENCOUNTER (OUTPATIENT)
Dept: ONCOLOGY | Facility: HOSPITAL | Age: 28
Discharge: HOME | End: 2023-11-20
Admitting: INTERNAL MEDICINE
Payer: COMMERCIAL

## 2023-11-20 VITALS
BODY MASS INDEX: 27.89 KG/M2 | SYSTOLIC BLOOD PRESSURE: 111 MMHG | WEIGHT: 157.41 LBS | DIASTOLIC BLOOD PRESSURE: 73 MMHG | HEIGHT: 63 IN | TEMPERATURE: 98.7 F | OXYGEN SATURATION: 100 % | HEART RATE: 75 BPM | RESPIRATION RATE: 16 BRPM

## 2023-11-20 DIAGNOSIS — Z45.2 FITTING AND ADJUSTMENT OF VASCULAR CATHETER: ICD-10-CM

## 2023-11-20 DIAGNOSIS — C50.919 METAPLASTIC CARCINOMA OF BREAST: Primary | ICD-10-CM

## 2023-11-20 LAB
BASOPHILS # BLD AUTO: 0.01 10*3/MM3 (ref 0–0.2)
BASOPHILS NFR BLD AUTO: 0.3 % (ref 0–1.5)
DEPRECATED RDW RBC AUTO: 51.8 FL (ref 37–54)
EOSINOPHIL # BLD AUTO: 0 10*3/MM3 (ref 0–0.4)
EOSINOPHIL NFR BLD AUTO: 0 % (ref 0.3–6.2)
ERYTHROCYTE [DISTWIDTH] IN BLOOD BY AUTOMATED COUNT: 15.8 % (ref 12.3–15.4)
HCT VFR BLD AUTO: 32.7 % (ref 34–46.6)
HGB BLD-MCNC: 10.8 G/DL (ref 12–15.9)
IMM GRANULOCYTES # BLD AUTO: 0.02 10*3/MM3 (ref 0–0.05)
IMM GRANULOCYTES NFR BLD AUTO: 0.5 % (ref 0–0.5)
LYMPHOCYTES # BLD AUTO: 1.28 10*3/MM3 (ref 0.7–3.1)
LYMPHOCYTES NFR BLD AUTO: 33.4 % (ref 19.6–45.3)
MCH RBC QN AUTO: 29.5 PG (ref 26.6–33)
MCHC RBC AUTO-ENTMCNC: 33 G/DL (ref 31.5–35.7)
MCV RBC AUTO: 89.3 FL (ref 79–97)
MONOCYTES # BLD AUTO: 0.53 10*3/MM3 (ref 0.1–0.9)
MONOCYTES NFR BLD AUTO: 13.8 % (ref 5–12)
NEUTROPHILS NFR BLD AUTO: 1.99 10*3/MM3 (ref 1.7–7)
NEUTROPHILS NFR BLD AUTO: 52 % (ref 42.7–76)
PLATELET # BLD AUTO: 184 10*3/MM3 (ref 140–450)
PMV BLD AUTO: 9.8 FL (ref 6–12)
RBC # BLD AUTO: 3.66 10*6/MM3 (ref 3.77–5.28)
WBC NRBC COR # BLD AUTO: 3.83 10*3/MM3 (ref 3.4–10.8)

## 2023-11-20 PROCEDURE — 96417 CHEMO IV INFUS EACH ADDL SEQ: CPT

## 2023-11-20 PROCEDURE — 96367 TX/PROPH/DG ADDL SEQ IV INF: CPT

## 2023-11-20 PROCEDURE — 25010000002 DEXAMETHASONE SODIUM PHOSPHATE 120 MG/30ML SOLUTION: Performed by: INTERNAL MEDICINE

## 2023-11-20 PROCEDURE — 96413 CHEMO IV INFUSION 1 HR: CPT

## 2023-11-20 PROCEDURE — 25010000002 PALONOSETRON PER 25 MCG: Performed by: INTERNAL MEDICINE

## 2023-11-20 PROCEDURE — 25810000003 SODIUM CHLORIDE 0.9 % SOLUTION: Performed by: INTERNAL MEDICINE

## 2023-11-20 PROCEDURE — 25010000002 DOXORUBICIN PER 10 MG: Performed by: INTERNAL MEDICINE

## 2023-11-20 PROCEDURE — 96375 TX/PRO/DX INJ NEW DRUG ADDON: CPT

## 2023-11-20 PROCEDURE — 96411 CHEMO IV PUSH ADDL DRUG: CPT

## 2023-11-20 PROCEDURE — 63710000001 PROCHLORPERAZINE MALEATE PER 5 MG: Performed by: INTERNAL MEDICINE

## 2023-11-20 PROCEDURE — 25010000002 CYCLOPHOSPHAMIDE 1 GM/5ML SOLUTION 5 ML VIAL: Performed by: INTERNAL MEDICINE

## 2023-11-20 PROCEDURE — 25010000002 PEMBROLIZUMAB 100 MG/4ML SOLUTION 4 ML VIAL: Performed by: INTERNAL MEDICINE

## 2023-11-20 PROCEDURE — 85025 COMPLETE CBC W/AUTO DIFF WBC: CPT | Performed by: INTERNAL MEDICINE

## 2023-11-20 PROCEDURE — 25010000002 HEPARIN LOCK FLUSH PER 10 UNITS: Performed by: INTERNAL MEDICINE

## 2023-11-20 PROCEDURE — 25010000002 FOSAPREPITANT PER 1 MG: Performed by: INTERNAL MEDICINE

## 2023-11-20 PROCEDURE — 25810000003 SODIUM CHLORIDE 0.9 % SOLUTION 250 ML FLEX CONT: Performed by: INTERNAL MEDICINE

## 2023-11-20 RX ORDER — SODIUM CHLORIDE 9 MG/ML
250 INJECTION, SOLUTION INTRAVENOUS ONCE
Status: CANCELLED | OUTPATIENT
Start: 2023-11-20

## 2023-11-20 RX ORDER — PROCHLORPERAZINE MALEATE 5 MG/1
5 TABLET ORAL ONCE
Status: COMPLETED | OUTPATIENT
Start: 2023-11-20 | End: 2023-11-20

## 2023-11-20 RX ORDER — DOXORUBICIN HYDROCHLORIDE 2 MG/ML
60 INJECTION, SOLUTION INTRAVENOUS ONCE
Status: COMPLETED | OUTPATIENT
Start: 2023-11-20 | End: 2023-11-20

## 2023-11-20 RX ORDER — PALONOSETRON 0.05 MG/ML
0.25 INJECTION, SOLUTION INTRAVENOUS ONCE
Status: CANCELLED | OUTPATIENT
Start: 2023-11-20

## 2023-11-20 RX ORDER — DOXORUBICIN HYDROCHLORIDE 2 MG/ML
60 INJECTION, SOLUTION INTRAVENOUS ONCE
Status: CANCELLED | OUTPATIENT
Start: 2023-11-20

## 2023-11-20 RX ORDER — HEPARIN SODIUM (PORCINE) LOCK FLUSH IV SOLN 100 UNIT/ML 100 UNIT/ML
500 SOLUTION INTRAVENOUS AS NEEDED
Status: DISCONTINUED | OUTPATIENT
Start: 2023-11-20 | End: 2023-11-21 | Stop reason: HOSPADM

## 2023-11-20 RX ORDER — SODIUM CHLORIDE 9 MG/ML
250 INJECTION, SOLUTION INTRAVENOUS ONCE
Status: COMPLETED | OUTPATIENT
Start: 2023-11-20 | End: 2023-11-20

## 2023-11-20 RX ORDER — SODIUM CHLORIDE 0.9 % (FLUSH) 0.9 %
20 SYRINGE (ML) INJECTION AS NEEDED
Status: DISCONTINUED | OUTPATIENT
Start: 2023-11-20 | End: 2023-11-21 | Stop reason: HOSPADM

## 2023-11-20 RX ORDER — PROCHLORPERAZINE MALEATE 5 MG/1
5 TABLET ORAL ONCE
Status: CANCELLED
Start: 2023-11-20 | End: 2023-11-20

## 2023-11-20 RX ORDER — SODIUM CHLORIDE 0.9 % (FLUSH) 0.9 %
20 SYRINGE (ML) INJECTION AS NEEDED
OUTPATIENT
Start: 2023-11-20

## 2023-11-20 RX ORDER — PALONOSETRON 0.05 MG/ML
0.25 INJECTION, SOLUTION INTRAVENOUS ONCE
Status: COMPLETED | OUTPATIENT
Start: 2023-11-20 | End: 2023-11-20

## 2023-11-20 RX ORDER — HEPARIN SODIUM (PORCINE) LOCK FLUSH IV SOLN 100 UNIT/ML 100 UNIT/ML
500 SOLUTION INTRAVENOUS AS NEEDED
OUTPATIENT
Start: 2023-11-20

## 2023-11-20 RX ADMIN — CYCLOPHOSPHAMIDE 1000 MG: 200 INJECTION, SOLUTION INTRAVENOUS at 12:51

## 2023-11-20 RX ADMIN — HEPARIN SODIUM (PORCINE) LOCK FLUSH IV SOLN 100 UNIT/ML 500 UNITS: 100 SOLUTION at 13:45

## 2023-11-20 RX ADMIN — PALONOSETRON 0.25 MG: 0.05 INJECTION, SOLUTION INTRAVENOUS at 10:33

## 2023-11-20 RX ADMIN — SODIUM CHLORIDE 250 ML: 9 INJECTION, SOLUTION INTRAVENOUS at 10:28

## 2023-11-20 RX ADMIN — Medication 20 ML: at 13:44

## 2023-11-20 RX ADMIN — SODIUM CHLORIDE 200 MG: 9 INJECTION, SOLUTION INTRAVENOUS at 10:55

## 2023-11-20 RX ADMIN — FOSAPREPITANT 100 ML: 150 INJECTION, POWDER, LYOPHILIZED, FOR SOLUTION INTRAVENOUS at 12:00

## 2023-11-20 RX ADMIN — DEXAMETHASONE SODIUM PHOSPHATE 12 MG: 4 INJECTION, SOLUTION INTRA-ARTICULAR; INTRALESIONAL; INTRAMUSCULAR; INTRAVENOUS; SOFT TISSUE at 11:40

## 2023-11-20 RX ADMIN — PROCHLORPERAZINE MALEATE 5 MG: 5 TABLET ORAL at 10:31

## 2023-11-20 RX ADMIN — DOXORUBICIN HYDROCHLORIDE 102 MG: 2 INJECTION, SOLUTION INTRAVENOUS at 12:41

## 2023-11-22 ENCOUNTER — HOSPITAL ENCOUNTER (OUTPATIENT)
Dept: MRI IMAGING | Facility: HOSPITAL | Age: 28
Discharge: HOME | End: 2023-11-22
Admitting: INTERNAL MEDICINE
Payer: COMMERCIAL

## 2023-11-22 DIAGNOSIS — C50.919 METAPLASTIC CARCINOMA OF BREAST: ICD-10-CM

## 2023-11-22 PROCEDURE — 0 GADOBENATE DIMEGLUMINE 529 MG/ML SOLUTION: Performed by: INTERNAL MEDICINE

## 2023-11-22 PROCEDURE — 77049 MRI BREAST C-+ W/CAD BI: CPT

## 2023-11-22 PROCEDURE — A9577 INJ MULTIHANCE: HCPCS | Performed by: INTERNAL MEDICINE

## 2023-11-22 RX ADMIN — GADOBENATE DIMEGLUMINE 15 ML: 529 INJECTION, SOLUTION INTRAVENOUS at 07:56

## 2023-11-27 ENCOUNTER — OFFICE VISIT (OUTPATIENT)
Dept: SURGERY | Facility: CLINIC | Age: 28
End: 2023-11-27
Payer: COMMERCIAL

## 2023-11-27 VITALS — RESPIRATION RATE: 16 BRPM | WEIGHT: 164 LBS | HEIGHT: 63 IN | BODY MASS INDEX: 29.06 KG/M2

## 2023-11-27 DIAGNOSIS — C50.919 METAPLASTIC CARCINOMA OF BREAST: Primary | ICD-10-CM

## 2023-11-27 PROCEDURE — 1160F RVW MEDS BY RX/DR IN RCRD: CPT | Performed by: SURGERY

## 2023-11-27 PROCEDURE — 99213 OFFICE O/P EST LOW 20 MIN: CPT | Performed by: SURGERY

## 2023-11-27 PROCEDURE — 1159F MED LIST DOCD IN RCRD: CPT | Performed by: SURGERY

## 2023-11-27 NOTE — PROGRESS NOTES
Chief Complaint: Follow-up    Subjective         History of Present Illness  Marilee Watts is a 27 y.o. female presents to [facility] to be seen for right breast cancer.   Her pathology and imaging are shown below:    DIAGNOSIS:   RIGHT BREAST, MASS, LUMPECTOMY:   Invasive metaplastic carcinoma, high grade   Maximum dimensions: 4.5 x 4.0 x 3.3 cm   Interspersed fibroadenomata   Margins free of tumor   Estrogen receptor: Negative   Progesterone receptor: Negative   HER2/suzie: Negative   PD-L1 (22C3) Combined Positive Score: 50   See CAP Template     INVASIVE CARCINOMA OF THE BREAST     SPECIMEN:   Procedure: Excision (less than total mastectomy)   Specimen Laterality: Right     TUMOR:   Histologic Type: Metaplastic carcinoma   Ductal Carcinoma In Situ (DCIS): Not identified   Lymphovascular Invasion: Not identified   Treatment Effect in the Breast: No known presurgical therapy     MARGINS:   Margin Status for Invasive Carcinoma:   - All margins negative for invasive carcinoma   Distance from Invasive Carcinoma to Closest Margin: 1 mm, posterior     REGIONAL LYMPH NODES:   Regional Lymph Node Status: Not applicable (no regional lymph nodes   submitted or found)     ADDITIONAL FINDINGS: There are interspersed fibroadenomata     PATHOLOGIC STAGE CLASSIFICATION (pTNM, AJCC 8th Edition): pT2 NX    CLINICAL HISTORY:   Benign neoplasm of right breast     SPECIMENS RECEIVED:   RIGHT BREAST , MASS     MICROSCOPIC DESCRIPTION:   Tissue blocks are prepared and slides are examined microscopically on all   specimens. See diagnosis for details. IMMUNOHISTOCHEMICAL RESULTS   (IHC):   Estrogen Receptor      RESULT: Negative   0%   0+ (INTENSITY SCORE)     Progesterone Receptor         RESULT:  Negative  0%   0+   (INTENSITY SCORE)     Her2/suzie oncoprotein    RESULT: Negative   0%   0+ (INTENSITY   SCORE)       Imaging:  Right axillary adenopathy, likely metastatic disease related to  recently  diagnosed right breast carcinoma developed in prior area of  fibroadenoma.    BI-RADS CATEGORY: 6 , KNOWN BIOPSY PROVEN MALIGNANCY.        RECOMMENDED FOLLOW-UP:  Core biopsy of the right axillary adenopathy.    Narrative    BILATERAL DIAGNOSTIC DIGITAL MAMMOGRAPHY, RIGHT AXILLARY ULTRASOUND,  LEFT AXILLARY/BREAST ULTRASOUND    CLINICAL INDICATION: Triple negative right breast cancer.    TECHNIQUE: Bilateral CC, exaggerated CC and MLO views were obtained with  2-D and 3-D digital acquisitions. The study was read with the assistance  of CAD.      COMPARISON: Previous studies back to March 2022.    FINDINGS:    Heterogeneously dense: The breasts are heterogeneously dense, which may  obscure small masses.    Surgical clips in the lateral right breast from lumpectomy. No mass in  the right breast. There are enlarged lymph nodes in the right axilla.  Ultrasound shows two abnormal lymph nodes in the right axilla, the  largest 2.9 x 1.7 cm with a markedly thickened cortex up to 1.4 cm. The  other adjacent node measures 2.5 x 1.3 cm with a cortical thickness of 5  mm. Findings are consistent with metastatic adenopathy.    Complete left breast ultrasound is normal. Left axillary ultrasound is  normal.      She is here today to review surgical options.     Her most recent MRI is shown below:    Narrative & Impression   PROCEDURE:  MRI BREAST BILATERAL W WO CONTRAST     COMPARISON: Other, MG, MAMMO DIAGNOSTIC DIGITAL TOMOSYNTHESIS BILATERAL W CAD, 6/20/2023, 13:20.    Other, US, US BREAST BILATERAL COMPLETE, 6/20/2023, 13:43.  [facility], CT, CT CHEST   W CONTRAST DIAGNOSTIC, 10/17/2023, 14:20.  Other, MR, MRI BREAST BILATERAL W WO CONTRAST,   6/20/2023, 14:20.     INDICATIONS:  Staging scan. History right breast triple negative ca, after completion of   paty-adjuvant chemo     CONTRAST:      15ML  Multihance I.V.     TECHNIQUE:    Breast MRI was performed using dynamic intravenous gadolinium infusion,  thin sections,   and a dedicated breast coil.  Contrast enhancement analysis was assisted by computer-aided   detection.       AMOUNT OF FIBROGLANDULAR TISSUE:        Heterogeneous fibroglandular tissue         BACKGROUND PARENCHYMAL ENHANCEMENT:       Mild symmetric     FINDINGS:  A satisfactory contrast bolus is evident.     Post lumpectomy changes are evident on the right.  No suspicious mass or non mass enhancement is   seen in either breast.  No abnormality of the nipple or chest wall is evident.     Right axillary adenopathy is improved.  The largest lymph node in the right axilla measures 1.9 cm   by 1 cm.  The largest lymph node seen on 2023 measured 2.6 cm x 1.5 cm, and was not completely   included.     The largest lymph node in the left axilla measures 1.2 cm x 0.8 cm.  No internal mammary adenopathy   is evident.       CONTINUED ON NEXT PAGE...     No marrow signal abnormality is evident.     IMPRESSION:  Bilateral breast MR demonstrating post lumpectomy changes on the right.     Right axillary adenopathy is improved.     No MR findings of malignancy, left breast.     BIRADS:  DIAGNOSTIC CATEGORY 6--KNOWN MALIGNANCY RIGHT BREAST       RECOMMENDATION(S):  CLINICAL EVALUATION.                      PLEASE NOTE:  A NORMAL MRI DOES NOT EXCLUDE THE POSSIBILITY OF BREAST CANCER.  A CLINICALLY   SUSPICIOUS PALPABLE LUMP SHOULD BE BIOPSIED.           Objective     Past Medical History:   Diagnosis Date    Asthma     AS CHILD    Breast cancer     COVID-19 vaccine administered        Past Surgical History:   Procedure Laterality Date     SECTION      SKIN BIOPSY      TUBAL ABDOMINAL LIGATION           Current Outpatient Medications:     desvenlafaxine (PRISTIQ) 50 MG 24 hr tablet, Take 1 tablet by mouth Daily., Disp: 90 tablet, Rfl: 3    dexAMETHasone (DECADRON) 2 MG tablet, Take 1 tablet by mouth 2 (Two) Times a Day With Meals., Disp: 6 tablet, Rfl: 0    KLOR-CON 20 MEQ CR tablet, TAKE 1 TABLET BY  "MOUTH DAILY, Disp: 14 tablet, Rfl: 2    lidocaine-prilocaine (EMLA) 2.5-2.5 % cream, Apply 1 application  topically to the appropriate area as directed As Needed for Mild Pain., Disp: 30 g, Rfl: 1    prochlorperazine (COMPAZINE) 10 MG tablet, Take 1 tablet by mouth Every 6 (Six) Hours As Needed for Nausea or Vomiting., Disp: 60 tablet, Rfl: 2    promethazine (PHENERGAN) 50 MG tablet, Take 1 tablet by mouth Every 6 (Six) Hours As Needed for Nausea or Vomiting., Disp: 30 tablet, Rfl: 0    propranolol (INDERAL) 20 MG tablet, Take 1 tablet by mouth 2 (Two) Times a Day., Disp: 60 tablet, Rfl: 5    rOPINIRole (REQUIP) 0.5 MG tablet, Take 1 tablet by mouth Every Night. Take 1 hour before bedtime., Disp: 30 tablet, Rfl: 3    triamcinolone (KENALOG) 0.1 % ointment, Apply 1 application  topically to the appropriate area as directed 2 (Two) Times a Day., Disp: 30 g, Rfl: 1    Allergies   Allergen Reactions    Tegaderm Ag Mesh [Silver] Itching        Family History   Problem Relation Age of Onset    COPD Mother     Hypertension Father     Hyperlipidemia Father     Diabetes Father     Skin cancer Paternal Grandmother        Social History     Socioeconomic History    Marital status: Single    Number of children: 2   Tobacco Use    Smoking status: Never     Passive exposure: Never    Smokeless tobacco: Never   Vaping Use    Vaping Use: Never used   Substance and Sexual Activity    Alcohol use: Never    Drug use: Never    Sexual activity: Defer       Vital Signs:   Resp 16   Ht 160 cm (62.99\")   Wt 74.4 kg (164 lb)   BMI 29.06 kg/m²    Review of Systems    Physical Exam  Vitals and nursing note reviewed.   Constitutional:       Appearance: Normal appearance.   HENT:      Head: Normocephalic and atraumatic.   Eyes:      Extraocular Movements: Extraocular movements intact.      Pupils: Pupils are equal, round, and reactive to light.   Cardiovascular:      Pulses: Normal pulses.   Pulmonary:      Effort: Pulmonary effort is " normal. No accessory muscle usage or respiratory distress.   Chest:   Breasts:     Right: Normal. Mass present. No inverted nipple, nipple discharge or skin change.      Left: Normal. No inverted nipple, nipple discharge or skin change.   Abdominal:      General: Abdomen is flat.      Palpations: Abdomen is soft.      Tenderness: There is no abdominal tenderness. There is no guarding.   Musculoskeletal:         General: No swelling, tenderness or deformity.      Cervical back: Neck supple.   Lymphadenopathy:      Upper Body:      Right upper body: Axillary adenopathy present. No supraclavicular adenopathy.      Left upper body: No supraclavicular or axillary adenopathy.   Skin:     General: Skin is warm and dry.   Neurological:      General: No focal deficit present.      Mental Status: She is alert and oriented to person, place, and time.   Psychiatric:         Mood and Affect: Mood normal.         Thought Content: Thought content normal.           Assessment and Plan    Diagnoses and all orders for this visit:    1. Metaplastic carcinoma of breast (Primary)  -     NM [place] Node Injection Only; Future  -     Case Request; Standing  -     Follow Anesthesia Guidelines / Protocol; Standing  -     Place sequential compression device- to be placed on patient in Pre-op; Standing  -     Verify / Perform Chlorhexidine Skin Prep; Standing  -     Mammo Breast Placement Device Initial Without Biopsy Right; Future  -     No Lab Testing Needed; Standing  -     Obtain Informed Consent; Standing  -     ceFAZolin (ANCEF) 2,000 mg in sodium chloride 0.9 % 100 mL IVPB  -     Case Request    Will plan for combination surgery with plastics- discussed surgical options with her again today and will plan for surgery on the 9th of Jan    Follow Up   Return for Next scheduled followup after surgery.  Patient was given instructions and counseling regarding her condition or for health maintenance advice. Please see specific information  pulled into the AVS if appropriate.         This document has been electronically signed by Christiana Whiting MD  November 27, 2023 12:14 EST

## 2023-11-30 ENCOUNTER — PRIOR AUTHORIZATION (OUTPATIENT)
Dept: NEUROLOGY | Facility: CLINIC | Age: 28
End: 2023-11-30
Payer: COMMERCIAL

## 2023-12-04 NOTE — PROGRESS NOTES
"Consult (Combo case with  for Bilateral mastectomy)            History of Present Illness  Marilee Watts is a 27 y.o. female who presents to [facility] as a consult from Dr. Whiting for combo case for breast recon, and to discuss breast reconstructive options.  Patient considering bilateral mastectomy.      Last chemo treatment is in 11/17/2023  She wears 34C-D bra size and would like to be same size.        Subjective      Tegaderm ag mesh [silver]  Allergies Reconciled.    Review of Systems  All system were reviewed and were negative, except the ones noted above.     @Objective     /84 (BP Location: Left arm, Patient Position: Sitting, Cuff Size: Adult)   Pulse 77   Temp 98.6 °F (37 °C) (Temporal)   Ht 160 cm (62.99\")   Wt 72.3 kg (159 lb 6.4 oz)   SpO2 99%   BMI 28.24 kg/m²     Body mass index is 28.24 kg/m².    Physical Exam   Cardiovascular: Normal rate.     Pulmonary/Chest  Effort normal.     Physical exam:  Patient awake, alert, oriented  Respirations are non elaborated  Patient is not tachycardic    Breast exam:   Rt breast previous lumpectomy scar.        Result Review :              Assessment and Plan      Diagnoses and all orders for this visit:    1. Pre-operative examination (Primary)  -     Nicotine Screen, Urine - Urine, Clean Catch; Future  -     acetaminophen (TYLENOL) 500 MG tablet; Take 2 tablets by mouth Every 8 (Eight) Hours for 18 doses.  Dispense: 18 tablet; Refill: 1  -     cephalexin (KEFLEX) 500 MG capsule; Take 1 capsule by mouth 4 (Four) Times a Day for 5 days.  Dispense: 20 capsule; Refill: 0  -     gabapentin (Neurontin) 300 MG capsule; Take 1 capsule by mouth 3 (Three) Times a Day for 18 doses.  Dispense: 18 capsule; Refill: 0  -     naproxen (NAPROSYN) 250 MG tablet; Take 1 tablet by mouth 2 (Two) Times a Day As Needed for Mild Pain.  Dispense: 12 tablet; Refill: 0  -     ondansetron (Zofran) 4 MG tablet; " Take 1 tablet by mouth Every 6 (Six) Hours As Needed for Nausea or Vomiting.  Dispense: 20 tablet; Refill: 0    2. Disproportion between native breast and reconstructed breast    3. Metaplastic carcinoma of breast            Additional Order(s):         Breast reconstruction options (Tissue Expander/Implant versus Autologous) were all discussed with the patient at length.  In addition, we discussed options for symmetry procedures and nipple reconstruction.  The patient understands that her reconstructed breast will not have the same sensation as a natural breast and that visible incision lines will always be present.  In addition, patient understand that breast reconstruction is a multi-stage procedure that can take months to years to complete.   After thorough discussion of risk and benefits of each procedure the patient has elected to proceed.   We will send information for prior authorization.  Photos were obtained.   Patient was given the breast reconstruction pamphlet as well as directed to the ASPS website for additional information.   The patient was made aware that the FDA has discovered rare occurrences between an uncommon form of lymphoma (anaplastic large cell) and women with breast implants.  While the incidence is quite low, the risk of development is real; therefore, any unusual symptoms or signs (most commonly a late fluid collection years later) should be brought to the attention of your physician.  Patient also counseled on risks and benefits of saline versus silicone implants, lifting restrictions, scar placement, risk of capsular contracture, animation deformity, need for likely revision of breast implants at some point in her life, FDA recommendation of MRI every three years to monitor for rupture, and continued mammography for breast cancer surveillance.   We had a long discussion with the patient and her family today regarding her reconstructive options.  These include no reconstruction,  reconstruction at the time of mastectomy or lumpectomy, and finally delayed reconstruction.  We discussed specific types of reconstruction, including: tissue expander and/or implants, and autologous reconstruction with either pedicled or free flap.  We also covered the later stages of reconstruction, including nipple reconstruction and areola tattooing, fat grafting, and symmetry procedures if indicated or desired.   I described the typical ellen-operative course of each procedure, including the nature of the procedure, hospital stay, necessity for drains, post-operative activity restriction, and postoperative visits (including those for tissue expansion).  We also discussed the time frame for reconstruction.  I shared information about saline vs. silicone implants, including their differences, risk of capsular contracture, rippling, or leak/rupture, and safety/monitoring issues.  I described Alloderm and its use for implant reconstruction. We discussed that radiation therapy, if she ultimately requires it, may alter the reconstructive options.     We reviewed surgical risks including, but not limited to, infection, bleeding, hematoma, seroma, pain, scarring, numbness, skin or nipple loss, wound healing problems, flap failures including partial or complete flap loss, asymmetry, need for revisions or further surgeries,  and risks associated with anesthesia.   Additional risks with autologous reconstruction include donor wound morbidities, such as hernias or bulges, wound healing problems, muscle weakness, partial or complete flap loss, and typical surgical risks as listed above.   The use of biological mesh is not for soft tissue reinforcement. The use of mesh is necessary because the mastectomy dissection pocket is larger than the implant itself. The intention of the mesh is to restrain the displacement of the implant to the axilla, which is a very common complication requiring revision. In my experience, the use of  mesh avoids that specific complication and very often avoids a second surgery. The use of mesh also allows me to perform a safer procedure since it holds the implant in place and alleviates the pressure over the skin that depends only on the subdermal blood supply. Without the mesh, I believe I wouldn't be able to perform direct implant reconstruction and give the optimal result that patient desires and deserves.      Specifically on her case,  I understand she is having bilateral skin sparing mastectomies. In this case, I recommend bilateral breast reconstruction with pre pectoral placement of implant and mesh, use of spy. There is also a possibility of use of expanders.   Consent:  bilateral breast reconstruction with implants and mesh, use of spy, possible use of expanders.   Target implant size 550 cc Extra Projection       CPT codes:   07331-65 - immediate insertion of breast prosthesis following reconstruction   09496-33  implantation of biologic implant  01298- intravenous injection of agent (SPY)      Implants Sientra:  Sientra Smooth round gel implant  HP 88057-585, 505, 535, 565 x2                                                          XP 41384-462, 510 x2  Expander: [id number] x2  Single use sizer [id number] x1    Mesh:    Mesh:  alloderm rectangle 16x20 perforated [id number] x2   OR time: 3 hours     Jackie Sanders MD, PhD  NPI: [id number]    Women's Health and Cancer Rights Act (WHCRA)  The Women's Health and Cancer Rights Act of 1998 (WHCRA) is a federal law that provides protections to patients who choose to have breast reconstruction in connection with a mastectomy.  Coverage must be provided for:    1.All stages of reconstruction of the breast on which the mastectomy has been performed;  2.Surgery and reconstruction of the other breast to produce a symmetrical appearance; and  Prostheses and treatment of physical complications of all stages of the mastectomy, including lymphedema.    This law  applies to two different types of coverage:    1.Group health plans (provided by an employer or union);  2.Individual health insurance policies (not based on employment).    Plan:      I spent 60 minutes caring for Marilee on this date of service. This time includes time spent by me in the following activities:performing a medically appropriate examination and/or evaluation , counseling and educating the patient/family/caregiver, documenting information in the medical record, and care coordination    Plan:  Given these options, the patient has verbally expressed an understanding of the risks of surgery and finds these risks acceptable. We will proceed with surgery as soon as possible.    Medications sent to pharmacy      Scribed by Le Pelaez, acting as a scribe for Jackie Sanders MD, 12/14/23 09:18 EST.  Jackie Sanders MD's signature on the note affirms that the note adequately documents the care provided.        Follow Up     No follow-ups on file.    Patient was given instructions and counseling regarding her condition. Please see specific information pulled into the AVS if appropriate.     Jackie Sanders MD  12/14/2023

## 2023-12-04 NOTE — H&P (VIEW-ONLY)
"Consult (Combo case with  for Bilateral mastectomy)            History of Present Illness  Marilee Watts is a 27 y.o. female who presents to [facility] as a consult from Dr. Whiting for combo case for breast recon, and to discuss breast reconstructive options.  Patient considering bilateral mastectomy.      Last chemo treatment is in 11/17/2023  She wears 34C-D bra size and would like to be same size.        Subjective      Tegaderm ag mesh [silver]  Allergies Reconciled.    Review of Systems  All system were reviewed and were negative, except the ones noted above.     @Objective     /84 (BP Location: Left arm, Patient Position: Sitting, Cuff Size: Adult)   Pulse 77   Temp 98.6 °F (37 °C) (Temporal)   Ht 160 cm (62.99\")   Wt 72.3 kg (159 lb 6.4 oz)   SpO2 99%   BMI 28.24 kg/m²     Body mass index is 28.24 kg/m².    Physical Exam   Cardiovascular: Normal rate.     Pulmonary/Chest  Effort normal.     Physical exam:  Patient awake, alert, oriented  Respirations are non elaborated  Patient is not tachycardic    Breast exam:   Rt breast previous lumpectomy scar.        Result Review :              Assessment and Plan      Diagnoses and all orders for this visit:    1. Pre-operative examination (Primary)  -     Nicotine Screen, Urine - Urine, Clean Catch; Future  -     acetaminophen (TYLENOL) 500 MG tablet; Take 2 tablets by mouth Every 8 (Eight) Hours for 18 doses.  Dispense: 18 tablet; Refill: 1  -     cephalexin (KEFLEX) 500 MG capsule; Take 1 capsule by mouth 4 (Four) Times a Day for 5 days.  Dispense: 20 capsule; Refill: 0  -     gabapentin (Neurontin) 300 MG capsule; Take 1 capsule by mouth 3 (Three) Times a Day for 18 doses.  Dispense: 18 capsule; Refill: 0  -     naproxen (NAPROSYN) 250 MG tablet; Take 1 tablet by mouth 2 (Two) Times a Day As Needed for Mild Pain.  Dispense: 12 tablet; Refill: 0  -     ondansetron (Zofran) 4 MG tablet; " Take 1 tablet by mouth Every 6 (Six) Hours As Needed for Nausea or Vomiting.  Dispense: 20 tablet; Refill: 0    2. Disproportion between native breast and reconstructed breast    3. Metaplastic carcinoma of breast            Additional Order(s):         Breast reconstruction options (Tissue Expander/Implant versus Autologous) were all discussed with the patient at length.  In addition, we discussed options for symmetry procedures and nipple reconstruction.  The patient understands that her reconstructed breast will not have the same sensation as a natural breast and that visible incision lines will always be present.  In addition, patient understand that breast reconstruction is a multi-stage procedure that can take months to years to complete.   After thorough discussion of risk and benefits of each procedure the patient has elected to proceed.   We will send information for prior authorization.  Photos were obtained.   Patient was given the breast reconstruction pamphlet as well as directed to the ASPS website for additional information.   The patient was made aware that the FDA has discovered rare occurrences between an uncommon form of lymphoma (anaplastic large cell) and women with breast implants.  While the incidence is quite low, the risk of development is real; therefore, any unusual symptoms or signs (most commonly a late fluid collection years later) should be brought to the attention of your physician.  Patient also counseled on risks and benefits of saline versus silicone implants, lifting restrictions, scar placement, risk of capsular contracture, animation deformity, need for likely revision of breast implants at some point in her life, FDA recommendation of MRI every three years to monitor for rupture, and continued mammography for breast cancer surveillance.   We had a long discussion with the patient and her family today regarding her reconstructive options.  These include no reconstruction,  reconstruction at the time of mastectomy or lumpectomy, and finally delayed reconstruction.  We discussed specific types of reconstruction, including: tissue expander and/or implants, and autologous reconstruction with either pedicled or free flap.  We also covered the later stages of reconstruction, including nipple reconstruction and areola tattooing, fat grafting, and symmetry procedures if indicated or desired.   I described the typical ellen-operative course of each procedure, including the nature of the procedure, hospital stay, necessity for drains, post-operative activity restriction, and postoperative visits (including those for tissue expansion).  We also discussed the time frame for reconstruction.  I shared information about saline vs. silicone implants, including their differences, risk of capsular contracture, rippling, or leak/rupture, and safety/monitoring issues.  I described Alloderm and its use for implant reconstruction. We discussed that radiation therapy, if she ultimately requires it, may alter the reconstructive options.     We reviewed surgical risks including, but not limited to, infection, bleeding, hematoma, seroma, pain, scarring, numbness, skin or nipple loss, wound healing problems, flap failures including partial or complete flap loss, asymmetry, need for revisions or further surgeries,  and risks associated with anesthesia.   Additional risks with autologous reconstruction include donor wound morbidities, such as hernias or bulges, wound healing problems, muscle weakness, partial or complete flap loss, and typical surgical risks as listed above.   The use of biological mesh is not for soft tissue reinforcement. The use of mesh is necessary because the mastectomy dissection pocket is larger than the implant itself. The intention of the mesh is to restrain the displacement of the implant to the axilla, which is a very common complication requiring revision. In my experience, the use of  mesh avoids that specific complication and very often avoids a second surgery. The use of mesh also allows me to perform a safer procedure since it holds the implant in place and alleviates the pressure over the skin that depends only on the subdermal blood supply. Without the mesh, I believe I wouldn't be able to perform direct implant reconstruction and give the optimal result that patient desires and deserves.      Specifically on her case,  I understand she is having bilateral skin sparing mastectomies. In this case, I recommend bilateral breast reconstruction with pre pectoral placement of implant and mesh, use of spy. There is also a possibility of use of expanders.   Consent:  bilateral breast reconstruction with implants and mesh, use of spy, possible use of expanders.   Target implant size 550 cc Extra Projection       CPT codes:   45158-07 - immediate insertion of breast prosthesis following reconstruction   55369-91  implantation of biologic implant  34806- intravenous injection of agent (SPY)      Implants Sientra:  Sientra Smooth round gel implant  HP 95315-708, 505, 535, 565 x2                                                          XP 66661-265, 510 x2  Expander: [id number] x2  Single use sizer [id number] x1    Mesh:    Mesh:  alloderm rectangle 16x20 perforated [id number] x2   OR time: 3 hours     Jackie Sanders MD, PhD  NPI: [id number]    Women's Health and Cancer Rights Act (WHCRA)  The Women's Health and Cancer Rights Act of 1998 (WHCRA) is a federal law that provides protections to patients who choose to have breast reconstruction in connection with a mastectomy.  Coverage must be provided for:    1.All stages of reconstruction of the breast on which the mastectomy has been performed;  2.Surgery and reconstruction of the other breast to produce a symmetrical appearance; and  Prostheses and treatment of physical complications of all stages of the mastectomy, including lymphedema.    This law  applies to two different types of coverage:    1.Group health plans (provided by an employer or union);  2.Individual health insurance policies (not based on employment).    Plan:      I spent 60 minutes caring for Marilee on this date of service. This time includes time spent by me in the following activities:performing a medically appropriate examination and/or evaluation , counseling and educating the patient/family/caregiver, documenting information in the medical record, and care coordination    Plan:  Given these options, the patient has verbally expressed an understanding of the risks of surgery and finds these risks acceptable. We will proceed with surgery as soon as possible.    Medications sent to pharmacy      Scribed by Le Pelaez, acting as a scribe for Jackie Sanders MD, 12/14/23 09:18 EST.  Jackie Sanders MD's signature on the note affirms that the note adequately documents the care provided.        Follow Up     No follow-ups on file.    Patient was given instructions and counseling regarding her condition. Please see specific information pulled into the AVS if appropriate.     Jackie Sanders MD  12/14/2023

## 2023-12-14 ENCOUNTER — OFFICE VISIT (OUTPATIENT)
Dept: PLASTIC SURGERY | Facility: CLINIC | Age: 28
End: 2023-12-14
Payer: COMMERCIAL

## 2023-12-14 ENCOUNTER — LAB (OUTPATIENT)
Dept: LAB | Facility: HOSPITAL | Age: 28
End: 2023-12-14
Payer: COMMERCIAL

## 2023-12-14 VITALS
OXYGEN SATURATION: 99 % | BODY MASS INDEX: 28.24 KG/M2 | HEIGHT: 63 IN | HEART RATE: 77 BPM | DIASTOLIC BLOOD PRESSURE: 84 MMHG | WEIGHT: 159.4 LBS | SYSTOLIC BLOOD PRESSURE: 120 MMHG | TEMPERATURE: 98.6 F

## 2023-12-14 DIAGNOSIS — C50.919 METAPLASTIC CARCINOMA OF BREAST: ICD-10-CM

## 2023-12-14 DIAGNOSIS — Z01.818 PRE-OPERATIVE EXAMINATION: ICD-10-CM

## 2023-12-14 DIAGNOSIS — Z01.818 PRE-OPERATIVE EXAMINATION: Primary | ICD-10-CM

## 2023-12-14 DIAGNOSIS — N65.1 DISPROPORTION BETWEEN NATIVE BREAST AND RECONSTRUCTED BREAST: ICD-10-CM

## 2023-12-14 LAB — COTININE UR-MCNC: NEGATIVE NG/ML

## 2023-12-14 PROCEDURE — G0480 DRUG TEST DEF 1-7 CLASSES: HCPCS

## 2023-12-14 RX ORDER — AMOXICILLIN 875 MG/1
TABLET, COATED ORAL
COMMUNITY
Start: 2023-12-11

## 2023-12-15 ENCOUNTER — PREP FOR SURGERY (OUTPATIENT)
Dept: OTHER | Facility: HOSPITAL | Age: 28
End: 2023-12-15
Payer: COMMERCIAL

## 2023-12-15 RX ORDER — ACETAMINOPHEN 500 MG
1000 TABLET ORAL EVERY 8 HOURS
Qty: 18 TABLET | Refills: 1 | Status: SHIPPED | OUTPATIENT
Start: 2023-12-15 | End: 2023-12-21

## 2023-12-15 RX ORDER — ONDANSETRON 4 MG/1
4 TABLET, FILM COATED ORAL EVERY 6 HOURS PRN
Qty: 20 TABLET | Refills: 0 | Status: SHIPPED | OUTPATIENT
Start: 2023-12-15 | End: 2024-12-14

## 2023-12-15 RX ORDER — GABAPENTIN 300 MG/1
300 CAPSULE ORAL 3 TIMES DAILY
Qty: 18 CAPSULE | Refills: 0 | Status: SHIPPED | OUTPATIENT
Start: 2023-12-15 | End: 2023-12-21

## 2023-12-15 RX ORDER — CEPHALEXIN 500 MG/1
500 CAPSULE ORAL 4 TIMES DAILY
Qty: 20 CAPSULE | Refills: 0 | Status: SHIPPED | OUTPATIENT
Start: 2023-12-15 | End: 2023-12-20

## 2023-12-15 RX ORDER — NAPROXEN 250 MG/1
250 TABLET ORAL 2 TIMES DAILY PRN
Qty: 12 TABLET | Refills: 0 | Status: SHIPPED | OUTPATIENT
Start: 2023-12-15

## 2023-12-18 ENCOUNTER — TELEPHONE (OUTPATIENT)
Dept: CASE MANAGEMENT | Facility: OTHER | Age: 28
End: 2023-12-18
Payer: COMMERCIAL

## 2023-12-18 DIAGNOSIS — Z01.818 ENCOUNTER FOR PREOPERATIVE ASSESSMENT: ICD-10-CM

## 2023-12-18 DIAGNOSIS — C50.919 METAPLASTIC CARCINOMA OF BREAST: Primary | ICD-10-CM

## 2023-12-18 DIAGNOSIS — Z91.89 AT RISK FOR LYMPHEDEMA: ICD-10-CM

## 2023-12-21 ENCOUNTER — TELEPHONE (OUTPATIENT)
Dept: ONCOLOGY | Facility: HOSPITAL | Age: 28
End: 2023-12-21
Payer: COMMERCIAL

## 2024-01-04 ENCOUNTER — HOSPITAL ENCOUNTER (OUTPATIENT)
Dept: OCCUPATIONAL THERAPY | Facility: HOSPITAL | Age: 29
Setting detail: THERAPIES SERIES
Discharge: HOME | End: 2024-01-04
Payer: COMMERCIAL

## 2024-01-04 ENCOUNTER — HOSPITAL ENCOUNTER (OUTPATIENT)
Dept: MAMMOGRAPHY | Facility: HOSPITAL | Age: 29
Discharge: HOME | End: 2024-01-04
Payer: COMMERCIAL

## 2024-01-04 ENCOUNTER — OFFICE VISIT (OUTPATIENT)
Dept: PLASTIC SURGERY | Facility: CLINIC | Age: 29
End: 2024-01-04
Payer: COMMERCIAL

## 2024-01-04 VITALS
BODY MASS INDEX: 28.53 KG/M2 | OXYGEN SATURATION: 98 % | WEIGHT: 161 LBS | HEIGHT: 63 IN | TEMPERATURE: 98.7 F | HEART RATE: 97 BPM | DIASTOLIC BLOOD PRESSURE: 81 MMHG | SYSTOLIC BLOOD PRESSURE: 116 MMHG

## 2024-01-04 DIAGNOSIS — Z01.818 ENCOUNTER FOR PREOPERATIVE ASSESSMENT: ICD-10-CM

## 2024-01-04 DIAGNOSIS — C50.411 MALIGNANT NEOPLASM OF UPPER-OUTER QUADRANT OF RIGHT BREAST IN FEMALE, ESTROGEN RECEPTOR POSITIVE: ICD-10-CM

## 2024-01-04 DIAGNOSIS — Z17.0 MALIGNANT NEOPLASM OF UPPER-OUTER QUADRANT OF RIGHT BREAST IN FEMALE, ESTROGEN RECEPTOR POSITIVE: ICD-10-CM

## 2024-01-04 DIAGNOSIS — N65.1 DISPROPORTION BETWEEN NATIVE BREAST AND RECONSTRUCTED BREAST: ICD-10-CM

## 2024-01-04 DIAGNOSIS — Z91.89 AT RISK FOR LYMPHEDEMA: Primary | ICD-10-CM

## 2024-01-04 DIAGNOSIS — C50.919 METAPLASTIC CARCINOMA OF BREAST: Primary | ICD-10-CM

## 2024-01-04 DIAGNOSIS — C50.919 METAPLASTIC CARCINOMA OF BREAST: ICD-10-CM

## 2024-01-04 PROCEDURE — G0279 TOMOSYNTHESIS, MAMMO: HCPCS

## 2024-01-04 PROCEDURE — 1160F RVW MEDS BY RX/DR IN RCRD: CPT | Performed by: SURGERY

## 2024-01-04 PROCEDURE — 1159F MED LIST DOCD IN RCRD: CPT | Performed by: SURGERY

## 2024-01-04 PROCEDURE — 77065 DX MAMMO INCL CAD UNI: CPT

## 2024-01-04 PROCEDURE — 93702 BIS XTRACELL FLUID ANALYSIS: CPT | Performed by: OCCUPATIONAL THERAPIST

## 2024-01-04 PROCEDURE — 99215 OFFICE O/P EST HI 40 MIN: CPT | Performed by: SURGERY

## 2024-01-04 PROCEDURE — 97165 OT EVAL LOW COMPLEX 30 MIN: CPT | Performed by: OCCUPATIONAL THERAPIST

## 2024-01-04 RX ORDER — CEPHALEXIN 500 MG/1
500 CAPSULE ORAL 4 TIMES DAILY
COMMUNITY
Start: 2023-12-15 | End: 2024-01-16

## 2024-01-04 RX ORDER — ACETAMINOPHEN 500 MG
1000 TABLET ORAL EVERY 8 HOURS
COMMUNITY
End: 2024-01-23

## 2024-01-04 NOTE — PRE-PROCEDURE INSTRUCTIONS
IMPORTANT INSTRUCTIONS - PRE-ADMISSION TESTING  DO NOT EAT OR CHEW anything after midnight the night before your procedure.    You may have CLEAR liquids up to ____2__ hours prior to ARRIVAL time.   Take the following medications the morning of your procedure with JUST A SIP OF WATER:  ___NO MEDS______________    DO NOT BRING your medications to the hospital with you, UNLESS something has changed since your PRE-Admission Testing appointment.  Hold all vitamins, supplements, and NSAIDS (Non- steroidal anti-inflammatory meds) for one week prior to surgery (you MAY take Tylenol or Acetaminophen).  If you are diabetic, check your blood sugar the morning of your procedure. If it is less than 70 or if you are feeling symptomatic, call the following number for further instructions: [phone]_.  Use your inhalers/nebulizers as usual, the morning of your procedure. BRING YOUR INHALERS with you.   Bring your CPAP or BIPAP to hospital, ONLY IF YOU WILL BE SPENDING THE NIGHT.   Make sure you have a ride home and have someone who will stay with you the day of your procedure after you go home.  If you have any questions, please call your Pre-Admission Testing Nurse, _SABAS_ at [phone]______.   Per anesthesia request, do not smoke for 24 hours before your procedure or as instructed by your surgeon.    Clear Liquid Diet        Find out when you need to start a clear liquid diet.   Think of “clear liquids” as anything you could read a newspaper through. This includes things like water, broth, sports drinks, or tea WITHOUT any kind of milk or cream.           Once you are told to start a clear liquid diet, only drink these things until 2 hours before arrival to the hospital or when the hospital says to stop. Total volume limitation: 8 oz.       Clear liquids you CAN drink:   Water   Clear broth: beef, chicken, vegetable, or bone broth with nothing in it   Gatorade   Lemonade or Guillaume-aid   Soda   Tea, coffee (NO cream  or honey)   Jell-O (without fruit)   Popsicles (without fruit or cream)   Italian ices   Juice without pulp: apple, white, grape   You may use salt, pepper, and sugar    Do NOT drink:   Milk or cream   Soy milk, almond milk, coconut milk, or other non-dairy drinks and   creamers   Milkshakes or smoothies   Tomato juice   Orange juice   Grapefruit juice   Cream soups or any other than broth         Clear Liquid Diet:  Do NOT eat any solid food.  Do NOT eat or suck on mints or candy.  Do NOT chew gum.  Do NOT drink thick liquids like milk or juice with pulp in it.  Do NOT add milk, cream, or anything like soy milk or almond milk to coffee or tea.   PREOPERATIVE (BEFORE SURGERY)              BATHING INSTRUCTIONS  Instructions:    You will need to shower 1 time utilizing the soap provided; at the times indicated   below:      Wash your hair and face with normal shampoo and soap, rinse it well before using the surgical soap.      In the shower, wet the skin completely with water from your neck to your feet. Apply the cleanser to your   body ONLY FROM THE NECK TO YOUR FEET.     Do NOT USE THE CLEANSER ON YOUR FACE, HEAD, OR GENITAL (PRIVATE) AREAS.   Keep it out of your eyes, ears, and mouth because of the risk of injury to those areas.      Scrub with a clean washcloth for each bath utilizing the soap provided from the top of your body to the   bottom starting at the neck area.      Pay close attention to your armpits, groin area, and the site of surgery.      Wash your body gently for 5 minutes. Stand outside the stream or turn off the water while scrubbing your   body. Do NOT wash with your regular soap after the surgical cleanser is used.      RINSE THE CLEANSER OFF COMPLETELY with plenty of water. Rinse the area again thoroughly.      Dry off with a clean towel. The surgical soap can cause dryness; however do NOT APPLY LOTION,   CREAM, POWDER, and/or DEODORANT AFTER SHOWERING.     Be sure to where clean clothes after  showering.      Ensure CLEAN BED LINENS AFTER FIRST wash with the surgical soap.      NO PETS ALLOWED IN THE BED with you after utilizing the surgical soap.

## 2024-01-04 NOTE — THERAPY EVALUATION
Outpatient Occupational Therapy Lymphedema Initial Evaluation   Michelle     Patient Name: Marilee Watts  : 1995  MRN: [id number]  Today's Date: 2024      Visit Date: 2024    Patient Active Problem List   Diagnosis    Allergic rhinitis    Gastroesophageal reflux disease    Metaplastic carcinoma of breast    Breast fibroadenoma, right    Disproportion between native breast and reconstructed breast    Fitting and adjustment of vascular catheter    Intractable migraine without aura and without status migrainosus    Chemotherapy-induced nausea        Past Medical History:   Diagnosis Date    Asthma     AS CHILD    Breast cancer     COVID-19 vaccine administered         Past Surgical History:   Procedure Laterality Date     SECTION      SKIN BIOPSY      TUBAL ABDOMINAL LIGATION           Visit Dx:     ICD-10-CM ICD-9-CM   1. At risk for lymphedema  Z91.89 V49.89   2. Encounter for preoperative assessment  Z01.818 V72.84   3. Malignant neoplasm of upper-outer quadrant of right breast in female, estrogen receptor positive  C50.411 174.4    Z17.0 V86.0        Patient History       Row Name 24 1000             History    Chief Complaint --  Lymphedema surveillance program  -TD      Brief Description of Current Complaint Marilee is a 28-year-old female with a diagnosis of metaplastic carcinoma of the right breast.  Patient will undergo bilateral mastectomy with reconstruction on 2023.  -TD         Fall Risk Assessment    Any falls in the past year: No  -TD      Does patient have a fear of falling No  -TD         Services    Are you currently receiving Home Health services No  -TD      Do you plan to receive Home Health services in the near future No  -TD         Daily Activities    Primary Language English  -TD      Are you able to read Yes  -TD      Are you able to write Yes  -TD      How does patient learn best? Listening;Reading;Demonstration  -TD         Safety    Are you being  "hurt, hit, or frightened by anyone at home or in your life? No  -TD      Are you being neglected by a caregiver No  -TD      Have you had any of the following issues with Anxiety  Pt on medication  -TD                User Key  (r) = Recorded By, (t) = Taken By, (c) = Cosigned By      Initials Name Provider Type    TD Daniela Vazquez OT Occupational Therapist                     Lymphedema       Row Name 01/04/24 1000             Subjective Pain    Able to rate subjective pain? yes  -TD      Pre-Treatment Pain Level 0  -TD      Post-Treatment Pain Level 0  -TD         Subjective    Subjective Comments Patient is ready for surgery  -TD         Lymphedema Assessment    Lymphedema Classification RUE:;at risk/stage 0  -TD      Lymphedema Cancer Related Sx bilateral;simple mastectomy;reconstructive;sentinel node biopsy  -TD      Lymphedema Surgery Comments 1/9/2024  -TD         LLIS - Physical Concerns    The amount of pain associated with my lymphedema is: 0  -TD      The amount of limb heaviness associated with my lymphedema is: 0  -TD      The amount of skin tightness associated with my lymphedema is: 0  -TD      The size of my swollen limb(s) seems: 0  -TD      Lymphedema affects the movement of my swollen limb(s): 0  -TD      The strength in my swollen limb(s) is: 0  -TD         LLIS - Psychosocial Concerns    Lymphedema affects my body image (i.e., \"how I think I look\"). 0  -TD      Lymphedema affects my socializing with others. 0  -TD      Lymphedema affects my intimate relations with spouse or partner (rate 0 if not applicable 0  -TD      Lymphedema \"gets me down\" (i.e., depression, frustration, or anger) 0  -TD      I must rely on others for help due to my lymphedema. 0  -TD      I know what to do to manage my lymphedema 4  -TD         LLIS - Functional Concerns    Lymphedema affects my ability to perform self-care activities (i.e. eating, dressing, hygiene) 0  -TD      Lymphedema affects my ability to perform " routine home or work-related activities. 0  -TD      Lymphedema affects my performance of preferred leisure activities. 0  -TD      Lymphedema affects proper fit of clothing/shoes 0  -TD      Lymphedema affects my sleep 0  -TD         Posture/Observations    Posture- WNL Posture is WNL  -TD         General ROM    GENERAL ROM COMMENTS Both upper extremities are within functional limits  -TD         MMT (Manual Muscle Testing)    General MMT Comments Both upper extremities are within functional limits  -TD         L-Dex Bioimpedence Screening    L-Dex Measurement Extremity RUE  -TD      L-Dex Patient Position Standing  -TD      L-Dex UE Dominate Side Right  -TD      L-Dex UE At Risk Side Right  -TD      L-Dex UE Pre Surgical Value Yes  -TD      L-Dex UE Score 0  -TD      L-Dex UE Baseline Score 0  -TD      L-Dex UE Value Change 0  -TD      L-Dex UE Comment Baseline  -TD      $ L-Dex Charge yes  -TD         Lymphedema Life Impact Scale Totals    A.  Total Q1 - Q17 (Do not include Q18) 4  -TD      B.  Total number of questions answered (Q1-Q17) 17  -TD      C. Divide A by B 0.24  -TD      D. Multiple C by 25 6  -TD                User Key  (r) = Recorded By, (t) = Taken By, (c) = Cosigned By      Initials Name Provider Type    Daniela Hagen OT Occupational Therapist                      The patient had a baseline SOZO measurement which I reviewed today. The score is   WNL, see scanned to EMR. Bioimpedance spectroscopy helps identify the   onset of lymphedema in an arm or leg before patients experience noticeable swelling. Research has   shown that 92% of patients with early detection of lymphedema using L-Dex combined with   intervention do not progress to chronic lymphedema through three years. Additionally, as of March 2023, the NCCN Guidelines® for Survivorship recommend proactive screening for lymphedema using   bioimpedance spectroscopy. Whenever possible, patients are tested for baseline L-Dex score before    cancer treatment begins and then are reassessed during regular follow-up visits using the SOZO device.   Otherwise, this can be started postoperatively and continued during regular follow-up visits. If the   patient’s L-Dex score increases above normal levels, that is a sign that lymphedema is developing and a   referral is made to physical therapy for further evaluation and early compression treatment.   Lymphedema assessment with the SOZO L-Dex score is recommended to be done every 3 months for   the first 3 years and then every 6 months for years 4 and 5 followed by annually afterwards         Therapy Education  Education Details: Patient was educated on lymphedema and expectations of the lymphedema clinic. Patient provided education on orthopedic and lymph fluid dynamic changes from mastectomy and sentinel node removal surgery, post-surgical compression education and guidance, activity guidelines and weight bearing restrictions and education on a stretching HEP. Patient was instructed to defer wearing compression garment until instructed to do so by plastic surgeon. Patient was instructed to bring compression garment that will be provided to her at the hospital to the plastic surgeon for further instructions.  Given: HEP, Symptoms/condition management, Edema management, Pain management  Program: New  How Provided: Verbal, Demonstration, Written  Provided to: Patient  Level of Understanding: Teach back education performed, Verbalized, Demonstrated         OT Goals       Row Name 01/04/24 1051          Time Calculation    OT Goal Re-Cert Due Date 02/03/24  -TD               User Key  (r) = Recorded By, (t) = Taken By, (c) = Cosigned By      Initials Name Provider Type    Daniela Hagen OT Occupational Therapist                  1. Post Breast Surgery Care/at risk for Lymphedema  LTG 1: 90 days:  As an indicator of no exacerbation of lymphedema staging, the patient will present with an L-Dex score less than  [10] points from preoperative baseline.   STATUS: New  STG 1a:   30 days: To prevent exacerbation of mixed edema to lymphedema, patient will utilize the 2 postsurgical compression garments daily.      STATUS: New  STG 1b: 30 days: Patient will be independent with self-manual lymphatic massage.    STATUS: New  STG 1c: 30 days:  Patient will be independent with identification of signs and symptoms of lymphedema exasperation per stoplight to recovery education handout.   STATUS: New  STG 1 d: 30 days: Patient will be independent with HEP to prevent advancement in lymphedema staging.   STATUS: New  TREATMENT:  Self Care/ADL retraining, Therapeutic Activity, Neuromuscular Re-education, Therapeutic Exercise, Bioimpedence Fluid Analysis, Post-Surgical compression garement [address] Zip-ST-High/ Miranda Camisole Kit 2860K, Orthotic Management and training,  and Manual Therapy.    OT Assessment/Plan       Row Name 01/04/24 1049          OT Assessment    Functional Limitations Limitations in functional capacity and performance  -TD     Impairments Impaired lymphatic circulation  -TD     Assessment Comments Marilee is a 28-year-old female with a diagnosis of metaplastic carcinoma of the right breast. Patient will undergo bilateral mastectomy with reconstruction on January 9, 2023.  Patient would benefit from continued skilled occupational therapy to prevent increased staging of lymphedema, increased pain, and decreased range of motion.  -TD     OT Diagnosis At risk for lymphedema  -TD     OT Rehab Potential Good  -TD     Patient/caregiver participated in establishment of treatment plan and goals Yes  -TD     Patient would benefit from skilled therapy intervention Yes  -TD        OT Plan    OT Frequency --  See duration  -TD     Predicted Duration of Therapy Intervention (OT) Patient was seen preop, 3 weeks postop, 3 weeks post XRT, every 3 months years 1 through 3, and every 6 months years 4 and 5.  -TD     Planned CPT's? OT  EVAL LOW COMPLEXITY: 19708;OT RE-EVAL: 90751;OT THER ACT EA 15 MIN: [id number];OT THER PROC EA 15 MIN: 16433AL;OT SELF CARE/MGMT/TRAIN 15 MIN: 72631;OT MANUAL THERAPY EA 15 MIN: 23148;OT BIS XTRACELL FLUID ANALYSIS: 74553;OT CARE PLAN EA 15 MIN;OT ORTHOTIC MGMT/TRAIN EA 15 MIN: 08319;OT ORTHO/PROSTHET CHECKOUT EA 15 MIN: 55977  -TD     Planned Therapy Interventions (Optional Details) home exercise program;manual therapy techniques;stretching;strengthening;ROM (Range of Motion);prosthetic fitting/training;postural re-education;patient/family education;orthotic fitting/training  -TD     OT Plan Comments Initiate plan of care  -TD               User Key  (r) = Recorded By, (t) = Taken By, (c) = Cosigned By      Initials Name Provider Type    TD Daniela Vazquez, OT Occupational Therapist                  OT eval complexity:   Examination:   Performance Deficits include:  dressing, bathing, grooming   # deficits affecting performance:  3  Decision Making:   Treatment Options Considered : adaption, remediation, prevention  # Treatment options considered : 3  Modification Made for: environment, task   Level of Task Modification: min/mod  Comorbidity Affect Performance: none  How is performance affected: n/a  Evaluation Level Determined:  low             Time Calculation:   Untimed Charges  OT Eval/Re-eval Minutes: 55  Total Minutes  Untimed Charges Total Minutes: 55   Total Minutes: 55     Therapy Charges for Today       Code Description Service Date Service Provider Modifiers Qty    89561594048 HC PT BIS XTRACELL FLUID ANALYSIS 1/4/2024 Daniela Vazquez OT  1    86798388035 HC OT EVAL LOW COMPLEXITY 4 1/4/2024 Daniela Vazquez OT GO 1                      Daniela Vazquez OT  1/4/2024

## 2024-01-05 ENCOUNTER — TELEPHONE (OUTPATIENT)
Dept: SURGERY | Facility: CLINIC | Age: 29
End: 2024-01-05
Payer: COMMERCIAL

## 2024-01-05 NOTE — TELEPHONE ENCOUNTER
PATIENT CALLED AND SHE IS SCHEDULED FOR SURGERY BY DR. PANDA ON 01/09/24.      SHE HAD AN APPOINTMENT FOR MAG SEED PLACEMENT IN A LYMPH NODE.    SHE SAID THEY WERE UNABLE TO DO IT.  SHE SAID THEY DID AN US AND COULDN'T SEE, SO THEY MOVED TO MAMMOGRAM AND THE CLIP LIT UP, BUT IT WAS OUTSIDE OF THE SQUARE IT NEEDED TO BE WITHIN TO DO IT.  SHE SAID IT WAS KIND OF BEHIND THE PORT CATH.      SHE WANTS TO KNOW IF SHE CAN GET BACK IN TO TRY TO DO THE MAG SEED PLACEMENT AGAIN, BEFORE HER SURGERY DATE.    SHE SAID THE ONLY OTHER OPTION WOULD BE TO REMOVE ALL THE LYMPH NODES, AND SHE IS ONLY 28, AND DOESN'T WANT TO HAVE TO GO THROUGH THAT AND

## 2024-01-09 ENCOUNTER — APPOINTMENT (OUTPATIENT)
Dept: MAMMOGRAPHY | Facility: HOSPITAL | Age: 29
End: 2024-01-09
Payer: COMMERCIAL

## 2024-01-09 ENCOUNTER — HOSPITAL ENCOUNTER (OUTPATIENT)
Facility: HOSPITAL | Age: 29
Discharge: HOME | End: 2024-01-10
Attending: SURGERY | Admitting: SURGERY
Payer: COMMERCIAL

## 2024-01-09 ENCOUNTER — ANESTHESIA EVENT (OUTPATIENT)
Dept: PERIOP | Facility: HOSPITAL | Age: 29
End: 2024-01-09
Payer: COMMERCIAL

## 2024-01-09 ENCOUNTER — ANESTHESIA (OUTPATIENT)
Dept: PERIOP | Facility: HOSPITAL | Age: 29
End: 2024-01-09
Payer: COMMERCIAL

## 2024-01-09 ENCOUNTER — HOSPITAL ENCOUNTER (OUTPATIENT)
Dept: NUCLEAR MEDICINE | Facility: HOSPITAL | Age: 29
Discharge: HOME | End: 2024-01-09
Payer: COMMERCIAL

## 2024-01-09 DIAGNOSIS — C50.919 METAPLASTIC CARCINOMA OF BREAST: ICD-10-CM

## 2024-01-09 DIAGNOSIS — Z90.13 S/P BILATERAL MASTECTOMY: Primary | ICD-10-CM

## 2024-01-09 LAB
B-HCG UR QL: NEGATIVE
COTININE UR-MCNC: NEGATIVE NG/ML

## 2024-01-09 PROCEDURE — 25810000003 LACTATED RINGERS PER 1000 ML: Performed by: ANESTHESIOLOGY

## 2024-01-09 PROCEDURE — C1789 PROSTHESIS, BREAST, IMP: HCPCS | Performed by: SURGERY

## 2024-01-09 PROCEDURE — 0 HYDROMORPHONE 1 MG/ML SOLUTION: Performed by: ANESTHESIOLOGY

## 2024-01-09 PROCEDURE — 25010000002 DEXAMETHASONE PER 1 MG: Performed by: SURGERY

## 2024-01-09 PROCEDURE — 25010000002 GENTAMICIN PER 80 MG: Performed by: SURGERY

## 2024-01-09 PROCEDURE — 88342 IMHCHEM/IMCYTCHM 1ST ANTB: CPT | Performed by: SURGERY

## 2024-01-09 PROCEDURE — 81025 URINE PREGNANCY TEST: CPT | Performed by: ANESTHESIOLOGY

## 2024-01-09 PROCEDURE — 25010000002 SUGAMMADEX 200 MG/2ML SOLUTION: Performed by: NURSE ANESTHETIST, CERTIFIED REGISTERED

## 2024-01-09 PROCEDURE — 88331 PATH CONSLTJ SURG 1 BLK 1SPC: CPT | Performed by: SURGERY

## 2024-01-09 PROCEDURE — 0 TECHETIUM TC99M TILMANOCEPT: Performed by: SURGERY

## 2024-01-09 PROCEDURE — 25010000002 CEFAZOLIN IN DEXTROSE 2-4 GM/100ML-% SOLUTION: Performed by: SURGERY

## 2024-01-09 PROCEDURE — 15777 ACELLULAR DERM MATRIX IMPLT: CPT | Performed by: SURGERY

## 2024-01-09 PROCEDURE — 88307 TISSUE EXAM BY PATHOLOGIST: CPT | Performed by: SURGERY

## 2024-01-09 PROCEDURE — 15860 IV NJX TST VASC FLO FLAP/GRF: CPT | Performed by: SURGERY

## 2024-01-09 PROCEDURE — 25010000002 KETOROLAC TROMETHAMINE PER 15 MG: Performed by: SURGERY

## 2024-01-09 PROCEDURE — 25010000002 ONDANSETRON PER 1 MG: Performed by: NURSE ANESTHETIST, CERTIFIED REGISTERED

## 2024-01-09 PROCEDURE — 25010000002 HYDROMORPHONE 1 MG/ML SOLUTION: Performed by: ANESTHESIOLOGY

## 2024-01-09 PROCEDURE — S0260 H&P FOR SURGERY: HCPCS | Performed by: SURGERY

## 2024-01-09 PROCEDURE — 38792 RA TRACER ID OF SENTINL NODE: CPT

## 2024-01-09 PROCEDURE — 88332 PATH CONSLTJ SURG EA ADD BLK: CPT | Performed by: SURGERY

## 2024-01-09 PROCEDURE — 25010000002 DEXAMETHASONE PER 1 MG: Performed by: ANESTHESIOLOGY

## 2024-01-09 PROCEDURE — 76098 X-RAY EXAM SURGICAL SPECIMEN: CPT

## 2024-01-09 PROCEDURE — 25010000002 ONDANSETRON PER 1 MG: Performed by: SURGERY

## 2024-01-09 PROCEDURE — 25010000002 CEFAZOLIN IN DEXTROSE 2000 MG/ 100 ML SOLUTION: Performed by: SURGERY

## 2024-01-09 PROCEDURE — G0480 DRUG TEST DEF 1-7 CLASSES: HCPCS | Performed by: SURGERY

## 2024-01-09 PROCEDURE — 19303 MAST SIMPLE COMPLETE: CPT | Performed by: SURGERY

## 2024-01-09 PROCEDURE — 25010000002 PROCHLORPERAZINE 10 MG/2ML SOLUTION: Performed by: SURGERY

## 2024-01-09 PROCEDURE — 25010000002 HYDROMORPHONE 1 MG/ML SOLUTION: Performed by: SURGERY

## 2024-01-09 PROCEDURE — A9520 TC99 TILMANOCEPT DIAG 0.5MCI: HCPCS | Performed by: SURGERY

## 2024-01-09 PROCEDURE — 25010000002 FENTANYL CITRATE (PF) 50 MCG/ML SOLUTION: Performed by: ANESTHESIOLOGY

## 2024-01-09 PROCEDURE — 19342 INSJ/RPLCMT BRST IMPLT SEP D: CPT | Performed by: SURGERY

## 2024-01-09 PROCEDURE — 88341 IMHCHEM/IMCYTCHM EA ADD ANTB: CPT | Performed by: SURGERY

## 2024-01-09 PROCEDURE — C1889 IMPLANT/INSERT DEVICE, NOC: HCPCS | Performed by: SURGERY

## 2024-01-09 PROCEDURE — 25010000002 BUPIVACAINE (PF) 0.5 % SOLUTION: Performed by: SURGERY

## 2024-01-09 PROCEDURE — 25010000002 MIDAZOLAM PER 1MG: Performed by: ANESTHESIOLOGY

## 2024-01-09 PROCEDURE — 38525 BIOPSY/REMOVAL LYMPH NODES: CPT | Performed by: SURGERY

## 2024-01-09 PROCEDURE — 88360 TUMOR IMMUNOHISTOCHEM/MANUAL: CPT | Performed by: SURGERY

## 2024-01-09 PROCEDURE — 25010000002 PROPOFOL 10 MG/ML EMULSION: Performed by: ANESTHESIOLOGY

## 2024-01-09 DEVICE — ABSORBABLE HEMOSTAT (OXIDIZED REGENERATED CELLULOSE)
Type: IMPLANTABLE DEVICE | Site: BREAST | Status: FUNCTIONAL
Brand: SURGICEL

## 2024-01-09 DEVICE — BRST SIL HSCPLS OPUSLUXE SMOTH RND HI/PROJ 415CC: Type: IMPLANTABLE DEVICE | Site: BREAST | Status: FUNCTIONAL

## 2024-01-09 DEVICE — LIGACLIP MCA MULTIPLE CLIP APPLIERS, 20 MEDIUM CLIPS
Type: IMPLANTABLE DEVICE | Site: BREAST | Status: FUNCTIONAL
Brand: LIGACLIP

## 2024-01-09 DEVICE — GRFT TISS ALLODERM RTM PERF 16X20CM 2.4MM/THK .4MM: Type: IMPLANTABLE DEVICE | Site: BREAST | Status: FUNCTIONAL

## 2024-01-09 RX ORDER — PROMETHAZINE HYDROCHLORIDE 12.5 MG/1
25 TABLET ORAL ONCE AS NEEDED
Status: DISCONTINUED | OUTPATIENT
Start: 2024-01-09 | End: 2024-01-09 | Stop reason: HOSPADM

## 2024-01-09 RX ORDER — PROPOFOL 10 MG/ML
VIAL (ML) INTRAVENOUS AS NEEDED
Status: DISCONTINUED | OUTPATIENT
Start: 2024-01-09 | End: 2024-01-09 | Stop reason: SURG

## 2024-01-09 RX ORDER — ONDANSETRON 4 MG/1
4 TABLET, ORALLY DISINTEGRATING ORAL EVERY 6 HOURS PRN
Status: DISCONTINUED | OUTPATIENT
Start: 2024-01-09 | End: 2024-01-10 | Stop reason: HOSPADM

## 2024-01-09 RX ORDER — ACETAMINOPHEN 650 MG/1
650 SUPPOSITORY RECTAL EVERY 4 HOURS PRN
Status: DISCONTINUED | OUTPATIENT
Start: 2024-01-09 | End: 2024-01-10 | Stop reason: HOSPADM

## 2024-01-09 RX ORDER — PROMETHAZINE HYDROCHLORIDE 12.5 MG/1
12.5 TABLET ORAL EVERY 6 HOURS PRN
Status: DISCONTINUED | OUTPATIENT
Start: 2024-01-09 | End: 2024-01-10 | Stop reason: HOSPADM

## 2024-01-09 RX ORDER — DEXTROSE AND SODIUM CHLORIDE 5; .45 G/100ML; G/100ML
50 INJECTION, SOLUTION INTRAVENOUS CONTINUOUS
Status: DISCONTINUED | OUTPATIENT
Start: 2024-01-09 | End: 2024-01-10 | Stop reason: HOSPADM

## 2024-01-09 RX ORDER — ACETAMINOPHEN 325 MG/1
650 TABLET ORAL EVERY 4 HOURS PRN
Status: DISCONTINUED | OUTPATIENT
Start: 2024-01-09 | End: 2024-01-10 | Stop reason: HOSPADM

## 2024-01-09 RX ORDER — CEFAZOLIN SODIUM 2 G/100ML
2000 INJECTION, SOLUTION INTRAVENOUS ONCE
Status: COMPLETED | OUTPATIENT
Start: 2024-01-09 | End: 2024-01-09

## 2024-01-09 RX ORDER — CEFAZOLIN SODIUM 2 G/100ML
2000 INJECTION, SOLUTION INTRAVENOUS
Qty: 100 ML | Refills: 0 | Status: DISCONTINUED | OUTPATIENT
Start: 2024-01-09 | End: 2024-01-09

## 2024-01-09 RX ORDER — PROCHLORPERAZINE MALEATE 5 MG/1
10 TABLET ORAL EVERY 6 HOURS PRN
Status: DISCONTINUED | OUTPATIENT
Start: 2024-01-09 | End: 2024-01-10 | Stop reason: HOSPADM

## 2024-01-09 RX ORDER — PROMETHAZINE HYDROCHLORIDE 25 MG/1
25 SUPPOSITORY RECTAL ONCE AS NEEDED
Status: DISCONTINUED | OUTPATIENT
Start: 2024-01-09 | End: 2024-01-09 | Stop reason: HOSPADM

## 2024-01-09 RX ORDER — ROPINIROLE 0.25 MG/1
0.5 TABLET, FILM COATED ORAL NIGHTLY PRN
Status: DISCONTINUED | OUTPATIENT
Start: 2024-01-09 | End: 2024-01-10 | Stop reason: HOSPADM

## 2024-01-09 RX ORDER — PROMETHAZINE HYDROCHLORIDE 25 MG/1
12.5 SUPPOSITORY RECTAL EVERY 6 HOURS PRN
Status: DISCONTINUED | OUTPATIENT
Start: 2024-01-09 | End: 2024-01-10 | Stop reason: HOSPADM

## 2024-01-09 RX ORDER — AMOXICILLIN 250 MG
2 CAPSULE ORAL 2 TIMES DAILY PRN
Status: DISCONTINUED | OUTPATIENT
Start: 2024-01-09 | End: 2024-01-10 | Stop reason: HOSPADM

## 2024-01-09 RX ORDER — ACETAMINOPHEN 500 MG
1000 TABLET ORAL ONCE
Status: COMPLETED | OUTPATIENT
Start: 2024-01-09 | End: 2024-01-09

## 2024-01-09 RX ORDER — MEPERIDINE HYDROCHLORIDE 25 MG/ML
12.5 INJECTION INTRAMUSCULAR; INTRAVENOUS; SUBCUTANEOUS
Status: DISCONTINUED | OUTPATIENT
Start: 2024-01-09 | End: 2024-01-09 | Stop reason: HOSPADM

## 2024-01-09 RX ORDER — ROCURONIUM BROMIDE 10 MG/ML
INJECTION, SOLUTION INTRAVENOUS AS NEEDED
Status: DISCONTINUED | OUTPATIENT
Start: 2024-01-09 | End: 2024-01-09 | Stop reason: SURG

## 2024-01-09 RX ORDER — KETOROLAC TROMETHAMINE 15 MG/ML
30 INJECTION, SOLUTION INTRAMUSCULAR; INTRAVENOUS EVERY 6 HOURS PRN
Status: DISCONTINUED | OUTPATIENT
Start: 2024-01-09 | End: 2024-01-09

## 2024-01-09 RX ORDER — SODIUM CHLORIDE 9 MG/ML
40 INJECTION, SOLUTION INTRAVENOUS AS NEEDED
Status: DISCONTINUED | OUTPATIENT
Start: 2024-01-09 | End: 2024-01-09 | Stop reason: HOSPADM

## 2024-01-09 RX ORDER — LORAZEPAM 0.5 MG/1
0.5 TABLET ORAL ONCE
Status: COMPLETED | OUTPATIENT
Start: 2024-01-09 | End: 2024-01-09

## 2024-01-09 RX ORDER — BUPIVACAINE HYDROCHLORIDE 5 MG/ML
INJECTION, SOLUTION EPIDURAL; INTRACAUDAL AS NEEDED
Status: DISCONTINUED | OUTPATIENT
Start: 2024-01-09 | End: 2024-01-09 | Stop reason: HOSPADM

## 2024-01-09 RX ORDER — SODIUM CHLORIDE, SODIUM LACTATE, POTASSIUM CHLORIDE, CALCIUM CHLORIDE 600; 310; 30; 20 MG/100ML; MG/100ML; MG/100ML; MG/100ML
9 INJECTION, SOLUTION INTRAVENOUS CONTINUOUS PRN
Status: DISCONTINUED | OUTPATIENT
Start: 2024-01-09 | End: 2024-01-10 | Stop reason: HOSPADM

## 2024-01-09 RX ORDER — ONDANSETRON 2 MG/ML
4 INJECTION INTRAMUSCULAR; INTRAVENOUS ONCE AS NEEDED
Status: DISCONTINUED | OUTPATIENT
Start: 2024-01-09 | End: 2024-01-09 | Stop reason: HOSPADM

## 2024-01-09 RX ORDER — CEFAZOLIN SODIUM 2 G/100ML
2000 INJECTION, SOLUTION INTRAVENOUS EVERY 8 HOURS
Qty: 200 ML | Refills: 0 | Status: COMPLETED | OUTPATIENT
Start: 2024-01-09 | End: 2024-01-10

## 2024-01-09 RX ORDER — PROCHLORPERAZINE EDISYLATE 5 MG/ML
10 INJECTION INTRAMUSCULAR; INTRAVENOUS EVERY 6 HOURS PRN
Status: DISCONTINUED | OUTPATIENT
Start: 2024-01-09 | End: 2024-01-10 | Stop reason: HOSPADM

## 2024-01-09 RX ORDER — KETOROLAC TROMETHAMINE 30 MG/ML
30 INJECTION, SOLUTION INTRAMUSCULAR; INTRAVENOUS EVERY 6 HOURS PRN
Status: DISCONTINUED | OUTPATIENT
Start: 2024-01-09 | End: 2024-01-10 | Stop reason: HOSPADM

## 2024-01-09 RX ORDER — GENTAMICIN SULFATE 40 MG/ML
INJECTION, SOLUTION INTRAMUSCULAR; INTRAVENOUS AS NEEDED
Status: DISCONTINUED | OUTPATIENT
Start: 2024-01-09 | End: 2024-01-09 | Stop reason: HOSPADM

## 2024-01-09 RX ORDER — LIDOCAINE HYDROCHLORIDE 20 MG/ML
INJECTION, SOLUTION EPIDURAL; INFILTRATION; INTRACAUDAL; PERINEURAL AS NEEDED
Status: DISCONTINUED | OUTPATIENT
Start: 2024-01-09 | End: 2024-01-09 | Stop reason: SURG

## 2024-01-09 RX ORDER — OXYCODONE HYDROCHLORIDE AND ACETAMINOPHEN 5; 325 MG/1; MG/1
1 TABLET ORAL EVERY 4 HOURS PRN
Status: DISCONTINUED | OUTPATIENT
Start: 2024-01-09 | End: 2024-01-10 | Stop reason: HOSPADM

## 2024-01-09 RX ORDER — FENTANYL CITRATE 50 UG/ML
INJECTION, SOLUTION INTRAMUSCULAR; INTRAVENOUS AS NEEDED
Status: DISCONTINUED | OUTPATIENT
Start: 2024-01-09 | End: 2024-01-09 | Stop reason: SURG

## 2024-01-09 RX ORDER — DEXAMETHASONE SODIUM PHOSPHATE 4 MG/ML
INJECTION, SOLUTION INTRA-ARTICULAR; INTRALESIONAL; INTRAMUSCULAR; INTRAVENOUS; SOFT TISSUE AS NEEDED
Status: DISCONTINUED | OUTPATIENT
Start: 2024-01-09 | End: 2024-01-09 | Stop reason: HOSPADM

## 2024-01-09 RX ORDER — MIDAZOLAM HYDROCHLORIDE 2 MG/2ML
2 INJECTION, SOLUTION INTRAMUSCULAR; INTRAVENOUS ONCE
Status: COMPLETED | OUTPATIENT
Start: 2024-01-09 | End: 2024-01-09

## 2024-01-09 RX ORDER — ONDANSETRON 2 MG/ML
4 INJECTION INTRAMUSCULAR; INTRAVENOUS EVERY 6 HOURS PRN
Status: DISCONTINUED | OUTPATIENT
Start: 2024-01-09 | End: 2024-01-10 | Stop reason: HOSPADM

## 2024-01-09 RX ORDER — DEXAMETHASONE SODIUM PHOSPHATE 4 MG/ML
INJECTION, SOLUTION INTRA-ARTICULAR; INTRALESIONAL; INTRAMUSCULAR; INTRAVENOUS; SOFT TISSUE AS NEEDED
Status: DISCONTINUED | OUTPATIENT
Start: 2024-01-09 | End: 2024-01-09 | Stop reason: SURG

## 2024-01-09 RX ORDER — DEXMEDETOMIDINE HYDROCHLORIDE 100 UG/ML
INJECTION, SOLUTION INTRAVENOUS AS NEEDED
Status: DISCONTINUED | OUTPATIENT
Start: 2024-01-09 | End: 2024-01-09 | Stop reason: SURG

## 2024-01-09 RX ORDER — ONDANSETRON 2 MG/ML
INJECTION INTRAMUSCULAR; INTRAVENOUS AS NEEDED
Status: DISCONTINUED | OUTPATIENT
Start: 2024-01-09 | End: 2024-01-09 | Stop reason: SURG

## 2024-01-09 RX ORDER — NALOXONE HCL 0.4 MG/ML
0.1 VIAL (ML) INJECTION
Status: DISCONTINUED | OUTPATIENT
Start: 2024-01-09 | End: 2024-01-10 | Stop reason: HOSPADM

## 2024-01-09 RX ORDER — OXYCODONE HYDROCHLORIDE 5 MG/1
5 TABLET ORAL
Status: DISCONTINUED | OUTPATIENT
Start: 2024-01-09 | End: 2024-01-09 | Stop reason: HOSPADM

## 2024-01-09 RX ADMIN — ROCURONIUM BROMIDE 20 MG: 10 INJECTION, SOLUTION INTRAVENOUS at 11:11

## 2024-01-09 RX ADMIN — HYDROMORPHONE HYDROCHLORIDE 0.5 MG: 1 INJECTION, SOLUTION INTRAMUSCULAR; INTRAVENOUS; SUBCUTANEOUS at 13:28

## 2024-01-09 RX ADMIN — LIDOCAINE HYDROCHLORIDE 60 MG: 20 INJECTION, SOLUTION EPIDURAL; INFILTRATION; INTRACAUDAL; PERINEURAL at 10:05

## 2024-01-09 RX ADMIN — FENTANYL CITRATE 50 MCG: 50 INJECTION, SOLUTION INTRAMUSCULAR; INTRAVENOUS at 10:47

## 2024-01-09 RX ADMIN — ROCURONIUM BROMIDE 10 MG: 10 INJECTION, SOLUTION INTRAVENOUS at 10:16

## 2024-01-09 RX ADMIN — ROCURONIUM BROMIDE 45 MG: 10 INJECTION, SOLUTION INTRAVENOUS at 10:07

## 2024-01-09 RX ADMIN — DEXMEDETOMIDINE HYDROCHLORIDE 35 MCG: 100 INJECTION, SOLUTION, CONCENTRATE INTRAVENOUS at 11:31

## 2024-01-09 RX ADMIN — SODIUM CHLORIDE, POTASSIUM CHLORIDE, SODIUM LACTATE AND CALCIUM CHLORIDE 9 ML/HR: 600; 310; 30; 20 INJECTION, SOLUTION INTRAVENOUS at 08:55

## 2024-01-09 RX ADMIN — HYDROMORPHONE HYDROCHLORIDE 0.5 MG: 1 INJECTION, SOLUTION INTRAMUSCULAR; INTRAVENOUS; SUBCUTANEOUS at 13:08

## 2024-01-09 RX ADMIN — KETOROLAC TROMETHAMINE 30 MG: 30 INJECTION, SOLUTION INTRAMUSCULAR; INTRAVENOUS at 18:50

## 2024-01-09 RX ADMIN — FENTANYL CITRATE 50 MCG: 50 INJECTION, SOLUTION INTRAMUSCULAR; INTRAVENOUS at 12:51

## 2024-01-09 RX ADMIN — DEXAMETHASONE SODIUM PHOSPHATE 4 MG: 4 INJECTION, SOLUTION INTRAMUSCULAR; INTRAVENOUS at 10:18

## 2024-01-09 RX ADMIN — HYDROMORPHONE HYDROCHLORIDE 0.5 MG: 1 INJECTION, SOLUTION INTRAMUSCULAR; INTRAVENOUS; SUBCUTANEOUS at 13:18

## 2024-01-09 RX ADMIN — SODIUM CHLORIDE, POTASSIUM CHLORIDE, SODIUM LACTATE AND CALCIUM CHLORIDE: 600; 310; 30; 20 INJECTION, SOLUTION INTRAVENOUS at 11:32

## 2024-01-09 RX ADMIN — SUGAMMADEX 150 MG: 100 INJECTION, SOLUTION INTRAVENOUS at 12:33

## 2024-01-09 RX ADMIN — ROCURONIUM BROMIDE 10 MG: 10 INJECTION, SOLUTION INTRAVENOUS at 11:56

## 2024-01-09 RX ADMIN — PROCHLORPERAZINE EDISYLATE 10 MG: 5 INJECTION INTRAMUSCULAR; INTRAVENOUS at 18:23

## 2024-01-09 RX ADMIN — ACETAMINOPHEN 1000 MG: 500 TABLET ORAL at 08:55

## 2024-01-09 RX ADMIN — ROCURONIUM BROMIDE 5 MG: 10 INJECTION, SOLUTION INTRAVENOUS at 10:06

## 2024-01-09 RX ADMIN — DEXMEDETOMIDINE HYDROCHLORIDE 20 MCG: 100 INJECTION, SOLUTION, CONCENTRATE INTRAVENOUS at 10:42

## 2024-01-09 RX ADMIN — PROPOFOL 200 MG: 10 INJECTION, EMULSION INTRAVENOUS at 10:06

## 2024-01-09 RX ADMIN — ONDANSETRON 4 MG: 2 INJECTION INTRAMUSCULAR; INTRAVENOUS at 12:22

## 2024-01-09 RX ADMIN — MIDAZOLAM HYDROCHLORIDE 2 MG: 1 INJECTION, SOLUTION INTRAMUSCULAR; INTRAVENOUS at 09:55

## 2024-01-09 RX ADMIN — LORAZEPAM 0.5 MG: 0.5 TABLET ORAL at 14:48

## 2024-01-09 RX ADMIN — HYDROMORPHONE HYDROCHLORIDE 0.5 MG: 1 INJECTION, SOLUTION INTRAMUSCULAR; INTRAVENOUS; SUBCUTANEOUS at 16:38

## 2024-01-09 RX ADMIN — OXYCODONE HYDROCHLORIDE 5 MG: 5 TABLET ORAL at 13:08

## 2024-01-09 RX ADMIN — HYDROMORPHONE HYDROCHLORIDE 0.5 MG: 1 INJECTION, SOLUTION INTRAMUSCULAR; INTRAVENOUS; SUBCUTANEOUS at 12:09

## 2024-01-09 RX ADMIN — DEXTROSE AND SODIUM CHLORIDE 50 ML/HR: 5; 450 INJECTION, SOLUTION INTRAVENOUS at 15:51

## 2024-01-09 RX ADMIN — CEFAZOLIN SODIUM 2000 MG: 2 INJECTION, SOLUTION INTRAVENOUS at 10:03

## 2024-01-09 RX ADMIN — CEFAZOLIN SODIUM 2000 MG: 2 INJECTION, SOLUTION INTRAVENOUS at 17:27

## 2024-01-09 RX ADMIN — OXYCODONE HYDROCHLORIDE AND ACETAMINOPHEN 1 TABLET: 5; 325 TABLET ORAL at 17:26

## 2024-01-09 RX ADMIN — ONDANSETRON 4 MG: 2 INJECTION INTRAMUSCULAR; INTRAVENOUS at 17:26

## 2024-01-09 RX ADMIN — FENTANYL CITRATE 100 MCG: 50 INJECTION, SOLUTION INTRAMUSCULAR; INTRAVENOUS at 10:06

## 2024-01-09 RX ADMIN — TILMANOCEPT 1 DOSE: KIT at 08:52

## 2024-01-09 NOTE — OP NOTE
BREAST MASTECTOMY WITH SENTINEL NODE BIOPSY  Procedure Report    Patient Name:  Marilee Watts  YOB: 1995    Date of Surgery:  1/9/2024     Indications:  Breast cancer    Pre-op Diagnosis:   Metaplastic carcinoma of breast [C50.919]       Post-Op Diagnosis Codes:     * Metaplastic carcinoma of breast [C50.919]    Procedure/CPT® Codes:      Procedure(s):  BILATERAL BREAST MASTECTOMY WITH RIGHT AXILLARY SENTINEL NODE IDENTIFICATION AND EXCISION BIOPSY  BREAST RECONSTRUCTION WITH MESH AND IMPLANTS - USE OF SPY    Staff:  Surgeon(s):  Christiana Whiting MD Fensterer, Tathyana M, MD    Assistant: Elisa Roberts RN CSA; Pratik Hobbs    Anesthesia: General    Estimated Blood Loss: 100ml    Implants:    Implant Name Type Inv. Item Serial No.  Lot No. LRB No. Used Action   CLIPAPPLR M/ ENDO LIGACLIP 20CLP 11IN MD - [id number] Implant CLIPAPPLR M/ ENDO LIGACLIP 20CLP 11IN MD  [employer]  DIV OF J AND J [id number]  [id number] Implanted   GRFT TISS ALLODERM RTM PERF [id number] 2.4MM/THK .4MM - [id number] Implant GRFT TISS ALLODERM RTM PERF [id number] 2.4MM/THK .4MM  [employer] [id number] Left 1 Implanted   GRFT TISS ALLODERM RTM PERF [id number] 2.4MM/THK .4MM - [id number] Implant GRFT TISS ALLODERM RTM PERF [id number] 2.4MM/THK .4MM  [employer] [id number] Right 1 Implanted   BRST DINESH HSCPLS OPUSLUXE SMOTH RND HI/PROJ 415CC - [id number] - [id number] Implant BRST DINESH HSCPLS OPUSLUXE SMOTH RND HI/PROJ 415CC [id number] SIENTRA SEE SN Left 1 Implanted   BRST DINESH HSCPLS OPUSLUXE SMOTH RND HI/PROJ 415CC - [id number] - [id number] Implant BRST DINESH HSCPLS OPUSLUXE SMOTH RND HI/PROJ 415CC [id number] SIENTRA SEE SN Right 1 Implanted   HEMOST ABS SURGICEL ORIG 2X3IN STRL - [id number] Implant HEMOST ABS SURGICEL ORIG 2X3IN STRL  [employer]  DIV OF J AND J [id number] Right 1 Implanted       Specimen:          Specimens       ID Source Type Tests Collected By Collected At Frozen?     A Breast, Left Tissue TISSUE PATHOLOGY EXAM   Christiana Whiting MD 1/9/24 1042     Description: SHORT STITCH SUPERIOR & LONG STITCH LATERAL    This specimen was not marked as sent.    B [place] Lymph Node Tissue TISSUE PATHOLOGY EXAM   Christiana Whiting MD 1/9/24 1039 Yes    Description: RIGHT AXILLARY SENTINEL LYMPH NODE # 1 - COUNT 185  COLLECTED 1109    Comment: TO MAMMOGRAPHY THEN TO PATH FOR FROZEN SECTION   COLLECTED 1109    This specimen was not marked as sent.    C [place] Lymph Node Tissue TISSUE PATHOLOGY EXAM   Christiana Whiting MD 1/9/24 1110 Yes    Description: RIGHT AXILLARY SENTINEL LYMPH NODE # 2 - COUNT 4    Comment: TO MAMMOGRAPHY TO LOOK FOR CLIP AND THEN TO PATHOLOGY FOR FROZEN     This specimen was not marked as sent.    D Breast, Right Tissue TISSUE PATHOLOGY EXAM   Christiana Whiting MD 1/9/24 1100     Description: RIGHT BREAST SHORT STITCH SUPERIOR & LONG STITCH LATERAL    This specimen was not marked as sent.                Findings: none    Complications: none    Description of Procedure:   The morning of the procedure she was taken down to radiology where she was injected for a sentinel node on the right side. She was then brought back to the OR.    An elliptical incision was made on the left breast around the nipple areolar complex and a skin sparing mastectomy was completed by removing the breast by raising skin flaps superiorly to the clavicle, medially to the sternum, out laterally to the muscle border, and inferiorly to the inframammary crease. The breast was taken off the chest wall, including the prepectoral fascia, and it was marked short stitch superior and long stitch lateral and sent down to Pathology.   Then  Dr Sanders began her portion of the procedure on this side which is dictated separately.     We moved to the right breast and a skin sparing incision was made around the nipple areolar complex and a skin sparing mastectomy was completed by removing the  breast by raising skin flaps superiorly to the clavicle, medially to the sternum, out laterally to the muscle border, and inferiorly to the inframammary crease. The breast was taken off the chest wall, including the prepectoral fascia, and it was marked short stitch superior and long stitch lateral and sent down to Pathology. The clavipectoral fascia was opened and incised and the nodes were identified and removed. The first had a count of 185 and the next had count of 4 and both were palpable. The remaining axillary contents had minimal count.   The clip was confirmed to be in the node prior to sending it for frozen.  The wound was copiously irrigated. Clips and electrocautery were used to achieve hemostasis.  The wound was copiously irrigated with sterile water and bacitracin. The closure on this side was done by Dr Sanders as well and is dictated separately.    Assistant: Elisa Roberts RN CSA; Pratik Hobbs  was responsible for performing the following activities: Retraction, Suction, and Irrigation and their skilled assistance was necessary for the success of this case.    [place] Node Biopsy for Breast Cancer - Right  Operation performed with curative intent. Yes   Tracer(s) used to identify sentinel nodes in the upfront surgery (non-neoadjuvant) setting (select all that apply). N/A   Tracer(s) used to identify sentinel nodes in the neoadjuvant setting (select all that apply). Radioactive tracer   All nodes (colored or non-colored) present at the end of a dye-filled lymphatic channel were removed. N/A   All significantly radioactive nodes were removed. Yes   All palpably suspicious nodes were removed. Yes   Biopsy-proven positive nodes marked with clips prior to chemotherapy were identified and removed. Yes            Christiana Whiting MD     Date: 1/9/2024  Time: 12:16 EST

## 2024-01-09 NOTE — H&P
Chief Complaint: No chief complaint on file.    Subjective         History of Present Illness  Marilee Watts is a 28 y.o. female presents to [facility] OR to be seen for right breast cancer.   Her pathology and imaging are shown below:    DIAGNOSIS:   RIGHT BREAST, MASS, LUMPECTOMY:   Invasive metaplastic carcinoma, high grade   Maximum dimensions: 4.5 x 4.0 x 3.3 cm   Interspersed fibroadenomata   Margins free of tumor   Estrogen receptor: Negative   Progesterone receptor: Negative   HER2/suzie: Negative   PD-L1 (22C3) Combined Positive Score: 50   See CAP Template     INVASIVE CARCINOMA OF THE BREAST     SPECIMEN:   Procedure: Excision (less than total mastectomy)   Specimen Laterality: Right     TUMOR:   Histologic Type: Metaplastic carcinoma   Ductal Carcinoma In Situ (DCIS): Not identified   Lymphovascular Invasion: Not identified   Treatment Effect in the Breast: No known presurgical therapy     MARGINS:   Margin Status for Invasive Carcinoma:   - All margins negative for invasive carcinoma   Distance from Invasive Carcinoma to Closest Margin: 1 mm, posterior     REGIONAL LYMPH NODES:   Regional Lymph Node Status: Not applicable (no regional lymph nodes   submitted or found)     ADDITIONAL FINDINGS: There are interspersed fibroadenomata     PATHOLOGIC STAGE CLASSIFICATION (pTNM, AJCC 8th Edition): pT2 NX    CLINICAL HISTORY:   Benign neoplasm of right breast     SPECIMENS RECEIVED:   RIGHT BREAST , MASS     MICROSCOPIC DESCRIPTION:   Tissue blocks are prepared and slides are examined microscopically on all   specimens. See diagnosis for details. IMMUNOHISTOCHEMICAL RESULTS   (IHC):   Estrogen Receptor      RESULT: Negative   0%   0+ (INTENSITY SCORE)     Progesterone Receptor         RESULT:  Negative  0%   0+   (INTENSITY SCORE)     Her2/suzie oncoprotein    RESULT: Negative   0%   0+ (INTENSITY   SCORE)       Imaging:  Right axillary adenopathy, likely metastatic disease related to  recently  diagnosed right breast carcinoma developed in prior area of  fibroadenoma.    BI-RADS CATEGORY: 6 , KNOWN BIOPSY PROVEN MALIGNANCY.        RECOMMENDED FOLLOW-UP:  Core biopsy of the right axillary adenopathy.    Narrative    BILATERAL DIAGNOSTIC DIGITAL MAMMOGRAPHY, RIGHT AXILLARY ULTRASOUND,  LEFT AXILLARY/BREAST ULTRASOUND    CLINICAL INDICATION: Triple negative right breast cancer.    TECHNIQUE: Bilateral CC, exaggerated CC and MLO views were obtained with  2-D and 3-D digital acquisitions. The study was read with the assistance  of CAD.      COMPARISON: Previous studies back to March 2022.    FINDINGS:    Heterogeneously dense: The breasts are heterogeneously dense, which may  obscure small masses.    Surgical clips in the lateral right breast from lumpectomy. No mass in  the right breast. There are enlarged lymph nodes in the right axilla.  Ultrasound shows two abnormal lymph nodes in the right axilla, the  largest 2.9 x 1.7 cm with a markedly thickened cortex up to 1.4 cm. The  other adjacent node measures 2.5 x 1.3 cm with a cortical thickness of 5  mm. Findings are consistent with metastatic adenopathy.    Complete left breast ultrasound is normal. Left axillary ultrasound is  normal.      She is here today to review surgical options.     Her most recent MRI is shown below:    Narrative & Impression   PROCEDURE:  MRI BREAST BILATERAL W WO CONTRAST     COMPARISON: Other, MG, MAMMO DIAGNOSTIC DIGITAL TOMOSYNTHESIS BILATERAL W CAD, 6/20/2023, 13:20.    Other, US, US BREAST BILATERAL COMPLETE, 6/20/2023, 13:43.  [facility], CT, CT CHEST   W CONTRAST DIAGNOSTIC, 10/17/2023, 14:20.  Other, MR, MRI BREAST BILATERAL W WO CONTRAST,   6/20/2023, 14:20.     INDICATIONS:  Staging scan. History right breast triple negative ca, after completion of   paty-adjuvant chemo     CONTRAST:      15ML  Multihance I.V.     TECHNIQUE:    Breast MRI was performed using dynamic intravenous gadolinium infusion,  thin sections,   and a dedicated breast coil.  Contrast enhancement analysis was assisted by computer-aided   detection.       AMOUNT OF FIBROGLANDULAR TISSUE:        Heterogeneous fibroglandular tissue         BACKGROUND PARENCHYMAL ENHANCEMENT:       Mild symmetric     FINDINGS:  A satisfactory contrast bolus is evident.     Post lumpectomy changes are evident on the right.  No suspicious mass or non mass enhancement is   seen in either breast.  No abnormality of the nipple or chest wall is evident.     Right axillary adenopathy is improved.  The largest lymph node in the right axilla measures 1.9 cm   by 1 cm.  The largest lymph node seen on 2023 measured 2.6 cm x 1.5 cm, and was not completely   included.     The largest lymph node in the left axilla measures 1.2 cm x 0.8 cm.  No internal mammary adenopathy   is evident.       CONTINUED ON NEXT PAGE...     No marrow signal abnormality is evident.     IMPRESSION:  Bilateral breast MR demonstrating post lumpectomy changes on the right.     Right axillary adenopathy is improved.     No MR findings of malignancy, left breast.     BIRADS:  DIAGNOSTIC CATEGORY 6--KNOWN MALIGNANCY RIGHT BREAST       RECOMMENDATION(S):  CLINICAL EVALUATION.                      PLEASE NOTE:  A NORMAL MRI DOES NOT EXCLUDE THE POSSIBILITY OF BREAST CANCER.  A CLINICALLY   SUSPICIOUS PALPABLE LUMP SHOULD BE BIOPSIED.           Objective     Past Medical History:   Diagnosis Date    Asthma     AS CHILD    Breast cancer     COVID-19 vaccine administered        Past Surgical History:   Procedure Laterality Date    BREAST LUMPECTOMY Right      SECTION      PORTACATH PLACEMENT      SKIN BIOPSY      TUBAL ABDOMINAL LIGATION           Current Facility-Administered Medications:     ceFAZolin in dextrose (ANCEF) IVPB solution 2,000 mg, 2,000 mg, Intravenous, Once, Christiana Whiting MD    ethyl alcohol 62 % 2 each, 2 swab , Nasal, Once, Jackie Sanders MD    lactated ringers  "infusion, 9 mL/hr, Intravenous, Continuous PRN, Nathan Cain MD, Last Rate: 9 mL/hr at 01/09/24 0855, 9 mL/hr at 01/09/24 0855    Midazolam HCl (PF) (VERSED) injection 2 mg, 2 mg, Intravenous, Once, Nathan Cain MD    sodium chloride 0.9 % infusion 40 mL, 40 mL, Intravenous, PRN, Nathan Cain MD    Allergies   Allergen Reactions    Tegaderm Ag Mesh [Silver] Itching     Tegaderm that is used over port a cath        Family History   Problem Relation Age of Onset    COPD Mother     Hypertension Father     Hyperlipidemia Father     Diabetes Father     Skin cancer Paternal Grandmother     Malig Hyperthermia Neg Hx        Social History     Socioeconomic History    Marital status: Single    Number of children: 2   Tobacco Use    Smoking status: Never     Passive exposure: Never    Smokeless tobacco: Never   Vaping Use    Vaping Use: Never used   Substance and Sexual Activity    Alcohol use: Not Currently     Comment: OCCASSIONAL    Drug use: Never    Sexual activity: Defer       Vital Signs:   /78 (BP Location: Right arm)   Pulse 80   Temp 97.6 °F (36.4 °C) (Temporal)   Resp 18   Ht 160 cm (63\")   Wt 74.4 kg (164 lb 0.4 oz)   SpO2 98%   BMI 29.06 kg/m²    Review of Systems    Physical Exam  Vitals and nursing note reviewed.   Constitutional:       Appearance: Normal appearance.   HENT:      Head: Normocephalic and atraumatic.   Eyes:      Extraocular Movements: Extraocular movements intact.      Pupils: Pupils are equal, round, and reactive to light.   Cardiovascular:      Pulses: Normal pulses.   Pulmonary:      Effort: Pulmonary effort is normal. No accessory muscle usage or respiratory distress.   Chest:   Breasts:     Right: Normal. Mass present. No inverted nipple, nipple discharge or skin change.      Left: Normal. No inverted nipple, nipple discharge or skin change.   Abdominal:      General: Abdomen is flat.      Palpations: Abdomen is soft.      Tenderness: There is no abdominal " tenderness. There is no guarding.   Musculoskeletal:         General: No swelling, tenderness or deformity.      Cervical back: Neck supple.   Lymphadenopathy:      Upper Body:      Right upper body: Axillary adenopathy present. No supraclavicular adenopathy.      Left upper body: No supraclavicular or axillary adenopathy.   Skin:     General: Skin is warm and dry.   Neurological:      General: No focal deficit present.      Mental Status: She is alert and oriented to person, place, and time.   Psychiatric:         Mood and Affect: Mood normal.         Thought Content: Thought content normal.           Assessment and Plan    Diagnoses and all orders for this visit:    1. Metaplastic carcinoma of breast  -     Follow Anesthesia Guidelines / Protocol; Standing  -     Place sequential compression device- to be placed on patient in Pre-op; Standing  -     Verify / Perform Chlorhexidine Skin Prep; Standing  -     Obtain Informed Consent; Standing  -     ceFAZolin in dextrose (ANCEF) IVPB solution 2,000 mg  -     No Lab Testing Needed; Standing  -     Follow Anesthesia Guidelines / Protocol  -     Place sequential compression device- to be placed on patient in Pre-op  -     Verify / Perform Chlorhexidine Skin Prep  -     Obtain Informed Consent  -     No Lab Testing Needed    Other orders  -     Discontinue: ceFAZolin in dextrose (ANCEF) IVPB solution 2,000 mg  Dispense: 100 mL; Refill: 0  -     ethyl alcohol 62 % 2 each  -     Vital Signs - Per Anesthesia Protocol; Standing  -     Oxygen Therapy- Nasal Cannula; Titrate 1-6 LPM Per SpO2; 90 - 95%; Standing  -     Pulse Oximetry, Continuous; Standing  -     Pregnancy, Urine - Urine, Clean Catch; Standing  -     Insert Peripheral IV; Standing  -     sodium chloride 0.9 % infusion 40 mL  -     lactated ringers infusion  -     acetaminophen (TYLENOL) tablet 1,000 mg  -     Midazolam HCl (PF) (VERSED) injection 2 mg  -     Oxygen Therapy- Nasal Cannula; Titrate 1-6 LPM Per  SpO2; 90 - 95%  -     Pulse Oximetry, Continuous  -     Pregnancy, Urine - Urine, Clean Catch  -     Insert Peripheral IV  -     Nicotine Screen, Urine - Urine, Clean Catch; Standing  -     Nicotine Screen, Urine - Urine, Clean Catch  -     Modify Scheduled Case Request; Standing  -     Modify Scheduled Case Obtain Informed Consent; Standing  -     Modify Scheduled Case Request  -     Modify Scheduled Case Obtain Informed Consent    Will plan for combination surgery with plastics- discussed surgical options with her again today and will plan for surgery on the 9th of Jan    Follow Up   No follow-ups on file.  Patient was given instructions and counseling regarding her condition or for health maintenance advice. Please see specific information pulled into the AVS if appropriate.         This document has been electronically signed by Christiana Whiting MD  January 9, 2024 09:11 EST

## 2024-01-09 NOTE — OP NOTE
Plastic Surgery Op Note    BILATERAL BREAST RECONSTRUCTION WITH PRE PECTORAL PLACEMENT OF IMPLANT, MESH, USE OF SPY, INCISIONAL VACUUM DRESSING PLACEMENT    Marilee Watts  1/9/2024    Pre-op Diagnosis:   Metaplastic carcinoma of breast [C50.919]    Post-Op Diagnosis Codes:     * Metaplastic carcinoma of breast [C50.919]    Anesthesia: General    Staff:   Circulator: Marcia Ortega RN  Scrub Person: Sarai Spence; Kingsley Lai  Vendor Representative: Peyton Clark  Assistant: Elisa Roberts RN CSA; Pratik Hobbs  Other: Vale Koenig CSA; Christin Keith RN    Surgeon: Dr Sanders  First Assistant: PETRA Prado who was instrumental in helping with hemostasis, visualization and retraction structures as well as surgical closure.  Her skilled assistance was necessary for the success of this case.    Estimated Blood Loss: 150 mL    Specimens:   Order Name Source Comment Collection Info Order Time   PREGNANCY, URINE Urine, Clean Catch  Collected By: Mali Moya RN 1/9/2024  7:33 AM     Release to patient   Routine Release        NICOTINE SCREEN, URINE Urine, Clean Catch  Collected By: Mali Moya RN 1/9/2024  7:38 AM     Release to patient   Routine Release        TISSUE PATHOLOGY EXAM Breast, Left TO MAMMOGRAPHY THEN TO PATH FOR FROZEN SECTION   COLLECTED 1109 Collected By: Christiana Whiting MD 1/9/2024 10:42 AM     Release to patient   Routine Release              Drains:   Closed/Suction Drain 1 Inferior;Right Breast Bulb 15 Fr. (Active)   Dressing Status Clean;Dry;Intact 01/09/24 1226       Closed/Suction Drain 1 Inferior;Left Breast Bulb 15 Fr. (Active)   Dressing Status Clean;Dry;Intact 01/09/24 1226       Findings:     Mastectomy:   R:  675g   L: 511 g   Indocyaninde dye demonstrated good skin perfusion bilaterally. I was unable to place a larger implant due to the additional skin margin that was needed to be removed due to implant/scar implantation on the upper lateral  breast.   Because that area was tightly, I decided to place an incisional vacuum dressing.     Implants:      Right:    Sientra smooth round gel implant [id number]     Alloderm select perforated rectangular 16x20 cm   Left:   Sientra smooth round gel implant [id number]     Alloderm select perforated rectangular 16x20 cm     Complications: none     After risks and benefits were discussed with patient and informed consent was signed, patient was taken to the OR and positioned supine. The surgical site was then prepped in a sterile fashion way and a time out was performed confirming patient's name and procedure to be performed. All surgical team was introduced by name.   The planned footprint of the reconstructed breast was marked on the chest wall. The location of the inframammary suture line was marked approximately 0.5 cm below that of the planned inframammary fold location on the reconstructed breast. A [id number] sheet of acellular dermal matrix (AlloDerm) was then placed in the breast pocket, and a 2-0 interrupted  PDS suture line was first placed horizontally between acellular dermal matrix and the underlying pectoralis muscle fibers, approximately 3 cm above the planned inframammary fold. The acellular dermal matrix was then pulled down and folded at the inframammary fold where it came  up and over the lower pole of the implant, off the chest wall. At this location, a second 2-0 running  suture line was placed through the folded, double layer of acellular dermal matrix, to the underlying chest wall.  At this point, a sizer was placed in the breast, and the remainder of acellular dermal matrix was pulled up and over the entire anterior surface of the sizer that was filled until the desired size. Indocyanine dye was injected and skin perfusion checked. Then it was removed, an implant was placed, the medial, superior, and lateral borders of the acellular dermal matrix were sutured to the chest wall with 2-0 PDS, at  the borders of the inserted implant. Intercostal block was performed and one 15 F Rehan drain was then placed. The breast pocket was then irrigated with irrisept. The skin was  closed with 3-0 Vicryl followed by running 3-0Vicryl. A mirror procedure was performed in the contralateral side. I then placed an incisional vacuum dressing on the right breast and surgical glue on the left.   Patient tolerated procedure well, there were no complications, all instruments were checked and patient was awakened and taken to PACU in stable condition.  I was present for the entire procedure.     Date: 1/9/2024  Time: 12:50 EST    This procedure was not performed to treat cancer            Jackie Sanders MD  01/09/24  12:50 EST

## 2024-01-09 NOTE — INTERVAL H&P NOTE
H&P reviewed. The patient was examined and there are no changes to the H&P.    Patient is not tachycardic  Respirations are non elaborated  Vitals:    01/09/24 0719   BP: 112/78   Pulse: 80   Resp: 18   Temp: 97.6 °F (36.4 °C)   SpO2: 98%     Risks and benefits reminded to patient who agrees to proceed

## 2024-01-09 NOTE — PLAN OF CARE
Goal Outcome Evaluation:   Pt received from surgery. Resting in bed with family at bedside. Complained of pain- controlled with medication. VSS, A&O, on RA. No complaints at this time.   Nara Husain RN

## 2024-01-09 NOTE — ANESTHESIA POSTPROCEDURE EVALUATION
Patient: Marilee Watts    Procedure Summary       Date: 01/09/24 Room / Location: [facility] OR  /  [facility]    Anesthesia Start: 1003 Anesthesia Stop: 1257    Procedures:       BREAST MASTECTOMY WITH RIGHT AXILLARY SENTINEL NODE IDENTIFICATION AND EXCISION BIOPSY (Bilateral: Breast)      BREAST RECONSTRUCTION WITH MESH AND IMPLANTS - USE OF SPY (Bilateral: Breast) Diagnosis:       Metaplastic carcinoma of breast      (Metaplastic carcinoma of breast [C50.919])    Surgeons: Christiana Whiting MD; Jackie Sanders MD Provider: Iglesia Rodriguez MD    Anesthesia Type: general ASA Status: 2            Anesthesia Type: general    Vitals  Vitals Value Taken Time   /72 01/09/24 1431   Temp 36.7 °C (98 °F) 01/09/24 1253   Pulse 90 01/09/24 1435   Resp 10 01/09/24 1253   SpO2 97 % 01/09/24 1435   Vitals shown include unfiled device data.        Post Anesthesia Care and Evaluation    Patient location during evaluation: bedside  Patient participation: complete - patient participated  Level of consciousness: awake  Pain management: adequate    Airway patency: patent  PONV Status: none  Cardiovascular status: acceptable  Respiratory status: acceptable  Hydration status: acceptable    Comments: An Anesthesiologist personally participated in the most demanding procedures (including induction and emergence if applicable) in the anesthesia plan, monitored the course of anesthesia administration at frequent intervals and remained physically present and available for immediate diagnosis and treatment of emergencies.

## 2024-01-09 NOTE — ANESTHESIA PREPROCEDURE EVALUATION
Anesthesia Evaluation     Patient summary reviewed and Nursing notes reviewed                Airway   Mallampati: I  TM distance: >3 FB  Neck ROM: full  No difficulty expected  Dental      Pulmonary - normal exam    breath sounds clear to auscultation  (+) asthma,  Cardiovascular - negative cardio ROS and normal exam    Rhythm: regular  Rate: normal        Neuro/Psych  (+) headaches  GI/Hepatic/Renal/Endo    (+) GERD    Musculoskeletal (-) negative ROS    Abdominal    Substance History - negative use     OB/GYN negative ob/gyn ROS         Other      history of cancer                Anesthesia Plan    ASA 2     general     intravenous induction     Anesthetic plan, risks, benefits, and alternatives have been provided, discussed and informed consent has been obtained with: patient.    CODE STATUS:

## 2024-01-10 VITALS
HEART RATE: 85 BPM | HEIGHT: 63 IN | SYSTOLIC BLOOD PRESSURE: 115 MMHG | OXYGEN SATURATION: 97 % | RESPIRATION RATE: 14 BRPM | DIASTOLIC BLOOD PRESSURE: 70 MMHG | BODY MASS INDEX: 29.06 KG/M2 | WEIGHT: 164.02 LBS | TEMPERATURE: 98.6 F

## 2024-01-10 PROCEDURE — 99024 POSTOP FOLLOW-UP VISIT: CPT | Performed by: NURSE PRACTITIONER

## 2024-01-10 PROCEDURE — 25010000002 CEFAZOLIN IN DEXTROSE 2-4 GM/100ML-% SOLUTION: Performed by: SURGERY

## 2024-01-10 RX ORDER — OXYCODONE HYDROCHLORIDE AND ACETAMINOPHEN 5; 325 MG/1; MG/1
1 TABLET ORAL EVERY 4 HOURS PRN
Qty: 18 TABLET | Refills: 0 | Status: SHIPPED | OUTPATIENT
Start: 2024-01-10 | End: 2024-01-13

## 2024-01-10 RX ADMIN — OXYCODONE HYDROCHLORIDE AND ACETAMINOPHEN 1 TABLET: 5; 325 TABLET ORAL at 08:21

## 2024-01-10 RX ADMIN — OXYCODONE HYDROCHLORIDE AND ACETAMINOPHEN 1 TABLET: 5; 325 TABLET ORAL at 02:43

## 2024-01-10 RX ADMIN — CEFAZOLIN SODIUM 2000 MG: 2 INJECTION, SOLUTION INTRAVENOUS at 02:34

## 2024-01-10 NOTE — PLAN OF CARE
Goal Outcome Evaluation:      Education complete. Pt discharging.  Toyin Grewal RN

## 2024-01-10 NOTE — DISCHARGE INSTRUCTIONS
Ok for regular diet  Do not drive for next 2 weeks  Ok to shower in 3 days from waist down. Do not wet or change the right breast dressing.   Strip drains q 8 hours and record drain output daily. Wash hands or wear gloves when manipulating drains.  Do not exercise for the next 6 weeks.  Sleep in an upright position  No bra for 1 week. After that, wear a comfortable soft bra  Ok to move arms slowly to the shoulder level (brushing hair movement)  Do not fly for 2 months    Take post-operative medications as directed on the bottle.     If you experience any issues or concerns, please contact the office at [phone]. If after hours a call service will notify the on-call provider.

## 2024-01-10 NOTE — PROGRESS NOTES
"POST OP PROGRESS NOTE     Patient Name:  Marilee Watts  YOB: 1995  [id number]   LOS: 0 days   1 Day Post-Op  Patient Care Team:  Sakina Alvarez APRN as PCP - General (Nurse Practitioner)  Yessica Acevedo RN as Nurse Navigator  Ramin Shine MD as Consulting Physician (Hematology and Oncology)  Christiana Whiting MD as Consulting Physician (General Surgery)          Subjective     Interval History:   VSS. Afebrile, tolerating diet, pain controlled      Review of Systems:    A complete review of systems was performed and all are negative except what is documented in the HPI.       Objective     Constitutional:  well nourished, no acute distress, appears stated age /68 (BP Location: Left leg, Patient Position: Lying)   Pulse 85   Temp 98.6 °F (37 °C) (Oral)   Resp 14   Ht 160 cm (63\")   Wt 74.4 kg (164 lb 0.4 oz)   SpO2 97%   BMI 29.06 kg/m²    Eyes:  anicteric sclerae, moist conjunctivae, no lid lag, PERRLA  ENMT:  oropharynx clear, moist mucous membranes, good dentition  Neck:   full ROM, trachea midline, no thyromegaly  Cardiovascular: RRR, S1 and S2 present, no MRG, heart rate 85, no pedal edema  Respiratory: lungs CTA, respirations even and unlabored  GI:  Abdomen soft, nontender, nondistended, no HSM     Skin:  warm and dry, normal turgor, no rashes, bilateral chest incisions intact, Jps with serosang output,   Psychiatric:  alert and oriented x3, intact judgment and insight, cooperative          Results Review:       I reviewed the patient's new clinical results including  no labs.     No results found for: \"WBC\", \"RBC\", \"HGB\", \"HCT\", \"MCV\", \"MCH\", \"MCHC\", \"RDW\", \"RDWSD\", \"MPV\", \"PLT\", \"NEUTRORELPCT\", \"LYMPHORELPCT\", \"MONORELPCT\", \"EOSRELPCT\", \"BASORELPCT\", \"AUTOIGPER\", \"NEUTROABS\", \"LYMPHSABS\", \"MONOSABS\", \"EOSABS\", \"BASOSABS\", \"AUTOIGNUM\", \"NRBC\"      Basic Metabolic Panel    Sodium No results found for: \"NA\"   Potassium No results found for: \"K\"   Chloride No " "results found for: \"CL\"   Bicarbonate No results found for: \"PLASMABICARB\"   BUN No results found for: \"BUN\"   Creatinine No results found for: \"CREATININE\"   Calcium No results found for: \"CALCIUM\"   Glucose      No components found for: \"GLUCOSE.*\"       Lab Results   Component Value Date    GLUCOSE 75 11/16/2023    BUN 12 11/16/2023    CREATININE 0.62 11/16/2023    EGFR 123.8 11/16/2023    BCR 19.4 11/16/2023    K 3.6 11/16/2023    CO2 24.7 11/16/2023    CALCIUM 10.5 11/16/2023    ALBUMIN 4.2 11/16/2023    BILITOT 0.2 11/16/2023    AST 25 11/16/2023    ALT 33 11/16/2023       IMAGING:  Imaging Results (Last 72 Hours)       Procedure Component Value Units Date/Time    Mammo Stereotactic Breast Specimen Right [[id number]] Collected: 01/09/24 1547     Updated: 01/09/24 1551    Narrative:      PROCEDURE: MAMMO STEREOTACTIC BREAST SPECIMEN RIGHT     COMPARISON: [facility], , MAMMO DIAGNOSTIC DIGITAL TOMOSYNTHESIS RIGHT W CAD,   1/04/2024, 12:53.     INDICATIONS: 28-year-old woman undergoing mastectomy and right axillary sentinel node biopsy for   right breast cancer.  She had biopsy of a right axillary lymph node an outside facility marked by a   coil shaped biopsy clip.  No adenopathy was seen post neoadjuvant chemotherapy, and Magseed   placement was not possible according to 1/4/2024 report.     FINDINGS:  Specimen radiograph includes the coil shaped biopsy clip seen in the right axilla on 1/4/2024   diagnostic mammogram.  I called this result to the operating room.       Impression:         Specimen radiograph includes the coil shaped biopsy clip.            APRIL OROURKE MD         Electronically Signed and Approved By: APRIL OROURKE MD on 1/09/2024 at 15:47                             Medications:    Current Facility-Administered Medications:     acetaminophen (TYLENOL) tablet 650 mg, 650 mg, Oral, Q4H PRN **OR** acetaminophen (TYLENOL) suppository 650 mg, 650 mg, Rectal, Q4H PRN, Fensterer, " Jackie OHARA MD    dextrose 5 % and sodium chloride 0.45 % infusion, 50 mL/hr, Intravenous, Continuous, Jackie Sanders MD, Last Rate: 50 mL/hr at 01/09/24 1846, 50 mL/hr at 01/09/24 1846    HYDROmorphone (DILAUDID) injection 0.5 mg, 0.5 mg, Intravenous, Q2H PRN, 0.5 mg at 01/09/24 1638 **AND** naloxone (NARCAN) injection 0.1 mg, 0.1 mg, Intravenous, Q5 Min PRN, Jackie Sanders MD    ketorolac (TORADOL) injection 30 mg, 30 mg, Intravenous, Q6H PRN, Jackie Sanders MD, 30 mg at 01/09/24 1850    lactated ringers infusion, 9 mL/hr, Intravenous, Continuous PRN, Jackie Sanders MD, Stopped at 01/09/24 1600    ondansetron ODT (ZOFRAN-ODT) disintegrating tablet 4 mg, 4 mg, Oral, Q6H PRN **OR** ondansetron (ZOFRAN) injection 4 mg, 4 mg, Intravenous, Q6H PRN, Jackie Sanders MD, 4 mg at 01/09/24 1726    oxyCODONE-acetaminophen (PERCOCET) 5-325 MG per tablet 1 tablet, 1 tablet, Oral, Q4H PRN, Jackie Sanders MD, 1 tablet at 01/10/24 0821    prochlorperazine (COMPAZINE) injection 10 mg, 10 mg, Intravenous, Q6H PRN, Christiana Whiting MD, 10 mg at 01/09/24 1823    prochlorperazine (COMPAZINE) tablet 10 mg, 10 mg, Oral, Q6H PRN, Jackie Sanders MD    promethazine (PHENERGAN) tablet 12.5 mg, 12.5 mg, Oral, Q6H PRN **OR** promethazine (PHENERGAN) suppository 12.5 mg, 12.5 mg, Rectal, Q6H PRN, Jackie Sanders MD    rOPINIRole (REQUIP) tablet 0.5 mg, 0.5 mg, Oral, Nightly PRN, Jackie Sanders MD    sennosides-docusate (PERICOLACE) 8.6-50 MG per tablet 2 tablet, 2 tablet, Oral, BID PRN, Jackie Sanders MD    Assessment & Plan       Metaplastic carcinoma of breast    Breast cancer  S/P BREAST MASTECTOMY WITH RIGHT AXILLARY SENTINEL NODE IDENTIFICATION AND EXCISION BIOPSY (Bilateral)  BREAST RECONSTRUCTION WITH MESH AND IMPLANTS - USE OF SPMeetBall   [place]            Electronically signed by Maria Del Rosario Mckeon, APRN, 01/10/24, 8:59 AM EST.

## 2024-01-10 NOTE — PROGRESS NOTES
PLASTIC SURGERY PROGRESS NOTE    Patient Identification:  Name: Marilee Watts    Age: 28 y.o.    Sex: female   :  1995  MRN: [id number]               Subjective:  Panic attack yesterday after seeing the scars     Objective:    Continuous Infusions:dextrose 5 % and sodium chloride 0.45 %, 50 mL/hr, Last Rate: 50 mL/hr (24 1846)  lactated ringers, 9 mL/hr, Last Rate: Stopped (24 1600)        Scheduled Meds:   PRN Meds:  acetaminophen **OR** acetaminophen    HYDROmorphone **AND** naloxone    ketorolac    lactated ringers    ondansetron ODT **OR** ondansetron    oxyCODONE-acetaminophen    prochlorperazine    prochlorperazine    promethazine **OR** promethazine    rOPINIRole    senna-docusate sodium    Vital signs in last 24 hours:  Vitals:    24 1559 24 1857 24 2218 01/10/24 0213   BP: 126/78 124/84 120/64 118/66   BP Location: Left leg Left leg Left leg Left leg   Patient Position: Sitting Lying Lying Lying   Pulse: 107 95 91 88   Resp: 18 16 16 16   Temp: 97.7 °F (36.5 °C) 97.3 °F (36.3 °C) 97.7 °F (36.5 °C) 97.3 °F (36.3 °C)   TempSrc: Oral Oral Oral Oral   SpO2: 90% 93% 96% 97%   Weight:       Height:           Intake/Output:I/O last 3 completed shifts:  In: 2633.4 [P.O.:730; I.V.:1903.4]  Out: 1750 [Urine:1350; Drains:250; Blood:150]    Exam:  GEN: no acute distress, resting quietly   HEENT: NCAT, EOMI, MMM   HEART: normal rate, regular rhythm   LUNGS: respirations non-labored   ABD: soft, nondistended, nontender   EXT: moves all extremities, no edema      Recent Results (from the past 24 hour(s))   Pregnancy, Urine - Urine, Clean Catch    Collection Time: 24  8:07 AM    Specimen: Urine, Clean Catch   Result Value Ref Range    HCG, Urine QL Negative Negative   Nicotine Screen, Urine - Urine, Clean Catch    Collection Time: 24  8:07 AM    Specimen: Urine, Clean Catch   Result Value Ref Range    Cotinine Screen, Urine  Negative Negative         Assessment:     Metaplastic carcinoma of breast    Breast cancer      1 Day Post-Op Procedure(s) (LRB):  BREAST MASTECTOMY WITH RIGHT AXILLARY SENTINEL NODE IDENTIFICATION AND EXCISION BIOPSY (Bilateral)  BREAST RECONSTRUCTION WITH MESH AND IMPLANTS - USE OF SPY (Bilateral)    Plan:  I explained to patient we needed to remove more skin from the right side due to skin involvement. That's why she has the LEXA dressing. She will keep that intact for a week and no movement with the right arm.   Jackie Sanders MD    1/10/2024

## 2024-01-16 ENCOUNTER — TELEPHONE (OUTPATIENT)
Dept: PLASTIC SURGERY | Facility: CLINIC | Age: 29
End: 2024-01-16
Payer: COMMERCIAL

## 2024-01-16 ENCOUNTER — OFFICE VISIT (OUTPATIENT)
Dept: PLASTIC SURGERY | Facility: CLINIC | Age: 29
End: 2024-01-16
Payer: COMMERCIAL

## 2024-01-16 VITALS
SYSTOLIC BLOOD PRESSURE: 100 MMHG | WEIGHT: 163 LBS | HEART RATE: 90 BPM | HEIGHT: 63 IN | OXYGEN SATURATION: 98 % | TEMPERATURE: 97.8 F | BODY MASS INDEX: 28.88 KG/M2 | DIASTOLIC BLOOD PRESSURE: 69 MMHG

## 2024-01-16 DIAGNOSIS — N65.1 DISPROPORTION BETWEEN NATIVE BREAST AND RECONSTRUCTED BREAST: ICD-10-CM

## 2024-01-16 DIAGNOSIS — Z09 POSTOPERATIVE FOLLOW-UP: Primary | ICD-10-CM

## 2024-01-16 NOTE — PROGRESS NOTES
"Post-op Follow-up (1 week post op)            History of Present Illness  Marilee Watts is a 28 y.o. female who presents to [facility] PLASTIC & RECONSTRUCTIVE SURGERY as a post op for BREAST RECONSTRUCTION WITH MESH AND IMPLANTS - USE OF SPY 1/9/24.    Pt presents today for 1 week post op. Pt states her rt drain has been pulling and leaking since yesterday    Pt has 2 drains intact with output of  Right 25/20/17  Left 20/7/17    She does have a alex on rt breast.    Has finished antibiotic.    Subjective  the left drain is burning and right drain is leaking    Tegaderm ag mesh [silver]  Allergies Reconciled.    Review of Systems   Constitutional:  Positive for activity change.   HENT: Negative.     Eyes: Negative.    Respiratory: Negative.     Cardiovascular: Negative.    Gastrointestinal: Negative.    Endocrine: Negative.    Genitourinary: Negative.    Musculoskeletal:  Positive for myalgias.   Skin:  Positive for bruise.   Allergic/Immunologic: Negative.    Neurological: Negative.    Hematological: Negative.    Psychiatric/Behavioral:  The patient is nervous/anxious.       All review of system has been reviewed and it  is negative except the ones note above.     Objective     /69 (BP Location: Left arm, Patient Position: Sitting, Cuff Size: Adult)   Pulse 90   Temp 97.8 °F (36.6 °C) (Temporal)   Ht 160 cm (62.99\")   Wt 73.9 kg (163 lb)   SpO2 98%   BMI 28.88 kg/m²     Body mass index is 28.88 kg/m².    Physical Exam   Vitals reviewed.  Constitutional  She appears well-developed and well-nourished.     Eyes  System normal.       Cardiovascular: Normal rate.     Pulmonary/Chest  Effort normal.     Skin  Skin is warm and dry.     Psychiatric  She has a normal mood and affect.     Breasts: Steri-strips in place over mastectomy incisions with no erythema, alex intact over right breast incision, expected ecchymosis and edema, bilateral drains in place with serosanguineous fluid, implants " in good position                                                           Result Review :         No new results to review this visit     Assessment and Plan      Diagnoses and all orders for this visit:    1. Postoperative follow-up (Primary)    2. Disproportion between native breast and reconstructed breast    S/p bilateral breast reconstruction    Additional Order(s):       Plan:  LEXA removed as well as both drains  Patient concerned about asymmetry of breasts, discussed at length what to expect with healing, at least 3-6 months  Reinforced limitation of activity for 3 months to allow for collagen formation  patient instructed to call office for any questions or concerns and verbalized understanding of all instructions given.    RTC in 1 week for fluid check.    Scribed by Rosina Epstein MA, acting as a scribe for MARIO Hoyt, 01/16/24 14:20 EST.  MARIO Hoyt's signature on the note affirms that the note adequately documents the care provided.        Patient was given instructions and counseling regarding her condition. Please see specific information pulled into the AVS if appropriate.     MARIO Hoyt  01/16/2024

## 2024-01-16 NOTE — PROGRESS NOTES
"Follow-up (Questions prior to surgery)            History of Present Illness  Marilee Watts is a 28 y.o. female who presents to [facility] PLASTIC [facility] RECONSTRUCTIVE SURGERY as a consult from Dr. Whiting for combo case for breast recon, and to discuss breast reconstructive options.  Patient considering nipple sparing mastectomy.      Last chemo treatment is in 1/2024  She wears 34C-D bra size and would like to be same size.        Subjective      Tegaderm ag mesh [silver]  Allergies Reconciled.    Review of Systems  All system were reviewed and were negative, except the ones noted above.     @Objective     /81 (BP Location: Left arm, Patient Position: Sitting, Cuff Size: Adult)   Pulse 97   Temp 98.7 °F (37.1 °C) (Temporal)   Ht 160 cm (62.99\")   Wt 73 kg (161 lb)   SpO2 98%   BMI 28.53 kg/m²     Body mass index is 28.53 kg/m².    Physical Exam   Cardiovascular: Normal rate.     Pulmonary/Chest  Effort normal.     Physical exam:  Patient awake, alert, oriented  Respirations are non elaborated  Patient is not tachycardic    Breast exam:   Rt breast previous lumpectomy scar.    Axillary Lymphadenopathy: No axillary lymphadenopathy  SN-N (Right Breast): 21.5  SN-N (Left Breast): 22  N-IMF (Right Breast): 9  N-IMF (Left Breast): 8  Base Width (Right Breast): 15  Base Width (Left Breast): 15.5      Result Review :              Assessment and Plan      Diagnoses and all orders for this visit:    1. Metaplastic carcinoma of breast (Primary)    2. Disproportion between native breast and reconstructed breast          Additional Order(s):         Breast reconstruction options (Tissue Expander/Implant versus Autologous) were all discussed with the patient at length.  In addition, we discussed options for symmetry procedures and nipple reconstruction.  The patient understands that her reconstructed breast will not have the same sensation as a natural breast and that visible incision lines will " always be present.  In addition, patient understand that breast reconstruction is a multi-stage procedure that can take months to years to complete.   After thorough discussion of risk and benefits of each procedure the patient has elected to proceed.   We will send information for prior authorization.  Photos were obtained.   Patient was given the breast reconstruction pamphlet as well as directed to the ASPS website for additional information.   The patient was made aware that the FDA has discovered rare occurrences between an uncommon form of lymphoma (anaplastic large cell) and women with breast implants.  While the incidence is quite low, the risk of development is real; therefore, any unusual symptoms or signs (most commonly a late fluid collection years later) should be brought to the attention of your physician.  Patient also counseled on risks and benefits of saline versus silicone implants, lifting restrictions, scar placement, risk of capsular contracture, animation deformity, need for likely revision of breast implants at some point in her life, FDA recommendation of MRI every three years to monitor for rupture, and continued mammography for breast cancer surveillance.   We had a long discussion with the patient and her family today regarding her reconstructive options.  These include no reconstruction, reconstruction at the time of mastectomy or lumpectomy, and finally delayed reconstruction.  We discussed specific types of reconstruction, including: tissue expander and/or implants, and autologous reconstruction with either pedicled or free flap.  We also covered the later stages of reconstruction, including nipple reconstruction and areola tattooing, fat grafting, and symmetry procedures if indicated or desired.   I described the typical ellen-operative course of each procedure, including the nature of the procedure, hospital stay, necessity for drains, post-operative activity restriction, and  postoperative visits (including those for tissue expansion).  We also discussed the time frame for reconstruction.  I shared information about saline vs. silicone implants, including their differences, risk of capsular contracture, rippling, or leak/rupture, and safety/monitoring issues.  I described Alloderm and its use for implant reconstruction. We discussed that radiation therapy, if she ultimately requires it, may alter the reconstructive options.     We reviewed surgical risks including, but not limited to, infection, bleeding, hematoma, seroma, pain, scarring, numbness, skin or nipple loss, wound healing problems, flap failures including partial or complete flap loss, asymmetry, need for revisions or further surgeries,  and risks associated with anesthesia.   Additional risks with autologous reconstruction include donor wound morbidities, such as hernias or bulges, wound healing problems, muscle weakness, partial or complete flap loss, and typical surgical risks as listed above.   The use of biological mesh is not for soft tissue reinforcement. The use of mesh is necessary because the mastectomy dissection pocket is larger than the implant itself. The intention of the mesh is to restrain the displacement of the implant to the axilla, which is a very common complication requiring revision. In my experience, the use of mesh avoids that specific complication and very often avoids a second surgery. The use of mesh also allows me to perform a safer procedure since it holds the implant in place and alleviates the pressure over the skin that depends only on the subdermal blood supply. Without the mesh, I believe I wouldn't be able to perform direct implant reconstruction and give the optimal result that patient desires and deserves.      Specifically on her case,  I understand she is having bilateral skin sparing mastectomies. In this case, I recommend bilateral breast reconstruction with pre pectoral placement of  implant and mesh, use of spy. There is also a possibility of use of expanders.   Consent:  bilateral breast reconstruction with implants and mesh, use of spy, possible use of expanders.   Target implant size 550 cc Extra Projection       CPT codes:   80346-42 - immediate insertion of breast prosthesis following reconstruction   22619-94  implantation of biologic implant  54613- intravenous injection of agent (SPY)      Implants Sientra:  Sientra Smooth round gel implant  HP 15057-189, 505, 535, 565 x2                                                          XP 64309-704, 510 x2  Expander: [id number] x2  Single use sizer [id number] x1    Mesh:    Mesh:  alloderm rectangle 16x20 perforated [id number] x2   OR time: 3 hours     Jackie Sanders MD, PhD  NPI: [id number]    Women's Health and Cancer Rights Act (WHCRA)  The Women's Health and Cancer Rights Act of 1998 (WHCRA) is a federal law that provides protections to patients who choose to have breast reconstruction in connection with a mastectomy.  Coverage must be provided for:    1.All stages of reconstruction of the breast on which the mastectomy has been performed;  2.Surgery and reconstruction of the other breast to produce a symmetrical appearance; and  Prostheses and treatment of physical complications of all stages of the mastectomy, including lymphedema.    This law applies to two different types of coverage:    1.Group health plans (provided by an employer or union);  2.Individual health insurance policies (not based on employment).    Plan:  Given these options, the patient has verbally expressed an understanding of the risks of surgery and finds these risks acceptable. We will proceed with surgery as soon as possible.    Discussed surgery after chemo 1/24        Scribed by Kate Sharma, acting as a scribe for Jackie Sanders MD, 06/21/23 15:39 EDT.  Jackie Sanders MD's signature on the note affirms that the note adequately documents the care  provided.        I spent 60 minutes caring for Marilee on this date of service. This time includes time spent by me in the following activities:performing a medically appropriate examination and/or evaluation , counseling and educating the patient/family/caregiver, documenting information in the medical record, and care coordination        Follow Up     No follow-ups on file.    Patient was given instructions and counseling regarding her condition. Please see specific information pulled into the AVS if appropriate.     Jackie Sanders MD  01/04/2024

## 2024-01-16 NOTE — TELEPHONE ENCOUNTER
Provider: FER MODI    Caller: RUTH FRANK    Relationship to Patient: SELF    Pharmacy:   [facility] [id number] - [place], [place] - [address] [facility] - [phone] [facility] [phone] FX     Phone Number: [phone]     Reason for Call: LEAKING DRAIN    When was the patient last seen: 01/09/24    When did it start: 01/15/24    Where is it located: RT SIDE    Characteristics of symptom/severity: DRAIN IS LEAKING MODERATELY, PULLING, BURNING FEELING    Timing- Is it constant or intermittent: CONSTANT    What makes it worse: LAYING DOWN    What makes it better: NOTHING    What therapies/medications have you tried: IB, TYLENOL, PROPPED UP,

## 2024-01-18 PROBLEM — Z98.890 S/P BREAST RECONSTRUCTION, BILATERAL: Status: ACTIVE | Noted: 2024-01-18

## 2024-01-18 PROBLEM — Z09 POSTOPERATIVE FOLLOW-UP: Status: ACTIVE | Noted: 2024-01-18

## 2024-01-18 NOTE — PROGRESS NOTES
"Post-op Follow-up (Fluid check)            History of Present Illness  Marilee Watts is a 28 y.o. female who presents to [facility] as a post op for BREAST RECONSTRUCTION WITH MESH AND IMPLANTS - USE OF SPY 1/9/24.    Here today for fluid check. Feels like she has fluid on right breast. Does have some knots she feels underneath bilateral armpits. The asymmetry still bothers her.    Subjective      Tegaderm ag mesh [silver]  Allergies Reconciled.    Review of Systems   Constitutional:  Positive for activity change.   HENT: Negative.     Eyes: Negative.    Respiratory: Negative.     Cardiovascular: Negative.    Gastrointestinal: Negative.    Endocrine: Negative.    Genitourinary: Negative.    Musculoskeletal:  Positive for arthralgias and myalgias.   Skin:  Positive for bruise.   Allergic/Immunologic: Negative.    Neurological: Negative.    Hematological: Negative.    Psychiatric/Behavioral:  The patient is nervous/anxious.       All review of system has been reviewed and it  is negative except the ones note above.     Objective     /70 (BP Location: Left arm, Patient Position: Sitting, Cuff Size: Adult)   Pulse 88   Temp 98.2 °F (36.8 °C) (Temporal)   Ht 160 cm (62.99\")   Wt 73.4 kg (161 lb 12.8 oz)   SpO2 98%   BMI 28.67 kg/m²     Body mass index is 28.67 kg/m².    Physical Exam   Vitals reviewed.  Constitutional  She appears well-developed and well-nourished.     Eyes  System normal.       Cardiovascular: Normal rate.     Pulmonary/Chest  Effort normal.     Skin  Skin is warm and dry.     Psychiatric  She has a normal mood and affect.     Breast    Additional breast conditions include the following:   Right Breast: There is a surgical incision.The following is true regarding the right breast:     Steri-strips in place             Expected ecchymosis     Expected edema      Left Breast: There is a surgical incision. The following is true regarding the " "left breast:     Steri-strips in place             Expected ecchymosis     Expected edema          Breast: incisions healing well,steri strips to right breast intact, peeling distal end trimmed, right breast skin adhesive strip peeling lateral end and trimmed,nipples absent bilaterally,   implants still sitting high with left more than right. Surgical incisions are different due to prior surgeries, left breast has \"flat\" area right upper outer quadrant. Left breast diffusely swollen, no specific fluid pocket noted with bedside ultra sound, right noted to have large fluid pocket     Result Review :   no new results to review this visit    Assessment and Plan      Diagnoses and all orders for this visit:    1. Postoperative follow-up (Primary)    2. S/P breast reconstruction, bilateral    3. Seroma of breast        Additional Order(s):       Plan:  Photos obtained today.   Trimmed loose steri strips and spitting suture from right breast and and skin adhesive from left breast   Bedside  ultrasound utilized to assess for fluid. Aspirated 120CC's serosanguinous fluid from  right lower breast. Left breast showed  residual swelling, no fluid pockets to drain. Patient is very concerned with the breast asymmetry, discussed the healing process and what to expect for the first 3 months and 3-6 months for implants to settle completely. Discussed options for revisions  such as fat grafting.   Patient is aware to limit use of upper arms to decrease fluid build up. Continue to do gentle downward massage to bilateral breasts.   RTC in 1 week for fluid check.  patient instructed to call office for any questions or concerns and verbalized understanding of all instructions given.      Scribed by Rosina Epstein MA, acting as a scribe for MAIRO Hoyt, 01/23/24 11:53 EST.  MARIO Hoyt's signature on the note affirms that the note adequately documents the care provided.        Patient was given instructions and " counseling regarding her condition. Please see specific information pulled into the AVS if appropriate.     Anh Chaney, APRN  01/23/2024

## 2024-01-22 LAB
CYTO UR: NORMAL
LAB AP CASE REPORT: NORMAL
LAB AP CLINICAL INFORMATION: NORMAL
LAB AP DIAGNOSIS COMMENT: NORMAL
LAB AP SPECIAL STAINS: NORMAL
LAB AP SYNOPTIC CHECKLIST: NORMAL
Lab: NORMAL
PATH REPORT.FINAL DX SPEC: NORMAL
PATH REPORT.GROSS SPEC: NORMAL

## 2024-01-23 ENCOUNTER — OFFICE VISIT (OUTPATIENT)
Dept: PLASTIC SURGERY | Facility: CLINIC | Age: 29
End: 2024-01-23
Payer: COMMERCIAL

## 2024-01-23 ENCOUNTER — PRIOR AUTHORIZATION (OUTPATIENT)
Dept: NEUROLOGY | Facility: CLINIC | Age: 29
End: 2024-01-23
Payer: COMMERCIAL

## 2024-01-23 ENCOUNTER — OFFICE VISIT (OUTPATIENT)
Dept: ONCOLOGY | Facility: HOSPITAL | Age: 29
End: 2024-01-23
Payer: COMMERCIAL

## 2024-01-23 ENCOUNTER — HOSPITAL ENCOUNTER (OUTPATIENT)
Dept: ONCOLOGY | Facility: HOSPITAL | Age: 29
Discharge: HOME | End: 2024-01-23
Admitting: INTERNAL MEDICINE
Payer: COMMERCIAL

## 2024-01-23 ENCOUNTER — OFFICE VISIT (OUTPATIENT)
Dept: NEUROLOGY | Facility: CLINIC | Age: 29
End: 2024-01-23
Payer: COMMERCIAL

## 2024-01-23 ENCOUNTER — TELEPHONE (OUTPATIENT)
Dept: ONCOLOGY | Facility: HOSPITAL | Age: 29
End: 2024-01-23

## 2024-01-23 VITALS
HEIGHT: 63 IN | SYSTOLIC BLOOD PRESSURE: 117 MMHG | BODY MASS INDEX: 28.77 KG/M2 | DIASTOLIC BLOOD PRESSURE: 81 MMHG | WEIGHT: 162.4 LBS | HEART RATE: 93 BPM

## 2024-01-23 VITALS
BODY MASS INDEX: 28.13 KG/M2 | SYSTOLIC BLOOD PRESSURE: 113 MMHG | HEART RATE: 72 BPM | RESPIRATION RATE: 18 BRPM | WEIGHT: 158.73 LBS | OXYGEN SATURATION: 100 % | DIASTOLIC BLOOD PRESSURE: 68 MMHG | TEMPERATURE: 97.6 F

## 2024-01-23 VITALS
DIASTOLIC BLOOD PRESSURE: 70 MMHG | OXYGEN SATURATION: 98 % | HEIGHT: 63 IN | TEMPERATURE: 98.2 F | SYSTOLIC BLOOD PRESSURE: 107 MMHG | BODY MASS INDEX: 28.67 KG/M2 | WEIGHT: 161.8 LBS | HEART RATE: 88 BPM

## 2024-01-23 DIAGNOSIS — C50.919 METAPLASTIC CARCINOMA OF BREAST: Primary | ICD-10-CM

## 2024-01-23 DIAGNOSIS — Z09 POSTOPERATIVE FOLLOW-UP: Primary | ICD-10-CM

## 2024-01-23 DIAGNOSIS — N64.89 SEROMA OF BREAST: ICD-10-CM

## 2024-01-23 DIAGNOSIS — Z98.890 S/P BREAST RECONSTRUCTION, BILATERAL: ICD-10-CM

## 2024-01-23 DIAGNOSIS — G43.719 INTRACTABLE CHRONIC MIGRAINE WITHOUT AURA AND WITHOUT STATUS MIGRAINOSUS: Primary | ICD-10-CM

## 2024-01-23 DIAGNOSIS — G25.81 RESTLESS LEG: ICD-10-CM

## 2024-01-23 DIAGNOSIS — Z45.2 FITTING AND ADJUSTMENT OF VASCULAR CATHETER: Primary | ICD-10-CM

## 2024-01-23 DIAGNOSIS — R21 RASH: ICD-10-CM

## 2024-01-23 PROCEDURE — 1160F RVW MEDS BY RX/DR IN RCRD: CPT | Performed by: NURSE PRACTITIONER

## 2024-01-23 PROCEDURE — 1159F MED LIST DOCD IN RCRD: CPT | Performed by: NURSE PRACTITIONER

## 2024-01-23 PROCEDURE — 99214 OFFICE O/P EST MOD 30 MIN: CPT | Performed by: NURSE PRACTITIONER

## 2024-01-23 PROCEDURE — 96523 IRRIG DRUG DELIVERY DEVICE: CPT

## 2024-01-23 PROCEDURE — 10160 PNXR ASPIR ABSC HMTMA BULLA: CPT | Performed by: NURSE PRACTITIONER

## 2024-01-23 PROCEDURE — 99214 OFFICE O/P EST MOD 30 MIN: CPT | Performed by: INTERNAL MEDICINE

## 2024-01-23 PROCEDURE — 25010000002 HEPARIN LOCK FLUSH PER 10 UNITS: Performed by: INTERNAL MEDICINE

## 2024-01-23 PROCEDURE — 99024 POSTOP FOLLOW-UP VISIT: CPT | Performed by: NURSE PRACTITIONER

## 2024-01-23 PROCEDURE — 1125F AMNT PAIN NOTED PAIN PRSNT: CPT | Performed by: INTERNAL MEDICINE

## 2024-01-23 RX ORDER — ROPINIROLE 1 MG/1
1 TABLET, FILM COATED ORAL NIGHTLY
Qty: 30 TABLET | Refills: 3 | Status: SHIPPED | OUTPATIENT
Start: 2024-01-23

## 2024-01-23 RX ORDER — SODIUM CHLORIDE 0.9 % (FLUSH) 0.9 %
20 SYRINGE (ML) INJECTION AS NEEDED
Status: DISCONTINUED | OUTPATIENT
Start: 2024-01-23 | End: 2024-01-24 | Stop reason: HOSPADM

## 2024-01-23 RX ORDER — HEPARIN SODIUM (PORCINE) LOCK FLUSH IV SOLN 100 UNIT/ML 100 UNIT/ML
500 SOLUTION INTRAVENOUS AS NEEDED
OUTPATIENT
Start: 2024-01-23

## 2024-01-23 RX ORDER — HEPARIN SODIUM (PORCINE) LOCK FLUSH IV SOLN 100 UNIT/ML 100 UNIT/ML
500 SOLUTION INTRAVENOUS AS NEEDED
Status: DISCONTINUED | OUTPATIENT
Start: 2024-01-23 | End: 2024-01-24 | Stop reason: HOSPADM

## 2024-01-23 RX ORDER — SODIUM CHLORIDE 0.9 % (FLUSH) 0.9 %
20 SYRINGE (ML) INJECTION AS NEEDED
OUTPATIENT
Start: 2024-01-23

## 2024-01-23 RX ORDER — SODIUM CHLORIDE 9 MG/ML
20 INJECTION, SOLUTION INTRAVENOUS ONCE
OUTPATIENT
Start: 2024-01-23

## 2024-01-23 RX ADMIN — Medication 20 ML: at 09:36

## 2024-01-23 RX ADMIN — HEPARIN SODIUM (PORCINE) LOCK FLUSH IV SOLN 100 UNIT/ML 500 UNITS: 100 SOLUTION at 09:36

## 2024-01-23 NOTE — PROGRESS NOTES
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 She had bilateral mastectomy and bilateral implants on 1/9/24. Tolerated surgery well. Still sore. Reports continued restless leg symptoms, worse at night depsite ropinirole 0.5 mg. Also reports continued rash on back of legs and abdomen. It is not red. Itchiness has improved but the size of rash has worsened. Worse after shower. Topical steroid not much beneficial. Otherwise doing well. Surgery with complete pathologic response on the basis of the National Surgical Adjuvant Breast and Bowel project criteria. DCIS still present as well as ALH, negative margins, nodes negative.   Oncology/Hematology History Overview Note   2012: Lumpectomy of fibroadenoma of right breast    3/2023: Started to have pain and swelling of known area of fibroadenoma of right breast. Started after trauma to breast.     5/26/23: Right breast lumpectomy: Invasive metaplastic carcinoma, high-grade, 4.5 x 4.0 x 3.3 cm with interspersed fibroadenoma, negative margins. ER 0%, MD 0%, HER2 0 by IHC. PDL1 CPS of 50.     6/21/23: MRI breast: Diffuse edema, hypervascularity, and skin thickening of the right breast raising the possibility of inflammatory breast cancer. 2. 4.7 cm seroma in the lateral breast in area of known recent malignant lumpectomy. 3. Metastatic right axillary adenopathy. 4. No evidence of contralateral left breast malignancy. BI-RADS Category 6, known biopsy-proven malignancy.     7/7/23: Right axillary LN biopsy: metastatic adenocarcinoma, consistent with metaplastic breast carcinoma.     7/11/23: CT  Chest, abdomen, pelvis: No definite metastatic disease. 1. Chest:  Pathologically enlarged right axillary lymphadenopathy.  Masslike opacity right upper outer breast with postsurgical changes.  Skin thickening right breast.  Correlate with tissue diagnosis and breast MRI.Small semi solid nodules in the right lung.  These are nonspecific.  A follow-up CT chest in 3 months time is recommended.  Abdomen pelvis:  No findings of metastatic disease to the abdomen or pelvis.     7/12/23: NM bone scan: negative for osseous mets    7/14/23: C1D1 Carboplatin/Paclitaxel/Pembrolizumab  8/4/23: C2D1 Carboplatin/Paclitaxel/Pembrolizumab. C2D15 held due to neutropenia  8/25/23: C3D1 Carboplatin/Paclitaxel/Pembrolizumab. All doses given  9/15/23: C4D1 Carboplatin/Paclitaxel/Pembrolizumab. C4D15 held due to neutropenia  10/6/23: C1D1 Doxorubicin/Cyclophosphamide/Pembrolizumab  10/27/23: C2D1 Doxorubicin/Cyclophosphamide/Pembrolizumab  11/20/23: C3D1 Doxorubicin/Cyclophosphamide/Pembrolizumab. Delayed due to neutropenia. Last cycle of chemo prior to surgery    1/9/24: Bilateral Mastectomy: Right breast: No invasive disease left, intermediate grade DCIS present. ypTisN0 (sn), ER/AZ negative, HER2 negative, margins negative, ALH present, left breast benign     Metaplastic carcinoma of breast   6/16/2023 Initial Diagnosis    Metaplastic carcinoma of breast     6/16/2023 - 6/16/2023 Chemotherapy    OP BREAST AC DD DOXOrubicin / Cyclophosphamide     7/13/2023 Cancer Staged    Staging form: Breast, AJCC 8th Edition  - Clinical: Stage IIIC (cT4d, cN2, cM0, G3, ER-, AZ-, HER2-) - Signed by Ramin Shine MD on 7/13/2023 7/14/2023 - 9/22/2023 Chemotherapy    OP BREAST Pembrolizumab 400 mg / PACLitaxel / CARBOplatin AUC=5     8/12/2023 - 8/12/2023 Chemotherapy    OP BREAST PACLitaxel Adjuvant (Weekly X 12)     10/6/2023 -  Chemotherapy    OP BREAST Pembrolizumab 200 mg / DOXOrubicin / Cyclophosphamide      1/23/2024 Cancer Staged     Staging form: Breast, AJCC 8th Edition  - Pathologic: ypTis (DCIS), pN0, cM0, ER-, HI-, HER2- - Signed by Ramin Shine MD on 1/23/2024     Triple negative breast cancer (Resolved)   6/28/2023 Initial Diagnosis    Triple negative breast cancer           Review of Systems   Genitourinary:  Positive for breast pain.   Musculoskeletal:  Positive for arthralgias.   Skin:  Positive for rash (Pt had a rash after last chemo treatment).   All other systems reviewed and are negative.    Current Outpatient Medications on File Prior to Visit   Medication Sig Dispense Refill    acetaminophen (TYLENOL) 500 MG tablet Take 2 tablets by mouth Every 8 (Eight) Hours. START THREE DAYS PRIOR TO SURGERY THEN RESUME AFTER SURGERY FOR THREE DAYS      gabapentin (Neurontin) 300 MG capsule Take 1 capsule by mouth 3 (Three) Times a Day for 18 doses. 18 capsule 0    naproxen (NAPROSYN) 250 MG tablet Take 1 tablet by mouth 2 (Two) Times a Day As Needed for Mild Pain. 12 tablet 0    ondansetron (Zofran) 4 MG tablet Take 1 tablet by mouth Every 6 (Six) Hours As Needed for Nausea or Vomiting. (Patient not taking: Reported on 1/16/2024) 20 tablet 0    prochlorperazine (COMPAZINE) 10 MG tablet Take 1 tablet by mouth Every 6 (Six) Hours As Needed for Nausea or Vomiting. 60 tablet 2    promethazine (PHENERGAN) 50 MG tablet Take 1 tablet by mouth Every 6 (Six) Hours As Needed for Nausea or Vomiting. 30 tablet 0    rOPINIRole (REQUIP) 0.5 MG tablet Take 1 tablet by mouth Every Night. Take 1 hour before bedtime. (Patient taking differently: Take 1 tablet by mouth At Night As Needed. Take 1 hour before bedtime.) 30 tablet 3     No current facility-administered medications on file prior to visit.       Allergies   Allergen Reactions    Tegaderm Ag Mesh [Silver] Itching     Tegaderm that is used over port a cath     Past Medical History:   Diagnosis Date    Asthma     AS CHILD    Breast cancer     COVID-19 vaccine administered      Past  Surgical History:   Procedure Laterality Date    BREAST LUMPECTOMY Right     BREAST RECONSTRUCTION Bilateral 2024    Procedure: BREAST RECONSTRUCTION WITH MESH AND IMPLANTS - USE OF SPY;  Surgeon: Jackie Sanders MD;  Location: [facility];  Service: Plastics;  Laterality: Bilateral;     SECTION      MASTECTOMY W/ SENTINEL NODE BIOPSY Bilateral 2024    Procedure: BREAST MASTECTOMY WITH RIGHT AXILLARY SENTINEL NODE IDENTIFICATION AND EXCISION BIOPSY;  Surgeon: Christiana Whiting MD;  Location: [facility];  Service: General;  Laterality: Bilateral;    PORTACATH PLACEMENT      SKIN BIOPSY      TUBAL ABDOMINAL LIGATION       Social History     Socioeconomic History    Marital status: Single    Number of children: 2   Tobacco Use    Smoking status: Never     Passive exposure: Never    Smokeless tobacco: Never   Vaping Use    Vaping Use: Never used   Substance and Sexual Activity    Alcohol use: Not Currently     Comment: OCCASSIONAL    Drug use: Never    Sexual activity: Defer     Family History   Problem Relation Age of Onset    COPD Mother     Hypertension Father     Hyperlipidemia Father     Diabetes Father     Skin cancer Paternal Grandmother     Malig Hyperthermia Neg Hx        Objective   Physical Exam  Well appearing patient in no acute distress on RA  Anicteric sclerae, non-erythematous papules palpated on stomach.   Respirations non-labored  Awake, alert, and oriented x 4. Speech intact. No gross neurologic deficit  Appropriate mood and affect    Vitals:    24 0849   BP: 113/68   Pulse: 72   Resp: 18   Temp: 97.6 °F (36.4 °C)   TempSrc: Temporal   SpO2: 100%   Weight: 72 kg (158 lb 11.7 oz)   PainSc:   2   PainLoc: Breast                   PHQ-9 Total Score:           Result Review :   The following data was reviewed by: Ramin Shine MD on 24:  Lab Results   Component Value Date    HGB 10.8 (L) 2023    HCT 32.7 (L) 2023    MCV 89.3 2023    PLT  184 11/20/2023    WBC 3.83 11/20/2023    NEUTROABS 1.99 11/20/2023    LYMPHSABS 1.28 11/20/2023    MONOSABS 0.53 11/20/2023    EOSABS 0.00 11/20/2023    BASOSABS 0.01 11/20/2023     Lab Results   Component Value Date    GLUCOSE 75 11/16/2023    BUN 12 11/16/2023    CREATININE 0.62 11/16/2023     11/16/2023    K 3.6 11/16/2023     11/16/2023    CO2 24.7 11/16/2023    CALCIUM 10.5 11/16/2023    PROTEINTOT 6.7 11/16/2023    ALBUMIN 4.2 11/16/2023    BILITOT 0.2 11/16/2023    ALKPHOS 88 11/16/2023    AST 25 11/16/2023    ALT 33 11/16/2023     Lab Results   Component Value Date    FREET4 0.96 11/16/2023    TSH 0.966 11/16/2023     Labs personally reviewed.      Pathology report personally reviewed      Surgery note personally reviewed      Mammo Stereotactic Breast Specimen Right    Result Date: 1/9/2024    Specimen radiograph includes the coil shaped biopsy clip.     APRIL OROURKE MD       Electronically Signed and Approved By: APRIL OROURKE MD on 1/09/2024 at 15:47             NM [place] Node Injection Only    Result Date: 1/9/2024    Right breast sentinel node injection.     APRIL OROURKE MD       Electronically Signed and Approved By: APRIL OROURKE MD on 1/09/2024 at 13:13             Mammo Diagnostic Digital Tomosynthesis Right With CAD    Result Date: 1/4/2024  The right axillary clip cannot be localized for Mag seed placement or needle localization as detailed above.  RECOMMENDATION(S):  SURGICAL CONSULTATION.   BIRADS:  DIAGNOSTIC CATEGORY 6--KNOWN MALIGNANCY RIGHT BREAST   BREAST COMPOSITION: Extremely dense, which lowers the sensitivity of mammography.  PLEASE NOTE:  A NORMAL MAMMOGRAM DOES NOT EXCLUDE THE POSSIBILITY OF BREAST CANCER. ANY CLINICALLY SUSPICIOUS PALPABLE LUMP SHOULD BE BIOPSIED.      Rianna Cabrera M.D.       Electronically Signed and Approved By: Rianna Cabrera M.D. on 1/04/2024 at 13:52                  Assessment and Plan    Diagnoses and all orders for this  visit:    1. Metaplastic carcinoma of breast (Primary)  -     CBC and Differential; Future  -     Comprehensive metabolic panel; Future  -     TSH; Future  -     T4, free; Future  -     Pregnancy, Urine - Urine, Clean Catch; Future  -     Lab Appointment Request; Future  -     Clinic Appointment Request; Future  -     Infusion Appointment Request 3; Future    2. Restless leg    3. Rash    Other orders  -     rOPINIRole (REQUIP) 1 MG tablet; Take 1 tablet by mouth Every Night. Take 1 hour before bedtime.  Dispense: 30 tablet; Refill: 3  -     sodium chloride 0.9 % infusion  -     Pembrolizumab (KEYTRUDA) 200 mg in sodium chloride 0.9 % 58 mL chemo IVPB      Metaplastic Carcinoma of Right Breast, triple negative  Patient is s/p lumpectomy of 4.5 cm lesion with triple negative metaplastic carcinoma, interspersed with fibroadenoma. Patient has clinical inflammatory breast cancer. MRI breast with concern for inflammatory breast cancer as well as right sided axillary involvement. Left breast without lesions per MRI. Lymph node biopsy 7/7/23 confirmed right axillary involvement by breast carcinoma.  Completed noeadjuvant chemothearpy and immunotherapy with 4 cycles of Carboplatin/Paclitaxel/Pembro (C1 7/1423 and C4 9/15/23) and 3 cycles of Doxorubicin, cyclophosphamide, pembrolizumab (completed C3 11/20/23 with delay due to neutropenia). Completed 7 cycles total. C4 omitted due to accumulation of toxicities.     1/9/24: Bilateral Mastectomy: Right breast: No invasive disease left, intermediate grade DCIS present. ypTisN0 (sn), ER/TX negative, HER2 negative, margins negative, ALH     No residual invasive dsiease present, so patient with complete pathologic response  on the basis of the National Surgical Adjuvant Breast and Bowel project criteria. DCIS still present as well as ALH, negative margins, nodes negative. Will proceed with adjuvant immunotherapy with Pembrolizumab every 3 weeks to complete 1 year. Plan to start in  2 weeks or so.     Plan for adjuvant radiation as well.      This is an acute or chronic illness that poses a threat to life or bodily function. The above treatment plan involves a high risk of complications and/or mortality of patient management. Continue to monitor for severe toxicities with labs     Rash  Unlikely 2/2 chemo at this time. Could be eczema related. Topical aquaphor and benadryl cream recommended    Anxiety  Xanax PRN added.     Restless leg  Ropinirole helps, increase to 1.0 mg.    Pulmonary nodules  Too small for biopsy or characterization by PET. Repeat CT Chest 3 months after (10/17/23) was completed with resolution of semi-solid nodules in right lung, residal 3 mm density in the RUL, likely infectious/inflammatory, unlikely related to disease      I spent 30 minutes caring for Marilee on this date of service. This time includes time spent by me in the following activities:preparing for the visit, reviewing tests, obtaining and/or reviewing a separately obtained history, performing a medically appropriate examination and/or evaluation , counseling and educating the patient/family/caregiver, ordering medications, tests, or procedures, referring and communicating with other health care professionals , documenting information in the medical record, independently interpreting results and communicating that information with the patient/family/caregiver and care coordination    Patient Follow Up: C1 immunotherapy  Patient was given instructions and counseling regarding her condition or for health maintenance advice. Please see specific information pulled into the AVS if appropriate.

## 2024-01-23 NOTE — PROGRESS NOTES
"Chief Complaint  Migraine    Subjective          Marilee Watts presents to [facility]  History of Present Illness  Had site reaction from Emgality.  Currently having about 16+ headache days per month.        Interval History:   She reports that she developed headaches many years ago. Since that time, her headaches have progressively worsened.   Currently, she reports headaches that are located frontal. She characterizes the headaches as 8/10 in severity, throbbing  in nature with associated photophobia, phonophobia, and nausea. She reports headaches last 8+ hours. She reports 25 headache days per month. She denies associated aura. She denies focal numbness, weakness, speech, and vision changes.   Triggers: stress and menstrual cycle   Symptoms improved by: Dark quiet room and Sleep   She states she is sleeping fairly well. Reports getting 6 hours of sleep per night. denies snoring. Reports refreshing sleep.   Prior prophylactic medications include: topiramate, propranolol, TCAs contraindicated d/t medication interactions, Emgality  She  uses abortive therapy such as: triptans contraindicated d/t risk for serotonin syndrome, Nurtec,   Caffeine Use: 2 servings daily  Childbearing potential : tubal ligation   History of Kidney Stones: No  She denies a family history of cerebral aneurysm.            Objective   Vital Signs:   /81   Pulse 93   Ht 160 cm (62.99\")   Wt 73.7 kg (162 lb 6.4 oz)   BMI 28.78 kg/m²     Physical Exam  HENT:      Head: Normocephalic.   Pulmonary:      Effort: Pulmonary effort is normal.   Neurological:      Mental Status: She is alert and oriented to person, place, and time.      Sensory: Sensation is intact.      Motor: Motor function is intact.      Coordination: Coordination is intact.      Deep Tendon Reflexes: Reflexes are normal and symmetric.        Neurologic Exam     Mental Status   Oriented to person, place, and time.        Result " Review :               Assessment and Plan    Diagnoses and all orders for this visit:    1. Intractable chronic migraine without aura and without status migrainosus (Primary)  Assessment & Plan:  Will start Botox for preventative therapy and Ubrelvy PRN for abortive therapy.      Botox will be administered per accepted algorithm for chronic migraine with a total of 155 units given divided as follows: 10 units in the , 5 units in the procerus, 20 units in the frontalis, 40 units in the temporalis, 30 units in the occipitalis, 20 units in the cervical paraspinals and 30 units in the trapezius.  This therapy will be given every 12 weeks.           Other orders  -     ubrogepant (Ubrelvy) 100 MG tablet; Take 1 tablet by mouth 1 (One) Time As Needed (migraine) for up to 1 dose. One tablet at HA onset, may repeat once in 2 hours if needed. (Patient not taking: Reported on 1/25/2024)  Dispense: 10 tablet; Refill: 3        Follow Up   No follow-ups on file.  Patient was given instructions and counseling regarding her condition or for health maintenance advice. Please see specific information pulled into the AVS if appropriate.

## 2024-01-23 NOTE — TELEPHONE ENCOUNTER
Caller: Marilee Watts    Relationship: Self    Best call back number: [phone]      What was the call regarding: PT MISSED CALL FROM THE OFFICE, NO MESSAGE

## 2024-01-25 ENCOUNTER — OFFICE VISIT (OUTPATIENT)
Dept: SURGERY | Facility: CLINIC | Age: 29
End: 2024-01-25
Payer: COMMERCIAL

## 2024-01-25 VITALS — BODY MASS INDEX: 30 KG/M2 | HEIGHT: 62 IN | RESPIRATION RATE: 16 BRPM | WEIGHT: 163 LBS

## 2024-01-25 DIAGNOSIS — C50.919 METAPLASTIC CARCINOMA OF BREAST: ICD-10-CM

## 2024-01-25 PROCEDURE — 1159F MED LIST DOCD IN RCRD: CPT | Performed by: SURGERY

## 2024-01-25 PROCEDURE — 1160F RVW MEDS BY RX/DR IN RCRD: CPT | Performed by: SURGERY

## 2024-01-25 PROCEDURE — 99024 POSTOP FOLLOW-UP VISIT: CPT | Performed by: SURGERY

## 2024-01-25 NOTE — PROGRESS NOTES
Chief Complaint  Post-op    Subjective          History of Present Illness  The patient is here to follow up on bilateral mastectomy with SLN bx.  They are doing well and have no complaints.  Pathology is shown below:    Results for orders placed or performed during the hospital encounter of 01/09/24   Pregnancy, Urine - Urine, Clean Catch    Specimen: Urine, Clean Catch   Result Value Ref Range    HCG, Urine QL Negative Negative   Nicotine Screen, Urine - Urine, Clean Catch    Specimen: Urine, Clean Catch   Result Value Ref Range    Cotinine Screen, Urine  Negative Negative   Tissue Pathology Exam    Specimen: A: Breast, Left; Tissue    B: [place] Lymph Node; Tissue    C: [place] Lymph Node; Tissue    D: Breast, Right; Tissue   Result Value Ref Range    Case Report       Surgical Pathology Report                         Case: [id number]                                  Authorizing Provider:  Christiana Whiting MD    Collected:           01/09/2024 10:42 AM          Ordering Location:     [facility]  Received:            01/09/2024 02:22 PM                                 OR                                                                           Pathologist:           Sally Alcaraz DO                                                       Specimens:   1) - Breast, Left, SHORT STITCH SUPERIOR & LONG STITCH LATERAL                                      2) - [place] Lymph Node, RIGHT AXILLARY SENTINEL LYMPH NODE # 1 - COUNT 185                        COLLECTED 1109                                                                                      3) - [place] Lymph Node, RIGHT AXILLARY SENTINEL LYMPH NODE # 2 - COUNT 4                          4) - Breast, Right, RIGHT BREAST SHORT STITCH SUPERIOR & LONG STITCH LATERAL               Clinical Information       Metaplastic carcinoma of breast      Final Diagnosis       1. Left breast, simple mastectomy:   - Fibrosis   - Columnar cell change    - Usual ductal hyperplasia (UDH)  - Benign nipple and skin       2.  Right axillary sentinel lymph node #1, count 185, biopsy:   - One lymph node, negative for metastatic carcinoma (0/1)    - See synoptic report    3.  Right axillary sentinel node #2, count 4, biopsy:   - One lymph node, negative for metastatic carcinoma (0/1)    - See synoptic report    4.  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)    - Breast biomarker testing on posttreatment DCIS:    - Estrogen Receptor (ER):  Negative (0%)    - Progesterone Receptor (PgR):  Negative (0%)  - Other findings:    - Atypical lobular hyperplasia (ALH)    - No residual invasive carcinoma following lumpectomy and neoadjuvant chemotherapy    - Benign nipple and skin      Synoptic Checklist       INVASIVE CARCINOMA OF THE BREAST: Resection   INVASIVE CARCINOMA OF THE BREAST: RESECTION - 2, 3, 4   8th Edition - Protocol posted: 9/20/2023      SPECIMEN      Procedure:    Total mastectomy       Specimen Laterality:    Right       TUMOR      Histologic Type:    No residual invasive carcinoma       Tumor Size:    No residual invasive carcinoma       Ductal Carcinoma In Situ (DCIS):    Present       Lymphatic and / or Vascular Invasion:    Not identified       Treatment Effect in the Breast:    No residual invasive carcinoma is present in the breast after presurgical therapy       Treatment Effect in the Lymph Nodes:    No lymph node metastases. Fibrous scarring or histiocytic aggregates, possibly related to prior lymph node metastases with pathologic complete response       MARGINS      Margin Status for DCIS:    All margins negative for DCIS         Distance from DCIS to Closest Margin:    3 mm      REGIONAL LYMPH NODES      Regional Lymph Node Status:            :    All regional lymph nodes negative for tumor         Total Number of Lymph Nodes Examined (sentinel and non-sentinel):    2         Number of [place]  "Nodes Examined:    2       pTNM CLASSIFICATION (AJCC 8th Edition)      Reporting of pT, pN, and (when applicable) pM categories is based on information available to the pathologist at the time the report is issued. As per the AJCC (Chapter 1, 8th Ed.) it is the managing physician’s responsibility to establish the final pathologic stage based upon all pertinent information, including but potentially not limited to this pathology report.      Modified Classification:    y       pT Category:    pTis (DCIS)       pN Category:    pN0       N Suffix:    (sn)          Breast Biomarker Testing Performed on Previous Biopsy:            Estrogen Receptor (ER) Status:    Negative       Breast Biomarker Testing Performed on Previous Biopsy:            Progesterone Receptor (PgR) Status:    Negative       Breast Biomarker Testing Performed on Previous Biopsy:            HER2 (by immunohistochemistry):    Negative (Score 0)         Testing Performed on Case Number:    [id number]       Comment       The patient has undergone previous lumpectomy with negative margins and subsequent lymph node biopsy, which was then followed by neoadjuvant chemotherapy.  The current specimen is a post-treatment mastectomy specimen with sentinel lymph node biopsy.       Intraoperative Consultation       2.  Right axillary sentinel node #1, count 185, biopsy: Biopsy site changes and treatment related changes.  Negative for macrometastatic carcinoma.  3.  Right axillary sentinel lymph node #2, count 4, biopsy: Negative for macrometastatic carcinoma.    The above frozen diagnoses were  called to Dr. Whiting at 12:10 PM on 1/9/2024 by Dr. ANGELA Alcaraz.      Gross Description       1. Breast, Left.  Received in formalin and labeled \"left breast short stitch superior and long stitch lateral\" is a 520 g, 17.0 x 15.0 x 5.5 cm left simple mastectomy specimen received with 2 stitches.  The anterior aspect (inked blue) displays a 13.0 x 3.5 cm tan, wrinkled skin " "ellipse with a 3.0 x 3.0 cm tan, wrinkled areola and a 1.0 x 1.0 x 0.7 cm tan-pink, everted nipple.  The posterior aspect (inked black) displays tan-pink, shaggy connective tissue over lobulated adipose tissue; no muscular tissue is grossly appreciated.  Serially sectioning from medial to lateral reveals 20% white fibrous tissue and 80% lobulated adipose tissue with no distinct lesions, lymph nodes, or biopsy clip/cavities.  Representative sections are submitted in 9 cassettes as follows: 1A-1B-fibrous tissue from upper outer quadrant; 1C-1D-fibrous tissue from lower outer quadrant; 1E-1F-fibrous tissue from upper outer quadrant; 1G-1H-fibrous tissue from lower outer quadrant; 1I-nipple.  Cold ischemic time-2 hours and 11 minutes  Total formalin fixation time-8 hours and 7 minutes.  AFUA      2. [place] Lymph Node.  Received fresh for intraoperative consultation (frozen section performed) labeled \"right axillary sentinel lymph node #1-185\" is a 2.5 cm lymph node encased in adipose tissue.  Sectioning reveals a biopsy clip.  The lymph node is entirely frozen and subsequently submitted in 2 cassettes.        3. [place] Lymph Node.  Received fresh for intraoperative consultation (frozen section performed) labeled \"right axillary sentinel lymph node #2-count 4\" is a 3.0 cm fragment of adipose tissue containing a 1.5 cm lymph node with a dark gray, soft cut surface.  The lymph node is entirely frozen and separately submitted in 1 cassette.    4. Breast, Right.  Received in formalin and labeled \"right breast short stitch superior and long stitch lateral\" is a 628 g, 17.0 x 14.0 x 6.0 cm right simple mastectomy specimen received with 2 stitches.  The anterior aspect (inked blue) displays a vertical/diagonal 14.5 x 5.0 cm tan, wrinkled skin ellipse with a 3.5 x 3.0 cm tan, wrinkled areola, a 1.4 x 1.0 x 0.6 cm tan-pink, inverted nipple, and a 6.5 cm stapled defect along the lateral aspect.  The posterior aspect (inked " black) displays tan-pink, shaggy connective tissue and scant muscular tissue over lobulated adipose tissue.  Serially sectioning from medial to lateral reveals a 4.5 x 4.0 x 3.5 cm ill-defined, stellate area of scarred fibrous tissue containing multiple gray metallic clips at 3:00 in the upper and lower outer quadrants, possibly abutting the superior skin margin, anteroinferior margin, and anterosuperior margins.  The lesion is located 0.5 cm from the posterior margin, 4.0 cm from the nipple, and 0.7 cm from the inferior skin margin.  The remaining uninvolved parenchyma includes 20% white fibrous tissue and 80% lobulated adipose tissue with no additional lesions or lymph nodes.  Representative sections are submitted as follows: 4A-lesion with anterosuperior margin and superior skin margin; 4B-lesion with posterior margin; 6G-3J-olwejfau of lesion; 4I-4J-lesion with anteroinferior margin; 4K-fibrous tissue extending from lesion to anterosuperior margin; 4L-fibrous tissue from upper inner quadrant; 4M-fibrous tissue from lower inner quadrant; 4N-fibrous tissue from upper outer quadrant; 4O-fibrous tissue from lower outer quadrant; 4P-lesion with posterior margin; 4Q-additional section of lesion; 4R-nipple, entirely; 4S-inferior skin margin, perpendicular.  Cold ischemic time-1 hour and 53 minutes; total formalin fixation time-8 hours and 7 minutes.  Additional sections are submitted as follows: 4O-8V-stzfgkg lesion with posterior margin; 4Y-lesion with anterosuperior margin; 4Z-4EE-lesion with anteroinferior margin; 9KB-9OUW-jnxhgxopb of lesion. AFUA 1/11/2024      Special Stains       Immunohistochemical stain for CK7 is performed on blocks 2A, 2B, and 3A and are negative for metastatic carcinoma.  Immunohistochemical stain for E-cadherin is performed on blocks 4G and 4OO and is negative in the ALH and positive in benign ducts.  Immunohistochemical stain for CK7 is performed on blocks 4G, 4JJ, 4SS, and 4WW and is  "negative for invasive carcinoma.  Immunohistochemical stain for p63 is performed on block 4FF and demonstrates retained myoepithelial cells surrounding DCIS.      All immunohistochemical/cytochemical stains (IHC) are performed on separate slides per different antibody unless otherwise specified in the documentation that a cocktail (multiple stain) was performed.  Controls are appropriate.        Microscopic Description       Microscopic examination performed.          Objective   Vital Signs:   Resp 16   Ht 157.5 cm (62\")   Wt 73.9 kg (163 lb)   BMI 29.81 kg/m²     Physical Exam  Vitals and nursing note reviewed.   Constitutional:       General: She is not in acute distress.     Appearance: Normal appearance. She is well-developed.   HENT:      Head: Normocephalic and atraumatic.   Eyes:      Extraocular Movements: Extraocular movements intact.      Pupils: Pupils are equal, round, and reactive to light.   Cardiovascular:      Pulses: Normal pulses.   Pulmonary:      Effort: Pulmonary effort is normal. No retractions.      Breath sounds: Normal air entry. No wheezing.   Abdominal:      General: There is no distension.      Palpations: Abdomen is soft.      Tenderness: There is no abdominal tenderness.      Hernia: No hernia is present.   Musculoskeletal:         General: No swelling or deformity.      Cervical back: Neck supple.   Skin:     General: Skin is warm and dry.      Findings: No erythema.      Comments: Surgical Incision Healing Well   Neurological:      General: No focal deficit present.      Mental Status: She is alert and oriented to person, place, and time.      Motor: Motor function is intact.   Psychiatric:         Mood and Affect: Mood normal.         Thought Content: Thought content normal.            Result Review :                 Assessment and Plan    Diagnoses and all orders for this visit:    1. Metaplastic carcinoma of breast (Primary)    Keep apt with plastics  Keep apt with " oncology  Followup 3 months  Refer to rad onc    Follow Up   Return in about 3 months (around 4/25/2024).  Patient was given instructions and counseling regarding her condition or for health maintenance advice. Please see specific information pulled into the AVS if appropriate.

## 2024-01-26 PROBLEM — G43.719 INTRACTABLE CHRONIC MIGRAINE WITHOUT AURA AND WITHOUT STATUS MIGRAINOSUS: Status: ACTIVE | Noted: 2023-08-25

## 2024-01-26 NOTE — ASSESSMENT & PLAN NOTE
Will start Botox for preventative therapy and Ubrelvy PRN for abortive therapy.      Botox will be administered per accepted algorithm for chronic migraine with a total of 155 units given divided as follows: 10 units in the , 5 units in the procerus, 20 units in the frontalis, 40 units in the temporalis, 30 units in the occipitalis, 20 units in the cervical paraspinals and 30 units in the trapezius.  This therapy will be given every 12 weeks.

## 2024-01-29 ENCOUNTER — TELEPHONE (OUTPATIENT)
Dept: ONCOLOGY | Facility: HOSPITAL | Age: 29
End: 2024-01-29

## 2024-01-29 NOTE — PROGRESS NOTES
"Post-op Follow-up (Fluid check)            History of Present Illness  Marilee Watts is a 28 y.o. female who presents to [facility] as a post op for BREAST RECONSTRUCTION WITH MESH AND IMPLANTS - USE OF SPY 1/9/24.    Here today for fluid check. She states she believes to have fluid; more on right breast. Pt states she  in her rib area more on rt side around incision line.    Very little bruising.    Subjective      Tegaderm ag mesh [silver]  Allergies Reconciled.    Review of Systems   Constitutional:  Positive for activity change.   HENT: Negative.     Eyes: Negative.    Respiratory: Negative.     Cardiovascular: Negative.    Gastrointestinal: Negative.    Endocrine: Negative.    Genitourinary: Negative.    Musculoskeletal:  Positive for arthralgias and myalgias.   Skin:  Positive for bruise.   Allergic/Immunologic: Negative.    Neurological: Negative.    Hematological: Negative.    Psychiatric/Behavioral:  The patient is nervous/anxious.       All review of system has been reviewed and it  is negative except the ones note above.     Objective     BP 97/67 (BP Location: Left arm, Patient Position: Sitting, Cuff Size: Adult)   Pulse 74   Temp 98.4 °F (36.9 °C) (Temporal)   Ht 157.5 cm (62.01\")   Wt 73.9 kg (163 lb)   SpO2 100%   BMI 29.81 kg/m²     Body mass index is 29.81 kg/m².    Physical Exam   Vitals reviewed.  Constitutional  She appears well-developed and well-nourished.     Eyes  System normal.       Cardiovascular: Normal rate.     Pulmonary/Chest  Effort normal.     Skin  Skin is warm and dry.     Psychiatric  She has a normal mood and affect.     Breast    Additional breast conditions include the following:   Right Breast: There is a surgical incision.The following is true regarding the right breast:     Steri-strips in place             Expected ecchymosis     Expected edema      Left Breast: There is a surgical incision. The " following is true regarding the left breast:     Steri-strips in place             Expected ecchymosis     Expected edema          Breast: steri strips intact on right breast, glue strip loose and peeling on left breast, removed from left breast, incisions healing well, no open areas. fluid pocket on right bottom of breast noted with bedside ultrasound, no fluid present on left breast, just residual swelling.    Result Review :   no new results to review this visit    Assessment and Plan      Diagnoses and all orders for this visit:    1. Postoperative follow-up (Primary)    2. S/P breast reconstruction, bilateral    3. Seroma of breast      Additional Order(s):       Plan:  Aspirated 35CC's from right breast., serosanguinous fluid  Applied antibiotic ointment with gauze. Removed glue strip off of left breast.   Discussed breast symmetry with patient again, there is still tissue swelling frim surgery  Advised patient to wait one more week to apply vitamin e oil to incision on left breast.   Keep steri strips intact on right breast.   RTC in 2 weeks for fluid check.  She goes on February 12th for her first radiation appointment.   Scribed by Rosina Epstein MA, acting as a scribe for MARIO Hoyt, 01/30/24 09:57 EST.  MARIO Hoyt's signature on the note affirms that the note adequately documents the care provided.        Patient was given instructions and counseling regarding her condition. Please see specific information pulled into the AVS if appropriate.     MARIO Hoyt  01/30/2024       Answers submitted by the patient for this visit:  Other (Submitted on 1/29/2024)  Please describe your symptoms.: Follow up  Have you had these symptoms before?: No  How long have you been having these symptoms?: Greater than 2 weeks  Primary Reason for Visit (Submitted on 1/29/2024)  What is the primary reason for your visit?: Other

## 2024-01-29 NOTE — TELEPHONE ENCOUNTER
Caller: Marilee Watts    Relationship: Self    Best call back number: [phone]     What is the best time to reach you: ASAP    Who are you requesting to speak with (clinical staff, provider,  specific staff member): CLINICAL        What was the call regarding: PT WANTS TO START PROCESS OF GETTING SCHEDULED FOR 1ST KEYTRUDA..  PLEASE CALL PT TO ADVISE.  SHE MENTIONED THIS WAS DISCUSSED AT LAST VISIT BUT THERE WAS A QUESTION ABOUT INSURANCE.

## 2024-01-30 ENCOUNTER — PATIENT MESSAGE (OUTPATIENT)
Dept: NEUROLOGY | Facility: CLINIC | Age: 29
End: 2024-01-30
Payer: COMMERCIAL

## 2024-01-30 ENCOUNTER — OFFICE VISIT (OUTPATIENT)
Dept: PLASTIC SURGERY | Facility: CLINIC | Age: 29
End: 2024-01-30
Payer: COMMERCIAL

## 2024-01-30 VITALS
HEART RATE: 74 BPM | BODY MASS INDEX: 30 KG/M2 | TEMPERATURE: 98.4 F | HEIGHT: 62 IN | OXYGEN SATURATION: 100 % | DIASTOLIC BLOOD PRESSURE: 67 MMHG | WEIGHT: 163 LBS | SYSTOLIC BLOOD PRESSURE: 97 MMHG

## 2024-01-30 DIAGNOSIS — D64.81 ANTINEOPLASTIC CHEMOTHERAPY INDUCED ANEMIA: Primary | ICD-10-CM

## 2024-01-30 DIAGNOSIS — T45.1X5A ANTINEOPLASTIC CHEMOTHERAPY INDUCED ANEMIA: Primary | ICD-10-CM

## 2024-01-30 DIAGNOSIS — Z09 POSTOPERATIVE FOLLOW-UP: Primary | ICD-10-CM

## 2024-01-30 DIAGNOSIS — N64.89 SEROMA OF BREAST: ICD-10-CM

## 2024-01-30 DIAGNOSIS — Z98.890 S/P BREAST RECONSTRUCTION, BILATERAL: ICD-10-CM

## 2024-01-30 PROCEDURE — 99024 POSTOP FOLLOW-UP VISIT: CPT | Performed by: NURSE PRACTITIONER

## 2024-01-30 PROCEDURE — 1159F MED LIST DOCD IN RCRD: CPT | Performed by: NURSE PRACTITIONER

## 2024-01-30 PROCEDURE — 10160 PNXR ASPIR ABSC HMTMA BULLA: CPT | Performed by: NURSE PRACTITIONER

## 2024-01-30 PROCEDURE — 1160F RVW MEDS BY RX/DR IN RCRD: CPT | Performed by: NURSE PRACTITIONER

## 2024-02-01 ENCOUNTER — HOSPITAL ENCOUNTER (OUTPATIENT)
Dept: OCCUPATIONAL THERAPY | Facility: HOSPITAL | Age: 29
Setting detail: THERAPIES SERIES
Discharge: HOME | End: 2024-02-01
Payer: COMMERCIAL

## 2024-02-01 DIAGNOSIS — Z17.0 MALIGNANT NEOPLASM OF UPPER-OUTER QUADRANT OF RIGHT BREAST IN FEMALE, ESTROGEN RECEPTOR POSITIVE: ICD-10-CM

## 2024-02-01 DIAGNOSIS — L90.5 SCAR CONDITION AND FIBROSIS OF SKIN: ICD-10-CM

## 2024-02-01 DIAGNOSIS — C50.411 MALIGNANT NEOPLASM OF UPPER-OUTER QUADRANT OF RIGHT BREAST IN FEMALE, ESTROGEN RECEPTOR POSITIVE: ICD-10-CM

## 2024-02-01 DIAGNOSIS — M25.60 JOINT STIFFNESS: ICD-10-CM

## 2024-02-01 DIAGNOSIS — Z91.89 AT RISK FOR LYMPHEDEMA: Primary | ICD-10-CM

## 2024-02-01 DIAGNOSIS — R52 PAIN: ICD-10-CM

## 2024-02-01 PROCEDURE — 97535 SELF CARE MNGMENT TRAINING: CPT | Performed by: OCCUPATIONAL THERAPIST

## 2024-02-01 PROCEDURE — 93702 BIS XTRACELL FLUID ANALYSIS: CPT | Performed by: OCCUPATIONAL THERAPIST

## 2024-02-01 NOTE — THERAPY RE-EVALUATION
Outpatient Occupational Therapy Lymphedema Re-Evaluation   [facility]     Patient Name: Marilee Watts  : 1995  MRN: [id number]  Today's Date: 2024      Visit Date: 2024    Patient Active Problem List   Diagnosis    Allergic rhinitis    Gastroesophageal reflux disease    Metaplastic carcinoma of breast    Breast fibroadenoma, right    Disproportion between native breast and reconstructed breast    Fitting and adjustment of vascular catheter    Intractable chronic migraine without aura and without status migrainosus    Chemotherapy-induced nausea    Postoperative follow-up    S/P breast reconstruction, bilateral        Past Medical History:   Diagnosis Date    Asthma     AS CHILD    Breast cancer     COVID-19 vaccine administered         Past Surgical History:   Procedure Laterality Date    BREAST LUMPECTOMY Right     BREAST RECONSTRUCTION Bilateral 2024    Procedure: BREAST RECONSTRUCTION WITH MESH AND IMPLANTS - USE OF SPY;  Surgeon: Jackie Sanders MD;  [place] [facility] [place] [facility] [place]  Service: Plastics;  Laterality: Bilateral;     SECTION      MASTECTOMY W/ SENTINEL NODE BIOPSY Bilateral 2024    Procedure: BREAST MASTECTOMY WITH RIGHT AXILLARY SENTINEL NODE IDENTIFICATION AND EXCISION BIOPSY;  Surgeon: Christiana Whiting MD;  [place] [facility] [place] [facility] [place]  Service: General;  Laterality: Bilateral;    PORTACATH PLACEMENT      SKIN BIOPSY      TUBAL ABDOMINAL LIGATION           Visit Dx:     ICD-10-CM ICD-9-CM   1. At risk for lymphedema  Z91.89 V49.89   2. Scar condition and fibrosis of skin  L90.5 709.2   3. Joint stiffness  M25.60 719.50   4. Pain  R52 780.96   5. Malignant neoplasm of upper-outer quadrant of right breast in female, estrogen receptor positive  C50.411 174.4    Z17.0 V86.0        Patient History       Row Name 24 1300             History    Chief Complaint --  Lymphedema surveillance program  -TD      Brief Description of Current Complaint Marilee xiomara london  28-year-old female with a diagnosis of metaplastic carcinoma of the right breast.  Patient underwent bilateral mastectomy with reconstruction on January 9, 2023.  -TD         Fall Risk Assessment    Any falls in the past year: No  -TD      Does patient have a fear of falling No  -TD         Services    Are you currently receiving Home Health services No  -TD      Do you plan to receive Home Health services in the near future No  -TD         Daily Activities    Primary Language English  -TD      Are you able to read Yes  -TD      Are you able to write Yes  -TD      How does patient learn best? Listening;Reading;Demonstration  -TD         Safety    Are you being hurt, hit, or frightened by anyone at home or in your life? No  -TD      Are you being neglected by a caregiver No  -TD      Have you had any of the following issues with Anxiety  Pt on medication  -TD                User Key  (r) = Recorded By, (t) = Taken By, (c) = Cosigned By      Initials Name Provider Type    TD Daniela Vazquez OT Occupational Therapist                     Lymphedema       Row Name 02/01/24 1300             Subjective Pain    Able to rate subjective pain? yes  -TD      Pre-Treatment Pain Level 3  -TD      Post-Treatment Pain Level 3  -TD         Subjective    Subjective Comments Pt reports that she isn't moving well.  -TD         Lymphedema Assessment    Lymphedema Classification RUE:;at risk/stage 0  -TD      Lymphedema Cancer Related Sx bilateral;simple mastectomy;reconstructive;sentinel node biopsy  -TD      Lymphedema Surgery Comments 1/9/2024  -TD      Lymph Nodes Removed # 2  -TD      Positive Lymph Nodes # 0  -TD      Chemo Received yes  -TD      Radiation Therapy Received yes  -TD         LLIS - Physical Concerns    The amount of pain associated with my lymphedema is: 0  -TD      The amount of limb heaviness associated with my lymphedema is: 0  -TD      The amount of skin tightness associated with my lymphedema is: 0  -TD      The  "size of my swollen limb(s) seems: 0  -TD      Lymphedema affects the movement of my swollen limb(s): 0  -TD      The strength in my swollen limb(s) is: 0  -TD         LLIS - Psychosocial Concerns    Lymphedema affects my body image (i.e., \"how I think I look\"). 0  -TD      Lymphedema affects my socializing with others. 0  -TD      Lymphedema affects my intimate relations with spouse or partner (rate 0 if not applicable 0  -TD      Lymphedema \"gets me down\" (i.e., depression, frustration, or anger) 0  -TD      I must rely on others for help due to my lymphedema. 0  -TD      I know what to do to manage my lymphedema 4  -TD         LLIS - Functional Concerns    Lymphedema affects my ability to perform self-care activities (i.e. eating, dressing, hygiene) 0  -TD      Lymphedema affects my ability to perform routine home or work-related activities. 0  -TD      Lymphedema affects my performance of preferred leisure activities. 0  -TD      Lymphedema affects proper fit of clothing/shoes 0  -TD      Lymphedema affects my sleep 0  -TD         Posture/Observations    Posture- WNL Posture is WNL  -TD         General ROM    RT Upper Ext Rt Shoulder ABduction;Rt Shoulder Flexion  -TD         Right Upper Ext    Rt Shoulder Abduction AROM 90  -TD      Rt Shoulder Flexion AROM 105  -TD         MMT (Manual Muscle Testing)    General MMT Comments BUE are WFL  -TD         Skin Changes/Observations    Location/Assessment Upper Quadrant  -TD      Upper Quadrant Conditions bilateral:;clean;dry;scab(s)  -TD      Upper Quadrant Color/Pigment bilateral:;normal  -TD      Skin Observations Comment Pt has scabs over insisions at this time.  -TD         L-Dex Bioimpedence Screening    L-Dex Measurement Extremity RUE  -TD      L-Dex Patient Position Standing  -TD      L-Dex UE Dominate Side Right  -TD      L-Dex UE At Risk Side Right  -TD      L-Dex UE Pre Surgical Value Yes  -TD      L-Dex UE Score 2.4  -TD      L-Dex UE Baseline Score 0  -TD   "    L-Dex UE Value Change 2.4  -TD      $ L-Dex Charge yes  -TD         Lymphedema Life Impact Scale Totals    A.  Total Q1 - Q17 (Do not include Q18) 4  -TD      B.  Total number of questions answered (Q1-Q17) 17  -TD      C. Divide A by B 0.24  -TD      D. Multiple C by 25 6  -TD                User Key  (r) = Recorded By, (t) = Taken By, (c) = Cosigned By      Initials Name Provider Type    Daniela Hagen OT Occupational Therapist                    The patient had a follow up SOZO measurement which I reviewed today. The score is   WNL, see scanned to EMR. Bioimpedance spectroscopy helps identify the   onset of lymphedema in an arm or leg before patients experience noticeable swelling. Research has   shown that 92% of patients with early detection of lymphedema using L-Dex combined with   intervention do not progress to chronic lymphedema through three years. Additionally, as of March 2023, the NCCN Guidelines® for Survivorship recommend proactive screening for lymphedema using   bioimpedance spectroscopy. Whenever possible, patients are tested for baseline L-Dex score before   cancer treatment begins and then are reassessed during regular follow-up visits using the SOZO device.   Otherwise, this can be started postoperatively and continued during regular follow-up visits. If the   patient’s L-Dex score increases above normal levels, that is a sign that lymphedema is developing and a   referral is made to physical therapy for further evaluation and early compression treatment.   Lymphedema assessment with the SOZO L-Dex score is recommended to be done every 3 months for   the first 3 years and then every 6 months for years 4 and 5 followed by annually afterwards         Therapy Education  Education Details: Patient then instructed to discontinue the use of the bra until skin has healed from XRT. Pt. educated to perform self-manual lymphatic drainage 2 times daily to mitigate edema/lymphedema. Pt. educated on  performing stretching 2 times daily to prevent soft tissue tightening and/or range of motion deficits. Pt. also educated to keep skin well moisturized per XRT instruction. OT also recommended patient increase hydration to improve skin integrity through XRT. Patient educated on the positive effect of low impact exercise such as walking or exercise/activity of choice to help reduce overall radiation-induced fatigue as well as mitigate weakening of muscle mass.Patient provided education on risk factors for lymphedema to include greater than 6 lymph node removal, BMI greater than 30, mastectomy versus lumpectomy, radiation therapy, and Taxol use and advanced age. Patient provided with the Le[employer] lymphedema action plan stoplight to recovery handout (Rehabilitation Oncology: July 2019 - Volume 37 - Issue 3 - p 122-127 doi: 10.1097/01.[id number]) to improve patient's ability to identify lymph exacerbation and provide guidance on appropriate action to be taken to control symptoms. Patient provided with education on a simple self-manual lymphatic drainage technique. Handouts provided. Patient exhibited fair return demonstration of skill. Patient will benefit from continued education to ensure carryover skill.  Given: HEP, Symptoms/condition management, Edema management, Pain management  Program: New  How Provided: Verbal, Demonstration, Written  Provided to: Patient  Level of Understanding: Teach back education performed, Verbalized, Demonstrated  63725 - OT Self Care/Mgmt Minutes: 40         OT Goals       Row Name 02/01/24 1316          Time Calculation    OT Goal Re-Cert Due Date 03/02/24  -TD               User Key  (r) = Recorded By, (t) = Taken By, (c) = Cosigned By      Initials Name Provider Type    Daniela Hagen OT Occupational Therapist                  1. Post Breast Surgery Care/at risk for Lymphedema  LTG 1: 90 days:  As an indicator of no exacerbation of lymphedema staging, the patient will present  with an L-Dex score less than [10] points from preoperative baseline.   STATUS: on going  STG 1a:   30 days: To prevent exacerbation of mixed edema to lymphedema, patient will utilize the 2 postsurgical compression garments daily.      STATUS: MET, on going  STG 1b: 30 days: Patient will be independent with self-manual lymphatic massage.    STATUS: on going  STG 1c: 30 days:  Patient will be independent with identification of signs and symptoms of lymphedema exasperation per stoplight to recovery education handout.   STATUS: on going  STG 1 d: 30 days: Patient will be independent with HEP to prevent advancement in lymphedema staging.   STATUS: on going  TREATMENT:  Self Care/ADL retraining, Therapeutic Activity, Neuromuscular Re-education, Therapeutic Exercise, Bioimpedence Fluid Analysis, Post-Surgical compression garement [address]64 Ayana Zip-ST-High/ Miranda Camisole Kit 2860K, Orthotic Management and training,  and Manual Therapy.    OT Assessment/Plan       Row Name 02/01/24 1313          OT Assessment    Functional Limitations Limitations in functional capacity and performance  -TD     Impairments Impaired lymphatic circulation  -TD     Assessment Comments Marilee is a 28-year-old female with a diagnosis of metaplastic carcinoma of the right breast. Patient underwent a bilateral mastectomy with reconstruction on January 9, 2023.  Pt had two lymphnodes removed at this time.  Pt l-dex score is WFL limits at this time but her AROM in the RUE is limited.  Therapist will evaluate after radiation for ongoing therapy to address AROM.   Pt  Patient would benefit from continued skilled occupational therapy to prevent increased staging of lymphedema, increased pain, and decreased range of motion.  -TD     OT Diagnosis at risk for lymphedema  -TD     OT Rehab Potential Good  -TD     Patient/caregiver participated in establishment of treatment plan and goals Yes  -TD     Patient would benefit from skilled therapy intervention  Yes  -TD        OT Plan    OT Frequency --  see duration  -TD     Predicted Duration of Therapy Intervention (OT) Patient was seen 3 weeks post XRT, every 3 months years 1 through 3, and every 6 months years 4 and 5.  -TD     Planned CPT's? OT EVAL LOW COMPLEXITY: 06652;OT RE-EVAL: 59976;OT THER ACT EA 15 MIN: 74634PG;OT THER PROC EA 15 MIN: 37316WE;OT SELF CARE/MGMT/TRAIN 15 MIN: 32085;OT MANUAL THERAPY EA 15 MIN: 04685;OT BIS XTRACELL FLUID ANALYSIS: 90991;OT CARE PLAN EA 15 MIN;OT ORTHOTIC MGMT/TRAIN EA 15 MIN: 43174;OT ORTHO/PROSTHET CHECKOUT EA 15 MIN: 35571  -TD     Planned Therapy Interventions (Optional Details) home exercise program;manual therapy techniques;stretching;strengthening;ROM (Range of Motion);prosthetic fitting/training;postural re-education;patient/family education;orthotic fitting/training  -TD     OT Plan Comments continue POC  -TD               User Key  (r) = Recorded By, (t) = Taken By, (c) = Cosigned By      Initials Name Provider Type    TD Daniela Vazquez OT Occupational Therapist                              Time Calculation:   Timed Charges  23219 - OT Self Care/Mgmt Minutes: 40  Total Minutes  Timed Charges Total Minutes: 40   Total Minutes: 40     Therapy Charges for Today       Code Description Service Date Service Provider Modifiers Qty    [id number] HC PT BIS XTRACELL FLUID ANALYSIS 2/1/2024 Daniela Vazquez OT  1    [id number] HC OT SELF CARE/MGMT/TRAIN EA 15 MIN 2/1/2024 Daniela Vazquez OT GO 3                      Daniela Vazquez OT  2/1/2024

## 2024-02-09 ENCOUNTER — HOSPITAL ENCOUNTER (OUTPATIENT)
Dept: ONCOLOGY | Facility: HOSPITAL | Age: 29
Discharge: HOME | End: 2024-02-09
Payer: COMMERCIAL

## 2024-02-09 VITALS
HEART RATE: 78 BPM | TEMPERATURE: 97.6 F | WEIGHT: 161.38 LBS | BODY MASS INDEX: 29.7 KG/M2 | HEIGHT: 62 IN | DIASTOLIC BLOOD PRESSURE: 65 MMHG | SYSTOLIC BLOOD PRESSURE: 106 MMHG | RESPIRATION RATE: 18 BRPM | OXYGEN SATURATION: 100 %

## 2024-02-09 DIAGNOSIS — Z45.2 FITTING AND ADJUSTMENT OF VASCULAR CATHETER: ICD-10-CM

## 2024-02-09 DIAGNOSIS — C50.919 METAPLASTIC CARCINOMA OF BREAST: Primary | ICD-10-CM

## 2024-02-09 LAB
ALBUMIN SERPL-MCNC: 4.4 G/DL (ref 3.5–5.2)
ALBUMIN/GLOB SERPL: 1.8 G/DL
ALP SERPL-CCNC: 98 U/L (ref 39–117)
ALT SERPL W P-5'-P-CCNC: 25 U/L (ref 1–33)
ANION GAP SERPL CALCULATED.3IONS-SCNC: 7.4 MMOL/L (ref 5–15)
AST SERPL-CCNC: 19 U/L (ref 1–32)
B-HCG UR QL: NEGATIVE
BASOPHILS # BLD AUTO: 0.01 10*3/MM3 (ref 0–0.2)
BASOPHILS NFR BLD AUTO: 0.3 % (ref 0–1.5)
BILIRUB SERPL-MCNC: 0.3 MG/DL (ref 0–1.2)
BUN SERPL-MCNC: 13 MG/DL (ref 6–20)
BUN/CREAT SERPL: 23.2 (ref 7–25)
CALCIUM SPEC-SCNC: 9.5 MG/DL (ref 8.6–10.5)
CHLORIDE SERPL-SCNC: 105 MMOL/L (ref 98–107)
CO2 SERPL-SCNC: 24.6 MMOL/L (ref 22–29)
CREAT SERPL-MCNC: 0.56 MG/DL (ref 0.57–1)
DEPRECATED RDW RBC AUTO: 43.3 FL (ref 37–54)
EGFRCR SERPLBLD CKD-EPI 2021: 126.1 ML/MIN/1.73
EOSINOPHIL # BLD AUTO: 0.03 10*3/MM3 (ref 0–0.4)
EOSINOPHIL NFR BLD AUTO: 0.8 % (ref 0.3–6.2)
ERYTHROCYTE [DISTWIDTH] IN BLOOD BY AUTOMATED COUNT: 13.5 % (ref 12.3–15.4)
GLOBULIN UR ELPH-MCNC: 2.5 GM/DL
GLUCOSE SERPL-MCNC: 117 MG/DL (ref 65–99)
HCT VFR BLD AUTO: 35 % (ref 34–46.6)
HGB BLD-MCNC: 11.5 G/DL (ref 12–15.9)
IMM GRANULOCYTES # BLD AUTO: 0 10*3/MM3 (ref 0–0.05)
IMM GRANULOCYTES NFR BLD AUTO: 0 % (ref 0–0.5)
LYMPHOCYTES # BLD AUTO: 1.28 10*3/MM3 (ref 0.7–3.1)
LYMPHOCYTES NFR BLD AUTO: 34.1 % (ref 19.6–45.3)
MCH RBC QN AUTO: 28.4 PG (ref 26.6–33)
MCHC RBC AUTO-ENTMCNC: 32.9 G/DL (ref 31.5–35.7)
MCV RBC AUTO: 86.4 FL (ref 79–97)
MONOCYTES # BLD AUTO: 0.31 10*3/MM3 (ref 0.1–0.9)
MONOCYTES NFR BLD AUTO: 8.3 % (ref 5–12)
NEUTROPHILS NFR BLD AUTO: 2.12 10*3/MM3 (ref 1.7–7)
NEUTROPHILS NFR BLD AUTO: 56.5 % (ref 42.7–76)
PLATELET # BLD AUTO: 155 10*3/MM3 (ref 140–450)
PMV BLD AUTO: 9.2 FL (ref 6–12)
POTASSIUM SERPL-SCNC: 3.3 MMOL/L (ref 3.5–5.2)
PROT SERPL-MCNC: 6.9 G/DL (ref 6–8.5)
RBC # BLD AUTO: 4.05 10*6/MM3 (ref 3.77–5.28)
SODIUM SERPL-SCNC: 137 MMOL/L (ref 136–145)
T4 FREE SERPL-MCNC: 1.04 NG/DL (ref 0.93–1.7)
TSH SERPL DL<=0.05 MIU/L-ACNC: 2.39 UIU/ML (ref 0.27–4.2)
WBC NRBC COR # BLD AUTO: 3.75 10*3/MM3 (ref 3.4–10.8)

## 2024-02-09 PROCEDURE — 80053 COMPREHEN METABOLIC PANEL: CPT | Performed by: INTERNAL MEDICINE

## 2024-02-09 PROCEDURE — 84443 ASSAY THYROID STIM HORMONE: CPT | Performed by: INTERNAL MEDICINE

## 2024-02-09 PROCEDURE — 85025 COMPLETE CBC W/AUTO DIFF WBC: CPT | Performed by: INTERNAL MEDICINE

## 2024-02-09 PROCEDURE — 81025 URINE PREGNANCY TEST: CPT | Performed by: INTERNAL MEDICINE

## 2024-02-09 PROCEDURE — 25010000002 PEMBROLIZUMAB 100 MG/4ML SOLUTION 4 ML VIAL: Performed by: INTERNAL MEDICINE

## 2024-02-09 PROCEDURE — 84439 ASSAY OF FREE THYROXINE: CPT | Performed by: INTERNAL MEDICINE

## 2024-02-09 PROCEDURE — 96413 CHEMO IV INFUSION 1 HR: CPT

## 2024-02-09 PROCEDURE — 25010000002 HEPARIN LOCK FLUSH PER 10 UNITS: Performed by: INTERNAL MEDICINE

## 2024-02-09 PROCEDURE — 25810000003 SODIUM CHLORIDE 0.9 % SOLUTION: Performed by: INTERNAL MEDICINE

## 2024-02-09 RX ORDER — SODIUM CHLORIDE 0.9 % (FLUSH) 0.9 %
20 SYRINGE (ML) INJECTION AS NEEDED
Status: DISCONTINUED | OUTPATIENT
Start: 2024-02-09 | End: 2024-02-10 | Stop reason: HOSPADM

## 2024-02-09 RX ORDER — POTASSIUM CHLORIDE 20 MEQ/1
20 TABLET, EXTENDED RELEASE ORAL DAILY
Qty: 14 TABLET | Refills: 2 | Status: SHIPPED | OUTPATIENT
Start: 2024-02-09

## 2024-02-09 RX ORDER — HEPARIN SODIUM (PORCINE) LOCK FLUSH IV SOLN 100 UNIT/ML 100 UNIT/ML
500 SOLUTION INTRAVENOUS AS NEEDED
OUTPATIENT
Start: 2024-02-09

## 2024-02-09 RX ORDER — HEPARIN SODIUM (PORCINE) LOCK FLUSH IV SOLN 100 UNIT/ML 100 UNIT/ML
500 SOLUTION INTRAVENOUS AS NEEDED
Status: DISCONTINUED | OUTPATIENT
Start: 2024-02-09 | End: 2024-02-10 | Stop reason: HOSPADM

## 2024-02-09 RX ORDER — SODIUM CHLORIDE 9 MG/ML
20 INJECTION, SOLUTION INTRAVENOUS ONCE
Status: COMPLETED | OUTPATIENT
Start: 2024-02-09 | End: 2024-02-09

## 2024-02-09 RX ORDER — SODIUM CHLORIDE 0.9 % (FLUSH) 0.9 %
20 SYRINGE (ML) INJECTION AS NEEDED
OUTPATIENT
Start: 2024-02-09

## 2024-02-09 RX ADMIN — Medication 20 ML: at 11:07

## 2024-02-09 RX ADMIN — SODIUM CHLORIDE 20 ML/HR: 9 INJECTION, SOLUTION INTRAVENOUS at 10:23

## 2024-02-09 RX ADMIN — SODIUM CHLORIDE 200 MG: 9 INJECTION, SOLUTION INTRAVENOUS at 10:23

## 2024-02-09 RX ADMIN — HEPARIN SODIUM (PORCINE) LOCK FLUSH IV SOLN 100 UNIT/ML 500 UNITS: 100 SOLUTION at 11:07

## 2024-02-12 ENCOUNTER — CONSULT (OUTPATIENT)
Dept: RADIATION ONCOLOGY | Facility: HOSPITAL | Age: 29
End: 2024-02-12
Payer: COMMERCIAL

## 2024-02-12 ENCOUNTER — OFFICE VISIT (OUTPATIENT)
Dept: PLASTIC SURGERY | Facility: CLINIC | Age: 29
End: 2024-02-12
Payer: COMMERCIAL

## 2024-02-12 VITALS
OXYGEN SATURATION: 100 % | SYSTOLIC BLOOD PRESSURE: 106 MMHG | RESPIRATION RATE: 16 BRPM | BODY MASS INDEX: 28.91 KG/M2 | TEMPERATURE: 98.7 F | DIASTOLIC BLOOD PRESSURE: 74 MMHG | HEART RATE: 87 BPM | WEIGHT: 163.14 LBS | HEIGHT: 63 IN

## 2024-02-12 VITALS
HEART RATE: 83 BPM | BODY MASS INDEX: 28.88 KG/M2 | HEIGHT: 63 IN | WEIGHT: 163 LBS | DIASTOLIC BLOOD PRESSURE: 81 MMHG | TEMPERATURE: 98 F | OXYGEN SATURATION: 98 % | SYSTOLIC BLOOD PRESSURE: 115 MMHG

## 2024-02-12 DIAGNOSIS — Z09 POSTOPERATIVE FOLLOW-UP: Primary | ICD-10-CM

## 2024-02-12 DIAGNOSIS — C77.3 BREAST CANCER METASTASIZED TO AXILLARY LYMPH NODE, RIGHT: Primary | ICD-10-CM

## 2024-02-12 DIAGNOSIS — Z98.890 S/P BREAST RECONSTRUCTION, BILATERAL: ICD-10-CM

## 2024-02-12 DIAGNOSIS — C50.911 BREAST CANCER METASTASIZED TO AXILLARY LYMPH NODE, RIGHT: Primary | ICD-10-CM

## 2024-02-12 PROCEDURE — 1160F RVW MEDS BY RX/DR IN RCRD: CPT | Performed by: NURSE PRACTITIONER

## 2024-02-12 PROCEDURE — 99024 POSTOP FOLLOW-UP VISIT: CPT | Performed by: NURSE PRACTITIONER

## 2024-02-12 PROCEDURE — 1159F MED LIST DOCD IN RCRD: CPT | Performed by: NURSE PRACTITIONER

## 2024-02-12 PROCEDURE — G0463 HOSPITAL OUTPT CLINIC VISIT: HCPCS | Performed by: RADIOLOGY

## 2024-02-12 RX ORDER — DESVENLAFAXINE SUCCINATE 50 MG/1
50 TABLET, EXTENDED RELEASE ORAL DAILY
COMMUNITY

## 2024-02-13 ENCOUNTER — TELEPHONE (OUTPATIENT)
Dept: RADIATION ONCOLOGY | Facility: HOSPITAL | Age: 29
End: 2024-02-13
Payer: COMMERCIAL

## 2024-02-13 DIAGNOSIS — C50.911 BREAST CANCER METASTASIZED TO AXILLARY LYMPH NODE, RIGHT: Primary | ICD-10-CM

## 2024-02-13 DIAGNOSIS — C77.3 BREAST CANCER METASTASIZED TO AXILLARY LYMPH NODE, RIGHT: Primary | ICD-10-CM

## 2024-02-23 ENCOUNTER — PROCEDURE VISIT (OUTPATIENT)
Dept: NEUROLOGY | Facility: CLINIC | Age: 29
End: 2024-02-23
Payer: COMMERCIAL

## 2024-02-23 DIAGNOSIS — G43.719 INTRACTABLE CHRONIC MIGRAINE WITHOUT AURA AND WITHOUT STATUS MIGRAINOSUS: Primary | ICD-10-CM

## 2024-02-29 ENCOUNTER — LAB (OUTPATIENT)
Dept: ONCOLOGY | Facility: HOSPITAL | Age: 29
End: 2024-02-29
Payer: COMMERCIAL

## 2024-02-29 ENCOUNTER — OFFICE VISIT (OUTPATIENT)
Dept: ONCOLOGY | Facility: HOSPITAL | Age: 29
End: 2024-02-29
Payer: COMMERCIAL

## 2024-02-29 VITALS
SYSTOLIC BLOOD PRESSURE: 119 MMHG | WEIGHT: 163.4 LBS | TEMPERATURE: 97.7 F | RESPIRATION RATE: 18 BRPM | BODY MASS INDEX: 28.95 KG/M2 | OXYGEN SATURATION: 100 % | HEART RATE: 78 BPM | DIASTOLIC BLOOD PRESSURE: 54 MMHG

## 2024-02-29 DIAGNOSIS — D64.81 ANTINEOPLASTIC CHEMOTHERAPY INDUCED ANEMIA: ICD-10-CM

## 2024-02-29 DIAGNOSIS — T45.1X5A ANTINEOPLASTIC CHEMOTHERAPY INDUCED ANEMIA: ICD-10-CM

## 2024-02-29 DIAGNOSIS — C50.919 METAPLASTIC CARCINOMA OF BREAST: Primary | ICD-10-CM

## 2024-02-29 DIAGNOSIS — C50.919 METAPLASTIC CARCINOMA OF BREAST: ICD-10-CM

## 2024-02-29 LAB
ALBUMIN SERPL-MCNC: 4.2 G/DL (ref 3.5–5.2)
ALBUMIN/GLOB SERPL: 1.8 G/DL
ALP SERPL-CCNC: 104 U/L (ref 39–117)
ALT SERPL W P-5'-P-CCNC: 17 U/L (ref 1–33)
ANION GAP SERPL CALCULATED.3IONS-SCNC: 3.5 MMOL/L (ref 5–15)
AST SERPL-CCNC: 14 U/L (ref 1–32)
BASOPHILS # BLD AUTO: 0.01 10*3/MM3 (ref 0–0.2)
BASOPHILS NFR BLD AUTO: 0.2 % (ref 0–1.5)
BILIRUB SERPL-MCNC: 0.2 MG/DL (ref 0–1.2)
BUN SERPL-MCNC: 11 MG/DL (ref 6–20)
BUN/CREAT SERPL: 21.6 (ref 7–25)
CALCIUM SPEC-SCNC: 9.1 MG/DL (ref 8.6–10.5)
CHLORIDE SERPL-SCNC: 104 MMOL/L (ref 98–107)
CO2 SERPL-SCNC: 29.5 MMOL/L (ref 22–29)
CREAT SERPL-MCNC: 0.51 MG/DL (ref 0.57–1)
DEPRECATED RDW RBC AUTO: 42.4 FL (ref 37–54)
EGFRCR SERPLBLD CKD-EPI 2021: 129 ML/MIN/1.73
EOSINOPHIL # BLD AUTO: 0.02 10*3/MM3 (ref 0–0.4)
EOSINOPHIL NFR BLD AUTO: 0.5 % (ref 0.3–6.2)
ERYTHROCYTE [DISTWIDTH] IN BLOOD BY AUTOMATED COUNT: 13.4 % (ref 12.3–15.4)
FERRITIN SERPL-MCNC: 21.13 NG/ML (ref 13–150)
GLOBULIN UR ELPH-MCNC: 2.4 GM/DL
GLUCOSE SERPL-MCNC: 86 MG/DL (ref 65–99)
HCT VFR BLD AUTO: 34.3 % (ref 34–46.6)
HGB BLD-MCNC: 11.4 G/DL (ref 12–15.9)
IMM GRANULOCYTES # BLD AUTO: 0 10*3/MM3 (ref 0–0.05)
IMM GRANULOCYTES NFR BLD AUTO: 0 % (ref 0–0.5)
IRON 24H UR-MRATE: 28 MCG/DL (ref 37–145)
IRON SATN MFR SERPL: 6 % (ref 20–50)
LYMPHOCYTES # BLD AUTO: 1.44 10*3/MM3 (ref 0.7–3.1)
LYMPHOCYTES NFR BLD AUTO: 32.7 % (ref 19.6–45.3)
MAGNESIUM SERPL-MCNC: 1.8 MG/DL (ref 1.6–2.6)
MCH RBC QN AUTO: 28.4 PG (ref 26.6–33)
MCHC RBC AUTO-ENTMCNC: 33.2 G/DL (ref 31.5–35.7)
MCV RBC AUTO: 85.5 FL (ref 79–97)
MONOCYTES # BLD AUTO: 0.35 10*3/MM3 (ref 0.1–0.9)
MONOCYTES NFR BLD AUTO: 7.9 % (ref 5–12)
NEUTROPHILS NFR BLD AUTO: 2.59 10*3/MM3 (ref 1.7–7)
NEUTROPHILS NFR BLD AUTO: 58.7 % (ref 42.7–76)
PLATELET # BLD AUTO: 188 10*3/MM3 (ref 140–450)
PMV BLD AUTO: 9.1 FL (ref 6–12)
POTASSIUM SERPL-SCNC: 3.4 MMOL/L (ref 3.5–5.2)
PROT SERPL-MCNC: 6.6 G/DL (ref 6–8.5)
RBC # BLD AUTO: 4.01 10*6/MM3 (ref 3.77–5.28)
SODIUM SERPL-SCNC: 137 MMOL/L (ref 136–145)
TIBC SERPL-MCNC: 438 MCG/DL (ref 298–536)
TRANSFERRIN SERPL-MCNC: 294 MG/DL (ref 200–360)
WBC NRBC COR # BLD AUTO: 4.41 10*3/MM3 (ref 3.4–10.8)

## 2024-02-29 PROCEDURE — 85025 COMPLETE CBC W/AUTO DIFF WBC: CPT

## 2024-02-29 PROCEDURE — 82728 ASSAY OF FERRITIN: CPT

## 2024-02-29 PROCEDURE — 84466 ASSAY OF TRANSFERRIN: CPT

## 2024-02-29 PROCEDURE — 83540 ASSAY OF IRON: CPT

## 2024-02-29 PROCEDURE — 36415 COLL VENOUS BLD VENIPUNCTURE: CPT

## 2024-02-29 PROCEDURE — 80053 COMPREHEN METABOLIC PANEL: CPT

## 2024-02-29 PROCEDURE — 83735 ASSAY OF MAGNESIUM: CPT

## 2024-02-29 PROCEDURE — G0463 HOSPITAL OUTPT CLINIC VISIT: HCPCS | Performed by: INTERNAL MEDICINE

## 2024-02-29 RX ORDER — SODIUM FLUORIDE 6 MG/ML
PASTE, DENTIFRICE DENTAL
COMMUNITY
Start: 2024-02-15

## 2024-02-29 RX ORDER — SODIUM CHLORIDE 9 MG/ML
20 INJECTION, SOLUTION INTRAVENOUS ONCE
Status: CANCELLED | OUTPATIENT
Start: 2024-03-01

## 2024-02-29 RX ORDER — GABAPENTIN 300 MG/1
300 CAPSULE ORAL 3 TIMES DAILY
COMMUNITY
Start: 2024-02-13 | End: 2024-03-14

## 2024-02-29 RX ORDER — FLUCONAZOLE 150 MG/1
TABLET ORAL
COMMUNITY
Start: 2024-02-13

## 2024-02-29 NOTE — PROGRESS NOTES
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 She had bilateral mastectomy and bilateral implants on 1/9/24. Has been started on Pembrolizumab. This has worsened her muscle cramps in her legs. On ropinirole 1 mg at night for this. Otherwise has tolerated well. Has been using eczema cream for rash on legs and abdomen which has resolved her symptoms. Has tooth pain on both left and right. Following with dentistry for this. Likely will need tooth extraction on the left. Has been getting radiation in [place]. Tolerated well so far. Has some sharp pain near her port site. X-ray has been negative.        Oncology/Hematology History Overview Note   2012: Lumpectomy of fibroadenoma of right breast    3/2023: Started to have pain and swelling of known area of fibroadenoma of right breast. Started after trauma to breast.     5/26/23: Right breast lumpectomy: Invasive metaplastic carcinoma, high-grade, 4.5 x 4.0 x 3.3 cm with interspersed fibroadenoma, negative margins. ER 0%, NC 0%, HER2 0 by IHC. PDL1 CPS of 50.     6/21/23: MRI breast: Diffuse edema, hypervascularity, and skin thickening of the right breast raising the possibility of inflammatory breast cancer. 2. 4.7 cm seroma in the lateral breast in area of known recent malignant lumpectomy. 3. Metastatic right axillary adenopathy. 4. No evidence of contralateral left breast malignancy. BI-RADS Category 6, known biopsy-proven malignancy.     7/7/23: Right axillary LN biopsy: metastatic adenocarcinoma, consistent with metaplastic breast carcinoma.     7/11/23: CT Chest, abdomen, pelvis: No  definite metastatic disease. 1. Chest:  Pathologically enlarged right axillary lymphadenopathy.  Masslike opacity right upper outer breast with postsurgical changes.  Skin thickening right breast.  Correlate with tissue diagnosis and breast MRI.Small semi solid nodules in the right lung.  These are nonspecific.  A follow-up CT chest in 3 months time is recommended.  Abdomen pelvis:  No findings of metastatic disease to the abdomen or pelvis.     7/12/23: NM bone scan: negative for osseous mets    7/14/23: C1D1 Carboplatin/Paclitaxel/Pembrolizumab  8/4/23: C2D1 Carboplatin/Paclitaxel/Pembrolizumab. C2D15 held due to neutropenia  8/25/23: C3D1 Carboplatin/Paclitaxel/Pembrolizumab. All doses given  9/15/23: C4D1 Carboplatin/Paclitaxel/Pembrolizumab. C4D15 held due to neutropenia  10/6/23: C1D1 Doxorubicin/Cyclophosphamide/Pembrolizumab  10/27/23: C2D1 Doxorubicin/Cyclophosphamide/Pembrolizumab  11/20/23: C3D1 Doxorubicin/Cyclophosphamide/Pembrolizumab. Delayed due to neutropenia. Last cycle of chemo prior to surgery    1/9/24: Bilateral Mastectomy: Right breast: No invasive disease left, intermediate grade DCIS present. ypTisN0 (sn), ER/MT negative, HER2 negative, margins negative, ALH present, left breast benign    2/9/24: C1D1 adjuvant pembrolizumab.      Metaplastic carcinoma of breast   6/16/2023 Initial Diagnosis    Metaplastic carcinoma of breast     6/16/2023 - 6/16/2023 Chemotherapy    OP BREAST AC DD DOXOrubicin / Cyclophosphamide     7/13/2023 Cancer Staged    Staging form: Breast, AJCC 8th Edition  - Clinical: Stage IIIC (cT4d, cN2, cM0, G3, ER-, MT-, HER2-) - Signed by Ramin Shine MD on 7/13/2023 7/14/2023 - 9/22/2023 Chemotherapy    OP BREAST Pembrolizumab 400 mg / PACLitaxel / CARBOplatin AUC=5     8/12/2023 - 8/12/2023 Chemotherapy    OP BREAST PACLitaxel Adjuvant (Weekly X 12)     10/6/2023 - 11/20/2023 Chemotherapy    OP BREAST Pembrolizumab 200 mg / DOXOrubicin / Cyclophosphamide       1/23/2024 Cancer Staged    Staging form: Breast, AJCC 8th Edition  - Pathologic: ypTis (DCIS), pN0, cM0, ER-, MA-, HER2- - Signed by Ramin Shien MD on 1/23/2024 2/9/2024 -  Chemotherapy    OP Pembrolizumab 200 mg     Triple negative breast cancer (Resolved)   6/28/2023 Initial Diagnosis    Triple negative breast cancer           Review of Systems   Constitutional:  Positive for fatigue.   Genitourinary:  Negative for breast pain.   Musculoskeletal:  Positive for arthralgias.   Skin:  Negative for rash.   All other systems reviewed and are negative.    Current Outpatient Medications on File Prior to Visit   Medication Sig Dispense Refill    fluconazole (DIFLUCAN) 150 MG tablet       gabapentin (NEURONTIN) 300 MG capsule Take 1 capsule by mouth 3 (Three) Times a Day.      Sodium Fluoride 5000 PPM 1.1 % paste       desvenlafaxine (PRISTIQ) 50 MG 24 hr tablet Take 1 tablet by mouth Daily.      potassium chloride (K-DUR,KLOR-CON) 20 MEQ CR tablet Take 1 tablet by mouth Daily. 14 tablet 2    prochlorperazine (COMPAZINE) 10 MG tablet Take 1 tablet by mouth Every 6 (Six) Hours As Needed for Nausea or Vomiting. 60 tablet 2    promethazine (PHENERGAN) 50 MG tablet Take 1 tablet by mouth Every 6 (Six) Hours As Needed for Nausea or Vomiting. 30 tablet 0    rOPINIRole (REQUIP) 1 MG tablet Take 1 tablet by mouth Every Night. Take 1 hour before bedtime. 30 tablet 3    ubrogepant (Ubrelvy) 100 MG tablet Take 1 tablet by mouth 1 (One) Time As Needed (migraine) for up to 1 dose. One tablet at HA onset, may repeat once in 2 hours if needed. 10 tablet 3     No current facility-administered medications on file prior to visit.       Allergies   Allergen Reactions    Tegaderm Ag Mesh [Silver] Itching     Tegaderm that is used over port a cath     Past Medical History:   Diagnosis Date    Asthma     AS CHILD    Breast cancer     COVID-19 vaccine administered      Past Surgical History:   Procedure Laterality Date     BREAST LUMPECTOMY Right     BREAST RECONSTRUCTION Bilateral 2024    Procedure: BREAST RECONSTRUCTION WITH MESH AND IMPLANTS - USE OF SPY;  Surgeon: Jackie Sanders MD;  Location: [facility] – [place], [place];  Service: Plastics;  Laterality: Bilateral;     SECTION      MASTECTOMY W/ SENTINEL NODE BIOPSY Bilateral 2024    Procedure: BREAST MASTECTOMY WITH RIGHT AXILLARY SENTINEL NODE IDENTIFICATION AND EXCISION BIOPSY;  Surgeon: Christiana Whiting MD;  Location: [facility];  Service: General;  Laterality: Bilateral;    PORTACATH PLACEMENT      SKIN BIOPSY      TUBAL ABDOMINAL LIGATION       Social History     Socioeconomic History    Marital status: Single    Number of children: 2   Tobacco Use    Smoking status: Never     Passive exposure: Never    Smokeless tobacco: Never   Vaping Use    Vaping Use: Never used   Substance and Sexual Activity    Alcohol use: Not Currently     Comment: OCCASSIONAL    Drug use: Never    Sexual activity: Defer     Family History   Problem Relation Age of Onset    Skin cancer Mother     COPD Mother     Hypertension Father     Hyperlipidemia Father     Diabetes Father     Skin cancer Paternal Grandmother     Lung cancer Paternal Grandmother     Malig Hyperthermia Neg Hx        Objective   Physical Exam  Well appearing patient in no acute distress on RA  Anicteric sclerae, non-erythematous papules palpated on stomach.   Respirations non-labored  Awake, alert, and oriented x 4. Speech intact. No gross neurologic deficit  Appropriate mood and affect    Vitals:    24 1333   BP: 119/54   Pulse: 78   Resp: 18   Temp: 97.7 °F (36.5 °C)   TempSrc: Temporal   SpO2: 100%   Weight: 74.1 kg (163 lb 6.4 oz)   PainSc: 0-No pain                     PHQ-9 Total Score:           Result Review :   The following data was reviewed by: Ramin Shine MD on 24:  Lab Results   Component Value Date    HGB 11.4 (L) 2024    HCT 34.3 2024    MCV 85.5 2024      02/29/2024    WBC 4.41 02/29/2024    NEUTROABS 2.59 02/29/2024    LYMPHSABS 1.44 02/29/2024    MONOSABS 0.35 02/29/2024    EOSABS 0.02 02/29/2024    BASOSABS 0.01 02/29/2024     Lab Results   Component Value Date    GLUCOSE 86 02/29/2024    BUN 11 02/29/2024    CREATININE 0.51 (L) 02/29/2024     02/29/2024    K 3.4 (L) 02/29/2024     02/29/2024    CO2 29.5 (H) 02/29/2024    CALCIUM 9.1 02/29/2024    PROTEINTOT 6.6 02/29/2024    ALBUMIN 4.2 02/29/2024    BILITOT 0.2 02/29/2024    ALKPHOS 104 02/29/2024    AST 14 02/29/2024    ALT 17 02/29/2024     Lab Results   Component Value Date    FREET4 1.04 02/09/2024    TSH 2.390 02/09/2024     Labs personally reviewed.  GREGORIA zhang again.     Pathology report personally reviewed    Surgery note personally reviewed      FL PORT A CATH CHECK    Result Date: 2/22/2024  No leak or obstruction . Fluoroscopy exposure time: 0.6 minutes, 71.08 mGy. 36 image sequences. Images reviewed, interpreted, and dictated by Momo Crain DO         Assessment and Plan    Diagnoses and all orders for this visit:    1. Metaplastic carcinoma of breast (Primary)  -     Magnesium; Future    Other orders  -     sodium chloride 0.9 % infusion  -     Pembrolizumab (KEYTRUDA) 200 mg in sodium chloride 0.9 % 58 mL chemo IVPB        Metaplastic Carcinoma of Right Breast, triple negative  Patient is s/p lumpectomy of 4.5 cm lesion with triple negative metaplastic carcinoma, interspersed with fibroadenoma. Patient has clinical inflammatory breast cancer. MRI breast with concern for inflammatory breast cancer as well as right sided axillary involvement. Left breast without lesions per MRI. Lymph node biopsy 7/7/23 confirmed right axillary involvement by breast carcinoma.  Completed noeadjuvant chemothearpy and immunotherapy with 4 cycles of Carboplatin/Paclitaxel/Pembro (C1 7/1423 and C4 9/15/23) and 3 cycles of Doxorubicin, cyclophosphamide, pembrolizumab (completed C3 11/20/23 with delay due to  neutropenia). Completed 7 cycles total. C4 omitted due to accumulation of toxicities.     1/9/24: Bilateral Mastectomy: Right breast: No invasive disease left, intermediate grade DCIS present. ypTisN0 (sn), ER/IL negative, HER2 negative, margins negative, ALH     No residual invasive dsiease present, so patient with complete pathologic response  on the basis of the National Surgical Adjuvant Breast and Bowel project criteria. DCIS still present as well as ALH, negative margins, nodes negative. Will proceed with adjuvant immunotherapy with Pembrolizumab every 3 weeks to complete 1 year. C1 provided 2/9/24    C2D1 adjuvant pembrolizumab 3/1/24. Labs appropriate to proceed.     Adjuvant radiation has been started in [place].       This is an acute or chronic illness that poses a threat to life or bodily function. The above treatment plan involves a high risk of complications and/or mortality of patient management. Continue to monitor for severe toxicities with labs     Rash  Resolved.  Eczema related as improved with topical eczema cream.    Anxiety  Xanax PRN added.     Restless leg  Ropinirole helps, previously increased to 1.0 mg. Could be worse from pembrolizumab.    Hypokalemia  3.4. Recommend replacement x 2 weeks.           I spent 30 minutes caring for Marilee on this date of service. This time includes time spent by me in the following activities:preparing for the visit, reviewing tests, obtaining and/or reviewing a separately obtained history, performing a medically appropriate examination and/or evaluation , counseling and educating the patient/family/caregiver, ordering medications, tests, or procedures, referring and communicating with other health care professionals , documenting information in the medical record, independently interpreting results and communicating that information with the patient/family/caregiver and care coordination    Patient Follow Up: C4 immunotherapy  Patient was given  instructions and counseling regarding her condition or for health maintenance advice. Please see specific information pulled into the AVS if appropriate.

## 2024-03-01 ENCOUNTER — PATIENT MESSAGE (OUTPATIENT)
Dept: ONCOLOGY | Facility: HOSPITAL | Age: 29
End: 2024-03-01
Payer: COMMERCIAL

## 2024-03-01 ENCOUNTER — HOSPITAL ENCOUNTER (OUTPATIENT)
Dept: ONCOLOGY | Facility: HOSPITAL | Age: 29
Discharge: HOME | End: 2024-03-01
Payer: COMMERCIAL

## 2024-03-01 ENCOUNTER — OFFICE VISIT (OUTPATIENT)
Dept: PLASTIC SURGERY | Facility: CLINIC | Age: 29
End: 2024-03-01
Payer: COMMERCIAL

## 2024-03-01 VITALS
BODY MASS INDEX: 29.02 KG/M2 | WEIGHT: 163.8 LBS | SYSTOLIC BLOOD PRESSURE: 99 MMHG | TEMPERATURE: 97.2 F | DIASTOLIC BLOOD PRESSURE: 61 MMHG | OXYGEN SATURATION: 99 % | HEIGHT: 63 IN | RESPIRATION RATE: 16 BRPM | HEART RATE: 65 BPM

## 2024-03-01 VITALS
HEART RATE: 77 BPM | WEIGHT: 163 LBS | BODY MASS INDEX: 28.88 KG/M2 | SYSTOLIC BLOOD PRESSURE: 104 MMHG | DIASTOLIC BLOOD PRESSURE: 67 MMHG | HEIGHT: 63 IN | OXYGEN SATURATION: 99 %

## 2024-03-01 DIAGNOSIS — C50.919 METAPLASTIC CARCINOMA OF BREAST: Primary | ICD-10-CM

## 2024-03-01 DIAGNOSIS — Z98.890 S/P BREAST RECONSTRUCTION, BILATERAL: Primary | ICD-10-CM

## 2024-03-01 DIAGNOSIS — K90.9 MALABSORPTION OF IRON: Primary | ICD-10-CM

## 2024-03-01 DIAGNOSIS — D50.8 OTHER IRON DEFICIENCY ANEMIA: ICD-10-CM

## 2024-03-01 DIAGNOSIS — Z09 POSTOPERATIVE FOLLOW-UP: ICD-10-CM

## 2024-03-01 PROBLEM — D50.9 IRON DEFICIENCY ANEMIA: Status: ACTIVE | Noted: 2024-03-01

## 2024-03-01 PROCEDURE — 96413 CHEMO IV INFUSION 1 HR: CPT

## 2024-03-01 PROCEDURE — 25010000002 PEMBROLIZUMAB 100 MG/4ML SOLUTION 4 ML VIAL: Performed by: INTERNAL MEDICINE

## 2024-03-01 PROCEDURE — 25810000003 SODIUM CHLORIDE 0.9 % SOLUTION: Performed by: INTERNAL MEDICINE

## 2024-03-01 RX ORDER — SODIUM CHLORIDE 9 MG/ML
20 INJECTION, SOLUTION INTRAVENOUS ONCE
Status: COMPLETED | OUTPATIENT
Start: 2024-03-01 | End: 2024-03-01

## 2024-03-01 RX ORDER — SODIUM CHLORIDE 9 MG/ML
20 INJECTION, SOLUTION INTRAVENOUS ONCE
OUTPATIENT
Start: 2024-03-04

## 2024-03-01 RX ORDER — SODIUM CHLORIDE 9 MG/ML
20 INJECTION, SOLUTION INTRAVENOUS ONCE
OUTPATIENT
Start: 2024-03-11

## 2024-03-01 RX ORDER — POTASSIUM CHLORIDE 20 MEQ/1
20 TABLET, EXTENDED RELEASE ORAL DAILY
Qty: 30 TABLET | Refills: 0 | Status: SHIPPED | OUTPATIENT
Start: 2024-03-01

## 2024-03-01 RX ADMIN — SODIUM CHLORIDE 20 ML/HR: 9 INJECTION, SOLUTION INTRAVENOUS at 10:53

## 2024-03-01 RX ADMIN — SODIUM CHLORIDE 200 MG: 9 INJECTION, SOLUTION INTRAVENOUS at 10:53

## 2024-03-01 NOTE — PROGRESS NOTES
"Post-op Follow-up (Post op/follow up on area of concern)            History of Present Illness  Marilee Watts is a 28 y.o. female who presents to [facility] as a post op for BREAST RECONSTRUCTION WITH MESH AND IMPLANTS - USE OF SPY 1/9/24.    Pt presents today for an area of concern, possible spitting suture.  She states area is very tender to touch.        Subjective  I have some sore areas, feels like it is splitting open    Tegaderm ag mesh [silver]  Allergies Reconciled.    Review of Systems   Constitutional:  Positive for activity change.   HENT: Negative.     Eyes: Negative.    Respiratory: Negative.     Cardiovascular: Negative.    Gastrointestinal: Negative.    Endocrine: Negative.    Genitourinary: Negative.    Musculoskeletal:  Positive for arthralgias.   Skin:  Positive for bruise.   Allergic/Immunologic: Negative.    Neurological: Negative.    Hematological: Negative.    Psychiatric/Behavioral:  The patient is nervous/anxious.       All review of system has been reviewed and it  is negative except the ones note above.     Objective     /67 (BP Location: Left arm, Patient Position: Sitting, Cuff Size: Adult)   Pulse 77   Ht 160 cm (62.99\")   Wt 73.9 kg (163 lb)   SpO2 99%   BMI 28.88 kg/m²     Body mass index is 28.88 kg/m².    Physical Exam  Vitals reviewed. Exam conducted with a chaperone present.   Constitutional:       Appearance: Normal appearance.   HENT:      Head: Atraumatic.      Nose: Nose normal.   Eyes:      Extraocular Movements: Extraocular movements intact.   Cardiovascular:      Rate and Rhythm: Normal rate.   Pulmonary:      Effort: Pulmonary effort is normal.   Chest:          Comments: Arrows are areas of concern for patient , right breast that has had 2 radiation treatments has an area in the central incision that is white and widened.  As discussed with patient this was the area that was the tightest after surgery, there " would be skin stretching and with radiation there will be skin changes. There is mild redness to the area but patient has been rubbing the skin, does not appear to have any s/s of  infection.  The left breast arrow is a small hematoma, a little tender to touch, ecchymosis noted, bedside ultrasound utilized and a small pocket of fluid noted. Incision is pink and flat, no open areas or s/s of infection  Abdominal:      Palpations: Abdomen is soft.   Musculoskeletal:         General: Normal range of motion.      Cervical back: Normal range of motion.   Skin:     General: Skin is warm and dry.      Capillary Refill: Capillary refill takes less than 2 seconds.   Neurological:      Mental Status: She is oriented to person, place, and time.   Psychiatric:         Mood and Affect: Mood normal.           Result Review :   no new results to review this visit    Assessment and Plan      Diagnoses and all orders for this visit:    1. S/P breast reconstruction, bilateral (Primary)    2. Postoperative follow-up          Additional Order(s):       Plan:  Patient getting Keytruda for 1 year, on 2nd treatment of radiation  Incisions healing, skin changes noted and discussed with patient   Mild Hirsutism noted, concerning to patient, instructed to discuss with oncologist  RTC 3 month f/u  patient instructed to call office for any questions or concerns and verbalized understanding of all instructions given.        Patient was given instructions and counseling regarding her condition. Please see specific information pulled into the AVS if appropriate.     Anh Chaney, APRN  03/01/2024

## 2024-03-05 DIAGNOSIS — K90.9 MALABSORPTION OF IRON: Primary | ICD-10-CM

## 2024-03-21 ENCOUNTER — OFFICE VISIT (OUTPATIENT)
Dept: ONCOLOGY | Facility: HOSPITAL | Age: 29
End: 2024-03-21
Payer: COMMERCIAL

## 2024-03-21 ENCOUNTER — LAB (OUTPATIENT)
Dept: ONCOLOGY | Facility: HOSPITAL | Age: 29
End: 2024-03-21
Payer: COMMERCIAL

## 2024-03-21 VITALS
TEMPERATURE: 97.4 F | DIASTOLIC BLOOD PRESSURE: 90 MMHG | WEIGHT: 166 LBS | RESPIRATION RATE: 18 BRPM | SYSTOLIC BLOOD PRESSURE: 111 MMHG | OXYGEN SATURATION: 100 % | HEART RATE: 74 BPM | BODY MASS INDEX: 29.41 KG/M2

## 2024-03-21 DIAGNOSIS — C50.919 METAPLASTIC CARCINOMA OF BREAST: Primary | ICD-10-CM

## 2024-03-21 DIAGNOSIS — C50.919 METAPLASTIC CARCINOMA OF BREAST: ICD-10-CM

## 2024-03-21 DIAGNOSIS — D50.9 IRON DEFICIENCY ANEMIA, UNSPECIFIED IRON DEFICIENCY ANEMIA TYPE: ICD-10-CM

## 2024-03-21 LAB
ALBUMIN SERPL-MCNC: 3.9 G/DL (ref 3.5–5.2)
ALBUMIN/GLOB SERPL: 1.6 G/DL
ALP SERPL-CCNC: 93 U/L (ref 39–117)
ALT SERPL W P-5'-P-CCNC: 14 U/L (ref 1–33)
ANION GAP SERPL CALCULATED.3IONS-SCNC: 4.6 MMOL/L (ref 5–15)
AST SERPL-CCNC: 14 U/L (ref 1–32)
BASOPHILS # BLD AUTO: 0.01 10*3/MM3 (ref 0–0.2)
BASOPHILS NFR BLD AUTO: 0.3 % (ref 0–1.5)
BILIRUB SERPL-MCNC: 0.2 MG/DL (ref 0–1.2)
BUN SERPL-MCNC: 10 MG/DL (ref 6–20)
BUN/CREAT SERPL: 18.9 (ref 7–25)
CALCIUM SPEC-SCNC: 9 MG/DL (ref 8.6–10.5)
CHLORIDE SERPL-SCNC: 103 MMOL/L (ref 98–107)
CO2 SERPL-SCNC: 29.4 MMOL/L (ref 22–29)
CREAT SERPL-MCNC: 0.53 MG/DL (ref 0.57–1)
DEPRECATED RDW RBC AUTO: 43.9 FL (ref 37–54)
EGFRCR SERPLBLD CKD-EPI 2021: 127.8 ML/MIN/1.73
EOSINOPHIL # BLD AUTO: 0.03 10*3/MM3 (ref 0–0.4)
EOSINOPHIL NFR BLD AUTO: 0.9 % (ref 0.3–6.2)
ERYTHROCYTE [DISTWIDTH] IN BLOOD BY AUTOMATED COUNT: 13.9 % (ref 12.3–15.4)
GLOBULIN UR ELPH-MCNC: 2.5 GM/DL
GLUCOSE SERPL-MCNC: 85 MG/DL (ref 65–99)
HCT VFR BLD AUTO: 34.3 % (ref 34–46.6)
HGB BLD-MCNC: 11 G/DL (ref 12–15.9)
IMM GRANULOCYTES # BLD AUTO: 0.01 10*3/MM3 (ref 0–0.05)
IMM GRANULOCYTES NFR BLD AUTO: 0.3 % (ref 0–0.5)
LYMPHOCYTES # BLD AUTO: 0.7 10*3/MM3 (ref 0.7–3.1)
LYMPHOCYTES NFR BLD AUTO: 22 % (ref 19.6–45.3)
MCH RBC QN AUTO: 27.2 PG (ref 26.6–33)
MCHC RBC AUTO-ENTMCNC: 32.1 G/DL (ref 31.5–35.7)
MCV RBC AUTO: 84.9 FL (ref 79–97)
MONOCYTES # BLD AUTO: 0.49 10*3/MM3 (ref 0.1–0.9)
MONOCYTES NFR BLD AUTO: 15.4 % (ref 5–12)
NEUTROPHILS NFR BLD AUTO: 1.94 10*3/MM3 (ref 1.7–7)
NEUTROPHILS NFR BLD AUTO: 61.1 % (ref 42.7–76)
PLATELET # BLD AUTO: 143 10*3/MM3 (ref 140–450)
PMV BLD AUTO: 8.8 FL (ref 6–12)
POTASSIUM SERPL-SCNC: 3.5 MMOL/L (ref 3.5–5.2)
PROT SERPL-MCNC: 6.4 G/DL (ref 6–8.5)
RBC # BLD AUTO: 4.04 10*6/MM3 (ref 3.77–5.28)
SODIUM SERPL-SCNC: 137 MMOL/L (ref 136–145)
WBC NRBC COR # BLD AUTO: 3.18 10*3/MM3 (ref 3.4–10.8)

## 2024-03-21 PROCEDURE — 85025 COMPLETE CBC W/AUTO DIFF WBC: CPT

## 2024-03-21 PROCEDURE — 80053 COMPREHEN METABOLIC PANEL: CPT

## 2024-03-21 PROCEDURE — G0463 HOSPITAL OUTPT CLINIC VISIT: HCPCS | Performed by: NURSE PRACTITIONER

## 2024-03-21 PROCEDURE — 36415 COLL VENOUS BLD VENIPUNCTURE: CPT

## 2024-03-21 RX ORDER — IBUPROFEN 600 MG/1
600 TABLET ORAL EVERY 8 HOURS PRN
Qty: 90 TABLET | Refills: 0 | Status: SHIPPED | OUTPATIENT
Start: 2024-03-21

## 2024-03-21 RX ORDER — HYDROCODONE BITARTRATE AND ACETAMINOPHEN 5; 325 MG/1; MG/1
TABLET ORAL
COMMUNITY
Start: 2024-03-06 | End: 2024-03-22

## 2024-03-21 NOTE — PROGRESS NOTES
Chief Complaint/Reason for Referral:  Metaplastic carcinoma of breast  anemia    Sakina Alvarez, Sakina Dale, MARIO        Subjective    History of Present Illness    Ms. Marilee Watts presents for treatment visit today with lab only visit. Treatment is scheduled for 3/22/2024 with Pembrolizumab every 21 days. Reports she has noticed nausea after the infusions. Has been taking Zofran and controlling the nausea. Also, feels like she has noticed increased hair growth on her face since initiating the immunotherapy. Reports she has noticed chest discomfort in the range of the radiation field with he right breast. She is using Ibuprofen for this and helps to alleviate the pain. Has noticed itching to the radiation field as well.     She was noted to low iron labs in late February with iron down to 28, ferritin of 21, iron sat of 6%, hemoglobin trending downward to 11.0 She reports is scheduled for IV iron on 3/25, 3/26, 3/27 next week.       Cancer Staging   Metaplastic carcinoma of breast  Staging form: Breast, AJCC 8th Edition  - Clinical: Stage IIIC (cT4d, cN2, cM0, G3, ER-, MO-, HER2-) - Signed by Ramin Shine MD on 7/13/2023  - Pathologic: ypTis (DCIS), pN0, cM0, ER-, MO-, HER2- - Signed by Ramin Shine MD on 1/23/2024       Treatment intent: curative    Oncology/Hematology History Overview Note   2012: Lumpectomy of fibroadenoma of right breast    3/2023: Started to have pain and swelling of known area of fibroadenoma of right breast. Started after trauma to breast.     5/26/23: Right breast lumpectomy: Invasive metaplastic carcinoma, high-grade, 4.5 x 4.0 x 3.3 cm with interspersed fibroadenoma, negative margins. ER 0%, MO 0%, HER2 0 by IHC. PDL1 CPS of 50.     6/21/23: MRI breast: Diffuse edema, hypervascularity, and skin thickening of the right breast raising the possibility of inflammatory breast cancer. 2. 4.7 cm seroma in the lateral breast in area of known recent malignant  lumpectomy. 3. Metastatic right axillary adenopathy. 4. No evidence of contralateral left breast malignancy. BI-RADS Category 6, known biopsy-proven malignancy.     7/7/23: Right axillary LN biopsy: metastatic adenocarcinoma, consistent with metaplastic breast carcinoma.     7/11/23: CT Chest, abdomen, pelvis: No definite metastatic disease. 1. Chest:  Pathologically enlarged right axillary lymphadenopathy.  Masslike opacity right upper outer breast with postsurgical changes.  Skin thickening right breast.  Correlate with tissue diagnosis and breast MRI.Small semi solid nodules in the right lung.  These are nonspecific.  A follow-up CT chest in 3 months time is recommended.  Abdomen pelvis:  No findings of metastatic disease to the abdomen or pelvis.     7/12/23: NM bone scan: negative for osseous mets    7/14/23: C1D1 Carboplatin/Paclitaxel/Pembrolizumab  8/4/23: C2D1 Carboplatin/Paclitaxel/Pembrolizumab. C2D15 held due to neutropenia  8/25/23: C3D1 Carboplatin/Paclitaxel/Pembrolizumab. All doses given  9/15/23: C4D1 Carboplatin/Paclitaxel/Pembrolizumab. C4D15 held due to neutropenia  10/6/23: C1D1 Doxorubicin/Cyclophosphamide/Pembrolizumab  10/27/23: C2D1 Doxorubicin/Cyclophosphamide/Pembrolizumab  11/20/23: C3D1 Doxorubicin/Cyclophosphamide/Pembrolizumab. Delayed due to neutropenia. Last cycle of chemo prior to surgery    1/9/24: Bilateral Mastectomy: Right breast: No invasive disease left, intermediate grade DCIS present. ypTisN0 (sn), ER/MT negative, HER2 negative, margins negative, ALH present, left breast benign    2/9/24: C1D1 adjuvant pembrolizumab.      Metaplastic carcinoma of breast   6/16/2023 Initial Diagnosis    Metaplastic carcinoma of breast     6/16/2023 - 6/16/2023 Chemotherapy    OP BREAST AC DD DOXOrubicin / Cyclophosphamide     7/13/2023 Cancer Staged    Staging form: Breast, AJCC 8th Edition  - Clinical: Stage IIIC (cT4d, cN2, cM0, G3, ER-, MT-, HER2-) - Signed by Ramin Shine MD on  7/13/2023 7/14/2023 - 9/22/2023 Chemotherapy    OP BREAST Pembrolizumab 400 mg / PACLitaxel / CARBOplatin AUC=5     8/12/2023 - 8/12/2023 Chemotherapy    OP BREAST PACLitaxel Adjuvant (Weekly X 12)     10/6/2023 - 11/20/2023 Chemotherapy    OP BREAST Pembrolizumab 200 mg / DOXOrubicin / Cyclophosphamide      1/23/2024 Cancer Staged    Staging form: Breast, AJCC 8th Edition  - Pathologic: ypTis (DCIS), pN0, cM0, ER-, [place]-, HER2- - Signed by Ramin Shine MD on 1/23/2024 2/9/2024 -  Chemotherapy    OP Pembrolizumab 200 mg     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 Negative for appetite change, diaphoresis, fever, unexpected weight gain and unexpected weight loss.   HENT:  Negative for hearing loss, sore throat and voice change.    Eyes:  Negative for blurred vision, double vision, pain, redness and visual disturbance.   Respiratory:  Negative for cough, shortness of breath and wheezing.    Cardiovascular:  Negative for chest pain, palpitations and leg swelling.   Endocrine: Negative for cold intolerance, heat intolerance, polydipsia and polyuria.   Genitourinary:  Negative for decreased urine volume, difficulty urinating, frequency and urinary incontinence.   Musculoskeletal:  Positive for back pain. Negative for arthralgias, joint swelling and myalgias.   Skin:  Negative for color change, rash, skin lesions and wound.   Neurological:  Negative for dizziness, seizures, numbness and headache.   Hematological:  Negative for adenopathy. Does not bruise/bleed easily.   Psychiatric/Behavioral:  Negative for depressed mood. The patient is not nervous/anxious.    All other systems reviewed and are negative.      Current Outpatient Medications on File Prior to Visit   Medication Sig Dispense Refill    HYDROcodone-acetaminophen (NORCO) 5-325 MG per tablet       desvenlafaxine  (PRISTIQ) 50 MG 24 hr tablet Take 1 tablet by mouth Daily.      fluconazole (DIFLUCAN) 150 MG tablet       potassium chloride (K-DUR,KLOR-CON) 20 MEQ CR tablet Take 1 tablet by mouth Daily. 30 tablet 0    prochlorperazine (COMPAZINE) 10 MG tablet Take 1 tablet by mouth Every 6 (Six) Hours As Needed for Nausea or Vomiting. 60 tablet 2    promethazine (PHENERGAN) 50 MG tablet Take 1 tablet by mouth Every 6 (Six) Hours As Needed for Nausea or Vomiting. 30 tablet 0    rOPINIRole (REQUIP) 1 MG tablet Take 1 tablet by mouth Every Night. Take 1 hour before bedtime. 30 tablet 3    Sodium Fluoride 5000 PPM 1.1 % paste       ubrogepant (Ubrelvy) 100 MG tablet Take 1 tablet by mouth 1 (One) Time As Needed (migraine) for up to 1 dose. One tablet at HA onset, may repeat once in 2 hours if needed. 10 tablet 3     No current facility-administered medications on file prior to visit.       Allergies   Allergen Reactions    Tegaderm Ag Mesh [Silver] Itching     Tegaderm that is used over port a cath     Past Medical History:   Diagnosis Date    Asthma     AS CHILD    Breast cancer     COVID-19 vaccine administered      Past Surgical History:   Procedure Laterality Date    BREAST LUMPECTOMY Right     BREAST RECONSTRUCTION Bilateral 2024    Procedure: BREAST RECONSTRUCTION WITH MESH AND IMPLANTS - USE OF SPY;  Surgeon: Jackie Sanders MD;  [place] [facility] [place] [facility] [place]  Service: [place];  Laterality: Bilateral;     SECTION      MASTECTOMY W/ SENTINEL NODE BIOPSY Bilateral 2024    Procedure: BREAST MASTECTOMY WITH RIGHT AXILLARY SENTINEL NODE IDENTIFICATION AND EXCISION BIOPSY;  Surgeon: Christiana Whiting MD;  [place] [facility] [place] [facility] [place]  Service: General;  Laterality: Bilateral;    PORTACATH PLACEMENT      SKIN BIOPSY      TUBAL ABDOMINAL LIGATION       Social History     Socioeconomic History    Marital status: Single    Number of children: 2   Tobacco Use    Smoking status: Never     Passive exposure: Never     Smokeless tobacco: Never   Vaping Use    Vaping status: Never Used   Substance and Sexual Activity    Alcohol use: Not Currently     Comment: OCCASSIONAL    Drug use: Never    Sexual activity: Defer     Family History   Problem Relation Age of Onset    Skin cancer Mother     COPD Mother     Hypertension Father     Hyperlipidemia Father     Diabetes Father     Skin cancer Paternal Grandmother     Lung cancer Paternal Grandmother     Malig Hyperthermia Neg Hx      Immunization History   Administered Date(s) Administered    COVID-19 (PFIZER) Purple Cap Monovalent 05/19/2021, 06/10/2021    DTaP, Unspecified 04/16/1999    HPV Quadrivalent 06/08/2009, 08/11/2009, 12/15/2009    Hepatitis A 06/18/2018    MMR 04/16/1999    Meningococcal, Unspecified 02/24/2012    OPV 04/16/1999    Td (TDVAX) 06/26/2006    Tdap 07/09/2014    Varicella 04/16/1999       Tobacco Use: Low Risk  (3/21/2024)    Patient History     Smoking Tobacco Use: Never     Smokeless Tobacco Use: Never     Passive Exposure: Never       Objective     Physical Exam  Vitals and nursing note reviewed.   Constitutional:       Appearance: Normal appearance. She is normal weight.   HENT:      Head: Normocephalic.      Right Ear: Tympanic membrane normal.      Mouth/Throat:      Mouth: Mucous membranes are moist. Mucous membranes are dry.   Eyes:      Extraocular Movements: Extraocular movements intact.      Pupils: Pupils are equal, round, and reactive to light.   Cardiovascular:      Rate and Rhythm: Normal rate and regular rhythm.      Pulses: Normal pulses.      Heart sounds: Normal heart sounds.   Pulmonary:      Effort: Pulmonary effort is normal. No respiratory distress.      Breath sounds: Normal breath sounds. No wheezing, rhonchi or rales.   Abdominal:      General: Bowel sounds are normal. There is no distension.      Palpations: Abdomen is soft.   Musculoskeletal:         General: Normal range of motion.      Cervical back: Normal range of motion and neck  supple.   Skin:     General: Skin is warm and dry.      Capillary Refill: Capillary refill takes less than 2 seconds.   Neurological:      General: No focal deficit present.      Mental Status: She is alert and oriented to person, place, and time.   Psychiatric:         Mood and Affect: Mood normal.         Behavior: Behavior normal.         Thought Content: Thought content normal.         Judgment: Judgment normal.         Vitals:    03/21/24 1122   BP: 111/90   Pulse: 74   Resp: 18   Temp: 97.4 °F (36.3 °C)   TempSrc: Temporal   SpO2: 100%   Weight: 75.3 kg (166 lb)   PainSc: 0-No pain       Wt Readings from Last 3 Encounters:   03/21/24 75.3 kg (166 lb)   03/01/24 74.3 kg (163 lb 12.8 oz)   03/01/24 73.9 kg (163 lb)        ECOG score: 0         ECOG: (0) Fully Active - Able to Carry On All Pre-disease Performance Without Restriction  Fall Risk Assessment was completed, and patient is at low risk for falls.  PHQ-9 Total Score:         The patient is  experiencing fatigue. Fatigue score: 1    PT/OT Functional Screening: PT fx screen : No needs identified  Speech Functional Screening: Speech fx screen : No needs identified  Rehab to be ordered: Rehab to be ordered : No needs identified        Result Review :  The following data was reviewed by: MARIO Gómez on 03/21/2024:  Lab Results   Component Value Date    HGB 11.0 (L) 03/21/2024    HCT 34.3 03/21/2024    MCV 84.9 03/21/2024     03/21/2024    WBC 3.18 (L) 03/21/2024    NEUTROABS 1.94 03/21/2024    LYMPHSABS 0.70 03/21/2024    MONOSABS 0.49 03/21/2024    EOSABS 0.03 03/21/2024    BASOSABS 0.01 03/21/2024     Lab Results   Component Value Date    GLUCOSE 85 03/21/2024    BUN 10 03/21/2024    CREATININE 0.53 (L) 03/21/2024     03/21/2024    K 3.5 03/21/2024     03/21/2024    CO2 29.4 (H) 03/21/2024    CALCIUM 9.0 03/21/2024    PROTEINTOT 6.4 03/21/2024    ALBUMIN 3.9 03/21/2024    BILITOT 0.2 03/21/2024    ALKPHOS 93 03/21/2024     "AST 14 03/21/2024    ALT 14 03/21/2024     Lab Results   Component Value Date    FERRITIN 21.13 02/29/2024     Lab Results   Component Value Date    IRON 28 (L) 02/29/2024    LABIRON 6 (L) 02/29/2024    TRANSFERRIN 294 02/29/2024    TIBC 438 02/29/2024     Lab Results   Component Value Date    FERRITIN 21.13 02/29/2024     No results found for: \"PSA\", \"CEA\", \"AFP\", \"\", \"\"    FL PORT A CATH CHECK    Result Date: 2/22/2024  No leak or obstruction . Fluoroscopy exposure time: 0.6 minutes, 71.08 mGy. 36 image sequences. Images reviewed, interpreted, and dictated by Momo Crain DO    Mammo Stereotactic Breast Specimen Right    Result Date: 1/9/2024    Specimen radiograph includes the coil shaped biopsy clip.     APRIL OROURKE MD       Electronically Signed and Approved By: APRIL OROURKE MD on 1/09/2024 at 15:47             NM [place] Node Injection Only    Result Date: 1/9/2024    Right breast sentinel node injection.     APRIL OROURKE MD       Electronically Signed and Approved By: APRIL OROURKE MD on 1/09/2024 at 13:13             Mammo Diagnostic Digital Tomosynthesis Right With CAD    Result Date: 1/4/2024  The right axillary clip cannot be localized for Mag seed placement or needle localization as detailed above.  RECOMMENDATION(S):  SURGICAL CONSULTATION.   BIRADS:  DIAGNOSTIC CATEGORY 6--KNOWN MALIGNANCY RIGHT BREAST   BREAST COMPOSITION: Extremely dense, which lowers the sensitivity of mammography.  PLEASE NOTE:  A NORMAL MAMMOGRAM DOES NOT EXCLUDE THE POSSIBILITY OF BREAST CANCER. ANY CLINICALLY SUSPICIOUS PALPABLE LUMP SHOULD BE BIOPSIED.      Rianna Cabrera M.D.       Electronically Signed and Approved By: Rianna Cabrera M.D. on 1/04/2024 at 13:52                    Assessment and Plan:  Diagnoses and all orders for this visit:    1. Metaplastic carcinoma of breast (Primary)    2. Iron deficiency anemia, unspecified iron deficiency anemia type    Other orders  -     ibuprofen " (ADVIL,MOTRIN) 600 MG tablet; Take 1 tablet by mouth Every 8 (Eight) Hours As Needed for Mild Pain.  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 She had bilateral mastectomy and bilateral implants on 1/9/24. Has been started on Pembrolizumab. This has worsened her muscle cramps in her legs. On ropinirole 1 mg at night for this. Otherwise has tolerated well. Has been using eczema cream for rash on legs and abdomen which has resolved her symptoms. Has tooth pain on both left and right. Following with dentistry for this. Likely will need tooth extraction on the left. Has been getting radiation in [place]. Tolerated well so far. Has some sharp pain near her port site. X-ray has been negative.               Oncology/Hematology History Overview Note    2012: Lumpectomy of fibroadenoma of right breast     3/2023: Started to have pain and swelling of known area of fibroadenoma of right breast. Started after trauma to breast.      5/26/23: Right breast lumpectomy: Invasive metaplastic carcinoma, high-grade, 4.5 x 4.0 x 3.3 cm with interspersed fibroadenoma, negative margins. ER 0%, DE 0%, HER2 0 by IHC. PDL1 CPS of 50.      6/21/23: MRI breast: Diffuse edema, hypervascularity, and skin thickening of the right breast raising the possibility of inflammatory breast cancer. 2. 4.7 cm seroma in the lateral breast in area of known recent malignant lumpectomy. 3. Metastatic right axillary adenopathy. 4. No evidence of contralateral left breast malignancy. BI-RADS Category 6, known  biopsy-proven malignancy.      7/7/23: Right axillary LN biopsy: metastatic adenocarcinoma, consistent with metaplastic breast carcinoma.      7/11/23: CT Chest, abdomen, pelvis: No definite metastatic disease. 1. Chest:  Pathologically enlarged right axillary lymphadenopathy.  Masslike opacity right upper outer breast with postsurgical changes.  Skin thickening right breast.  Correlate with tissue diagnosis and breast MRI.Small semi solid nodules in the right lung.  These are nonspecific.  A follow-up CT chest in 3 months time is recommended.  Abdomen pelvis:  No findings of metastatic disease to the abdomen or pelvis.      7/12/23: NM bone scan: negative for osseous mets     7/14/23: C1D1 Carboplatin/Paclitaxel/Pembrolizumab  8/4/23: C2D1 Carboplatin/Paclitaxel/Pembrolizumab. C2D15 held due to neutropenia  8/25/23: C3D1 Carboplatin/Paclitaxel/Pembrolizumab. All doses given  9/15/23: C4D1 Carboplatin/Paclitaxel/Pembrolizumab. C4D15 held due to neutropenia  10/6/23: C1D1 Doxorubicin/Cyclophosphamide/Pembrolizumab  10/27/23: C2D1 Doxorubicin/Cyclophosphamide/Pembrolizumab  11/20/23: C3D1 Doxorubicin/Cyclophosphamide/Pembrolizumab. Delayed due to neutropenia. Last cycle of chemo prior to surgery     1/9/24: Bilateral Mastectomy: Right breast: No invasive disease left, intermediate grade DCIS present. ypTisN0 (sn), ER/[place] negative, HER2 negative, margins negative, ALH present, left breast benign     2/9/24: C1D1 adjuvant pembrolizumab.       Metaplastic carcinoma of breast    6/16/2023 Initial Diagnosis      Metaplastic carcinoma of breast       6/16/2023 - 6/16/2023 Chemotherapy      OP BREAST AC DD DOXOrubicin / Cyclophosphamide       7/13/2023 Cancer Staged      Staging form: Breast, AJCC 8th Edition  - Clinical: Stage IIIC (cT4d, cN2, cM0, G3, ER-, [place]-, HER2-) - Signed by Ramin Shine MD on 7/13/2023 7/14/2023 - 9/22/2023 Chemotherapy      OP BREAST Pembrolizumab 400 mg / PACLitaxel / CARBOplatin  AUC=5       8/12/2023 - 8/12/2023 Chemotherapy      OP BREAST PACLitaxel Adjuvant (Weekly X 12)       10/6/2023 - 11/20/2023 Chemotherapy      OP BREAST Pembrolizumab 200 mg / DOXOrubicin / Cyclophosphamide        1/23/2024 Cancer Staged      Staging form: Breast, AJCC 8th Edition  - Pathologic: ypTis (DCIS), pN0, cM0, ER-, NM-, HER2- - Signed by Ramin Shine MD on 1/23/2024 2/9/2024 -  Chemotherapy      OP Pembrolizumab 200 mg       Triple negative breast cancer (Resolved)    6/28/2023 Initial Diagnosis      Triple negative breast cancer                Review of Systems   Constitutional:  Positive for fatigue.   Genitourinary:  Negative for breast pain.   Musculoskeletal:  Positive for arthralgias.   Skin:  Negative for rash.   All other systems reviewed and are negative.     Medications Ordered Prior to Encounter          Current Outpatient Medications on File Prior to Visit   Medication Sig Dispense Refill    fluconazole (DIFLUCAN) 150 MG tablet          gabapentin (NEURONTIN) 300 MG capsule Take 1 capsule by mouth 3 (Three) Times a Day.        Sodium Fluoride 5000 PPM 1.1 % paste          desvenlafaxine (PRISTIQ) 50 MG 24 hr tablet Take 1 tablet by mouth Daily.        potassium chloride (K-DUR,KLOR-CON) 20 MEQ CR tablet Take 1 tablet by mouth Daily. 14 tablet 2    prochlorperazine (COMPAZINE) 10 MG tablet Take 1 tablet by mouth Every 6 (Six) Hours As Needed for Nausea or Vomiting. 60 tablet 2    promethazine (PHENERGAN) 50 MG tablet Take 1 tablet by mouth Every 6 (Six) Hours As Needed for Nausea or Vomiting. 30 tablet 0    rOPINIRole (REQUIP) 1 MG tablet Take 1 tablet by mouth Every Night. Take 1 hour before bedtime. 30 tablet 3    ubrogepant (Ubrelvy) 100 MG tablet Take 1 tablet by mouth 1 (One) Time As Needed (migraine) for up to 1 dose. One tablet at HA onset, may repeat once in 2 hours if needed. 10 tablet 3      No current facility-administered medications on file prior to visit.             Allergies         Allergies   Allergen Reactions    Tegaderm Ag Mesh [Silver] Itching       Tegaderm that is used over port a cath         Medical History        Past Medical History:   Diagnosis Date    Asthma       AS CHILD    Breast cancer      COVID-19 vaccine administered           Surgical History         Past Surgical History:   Procedure Laterality Date    BREAST LUMPECTOMY Right      BREAST RECONSTRUCTION Bilateral 2024     Procedure: BREAST RECONSTRUCTION WITH MESH AND IMPLANTS - USE OF SPY;  Surgeon: Jackie Sanders MD;  [place] [facility] [place] [facility] [place]  Service: [place];  Laterality: Bilateral;     SECTION        MASTECTOMY W/ SENTINEL NODE BIOPSY Bilateral 2024     Procedure: BREAST MASTECTOMY WITH RIGHT AXILLARY SENTINEL NODE IDENTIFICATION AND EXCISION BIOPSY;  Surgeon: Christiana Whiting MD;  [place] [facility] [place] [facility] [place]  Service: General;  Laterality: Bilateral;    PORTACATH PLACEMENT        SKIN BIOPSY        TUBAL ABDOMINAL LIGATION             Social History   Social History            Socioeconomic History    Marital status: Single    Number of children: 2   Tobacco Use    Smoking status: Never       Passive exposure: Never    Smokeless tobacco: Never   Vaping Use    Vaping Use: Never used   Substance and Sexual Activity    Alcohol use: Not Currently       Comment: OCCASSIONAL    Drug use: Never    Sexual activity: Defer               Family History   Problem Relation Age of Onset    Skin cancer Mother      COPD Mother      Hypertension Father      Hyperlipidemia Father      Diabetes Father      Skin cancer Paternal Grandmother      Lung cancer Paternal Grandmother      Malig Hyperthermia Neg Hx                 Objective  Physical Exam  Well appearing patient in no acute distress on RA  Anicteric sclerae, non-erythematous papules palpated on stomach.   Respirations non-labored  Awake, alert, and oriented x 4. Speech intact. No gross neurologic deficit  Appropriate mood and  affect     Vitals       Vitals:     02/29/24 1333   BP: 119/54   Pulse: 78   Resp: 18   Temp: 97.7 °F (36.5 °C)   TempSrc: Temporal   SpO2: 100%   Weight: 74.1 kg (163 lb 6.4 oz)   PainSc: 0-No pain                             PHQ-9 Total Score:                Result Review  :   The following data was reviewed by: Ramin Shine MD on 02/29/24:        Lab Results   Component Value Date     HGB 11.4 (L) 02/29/2024     HCT 34.3 02/29/2024     MCV 85.5 02/29/2024      02/29/2024     WBC 4.41 02/29/2024     NEUTROABS 2.59 02/29/2024     LYMPHSABS 1.44 02/29/2024     MONOSABS 0.35 02/29/2024     EOSABS 0.02 02/29/2024     BASOSABS 0.01 02/29/2024            Lab Results   Component Value Date     GLUCOSE 86 02/29/2024     BUN 11 02/29/2024     CREATININE 0.51 (L) 02/29/2024      02/29/2024     K 3.4 (L) 02/29/2024      02/29/2024     CO2 29.5 (H) 02/29/2024     CALCIUM 9.1 02/29/2024     PROTEINTOT 6.6 02/29/2024     ALBUMIN 4.2 02/29/2024     BILITOT 0.2 02/29/2024     ALKPHOS 104 02/29/2024     AST 14 02/29/2024     ALT 17 02/29/2024            Lab Results   Component Value Date     FREET4 1.04 02/09/2024     TSH 2.390 02/09/2024      Labs personally reviewed.  K low again.      Pathology report personally reviewed     Surgery note personally reviewed        FL PORT A CATH CHECK     Result Date: 2/22/2024  No leak or obstruction . Fluoroscopy exposure time: 0.6 minutes, 71.08 mGy. 36 image sequences. Images reviewed, interpreted, and dictated by Momo Crain DO             Assessment  Assessment and Plan    Diagnoses and all orders for this visit:     1. Metaplastic carcinoma of breast (Primary)  -     Magnesium; Future     2. Iron deficiency anemia, unspecified iron deficiency anemia type     3. Malabsorption of iron     4. Hypokalemia     5. Restless legs     Other orders  -     Cancel: sodium chloride 0.9 % infusion  -     Cancel: Pembrolizumab (KEYTRUDA) 200 mg in sodium chloride 0.9 % 58 mL  chemo IVPB           Metaplastic Carcinoma of Right Breast, triple negative  Patient is s/p lumpectomy of 4.5 cm lesion with triple negative metaplastic carcinoma, interspersed with fibroadenoma. Patient has clinical inflammatory breast cancer. MRI breast with concern for inflammatory breast cancer as well as right sided axillary involvement. Left breast without lesions per MRI. Lymph node biopsy 7/7/23 confirmed right axillary involvement by breast carcinoma.  Completed noeadjuvant chemothearpy and immunotherapy with 4 cycles of Carboplatin/Paclitaxel/Pembro (C1 7/1423 and C4 9/15/23) and 3 cycles of Doxorubicin, cyclophosphamide, pembrolizumab (completed C3 11/20/23 with delay due to neutropenia). Completed 7 cycles total. C4 omitted due to accumulation of toxicities.      1/9/24: Bilateral Mastectomy: Right breast: No invasive disease left, intermediate grade DCIS present. ypTisN0 (sn), ER/IL negative, HER2 negative, margins negative, ALH      No residual invasive disease present, so patient with complete pathologic response  on the basis of the National Surgical Adjuvant Breast and Bowel project criteria. DCIS still present as well as ALH, negative margins, nodes negative. Will proceed with adjuvant immunotherapy with Pembrolizumab every 3 weeks to complete 1 year. C1 provided 2/9/24     C3D1 adjuvant pembrolizumab 3/21/24. Labs appropriate to proceed.      Adjuvant radiation has been started in [place].   Has 17 additional treatments to go before completed.     Muscle pain right chest: Ibuprofen 600 mg every 8 hours PRN. Sent to her pharmacy for refill. Encouraged massage as well.        JOHNATHAN  Malabsorption of iron  2/29/24 labs consistent with iron deficiency. Remains anemic. Provide IV iron. This is scheduled for next week beginning 3/25/24-3/27/2024.         Hypokalemia  3.5 today. Will continue to monitor. Has K supplements at home but has not been taking.              I spent 20 minutes caring for Marilee  on this date of service. This time includes time spent by me in the following activities:preparing for the visit, reviewing tests, counseling and educating the patient/family/caregiver, ordering medications, tests, or procedures, referring and communicating with other health care professionals , documenting information in the medical record, independently interpreting results and communicating that information with the patient/family/caregiver, and care coordination    Patient Follow Up: as scheduled.     Patient was given instructions and counseling regarding her condition or for health maintenance advice. Please see specific information pulled into the AVS if appropriate.     Koki Joseph, APRN    3/21/2024    .tob

## 2024-03-22 ENCOUNTER — HOSPITAL ENCOUNTER (OUTPATIENT)
Dept: ONCOLOGY | Facility: HOSPITAL | Age: 29
Discharge: HOME | End: 2024-03-22
Payer: COMMERCIAL

## 2024-03-22 ENCOUNTER — OFFICE VISIT (OUTPATIENT)
Dept: FAMILY MEDICINE CLINIC | Age: 29
End: 2024-03-22
Payer: COMMERCIAL

## 2024-03-22 VITALS
HEART RATE: 75 BPM | HEIGHT: 63 IN | RESPIRATION RATE: 18 BRPM | OXYGEN SATURATION: 100 % | SYSTOLIC BLOOD PRESSURE: 107 MMHG | TEMPERATURE: 97.8 F | WEIGHT: 166.89 LBS | BODY MASS INDEX: 29.57 KG/M2 | DIASTOLIC BLOOD PRESSURE: 68 MMHG

## 2024-03-22 VITALS
TEMPERATURE: 97.9 F | WEIGHT: 168 LBS | DIASTOLIC BLOOD PRESSURE: 69 MMHG | OXYGEN SATURATION: 99 % | HEART RATE: 84 BPM | HEIGHT: 63 IN | BODY MASS INDEX: 29.77 KG/M2 | SYSTOLIC BLOOD PRESSURE: 108 MMHG

## 2024-03-22 DIAGNOSIS — F41.0 ANXIETY ATTACK: Primary | ICD-10-CM

## 2024-03-22 DIAGNOSIS — F39 MOOD DISORDER: ICD-10-CM

## 2024-03-22 DIAGNOSIS — Z45.2 FITTING AND ADJUSTMENT OF VASCULAR CATHETER: ICD-10-CM

## 2024-03-22 DIAGNOSIS — C50.919 METAPLASTIC CARCINOMA OF BREAST: Primary | ICD-10-CM

## 2024-03-22 PROCEDURE — 25010000002 PEMBROLIZUMAB 100 MG/4ML SOLUTION 4 ML VIAL: Performed by: INTERNAL MEDICINE

## 2024-03-22 PROCEDURE — 25810000003 SODIUM CHLORIDE 0.9 % SOLUTION: Performed by: INTERNAL MEDICINE

## 2024-03-22 PROCEDURE — 96413 CHEMO IV INFUSION 1 HR: CPT

## 2024-03-22 PROCEDURE — 25010000002 HEPARIN LOCK FLUSH PER 10 UNITS: Performed by: INTERNAL MEDICINE

## 2024-03-22 RX ORDER — SODIUM CHLORIDE 9 MG/ML
20 INJECTION, SOLUTION INTRAVENOUS ONCE
Status: COMPLETED | OUTPATIENT
Start: 2024-03-22 | End: 2024-03-22

## 2024-03-22 RX ORDER — HEPARIN SODIUM (PORCINE) LOCK FLUSH IV SOLN 100 UNIT/ML 100 UNIT/ML
500 SOLUTION INTRAVENOUS AS NEEDED
OUTPATIENT
Start: 2024-03-22

## 2024-03-22 RX ORDER — SODIUM CHLORIDE 0.9 % (FLUSH) 0.9 %
20 SYRINGE (ML) INJECTION AS NEEDED
OUTPATIENT
Start: 2024-03-22

## 2024-03-22 RX ORDER — SODIUM CHLORIDE 0.9 % (FLUSH) 0.9 %
20 SYRINGE (ML) INJECTION AS NEEDED
Status: DISCONTINUED | OUTPATIENT
Start: 2024-03-22 | End: 2024-03-23 | Stop reason: HOSPADM

## 2024-03-22 RX ORDER — SODIUM CHLORIDE 9 MG/ML
20 INJECTION, SOLUTION INTRAVENOUS ONCE
Status: CANCELLED | OUTPATIENT
Start: 2024-03-22

## 2024-03-22 RX ORDER — DESVENLAFAXINE SUCCINATE 50 MG/1
50 TABLET, EXTENDED RELEASE ORAL DAILY
Qty: 90 TABLET | Refills: 1 | Status: SHIPPED | OUTPATIENT
Start: 2024-03-22

## 2024-03-22 RX ORDER — GABAPENTIN 300 MG/1
300 CAPSULE ORAL 3 TIMES DAILY PRN
COMMUNITY

## 2024-03-22 RX ORDER — HEPARIN SODIUM (PORCINE) LOCK FLUSH IV SOLN 100 UNIT/ML 100 UNIT/ML
500 SOLUTION INTRAVENOUS AS NEEDED
Status: DISCONTINUED | OUTPATIENT
Start: 2024-03-22 | End: 2024-03-23 | Stop reason: HOSPADM

## 2024-03-22 RX ADMIN — SODIUM CHLORIDE 20 ML/HR: 9 INJECTION, SOLUTION INTRAVENOUS at 10:51

## 2024-03-22 RX ADMIN — SODIUM CHLORIDE 200 MG: 9 INJECTION, SOLUTION INTRAVENOUS at 11:17

## 2024-03-22 RX ADMIN — Medication 20 ML: at 12:05

## 2024-03-22 RX ADMIN — HEPARIN 500 UNITS: 100 SYRINGE at 12:05

## 2024-03-22 NOTE — PROGRESS NOTES
Chief Complaint  Marilee Watts presents to [facility] for Anxiety (Follow up )      Subjective     History of Present Illness    Marilee presents today for follow up on Anxiety   She reports that She is doing well on current treatment of Prestiq  Current, ongoing symptoms include: none.    She denies current suicidal and homicidal ideation.    Current psychotherapy : No    Currently under care of oncology for breast cancer.  Doing well.  Radiation will be complete in 2 weeks.  Continues on Keytruda          Assessment and Plan       Diagnoses and all orders for this visit:    1. Anxiety attack (Primary)  -     desvenlafaxine (PRISTIQ) 50 MG 24 hr tablet; Take 1 tablet by mouth Daily.  Dispense: 90 tablet; Refill: 1    2. Mood disorder  -     desvenlafaxine (PRISTIQ) 50 MG 24 hr tablet; Take 1 tablet by mouth Daily.  Dispense: 90 tablet; Refill: 1            Follow Up   Return in about 6 months (around 9/22/2024) for Annual physical.  Future Appointments   Date Time Provider Department Center   3/25/2024 12:00 PM CHAIR 5 PARDEEP ACU BH PAR[facility] OPINF PAR[facility]EP   3/26/2024 10:00 AM CHAIR 3 PARDEEP ACU BH [facility] OPINF PAR[facility]   3/27/2024 10:30 AM CHAIR 3 PARDEEP ACU BH PAR[facility] OPINF PARDEEP   4/9/2024 11:45 AM Anh Chaney APRN [facility] [place] [facility] [place] [facility]   4/11/2024 10:00 AM NURSE/MA ONC ETOWN [facility] [place] ONC E521 PARDEEP   4/11/2024 10:30 AM Ramin Shine MD [facility] [place] ONC E521 PARDEEP   4/12/2024 10:15 AM CHAIR 17 PARDEEP OP INFU BH PARDEEP OPIF PARDEEP   4/23/2024  9:30 AM Daniela Vazquez OT  PARDEEP LYMCL PARDEEP   5/2/2024  8:45 AM NURSE/MA ONC [place] [facility] [place] ONC E521 PARDEEP   5/2/2024  9:45 AM Ramin Shine MD [facility] [place] ONC E521 PARDEEP   5/3/2024 10:15 AM CHAIR 10 PARDEEP OP INFU BH PARDEEP OPIF PARDEEP   5/7/2024  1:00 PM Alanis Contreras APRN [facility] [place] SOWMYA ETWN PARDEEP   5/23/2024 10:00 AM NURSE/MA ONC [place] [facility] [place] ONC E521 PARDEEP   5/23/2024 10:45 AM Ramin Shine MD [facility] ONC E521 [facility]   5/24/2024 10:15 AM CHAIR 12 PARDEEP OP INFU  PARDEEP SALCEDO [facility]   9/27/2024 10:30 AM  "Sakina Alvarez APRN [facility]       New Medications Ordered This Visit   Medications    desvenlafaxine (PRISTIQ) 50 MG 24 hr tablet     Sig: Take 1 tablet by mouth Daily.     Dispense:  90 tablet     Refill:  1       Medications Discontinued During This Encounter   Medication Reason    fluconazole (DIFLUCAN) 150 MG tablet *Therapy completed    desvenlafaxine (PRISTIQ) 50 MG 24 hr tablet Reorder          Review of Systems    Objective     Vitals:    03/22/24 1329   BP: 108/69   BP Location: Left arm   Patient Position: Sitting   Cuff Size: Adult   Pulse: 84   Temp: 97.9 °F (36.6 °C)   TempSrc: Oral   SpO2: 99%   Weight: 76.2 kg (168 lb)   Height: 160 cm (62.99\")     Body mass index is 29.77 kg/m².          Physical Exam  Constitutional:       General: She is not in acute distress.     Appearance: Normal appearance.   HENT:      Head: Normocephalic.   Cardiovascular:      Rate and Rhythm: Normal rate and regular rhythm.   Pulmonary:      Effort: Pulmonary effort is normal.      Breath sounds: Normal breath sounds.   Musculoskeletal:         General: Normal range of motion.   Neurological:      General: No focal deficit present.      Mental Status: She is alert and oriented to person, place, and time.   Psychiatric:         Mood and Affect: Mood normal.         Behavior: Behavior normal.            Result Review               Allergies   Allergen Reactions    Tegaderm Ag Mesh [Silver] Itching     Tegaderm that is used over port a cath      Past Medical History:   Diagnosis Date    Asthma     AS CHILD    Breast cancer     COVID-19 vaccine administered      Current Outpatient Medications   Medication Sig Dispense Refill    desvenlafaxine (PRISTIQ) 50 MG 24 hr tablet Take 1 tablet by mouth Daily. 90 tablet 1    gabapentin (NEURONTIN) 300 MG capsule Take 1 capsule by mouth 3 (Three) Times a Day As Needed.      ibuprofen (ADVIL,MOTRIN) 600 MG tablet Take 1 tablet by mouth Every 8 (Eight) Hours As Needed for Mild " Pain. 90 tablet 0    potassium chloride (K-DUR,KLOR-CON) 20 MEQ CR tablet Take 1 tablet by mouth Daily. 30 tablet 0    prochlorperazine (COMPAZINE) 10 MG tablet Take 1 tablet by mouth Every 6 (Six) Hours As Needed for Nausea or Vomiting. 60 tablet 2    promethazine (PHENERGAN) 50 MG tablet Take 1 tablet by mouth Every 6 (Six) Hours As Needed for Nausea or Vomiting. 30 tablet 0    rOPINIRole (REQUIP) 1 MG tablet Take 1 tablet by mouth Every Night. Take 1 hour before bedtime. 30 tablet 3    Sodium Fluoride 5000 PPM 1.1 % paste       HYDROcodone-acetaminophen (NORCO) 5-325 MG per tablet  (Patient not taking: Reported on 3/22/2024)      ubrogepant (Ubrelvy) 100 MG tablet Take 1 tablet by mouth 1 (One) Time As Needed (migraine) for up to 1 dose. One tablet at HA onset, may repeat once in 2 hours if needed. (Patient not taking: Reported on 3/22/2024) 10 tablet 3     No current facility-administered medications for this visit.     Facility-Administered Medications Ordered in Other Visits   Medication Dose Route Frequency Provider Last Rate Last Admin    heparin injection 500 Units  500 Units Intravenous PRN Ramin Shine MD   500 Units at 24 1205    sodium chloride 0.9 % flush 20 mL  20 mL Intravenous PRN Ramin Shine MD   20 mL at 24 1205     Past Surgical History:   Procedure Laterality Date    BREAST LUMPECTOMY Right     BREAST RECONSTRUCTION Bilateral 2024    Procedure: BREAST RECONSTRUCTION WITH MESH AND IMPLANTS - USE OF SPY;  Surgeon: Jackie Sanders MD;  Location: [facility] OR [facility];  Service: Plastics;  Laterality: Bilateral;     SECTION      MASTECTOMY W/ SENTINEL NODE BIOPSY Bilateral 2024    Procedure: BREAST MASTECTOMY WITH RIGHT AXILLARY SENTINEL NODE IDENTIFICATION AND EXCISION BIOPSY;  Surgeon: Christiana Whiting MD;  Location: [facility] OR [facility];  Service: General;  Laterality: Bilateral;    PORTACATH PLACEMENT      SKIN BIOPSY      TUBAL ABDOMINAL LIGATION         Health Maintenance Due   Topic Date Due    Pneumococcal Vaccine 0-64 (1 of 2 - PCV) Never done    COVID-19 Vaccine (3 - Pfizer risk series) 07/08/2021    HEPATITIS C SCREENING  Never done      Immunization History   Administered Date(s) Administered    COVID-19 (PFIZER) Purple Cap Monovalent 05/19/2021, 06/10/2021    DTaP, Unspecified 04/16/1999    HPV Quadrivalent 06/08/2009, 08/11/2009, 12/15/2009    Hepatitis A 06/18/2018    MMR 04/16/1999    Meningococcal, Unspecified 02/24/2012    OPV 04/16/1999    Td (TDVAX) 06/26/2006    Tdap 07/09/2014    Varicella 04/16/1999         Part of this note may be an electronic transcription/translation of spoken language to printed   text using the Dragon Dictation System.      Sakina Alvarez APRN

## 2024-04-09 ENCOUNTER — OFFICE VISIT (OUTPATIENT)
Dept: PLASTIC SURGERY | Facility: CLINIC | Age: 29
End: 2024-04-09
Payer: COMMERCIAL

## 2024-04-09 VITALS
WEIGHT: 160 LBS | OXYGEN SATURATION: 98 % | BODY MASS INDEX: 28.35 KG/M2 | HEIGHT: 63 IN | SYSTOLIC BLOOD PRESSURE: 111 MMHG | HEART RATE: 83 BPM | DIASTOLIC BLOOD PRESSURE: 76 MMHG

## 2024-04-09 DIAGNOSIS — Z98.890 S/P BREAST RECONSTRUCTION, BILATERAL: Primary | ICD-10-CM

## 2024-04-09 PROCEDURE — 1160F RVW MEDS BY RX/DR IN RCRD: CPT | Performed by: NURSE PRACTITIONER

## 2024-04-09 PROCEDURE — 99213 OFFICE O/P EST LOW 20 MIN: CPT | Performed by: NURSE PRACTITIONER

## 2024-04-09 PROCEDURE — 1159F MED LIST DOCD IN RCRD: CPT | Performed by: NURSE PRACTITIONER

## 2024-04-09 RX ORDER — AMOXICILLIN 500 MG/1
CAPSULE ORAL
COMMUNITY
Start: 2024-04-05

## 2024-04-09 RX ORDER — BENZONATATE 200 MG/1
CAPSULE ORAL
COMMUNITY
Start: 2024-04-05

## 2024-04-09 RX ORDER — BENZOCAINE/MENTHOL 6 MG-10 MG
LOZENGE MUCOUS MEMBRANE
COMMUNITY
Start: 2024-04-05

## 2024-04-09 RX ORDER — METHYLPREDNISOLONE 4 MG/1
TABLET ORAL
COMMUNITY
Start: 2024-04-05

## 2024-04-11 ENCOUNTER — LAB (OUTPATIENT)
Dept: ONCOLOGY | Facility: HOSPITAL | Age: 29
End: 2024-04-11
Payer: COMMERCIAL

## 2024-04-11 ENCOUNTER — PATIENT MESSAGE (OUTPATIENT)
Dept: PLASTIC SURGERY | Facility: CLINIC | Age: 29
End: 2024-04-11
Payer: COMMERCIAL

## 2024-04-11 ENCOUNTER — PATIENT ROUNDING (BHMG ONLY) (OUTPATIENT)
Dept: PLASTIC SURGERY | Facility: CLINIC | Age: 29
End: 2024-04-11
Payer: COMMERCIAL

## 2024-04-11 ENCOUNTER — OFFICE VISIT (OUTPATIENT)
Dept: ONCOLOGY | Facility: HOSPITAL | Age: 29
End: 2024-04-11
Payer: COMMERCIAL

## 2024-04-11 VITALS
BODY MASS INDEX: 28.63 KG/M2 | RESPIRATION RATE: 18 BRPM | OXYGEN SATURATION: 99 % | HEART RATE: 103 BPM | TEMPERATURE: 97 F | DIASTOLIC BLOOD PRESSURE: 97 MMHG | WEIGHT: 161.6 LBS | HEIGHT: 63 IN | SYSTOLIC BLOOD PRESSURE: 119 MMHG

## 2024-04-11 DIAGNOSIS — C50.919 METAPLASTIC CARCINOMA OF BREAST: ICD-10-CM

## 2024-04-11 DIAGNOSIS — C50.919 METAPLASTIC CARCINOMA OF BREAST: Primary | ICD-10-CM

## 2024-04-11 DIAGNOSIS — D50.9 IRON DEFICIENCY ANEMIA, UNSPECIFIED IRON DEFICIENCY ANEMIA TYPE: ICD-10-CM

## 2024-04-11 LAB
ALBUMIN SERPL-MCNC: 4.6 G/DL (ref 3.5–5.2)
ALBUMIN/GLOB SERPL: 1.5 G/DL
ALP SERPL-CCNC: 117 U/L (ref 39–117)
ALT SERPL W P-5'-P-CCNC: 19 U/L (ref 1–33)
ANION GAP SERPL CALCULATED.3IONS-SCNC: 6.7 MMOL/L (ref 5–15)
AST SERPL-CCNC: 13 U/L (ref 1–32)
BASOPHILS # BLD AUTO: 0.01 10*3/MM3 (ref 0–0.2)
BASOPHILS NFR BLD AUTO: 0.2 % (ref 0–1.5)
BILIRUB SERPL-MCNC: 0.4 MG/DL (ref 0–1.2)
BUN SERPL-MCNC: 11 MG/DL (ref 6–20)
BUN/CREAT SERPL: 21.2 (ref 7–25)
CALCIUM SPEC-SCNC: 9.2 MG/DL (ref 8.6–10.5)
CHLORIDE SERPL-SCNC: 99 MMOL/L (ref 98–107)
CO2 SERPL-SCNC: 28.3 MMOL/L (ref 22–29)
CREAT SERPL-MCNC: 0.52 MG/DL (ref 0.57–1)
DEPRECATED RDW RBC AUTO: 44.6 FL (ref 37–54)
EGFRCR SERPLBLD CKD-EPI 2021: 128.4 ML/MIN/1.73
EOSINOPHIL # BLD AUTO: 0.01 10*3/MM3 (ref 0–0.4)
EOSINOPHIL NFR BLD AUTO: 0.2 % (ref 0.3–6.2)
ERYTHROCYTE [DISTWIDTH] IN BLOOD BY AUTOMATED COUNT: 14.6 % (ref 12.3–15.4)
GLOBULIN UR ELPH-MCNC: 3 GM/DL
GLUCOSE SERPL-MCNC: 115 MG/DL (ref 65–99)
HCT VFR BLD AUTO: 38.5 % (ref 34–46.6)
HGB BLD-MCNC: 12.6 G/DL (ref 12–15.9)
IMM GRANULOCYTES # BLD AUTO: 0.01 10*3/MM3 (ref 0–0.05)
IMM GRANULOCYTES NFR BLD AUTO: 0.2 % (ref 0–0.5)
LYMPHOCYTES # BLD AUTO: 0.81 10*3/MM3 (ref 0.7–3.1)
LYMPHOCYTES NFR BLD AUTO: 13.7 % (ref 19.6–45.3)
MCH RBC QN AUTO: 27.2 PG (ref 26.6–33)
MCHC RBC AUTO-ENTMCNC: 32.7 G/DL (ref 31.5–35.7)
MCV RBC AUTO: 83 FL (ref 79–97)
MONOCYTES # BLD AUTO: 0.39 10*3/MM3 (ref 0.1–0.9)
MONOCYTES NFR BLD AUTO: 6.6 % (ref 5–12)
NEUTROPHILS NFR BLD AUTO: 4.7 10*3/MM3 (ref 1.7–7)
NEUTROPHILS NFR BLD AUTO: 79.1 % (ref 42.7–76)
PLATELET # BLD AUTO: 174 10*3/MM3 (ref 140–450)
PMV BLD AUTO: 9 FL (ref 6–12)
POTASSIUM SERPL-SCNC: 3.5 MMOL/L (ref 3.5–5.2)
PROT SERPL-MCNC: 7.6 G/DL (ref 6–8.5)
RBC # BLD AUTO: 4.64 10*6/MM3 (ref 3.77–5.28)
SODIUM SERPL-SCNC: 134 MMOL/L (ref 136–145)
T4 FREE SERPL-MCNC: 1.2 NG/DL (ref 0.92–1.68)
TSH SERPL DL<=0.05 MIU/L-ACNC: 1.35 UIU/ML (ref 0.27–4.2)
WBC NRBC COR # BLD AUTO: 5.93 10*3/MM3 (ref 3.4–10.8)

## 2024-04-11 PROCEDURE — 84443 ASSAY THYROID STIM HORMONE: CPT

## 2024-04-11 PROCEDURE — 85025 COMPLETE CBC W/AUTO DIFF WBC: CPT

## 2024-04-11 PROCEDURE — 36415 COLL VENOUS BLD VENIPUNCTURE: CPT

## 2024-04-11 PROCEDURE — 84439 ASSAY OF FREE THYROXINE: CPT

## 2024-04-11 PROCEDURE — G0463 HOSPITAL OUTPT CLINIC VISIT: HCPCS | Performed by: INTERNAL MEDICINE

## 2024-04-11 PROCEDURE — 80053 COMPREHEN METABOLIC PANEL: CPT

## 2024-04-11 NOTE — PROGRESS NOTES
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 She had bilateral mastectomy and bilateral implants on 1/9/24. Has been started on adjuvant Pembrolizumab. She has completed adjuvant radiation in [place] as of 4/9/24. Does have some redness but no skin blisters. Has been using eczema cream for rash on legs and abdomen which has resolved her symptoms. Has some allergies currently. No rash. No diarrhea. No shortness of breath. Does have a round knot on her inner left breast.       Oncology/Hematology History Overview Note   2012: Lumpectomy of fibroadenoma of right breast    3/2023: Started to have pain and swelling of known area of fibroadenoma of right breast. Started after trauma to breast.     5/26/23: Right breast lumpectomy: Invasive metaplastic carcinoma, high-grade, 4.5 x 4.0 x 3.3 cm with interspersed fibroadenoma, negative margins. ER 0%, MA 0%, HER2 0 by IHC. PDL1 CPS of 50.     6/21/23: MRI breast: Diffuse edema, hypervascularity, and skin thickening of the right breast raising the possibility of inflammatory breast cancer. 2. 4.7 cm seroma in the lateral breast in area of known recent malignant lumpectomy. 3. Metastatic right axillary adenopathy. 4. No evidence of contralateral left breast malignancy. BI-RADS Category 6, known biopsy-proven malignancy.     7/7/23: Right axillary LN biopsy: metastatic adenocarcinoma, consistent with metaplastic breast carcinoma.     7/11/23: CT Chest, abdomen, pelvis: No definite metastatic disease. 1. Chest:  Pathologically enlarged right axillary lymphadenopathy.  Masslike opacity right upper outer breast with postsurgical changes.  Skin thickening right breast.   Correlate with tissue diagnosis and breast MRI.Small semi solid nodules in the right lung.  These are nonspecific.  A follow-up CT chest in 3 months time is recommended.  Abdomen pelvis:  No findings of metastatic disease to the abdomen or pelvis.     7/12/23: NM bone scan: negative for osseous mets    7/14/23: C1D1 Carboplatin/Paclitaxel/Pembrolizumab  8/4/23: C2D1 Carboplatin/Paclitaxel/Pembrolizumab. C2D15 held due to neutropenia  8/25/23: C3D1 Carboplatin/Paclitaxel/Pembrolizumab. All doses given  9/15/23: C4D1 Carboplatin/Paclitaxel/Pembrolizumab. C4D15 held due to neutropenia  10/6/23: C1D1 Doxorubicin/Cyclophosphamide/Pembrolizumab  10/27/23: C2D1 Doxorubicin/Cyclophosphamide/Pembrolizumab  11/20/23: C3D1 Doxorubicin/Cyclophosphamide/Pembrolizumab. Delayed due to neutropenia. Last cycle of chemo prior to surgery    1/9/24: Bilateral Mastectomy: Right breast: No invasive disease left, intermediate grade DCIS present. ypTisN0 (sn), ER/UT negative, HER2 negative, margins negative, ALH present, left breast benign    2/9/24: C1D1 adjuvant pembrolizumab.      Metaplastic carcinoma of breast   6/16/2023 Initial Diagnosis    Metaplastic carcinoma of breast     6/16/2023 - 6/16/2023 Chemotherapy    OP BREAST AC DD DOXOrubicin / Cyclophosphamide     7/13/2023 Cancer Staged    Staging form: Breast, AJCC 8th Edition  - Clinical: Stage IIIC (cT4d, cN2, cM0, G3, ER-, UT-, HER2-) - Signed by Ramin Shine MD on 7/13/2023 7/14/2023 - 9/22/2023 Chemotherapy    OP BREAST Pembrolizumab 400 mg / PACLitaxel / CARBOplatin AUC=5     8/12/2023 - 8/12/2023 Chemotherapy    OP BREAST PACLitaxel Adjuvant (Weekly X 12)     10/6/2023 - 11/20/2023 Chemotherapy    OP BREAST Pembrolizumab 200 mg / DOXOrubicin / Cyclophosphamide      1/23/2024 Cancer Staged    Staging form: Breast, AJCC 8th Edition  - Pathologic: ypTis (DCIS), pN0, cM0, ER-, UT-, HER2- - Signed by Rmain Shine MD on 1/23/2024 2/9/2024 -   Chemotherapy    OP Pembrolizumab 200 mg     Triple negative breast cancer (Resolved)   6/28/2023 Initial Diagnosis    Triple negative breast cancer           Review of Systems   Constitutional:  Positive for fatigue.   Gastrointestinal:  Positive for nausea.   Genitourinary:  Negative for breast pain.   Musculoskeletal:  Negative for arthralgias.   Skin:  Negative for rash.   All other systems reviewed and are negative.    Current Outpatient Medications on File Prior to Visit   Medication Sig Dispense Refill    amoxicillin (AMOXIL) 500 MG capsule  (Patient not taking: Reported on 4/9/2024)      benzonatate (TESSALON) 200 MG capsule       desvenlafaxine (PRISTIQ) 50 MG 24 hr tablet Take 1 tablet by mouth Daily. 90 tablet 1    gabapentin (NEURONTIN) 300 MG capsule Take 1 capsule by mouth 3 (Three) Times a Day As Needed.      hydrocortisone 1 % cream       ibuprofen (ADVIL,MOTRIN) 600 MG tablet Take 1 tablet by mouth Every 8 (Eight) Hours As Needed for Mild Pain. 90 tablet 0    methylPREDNISolone (MEDROL) 4 MG dose pack       potassium chloride (K-DUR,KLOR-CON) 20 MEQ CR tablet Take 1 tablet by mouth Daily. 30 tablet 0    prochlorperazine (COMPAZINE) 10 MG tablet Take 1 tablet by mouth Every 6 (Six) Hours As Needed for Nausea or Vomiting. 60 tablet 2    promethazine (PHENERGAN) 50 MG tablet Take 1 tablet by mouth Every 6 (Six) Hours As Needed for Nausea or Vomiting. 30 tablet 0    rOPINIRole (REQUIP) 1 MG tablet Take 1 tablet by mouth Every Night. Take 1 hour before bedtime. 30 tablet 3    Sodium Fluoride 5000 PPM 1.1 % paste        No current facility-administered medications on file prior to visit.       Allergies   Allergen Reactions    Tegaderm Ag Mesh [Silver] Itching     Tegaderm that is used over port a cath     Past Medical History:   Diagnosis Date    Asthma     AS CHILD    Breast cancer     COVID-19 vaccine administered      Past Surgical History:   Procedure Laterality Date    BREAST LUMPECTOMY Right      "BREAST RECONSTRUCTION Bilateral 2024    Procedure: BREAST RECONSTRUCTION WITH MESH AND IMPLANTS - USE OF SPY;  Surgeon: Jackie Sanders MD;  Location: [facility] [place];  Service: Plastics;  Laterality: Bilateral;     SECTION      MASTECTOMY W/ SENTINEL NODE BIOPSY Bilateral 2024    Procedure: BREAST MASTECTOMY WITH RIGHT AXILLARY SENTINEL NODE IDENTIFICATION AND EXCISION BIOPSY;  Surgeon: Christiana Whiting MD;  Location: [facility];  Service: General;  Laterality: Bilateral;    PORTACATH PLACEMENT      SKIN BIOPSY      TUBAL ABDOMINAL LIGATION       Social History     Socioeconomic History    Marital status: Single    Number of children: 2   Tobacco Use    Smoking status: Never     Passive exposure: Never    Smokeless tobacco: Never   Vaping Use    Vaping status: Never Used   Substance and Sexual Activity    Alcohol use: Not Currently     Comment: OCCASSIONAL    Drug use: Never    Sexual activity: Defer     Family History   Problem Relation Age of Onset    Skin cancer Mother     COPD Mother     Hypertension Father     Hyperlipidemia Father     Diabetes Father     Skin cancer Paternal Grandmother     Lung cancer Paternal Grandmother     Malig Hyperthermia Neg Hx        Objective   Physical Exam  Well appearing patient in no acute distress on RA  Anicteric sclerae, no rash on exposed skin  Respirations non-labored  Awake, alert, and oriented x 4. Speech intact. No gross neurologic deficit  Appropriate mood and affect    Vitals:    24 1024   BP: 119/97   Pulse: 103   Resp: 18   Temp: 97 °F (36.1 °C)   SpO2: 99%   Weight: 73.3 kg (161 lb 9.6 oz)   Height: 160 cm (62.99\")   PainSc: 0-No pain                   PHQ-9 Total Score:           Result Review :   The following data was reviewed by: Ramin Shine MD on 24:  Lab Results   Component Value Date    HGB 12.6 2024    HCT 38.5 2024    MCV 83.0 2024     2024    WBC 5.93 2024    NEUTROABS " 4.70 04/11/2024    LYMPHSABS 0.81 04/11/2024    MONOSABS 0.39 04/11/2024    EOSABS 0.01 04/11/2024    BASOSABS 0.01 04/11/2024     Lab Results   Component Value Date    GLUCOSE 115 (H) 04/11/2024    BUN 11 04/11/2024    CREATININE 0.52 (L) 04/11/2024     (L) 04/11/2024    K 3.5 04/11/2024    CL 99 04/11/2024    CO2 28.3 04/11/2024    CALCIUM 9.2 04/11/2024    PROTEINTOT 7.6 04/11/2024    ALBUMIN 4.6 04/11/2024    BILITOT 0.4 04/11/2024    ALKPHOS 117 04/11/2024    AST 13 04/11/2024    ALT 19 04/11/2024     Lab Results   Component Value Date    MG 1.8 02/29/2024    FREET4 1.04 02/09/2024    TSH 2.390 02/09/2024     Labs personally reviewed.  K wnl.  Anemia resolved.     Pathology report personally reviewed    Surgery note personally reviewed      No radiology results for the last 30 days.        Assessment and Plan    Diagnoses and all orders for this visit:    1. Metaplastic carcinoma of breast (Primary)    2. Iron deficiency anemia, unspecified iron deficiency anemia type          Metaplastic Carcinoma of Right Breast, triple negative  Patient is s/p lumpectomy of 4.5 cm lesion with triple negative metaplastic carcinoma, interspersed with fibroadenoma. Patient has clinical inflammatory breast cancer. MRI breast with concern for inflammatory breast cancer as well as right sided axillary involvement. Left breast without lesions per MRI. Lymph node biopsy 7/7/23 confirmed right axillary involvement by breast carcinoma.  Completed noeadjuvant chemothearpy and immunotherapy with 4 cycles of Carboplatin/Paclitaxel/Pembro (C1 7/1423 and C4 9/15/23) and 3 cycles of Doxorubicin, cyclophosphamide, pembrolizumab (completed C3 11/20/23 with delay due to neutropenia). Completed 7 cycles total. C4 omitted due to accumulation of toxicities.     1/9/24: Bilateral Mastectomy: Right breast: No invasive disease left, intermediate grade DCIS present. ypTisN0 (sn), ER/SC negative, HER2 negative, margins negative, ALH     No  residual invasive dsiease present, so patient with complete pathologic response  on the basis of the National Surgical Adjuvant Breast and Bowel project criteria. DCIS still present as well as ALH, negative margins, nodes negative. Will proceed with adjuvant immunotherapy with Pembrolizumab every 3 weeks to complete 1 year. C1 provided 2/9/24    C4D1 adjuvant pembrolizumab 4/11/24. Labs appropriate to proceed.     Due for vacation in 3 weeks, will adjust next pembro dose to be given week after.     Plan for 10 cycles of adjuvant pembrolizumab.     Adjuvant radiation completed as of 4/9/24.     This is an acute or chronic illness that poses a threat to life or bodily function. The above treatment plan involves a high risk of complications and/or mortality of patient management. Continue to monitor for severe toxicities with labs     JOHNATHAN  Malabsorption of iron  2/29/24 labs consistent with iron deficiency. Anemia now resolved. Provide IV iron, not yet given.     Rash  Resolved.  Eczema related as improved with topical eczema cream.    Anxiety  Xanax PRN added.     Restless leg  Ropinirole helps, previously increased to 1.0 mg. Could be worse from pembrolizumab. IV iron as above could help.     Hypokalemia  3.5 today. Repeat next visit.    Breast lesion  Likely related to implant. Re-evaluate at next visit. If still present, obtain ultrasound      I spent 30 minutes caring for Marilee on this date of service. This time includes time spent by me in the following activities:preparing for the visit, reviewing tests, obtaining and/or reviewing a separately obtained history, performing a medically appropriate examination and/or evaluation , counseling and educating the patient/family/caregiver, ordering medications, tests, or procedures, referring and communicating with other health care professionals , documenting information in the medical record, independently interpreting results and communicating that information with  the patient/family/caregiver and care coordination    Patient Follow Up: C5 adjuvant immunotherapy  Patient was given instructions and counseling regarding her condition or for health maintenance advice. Please see specific information pulled into the AVS if appropriate.

## 2024-04-11 NOTE — PROGRESS NOTES
The [employer] message has been sent to the patient for PATIENT ROUNDING with [facility].

## 2024-04-12 ENCOUNTER — HOSPITAL ENCOUNTER (OUTPATIENT)
Dept: ONCOLOGY | Facility: HOSPITAL | Age: 29
Discharge: HOME | End: 2024-04-12
Payer: COMMERCIAL

## 2024-04-12 VITALS
TEMPERATURE: 98.3 F | DIASTOLIC BLOOD PRESSURE: 72 MMHG | WEIGHT: 162.48 LBS | SYSTOLIC BLOOD PRESSURE: 108 MMHG | RESPIRATION RATE: 18 BRPM | HEART RATE: 92 BPM | BODY MASS INDEX: 28.79 KG/M2 | OXYGEN SATURATION: 100 % | HEIGHT: 63 IN

## 2024-04-12 DIAGNOSIS — C50.919 METAPLASTIC CARCINOMA OF BREAST: Primary | ICD-10-CM

## 2024-04-12 DIAGNOSIS — Z45.2 FITTING AND ADJUSTMENT OF VASCULAR CATHETER: ICD-10-CM

## 2024-04-12 PROCEDURE — 25010000002 DIPHENHYDRAMINE PER 50 MG: Performed by: INTERNAL MEDICINE

## 2024-04-12 PROCEDURE — 96413 CHEMO IV INFUSION 1 HR: CPT

## 2024-04-12 PROCEDURE — 25810000003 SODIUM CHLORIDE 0.9 % SOLUTION: Performed by: INTERNAL MEDICINE

## 2024-04-12 PROCEDURE — 25010000002 PEMBROLIZUMAB 100 MG/4ML SOLUTION 4 ML VIAL: Performed by: INTERNAL MEDICINE

## 2024-04-12 PROCEDURE — 25010000002 HEPARIN LOCK FLUSH PER 10 UNITS: Performed by: INTERNAL MEDICINE

## 2024-04-12 PROCEDURE — 25010000002 HYDROCORTISONE SOD SUC (PF) 100 MG RECONSTITUTED SOLUTION: Performed by: INTERNAL MEDICINE

## 2024-04-12 PROCEDURE — 96375 TX/PRO/DX INJ NEW DRUG ADDON: CPT

## 2024-04-12 RX ORDER — HEPARIN SODIUM (PORCINE) LOCK FLUSH IV SOLN 100 UNIT/ML 100 UNIT/ML
500 SOLUTION INTRAVENOUS AS NEEDED
OUTPATIENT
Start: 2024-04-12

## 2024-04-12 RX ORDER — SODIUM CHLORIDE 0.9 % (FLUSH) 0.9 %
20 SYRINGE (ML) INJECTION AS NEEDED
Status: DISCONTINUED | OUTPATIENT
Start: 2024-04-12 | End: 2024-04-13 | Stop reason: HOSPADM

## 2024-04-12 RX ORDER — SODIUM CHLORIDE 9 MG/ML
20 INJECTION, SOLUTION INTRAVENOUS ONCE
Status: COMPLETED | OUTPATIENT
Start: 2024-04-12 | End: 2024-04-12

## 2024-04-12 RX ORDER — DIPHENHYDRAMINE HYDROCHLORIDE 50 MG/ML
25 INJECTION INTRAMUSCULAR; INTRAVENOUS ONCE
Status: COMPLETED | OUTPATIENT
Start: 2024-04-12 | End: 2024-04-12

## 2024-04-12 RX ORDER — LIDOCAINE AND PRILOCAINE 25; 25 MG/G; MG/G
1 CREAM TOPICAL
Qty: 5 G | Refills: 5 | Status: SHIPPED | OUTPATIENT
Start: 2024-04-12

## 2024-04-12 RX ORDER — HEPARIN SODIUM (PORCINE) LOCK FLUSH IV SOLN 100 UNIT/ML 100 UNIT/ML
500 SOLUTION INTRAVENOUS AS NEEDED
Status: DISCONTINUED | OUTPATIENT
Start: 2024-04-12 | End: 2024-04-13 | Stop reason: HOSPADM

## 2024-04-12 RX ORDER — SODIUM CHLORIDE 0.9 % (FLUSH) 0.9 %
20 SYRINGE (ML) INJECTION AS NEEDED
OUTPATIENT
Start: 2024-04-12

## 2024-04-12 RX ADMIN — HEPARIN 500 UNITS: 100 SYRINGE at 12:06

## 2024-04-12 RX ADMIN — SODIUM CHLORIDE 20 ML/HR: 9 INJECTION, SOLUTION INTRAVENOUS at 10:34

## 2024-04-12 RX ADMIN — DIPHENHYDRAMINE HYDROCHLORIDE 25 MG: 50 INJECTION INTRAMUSCULAR; INTRAVENOUS at 11:27

## 2024-04-12 RX ADMIN — HYDROCORTISONE SODIUM SUCCINATE 100 MG: 100 INJECTION, POWDER, FOR SOLUTION INTRAMUSCULAR; INTRAVENOUS at 11:37

## 2024-04-12 RX ADMIN — SODIUM CHLORIDE 200 MG: 9 INJECTION, SOLUTION INTRAVENOUS at 10:34

## 2024-04-12 RX ADMIN — Medication 20 ML: at 12:06

## 2024-04-12 NOTE — NURSING NOTE
"Pt completed immunotherapy at 1112. At 1113 pt started w/ c/o of swollen, red, itching, and warmness to hands, ears, chest, and throat, tightness and hoarse voice. MD notified.  VSS see flowsheets. Emergency medications given per MD. See Mar. Some improvement to s/s. Pt developed cough and \"tickle\" in throat persisted. MD at chair side. Emergency medication ordered per MD. All s/s resolved and pt discharged. VSS at discharge, see flowsheets.   "

## 2024-04-23 ENCOUNTER — HOSPITAL ENCOUNTER (OUTPATIENT)
Dept: OCCUPATIONAL THERAPY | Facility: HOSPITAL | Age: 29
Setting detail: THERAPIES SERIES
Discharge: HOME | End: 2024-04-23
Payer: COMMERCIAL

## 2024-04-23 DIAGNOSIS — M25.60 JOINT STIFFNESS: ICD-10-CM

## 2024-04-23 DIAGNOSIS — C50.411 MALIGNANT NEOPLASM OF UPPER-OUTER QUADRANT OF RIGHT BREAST IN FEMALE, ESTROGEN RECEPTOR POSITIVE: ICD-10-CM

## 2024-04-23 DIAGNOSIS — L90.5 SCAR CONDITION AND FIBROSIS OF SKIN: ICD-10-CM

## 2024-04-23 DIAGNOSIS — R52 PAIN: ICD-10-CM

## 2024-04-23 DIAGNOSIS — Z91.89 AT RISK FOR LYMPHEDEMA: Primary | ICD-10-CM

## 2024-04-23 DIAGNOSIS — Z17.0 MALIGNANT NEOPLASM OF UPPER-OUTER QUADRANT OF RIGHT BREAST IN FEMALE, ESTROGEN RECEPTOR POSITIVE: ICD-10-CM

## 2024-04-23 PROCEDURE — 93702 BIS XTRACELL FLUID ANALYSIS: CPT | Performed by: OCCUPATIONAL THERAPIST

## 2024-04-23 PROCEDURE — 97140 MANUAL THERAPY 1/> REGIONS: CPT | Performed by: OCCUPATIONAL THERAPIST

## 2024-04-23 PROCEDURE — 97535 SELF CARE MNGMENT TRAINING: CPT | Performed by: OCCUPATIONAL THERAPIST

## 2024-04-23 NOTE — THERAPY RE-EVALUATION
Outpatient Occupational Therapy Lymphedema Re-Evaluation   [facility]     Patient Name: Marilee Watts  : 1995  MRN: [id number]  Today's Date: 2024      Visit Date: 2024    Patient Active Problem List   Diagnosis    Allergic rhinitis    Gastroesophageal reflux disease    Metaplastic carcinoma of breast    Breast fibroadenoma, right    Disproportion between native breast and reconstructed breast    Fitting and adjustment of vascular catheter    Intractable chronic migraine without aura and without status migrainosus    Chemotherapy-induced nausea    Postoperative follow-up    S/P breast reconstruction, bilateral    Iron deficiency anemia    Malabsorption of iron        Past Medical History:   Diagnosis Date    Asthma     AS CHILD    Breast cancer     COVID-19 vaccine administered         Past Surgical History:   Procedure Laterality Date    BREAST LUMPECTOMY Right     BREAST RECONSTRUCTION Bilateral 2024    Procedure: BREAST RECONSTRUCTION WITH MESH AND IMPLANTS - USE OF SPY;  Surgeon: Jackie Sanders MD;  [place] [facility] [place] [facility] [place]  Service: Plastics;  Laterality: Bilateral;     SECTION      MASTECTOMY W/ SENTINEL NODE BIOPSY Bilateral 2024    Procedure: BREAST MASTECTOMY WITH RIGHT AXILLARY SENTINEL NODE IDENTIFICATION AND EXCISION BIOPSY;  Surgeon: Christiana Whiting MD;  [place] [facility] [place] [facility] [place]  Service: General;  Laterality: Bilateral;    PORTACATH PLACEMENT      SKIN BIOPSY      TUBAL ABDOMINAL LIGATION           Visit Dx:     ICD-10-CM ICD-9-CM   1. At risk for lymphedema  Z91.89 V49.89   2. Scar condition and fibrosis of skin  L90.5 709.2   3. Joint stiffness  M25.60 719.50   4. Pain  R52 780.96   5. Malignant neoplasm of upper-outer quadrant of right breast in female, estrogen receptor positive  C50.411 174.4    Z17.0 V86.0        Patient History       Row Name 24 0900             History    Chief Complaint --  Lymphedema surveillance program  -TD      Brief  Description of Current Complaint Marilee is a 28-year-old female with a diagnosis of metaplastic carcinoma of the right breast.  Patient underwent bilateral mastectomy with reconstruction on January 9, 2023.  -TD         Fall Risk Assessment    Any falls in the past year: No  -TD      Does patient have a fear of falling No  -TD         Services    Are you currently receiving Home Health services No  -TD      Do you plan to receive Home Health services in the near future No  -TD         Daily Activities    Primary Language English  -TD      Are you able to read Yes  -TD      Are you able to write Yes  -TD      How does patient learn best? Listening;Reading;Demonstration  -TD         Safety    Are you being hurt, hit, or frightened by anyone at home or in your life? No  -TD      Are you being neglected by a caregiver No  -TD      Have you had any of the following issues with Anxiety  Pt on medication  -TD                User Key  (r) = Recorded By, (t) = Taken By, (c) = Cosigned By      Initials Name Provider Type    TD Daniela Vazquez, JCARLOS Occupational Therapist                     Lymphedema       Row Name 04/23/24 0900             Subjective Pain    Able to rate subjective pain? yes  -TD      Pre-Treatment Pain Level 2  -TD      Post-Treatment Pain Level 2  -TD      Subjective Pain Comment Pt has pain in the right axilla.  -TD         Subjective    Subjective Comments Pt feels tight in the right axilla and right chest wall.  -TD         Lymphedema Assessment    Lymphedema Classification RUE:;at risk/stage 0  -TD      Lymphedema Cancer Related Sx bilateral;simple mastectomy;reconstructive;sentinel node biopsy  -TD      Lymphedema Surgery Comments 1/9/2024  -TD      Lymph Nodes Removed # 2  -TD      Positive Lymph Nodes # 0  -TD      Chemo Received yes  -TD      Chemo Treatments #/Timeframe 7  -TD      Radiation Therapy Received yes  -TD      Radiation Treatments #/Timeframe 28  -TD         LLIS - Physical Concerns     "The amount of pain associated with my lymphedema is: 0  -TD      The amount of limb heaviness associated with my lymphedema is: 0  -TD      The amount of skin tightness associated with my lymphedema is: 0  -TD      The size of my swollen limb(s) seems: 0  -TD      Lymphedema affects the movement of my swollen limb(s): 0  -TD      The strength in my swollen limb(s) is: 0  -TD         LLIS - Psychosocial Concerns    Lymphedema affects my body image (i.e., \"how I think I look\"). 0  -TD      Lymphedema affects my socializing with others. 0  -TD      Lymphedema affects my intimate relations with spouse or partner (rate 0 if not applicable 0  -TD      Lymphedema \"gets me down\" (i.e., depression, frustration, or anger) 0  -TD      I must rely on others for help due to my lymphedema. 0  -TD      I know what to do to manage my lymphedema 3  -TD         LLIS - Functional Concerns    Lymphedema affects my ability to perform self-care activities (i.e. eating, dressing, hygiene) 0  -TD      Lymphedema affects my ability to perform routine home or work-related activities. 0  -TD      Lymphedema affects my performance of preferred leisure activities. 0  -TD      Lymphedema affects proper fit of clothing/shoes 0  -TD      Lymphedema affects my sleep 0  -TD         Posture/Observations    Posture- WNL Posture is WNL  -TD         General ROM    GENERAL ROM COMMENTS BUE are WFL but tight at end ranges  -TD         MMT (Manual Muscle Testing)    General MMT Comments BUE are WFL  -TD         Skin Changes/Observations    Location/Assessment Upper Quadrant  -TD      Upper Quadrant Conditions bilateral:;clean;dry  -TD      Upper Quadrant Color/Pigment bilateral:;normal  -TD      Skin Observations Comment Pt has right breast tissue is tight  -TD         Manual Lymphatic Drainage    Manual Therapy Therapist had cord present in the right axilla and over the ribs in the right axilla. Therapist was able access and treat.  Pt was given HEP to " address cords.  -TD         L-Dex Bioimpedence Screening    L-Dex Measurement Extremity RUE  -TD      L-Dex Patient Position Standing  -TD      L-Dex UE Dominate Side Right  -TD      L-Dex UE At Risk Side Right  -TD      L-Dex UE Pre Surgical Value Yes  -TD      L-Dex UE Score -1.7  -TD      L-Dex UE Baseline Score 0  -TD      L-Dex UE Value Change -1.7  -TD      $ L-Dex Charge yes  -TD         Lymphedema Life Impact Scale Totals    A.  Total Q1 - Q17 (Do not include Q18) 3  -TD      B.  Total number of questions answered (Q1-Q17) 17  -TD      C. Divide A by B 0.18  -TD      D. Multiple C by 25 4.5  -TD                User Key  (r) = Recorded By, (t) = Taken By, (c) = Cosigned By      Initials Name Provider Type    Daniela Hagen OT Occupational Therapist                    The patient had a follow up SOZO measurement which I reviewed today. The score is   WNL, see scanned to EMR. Bioimpedance spectroscopy helps identify the   onset of lymphedema in an arm or leg before patients experience noticeable swelling. Research has   shown that 92% of patients with early detection of lymphedema using L-Dex combined with   intervention do not progress to chronic lymphedema through three years. Additionally, as of March 2023, the NCCN Guidelines® for Survivorship recommend proactive screening for lymphedema using   bioimpedance spectroscopy. Whenever possible, patients are tested for baseline L-Dex score before   cancer treatment begins and then are reassessed during regular follow-up visits using the SOZO device.   Otherwise, this can be started postoperatively and continued during regular follow-up visits. If the   patient’s L-Dex score increases above normal levels, that is a sign that lymphedema is developing and a   referral is made to physical therapy for further evaluation and early compression treatment.   Lymphedema assessment with the SOZO L-Dex score is recommended to be done every 3 months for   the first 3  years and then every 6 months for years 4 and 5 followed by annually afterwards         Therapy Education  Education Details: Pt was given HEP to address tightness in the right axilla and prevent future cording.  Given: HEP, Symptoms/condition management, Edema management, Pain management  Program: New  How Provided: Verbal, Demonstration  Provided to: Patient  Level of Understanding: Teach back education performed, Verbalized, Demonstrated  97135 - OT Self Care/Mgmt Minutes: 10      Manual Rx (Last 36 Hours)       Manual Treatments       Row Name 04/23/24 1008             Total Minutes    88611 - OT Manual Therapy Minutes 15  -TD                User Key  (r) = Recorded By, (t) = Taken By, (c) = Cosigned By      Initials Name Provider Type    TD Daniela Vazquez OT Occupational Therapist                   OT Goals       Row Name 04/23/24 1008          Time Calculation    OT Goal Re-Cert Due Date 05/23/24  -TD               User Key  (r) = Recorded By, (t) = Taken By, (c) = Cosigned By      Initials Name Provider Type    Daniela Hagen OT Occupational Therapist                1. Post Breast Surgery Care/at risk for Lymphedema  LTG 1: 90 days:  As an indicator of no exacerbation of lymphedema staging, the patient will present with an L-Dex score less than [10] points from preoperative baseline.              STATUS: on going  STG 1a:   30 days: To prevent exacerbation of mixed edema to lymphedema, patient will utilize the 2 postsurgical compression garments daily.                 STATUS: MET, on going  STG 1b: 30 days: Patient will be independent with self-manual lymphatic massage.               STATUS: on going  STG 1c: 30 days:  Patient will be independent with identification of signs and symptoms of lymphedema exasperation per stoplight to recovery education handout.              STATUS: on going  STG 1 d: 30 days: Patient will be independent with HEP to prevent advancement in lymphedema staging.               STATUS: on going  TREATMENT:  Self Care/ADL retraining, Therapeutic Activity, Neuromuscular Re-education, Therapeutic Exercise, Bioimpedence Fluid Analysis, Post-Surgical compression garement [address] [employer] [facility]/ [employer] Kit 2860K, Orthotic Management and training,  and Manual Therapy.     OT Assessment/Plan       Row Name 04/23/24 0911          OT Assessment    Functional Limitations Limitations in functional capacity and performance  -TD     Impairments Impaired lymphatic circulation  -TD     Assessment Comments Marilee is a 28-year-old female with a diagnosis of metaplastic carcinoma of the right breast. Patient underwent a bilateral mastectomy with reconstruction on January 9, 2023. Pt had two lymphnodes removed at this time. Pt l-dex score is WFL limits at this time.  Pt had cord present in the right axilla.  Therapist was able to access and treat and was given HEP to prevent in the future. Pt Patient would benefit from continued skilled occupational therapy to prevent increased staging of lymphedema, increased pain, and decreased range of motion.  -TD     OT Diagnosis at risk for lymphedema  -TD     OT Rehab Potential Good  -TD     Patient/caregiver participated in establishment of treatment plan and goals Yes  -TD     Patient would benefit from skilled therapy intervention Yes  -TD        OT Plan    OT Frequency --  see duration  -TD     Predicted Duration of Therapy Intervention (OT) Patient was seen 3 weeks post XRT, every 3 months years 1 through 3, and every 6 months years 4 and 5.  -TD     Planned CPT's? OT EVAL LOW COMPLEXITY: 28253;OT RE-EVAL: 60890;OT THER ACT EA 15 MIN: 61100UV;OT THER PROC EA 15 MIN: 24392XH;OT SELF CARE/MGMT/TRAIN 15 MIN: 69537;OT MANUAL THERAPY EA 15 MIN: 75575;OT BIS XTRACELL FLUID ANALYSIS: 09925;OT CARE PLAN EA 15 MIN;OT ORTHOTIC MGMT/TRAIN EA 15 MIN: 95079;OT ORTHO/PROSTHET CHECKOUT EA 15 MIN: 99051  -TD     Planned Therapy Interventions (Optional Details) home  exercise program;manual therapy techniques;stretching;strengthening;ROM (Range of Motion);prosthetic fitting/training;postural re-education;patient/family education;orthotic fitting/training  -TD     OT Plan Comments continue POC  -TD               User Key  (r) = Recorded By, (t) = Taken By, (c) = Cosigned By      Initials Name Provider Type    TD Daniela Vazquez OT Occupational Therapist                              Time Calculation:   Timed Charges  81052 - OT Manual Therapy Minutes: 15  13637 - OT Self Care/Mgmt Minutes: 10  Total Minutes  Timed Charges Total Minutes: 25   Total Minutes: 25     Therapy Charges for Today       Code Description Service Date Service Provider Modifiers Qty    84446424012 HC PT BIS XTRACELL FLUID ANALYSIS 4/23/2024 Daniela Vazquez OT  1    56009199026 HC OT MANUAL THERAPY EA 15 MIN 4/23/2024 Daniela Vazquez OT GO 1    71612456099 HC OT SELF CARE/MGMT/TRAIN EA 15 MIN 4/23/2024 Daniela Vazquez OT GO 1                      Daniela Vazquez OT  4/23/2024

## 2024-05-03 ENCOUNTER — HOSPITAL ENCOUNTER (OUTPATIENT)
Dept: ONCOLOGY | Facility: HOSPITAL | Age: 29
End: 2024-05-03

## 2024-05-03 DIAGNOSIS — K90.9 MALABSORPTION OF IRON: Primary | ICD-10-CM

## 2024-05-03 DIAGNOSIS — D50.8 OTHER IRON DEFICIENCY ANEMIA: ICD-10-CM

## 2024-05-03 RX ORDER — SODIUM CHLORIDE 9 MG/ML
20 INJECTION, SOLUTION INTRAVENOUS ONCE
OUTPATIENT
Start: 2024-05-03

## 2024-05-03 RX ORDER — ACETAMINOPHEN 325 MG/1
650 TABLET ORAL ONCE
OUTPATIENT
Start: 2024-05-10

## 2024-05-03 RX ORDER — SODIUM CHLORIDE 9 MG/ML
20 INJECTION, SOLUTION INTRAVENOUS ONCE
OUTPATIENT
Start: 2024-05-10

## 2024-05-03 RX ORDER — ACETAMINOPHEN 325 MG/1
650 TABLET ORAL ONCE
OUTPATIENT
Start: 2024-05-24

## 2024-05-03 RX ORDER — ACETAMINOPHEN 325 MG/1
650 TABLET ORAL ONCE
OUTPATIENT
Start: 2024-05-03

## 2024-05-03 RX ORDER — SODIUM CHLORIDE 9 MG/ML
20 INJECTION, SOLUTION INTRAVENOUS ONCE
OUTPATIENT
Start: 2024-05-17

## 2024-05-03 RX ORDER — SODIUM CHLORIDE 9 MG/ML
20 INJECTION, SOLUTION INTRAVENOUS ONCE
OUTPATIENT
Start: 2024-05-24

## 2024-05-03 RX ORDER — ACETAMINOPHEN 325 MG/1
650 TABLET ORAL ONCE
OUTPATIENT
Start: 2024-05-17

## 2024-05-07 ENCOUNTER — TELEPHONE (OUTPATIENT)
Dept: ONCOLOGY | Facility: HOSPITAL | Age: 29
End: 2024-05-07
Payer: COMMERCIAL

## 2024-05-07 ENCOUNTER — TELEPHONE (OUTPATIENT)
Dept: NEUROLOGY | Facility: CLINIC | Age: 29
End: 2024-05-07

## 2024-05-07 NOTE — TELEPHONE ENCOUNTER
Called patient, states that she has to pick her kids up at 3 and doesn't know if she could do it at 2 wanted to know if she could get something later in the week.

## 2024-05-07 NOTE — TELEPHONE ENCOUNTER
PATIENT IS SCHEDULED FOR VIDEO VISIT TODAY AT 1:00.    SHE ALSO HAS A DENTAL APPT AT 1:00 - DENTAL OFFICE IS CLOSE BY.    SHE WANTS TO KNOW IF SHE CAN JUST STOP IN TO THE OFFICE SINCE SHE WILL BE CLOSE BY.    PLEASE ADVISE PATIENT

## 2024-05-08 ENCOUNTER — HOSPITAL ENCOUNTER (OUTPATIENT)
Dept: INFUSION THERAPY | Facility: HOSPITAL | Age: 29
Discharge: HOME | End: 2024-05-08
Payer: COMMERCIAL

## 2024-05-08 VITALS
TEMPERATURE: 98.1 F | WEIGHT: 164.46 LBS | OXYGEN SATURATION: 98 % | RESPIRATION RATE: 20 BRPM | SYSTOLIC BLOOD PRESSURE: 105 MMHG | BODY MASS INDEX: 29.14 KG/M2 | HEIGHT: 63 IN | HEART RATE: 100 BPM | DIASTOLIC BLOOD PRESSURE: 67 MMHG

## 2024-05-08 DIAGNOSIS — K90.9 MALABSORPTION OF IRON: ICD-10-CM

## 2024-05-08 DIAGNOSIS — Z45.2 FITTING AND ADJUSTMENT OF VASCULAR CATHETER: ICD-10-CM

## 2024-05-08 DIAGNOSIS — D50.8 OTHER IRON DEFICIENCY ANEMIA: Primary | ICD-10-CM

## 2024-05-08 PROCEDURE — 25010000002 DIPHENHYDRAMINE PER 50 MG: Performed by: INTERNAL MEDICINE

## 2024-05-08 PROCEDURE — 96374 THER/PROPH/DIAG INJ IV PUSH: CPT

## 2024-05-08 PROCEDURE — 25010000002 NA FERRIC GLUC CPLX PER 12.5 MG: Performed by: INTERNAL MEDICINE

## 2024-05-08 PROCEDURE — 25010000002 HEPARIN LOCK FLUSH PER 10 UNITS: Performed by: INTERNAL MEDICINE

## 2024-05-08 PROCEDURE — 96375 TX/PRO/DX INJ NEW DRUG ADDON: CPT

## 2024-05-08 PROCEDURE — 96365 THER/PROPH/DIAG IV INF INIT: CPT

## 2024-05-08 PROCEDURE — 25810000003 SODIUM CHLORIDE 0.9 % SOLUTION: Performed by: INTERNAL MEDICINE

## 2024-05-08 PROCEDURE — 96366 THER/PROPH/DIAG IV INF ADDON: CPT

## 2024-05-08 RX ORDER — ACETAMINOPHEN 325 MG/1
650 TABLET ORAL ONCE
Status: COMPLETED | OUTPATIENT
Start: 2024-05-08 | End: 2024-05-08

## 2024-05-08 RX ORDER — HEPARIN SODIUM (PORCINE) LOCK FLUSH IV SOLN 100 UNIT/ML 100 UNIT/ML
500 SOLUTION INTRAVENOUS AS NEEDED
Status: DISCONTINUED | OUTPATIENT
Start: 2024-05-08 | End: 2024-05-10 | Stop reason: HOSPADM

## 2024-05-08 RX ORDER — HEPARIN SODIUM (PORCINE) LOCK FLUSH IV SOLN 100 UNIT/ML 100 UNIT/ML
500 SOLUTION INTRAVENOUS AS NEEDED
Status: CANCELLED | OUTPATIENT
Start: 2024-05-10

## 2024-05-08 RX ORDER — SODIUM CHLORIDE 0.9 % (FLUSH) 0.9 %
20 SYRINGE (ML) INJECTION AS NEEDED
Status: CANCELLED | OUTPATIENT
Start: 2024-05-08

## 2024-05-08 RX ORDER — DIPHENHYDRAMINE HYDROCHLORIDE 50 MG/ML
25 INJECTION INTRAMUSCULAR; INTRAVENOUS ONCE
Status: COMPLETED | OUTPATIENT
Start: 2024-05-08 | End: 2024-05-08

## 2024-05-08 RX ADMIN — DIPHENHYDRAMINE HYDROCHLORIDE 25 MG: 50 INJECTION, SOLUTION INTRAMUSCULAR; INTRAVENOUS at 12:36

## 2024-05-08 RX ADMIN — SODIUM CHLORIDE 250 MG: 9 INJECTION, SOLUTION INTRAVENOUS at 12:37

## 2024-05-08 RX ADMIN — ACETAMINOPHEN 650 MG: 325 TABLET ORAL at 12:25

## 2024-05-08 RX ADMIN — HEPARIN 500 UNITS: 100 SYRINGE at 14:42

## 2024-05-09 ENCOUNTER — OFFICE VISIT (OUTPATIENT)
Dept: ONCOLOGY | Facility: HOSPITAL | Age: 29
End: 2024-05-09
Payer: COMMERCIAL

## 2024-05-09 ENCOUNTER — APPOINTMENT (OUTPATIENT)
Dept: INFUSION THERAPY | Facility: HOSPITAL | Age: 29
End: 2024-05-09
Payer: COMMERCIAL

## 2024-05-09 ENCOUNTER — LAB (OUTPATIENT)
Dept: ONCOLOGY | Facility: HOSPITAL | Age: 29
End: 2024-05-09
Payer: COMMERCIAL

## 2024-05-09 ENCOUNTER — TELEPHONE (OUTPATIENT)
Dept: ONCOLOGY | Facility: HOSPITAL | Age: 29
End: 2024-05-09
Payer: COMMERCIAL

## 2024-05-09 VITALS
RESPIRATION RATE: 18 BRPM | TEMPERATURE: 97.4 F | OXYGEN SATURATION: 100 % | HEART RATE: 94 BPM | DIASTOLIC BLOOD PRESSURE: 65 MMHG | SYSTOLIC BLOOD PRESSURE: 113 MMHG | WEIGHT: 162 LBS | BODY MASS INDEX: 28.7 KG/M2

## 2024-05-09 DIAGNOSIS — M79.621 AXILLARY PAIN, RIGHT: Primary | ICD-10-CM

## 2024-05-09 DIAGNOSIS — D50.8 OTHER IRON DEFICIENCY ANEMIA: ICD-10-CM

## 2024-05-09 DIAGNOSIS — R05.9 COUGH, UNSPECIFIED TYPE: ICD-10-CM

## 2024-05-09 DIAGNOSIS — C50.919 METAPLASTIC CARCINOMA OF BREAST: Primary | ICD-10-CM

## 2024-05-09 DIAGNOSIS — C50.919 METAPLASTIC CARCINOMA OF BREAST: ICD-10-CM

## 2024-05-09 LAB
ALBUMIN SERPL-MCNC: 3.8 G/DL (ref 3.5–5.2)
ALBUMIN/GLOB SERPL: 1.5 G/DL
ALP SERPL-CCNC: 91 U/L (ref 39–117)
ALT SERPL W P-5'-P-CCNC: 24 U/L (ref 1–33)
ANION GAP SERPL CALCULATED.3IONS-SCNC: 9.6 MMOL/L (ref 5–15)
AST SERPL-CCNC: 21 U/L (ref 1–32)
BASOPHILS # BLD AUTO: 0.01 10*3/MM3 (ref 0–0.2)
BASOPHILS NFR BLD AUTO: 0.2 % (ref 0–1.5)
BILIRUB SERPL-MCNC: 0.2 MG/DL (ref 0–1.2)
BUN SERPL-MCNC: 9 MG/DL (ref 6–20)
BUN/CREAT SERPL: 14.5 (ref 7–25)
CALCIUM SPEC-SCNC: 9 MG/DL (ref 8.6–10.5)
CHLORIDE SERPL-SCNC: 106 MMOL/L (ref 98–107)
CO2 SERPL-SCNC: 23.4 MMOL/L (ref 22–29)
CREAT SERPL-MCNC: 0.62 MG/DL (ref 0.57–1)
DEPRECATED RDW RBC AUTO: 48.5 FL (ref 37–54)
EGFRCR SERPLBLD CKD-EPI 2021: 123 ML/MIN/1.73
EOSINOPHIL # BLD AUTO: 0.03 10*3/MM3 (ref 0–0.4)
EOSINOPHIL NFR BLD AUTO: 0.5 % (ref 0.3–6.2)
ERYTHROCYTE [DISTWIDTH] IN BLOOD BY AUTOMATED COUNT: 15.4 % (ref 12.3–15.4)
FERRITIN SERPL-MCNC: 272.9 NG/ML (ref 13–150)
GLOBULIN UR ELPH-MCNC: 2.5 GM/DL
GLUCOSE SERPL-MCNC: 95 MG/DL (ref 65–99)
HCT VFR BLD AUTO: 33.3 % (ref 34–46.6)
HGB BLD-MCNC: 10.9 G/DL (ref 12–15.9)
IMM GRANULOCYTES # BLD AUTO: 0 10*3/MM3 (ref 0–0.05)
IMM GRANULOCYTES NFR BLD AUTO: 0 % (ref 0–0.5)
IRON 24H UR-MRATE: 137 MCG/DL (ref 37–145)
IRON SATN MFR SERPL: 36 % (ref 20–50)
LYMPHOCYTES # BLD AUTO: 1.21 10*3/MM3 (ref 0.7–3.1)
LYMPHOCYTES NFR BLD AUTO: 21.9 % (ref 19.6–45.3)
MCH RBC QN AUTO: 27.9 PG (ref 26.6–33)
MCHC RBC AUTO-ENTMCNC: 32.7 G/DL (ref 31.5–35.7)
MCV RBC AUTO: 85.2 FL (ref 79–97)
MONOCYTES # BLD AUTO: 0.38 10*3/MM3 (ref 0.1–0.9)
MONOCYTES NFR BLD AUTO: 6.9 % (ref 5–12)
NEUTROPHILS NFR BLD AUTO: 3.9 10*3/MM3 (ref 1.7–7)
NEUTROPHILS NFR BLD AUTO: 70.5 % (ref 42.7–76)
PLATELET # BLD AUTO: 166 10*3/MM3 (ref 140–450)
PMV BLD AUTO: 9.3 FL (ref 6–12)
POTASSIUM SERPL-SCNC: 3.2 MMOL/L (ref 3.5–5.2)
PROT SERPL-MCNC: 6.3 G/DL (ref 6–8.5)
RBC # BLD AUTO: 3.91 10*6/MM3 (ref 3.77–5.28)
SODIUM SERPL-SCNC: 139 MMOL/L (ref 136–145)
TIBC SERPL-MCNC: 386 MCG/DL (ref 298–536)
TRANSFERRIN SERPL-MCNC: 259 MG/DL (ref 200–360)
WBC NRBC COR # BLD AUTO: 5.53 10*3/MM3 (ref 3.4–10.8)

## 2024-05-09 PROCEDURE — 84466 ASSAY OF TRANSFERRIN: CPT | Performed by: NURSE PRACTITIONER

## 2024-05-09 PROCEDURE — 36415 COLL VENOUS BLD VENIPUNCTURE: CPT

## 2024-05-09 PROCEDURE — 85025 COMPLETE CBC W/AUTO DIFF WBC: CPT

## 2024-05-09 PROCEDURE — 83540 ASSAY OF IRON: CPT | Performed by: NURSE PRACTITIONER

## 2024-05-09 PROCEDURE — G0463 HOSPITAL OUTPT CLINIC VISIT: HCPCS | Performed by: NURSE PRACTITIONER

## 2024-05-09 PROCEDURE — 82728 ASSAY OF FERRITIN: CPT | Performed by: NURSE PRACTITIONER

## 2024-05-09 PROCEDURE — 80053 COMPREHEN METABOLIC PANEL: CPT

## 2024-05-10 ENCOUNTER — HOSPITAL ENCOUNTER (OUTPATIENT)
Dept: ONCOLOGY | Facility: HOSPITAL | Age: 29
Discharge: HOME | End: 2024-05-10
Payer: COMMERCIAL

## 2024-05-10 VITALS
DIASTOLIC BLOOD PRESSURE: 69 MMHG | SYSTOLIC BLOOD PRESSURE: 104 MMHG | TEMPERATURE: 97.5 F | HEART RATE: 78 BPM | WEIGHT: 165.57 LBS | RESPIRATION RATE: 18 BRPM | HEIGHT: 63 IN | BODY MASS INDEX: 29.34 KG/M2 | OXYGEN SATURATION: 100 %

## 2024-05-10 DIAGNOSIS — C50.919 METAPLASTIC CARCINOMA OF BREAST: Primary | ICD-10-CM

## 2024-05-10 DIAGNOSIS — Z45.2 FITTING AND ADJUSTMENT OF VASCULAR CATHETER: ICD-10-CM

## 2024-05-10 PROCEDURE — 25010000002 HEPARIN LOCK FLUSH PER 10 UNITS: Performed by: INTERNAL MEDICINE

## 2024-05-10 PROCEDURE — 96413 CHEMO IV INFUSION 1 HR: CPT

## 2024-05-10 PROCEDURE — 96375 TX/PRO/DX INJ NEW DRUG ADDON: CPT

## 2024-05-10 PROCEDURE — 25010000002 PEMBROLIZUMAB 100 MG/4ML SOLUTION 4 ML VIAL: Performed by: INTERNAL MEDICINE

## 2024-05-10 PROCEDURE — 25010000002 DIPHENHYDRAMINE PER 50 MG: Performed by: NURSE PRACTITIONER

## 2024-05-10 PROCEDURE — 25810000003 SODIUM CHLORIDE 0.9 % SOLUTION: Performed by: INTERNAL MEDICINE

## 2024-05-10 RX ORDER — SODIUM CHLORIDE 0.9 % (FLUSH) 0.9 %
20 SYRINGE (ML) INJECTION AS NEEDED
OUTPATIENT
Start: 2024-05-10

## 2024-05-10 RX ORDER — HEPARIN SODIUM (PORCINE) LOCK FLUSH IV SOLN 100 UNIT/ML 100 UNIT/ML
500 SOLUTION INTRAVENOUS AS NEEDED
OUTPATIENT
Start: 2024-05-10

## 2024-05-10 RX ORDER — SODIUM CHLORIDE 9 MG/ML
20 INJECTION, SOLUTION INTRAVENOUS ONCE
Status: CANCELLED | OUTPATIENT
Start: 2024-05-10

## 2024-05-10 RX ORDER — HEPARIN SODIUM (PORCINE) LOCK FLUSH IV SOLN 100 UNIT/ML 100 UNIT/ML
500 SOLUTION INTRAVENOUS AS NEEDED
Status: DISCONTINUED | OUTPATIENT
Start: 2024-05-10 | End: 2024-05-11 | Stop reason: HOSPADM

## 2024-05-10 RX ORDER — SODIUM CHLORIDE 0.9 % (FLUSH) 0.9 %
20 SYRINGE (ML) INJECTION AS NEEDED
Status: DISCONTINUED | OUTPATIENT
Start: 2024-05-10 | End: 2024-05-11 | Stop reason: HOSPADM

## 2024-05-10 RX ORDER — SODIUM CHLORIDE 9 MG/ML
20 INJECTION, SOLUTION INTRAVENOUS ONCE
Status: COMPLETED | OUTPATIENT
Start: 2024-05-10 | End: 2024-05-10

## 2024-05-10 RX ADMIN — DIPHENHYDRAMINE HYDROCHLORIDE 25 MG: 50 INJECTION, SOLUTION INTRAMUSCULAR; INTRAVENOUS at 11:12

## 2024-05-10 RX ADMIN — SODIUM CHLORIDE 20 ML/HR: 9 INJECTION, SOLUTION INTRAVENOUS at 11:12

## 2024-05-10 RX ADMIN — HEPARIN 500 UNITS: 100 SYRINGE at 12:16

## 2024-05-10 RX ADMIN — SODIUM CHLORIDE 200 MG: 9 INJECTION, SOLUTION INTRAVENOUS at 11:32

## 2024-05-10 RX ADMIN — Medication 20 ML: at 12:16

## 2024-05-14 ENCOUNTER — HOSPITAL ENCOUNTER (OUTPATIENT)
Dept: GENERAL RADIOLOGY | Facility: HOSPITAL | Age: 29
Discharge: HOME | End: 2024-05-14
Admitting: NURSE PRACTITIONER
Payer: COMMERCIAL

## 2024-05-14 DIAGNOSIS — R05.9 COUGH, UNSPECIFIED TYPE: ICD-10-CM

## 2024-05-14 PROCEDURE — 71046 X-RAY EXAM CHEST 2 VIEWS: CPT

## 2024-05-15 ENCOUNTER — HOSPITAL ENCOUNTER (OUTPATIENT)
Dept: MAMMOGRAPHY | Facility: HOSPITAL | Age: 29
Discharge: HOME | End: 2024-05-15
Payer: COMMERCIAL

## 2024-05-15 ENCOUNTER — TELEPHONE (OUTPATIENT)
Dept: PLASTIC SURGERY | Facility: CLINIC | Age: 29
End: 2024-05-15
Payer: COMMERCIAL

## 2024-05-15 ENCOUNTER — HOSPITAL ENCOUNTER (OUTPATIENT)
Dept: ULTRASOUND IMAGING | Facility: HOSPITAL | Age: 29
Discharge: HOME | End: 2024-05-15
Payer: COMMERCIAL

## 2024-05-15 DIAGNOSIS — M79.621 AXILLARY PAIN, RIGHT: ICD-10-CM

## 2024-05-15 PROCEDURE — 77065 DX MAMMO INCL CAD UNI: CPT

## 2024-05-15 PROCEDURE — 76882 US LMTD JT/FCL EVL NVASC XTR: CPT

## 2024-05-15 PROCEDURE — G0279 TOMOSYNTHESIS, MAMMO: HCPCS

## 2024-05-15 NOTE — TELEPHONE ENCOUNTER
LVM FOR PATIENT TO CALL ABOUT APPOINTMENT SCHEDULED FOR NEXT WEEK WITH DR. MODI.    PLEASE TRANSFER TO THE OFFICE WHEN PATIENT CALLS.

## 2024-05-20 NOTE — PROGRESS NOTES
"Follow-up (Suspicious nodule on axilla)            History of Present Illness  Marilee Watts is a 28 y.o. female who presents to [facility] as a post op for BREAST RECONSTRUCTION WITH MESH AND IMPLANTS - USE OF SPY 1/9/24.    Pt presents today for 5 month post op.  Patient states that she has had a burning sensation deep in her right axilla.  It has been present since surgery but seems more deep.    IMAGING 5/15/24 Diagnostic mammogram and US Right axilla reviewed.      Subjective      Pembrolizumab and Tegaderm ag mesh [silver]  Allergies Reconciled.    Review of Systems   Constitutional: Negative.    HENT: Negative.     Eyes: Negative.    Respiratory: Negative.     Cardiovascular: Negative.    Gastrointestinal: Negative.    Endocrine: Negative.    Genitourinary: Negative.    Musculoskeletal: Negative.    Skin:  Positive for rash.        Right breast red, irritation from radiation noted   Allergic/Immunologic: Negative.    Neurological: Negative.    Hematological: Negative.    Psychiatric/Behavioral:  The patient is nervous/anxious.       All review of system has been reviewed and it  is negative except the ones note above.     Objective     /70 (BP Location: Left arm, Patient Position: Sitting, Cuff Size: Adult)   Pulse 94   Temp 97.9 °F (36.6 °C) (Oral)   Ht 160 cm (62.99\")   Wt 74.1 kg (163 lb 6.4 oz)   SpO2 96%   BMI 28.95 kg/m²     Body mass index is 28.95 kg/m².      Physical Exam  Vitals reviewed. Exam conducted with a chaperone present.   Constitutional:       Appearance: Normal appearance.   HENT:      Head: Normocephalic and atraumatic.      Nose: Nose normal.      Mouth/Throat:      Mouth: Mucous membranes are moist.   Eyes:      Extraocular Movements: Extraocular movements intact.   Cardiovascular:      Rate and Rhythm: Normal rate.   Pulmonary:      Effort: Pulmonary effort is normal.   Abdominal:      Palpations: Abdomen is soft.      " Hernia: A hernia is present.      Comments: Umbilical hernia noted, patient concerned as she feels like it is getting bigger, feels it when she contracts her abdominal muscles when sitting up   Musculoskeletal:         General: Normal range of motion.      Cervical back: Normal range of motion.   Skin:     General: Skin is warm.      Findings: Rash present.      Comments: Redness to right breast and chest from radiation, scattered small areas of peeling right sternal border   Neurological:      General: No focal deficit present.      Mental Status: She is alert and oriented to person, place, and time.   Psychiatric:         Mood and Affect: Mood normal.       Breast: Right breast smaller slightly smaller than left.  No signs of infection.  Axilla:  Right axilla with non palpable nodule.      Result Review :   no new results to review this visit    Assessment and Plan    Examined patients breast and right axilla.  Discussed the need for biopsy on area of concern in right axilla.  Discussed revision of bilateral breast fat grafting, and replace implants with larger size.  Also address umbilical hernia and remove port if not in use.  Diagnoses and all orders for this visit:    1. Burn of right axilla, unspecified burn degree, initial encounter (Primary)  -     US Guided Breast Biopsy With & Without Device initial; Future        Additional Order(s):       Plan:  U/S guided biopsy of right axilla  Will proceed with revision after axilla is addressed.  Patient agrees with plan.      Patient was given instructions and counseling regarding her condition. Please see specific information pulled into the AVS if appropriate.   Scribed by Rose Mary Miller, acting as a scribe for Jackie Sanders MD, 05/22/24 13:14 EDT.  Jackie Sanders MD's signature on the note affirms that the note adequately documents the care provided.   Jackie Sanders MD  05/22/2024

## 2024-05-22 ENCOUNTER — OFFICE VISIT (OUTPATIENT)
Dept: PLASTIC SURGERY | Facility: CLINIC | Age: 29
End: 2024-05-22
Payer: COMMERCIAL

## 2024-05-22 VITALS
HEART RATE: 94 BPM | SYSTOLIC BLOOD PRESSURE: 108 MMHG | OXYGEN SATURATION: 96 % | BODY MASS INDEX: 28.95 KG/M2 | WEIGHT: 163.4 LBS | HEIGHT: 63 IN | DIASTOLIC BLOOD PRESSURE: 70 MMHG | TEMPERATURE: 97.9 F

## 2024-05-22 DIAGNOSIS — T22.041A: Primary | ICD-10-CM

## 2024-05-22 PROCEDURE — 99024 POSTOP FOLLOW-UP VISIT: CPT | Performed by: SURGERY

## 2024-05-23 PROBLEM — T22.041A: Status: ACTIVE | Noted: 2024-05-23

## 2024-05-23 RX ORDER — ROPINIROLE 1 MG/1
TABLET, FILM COATED ORAL
Qty: 30 TABLET | Refills: 3 | Status: SHIPPED | OUTPATIENT
Start: 2024-05-23

## 2024-05-30 ENCOUNTER — LAB (OUTPATIENT)
Dept: ONCOLOGY | Facility: HOSPITAL | Age: 29
End: 2024-05-30
Payer: COMMERCIAL

## 2024-05-30 ENCOUNTER — OFFICE VISIT (OUTPATIENT)
Dept: NEUROLOGY | Facility: CLINIC | Age: 29
End: 2024-05-30
Payer: COMMERCIAL

## 2024-05-30 ENCOUNTER — OFFICE VISIT (OUTPATIENT)
Dept: ONCOLOGY | Facility: HOSPITAL | Age: 29
End: 2024-05-30
Payer: COMMERCIAL

## 2024-05-30 VITALS
BODY MASS INDEX: 28.82 KG/M2 | WEIGHT: 162.7 LBS | TEMPERATURE: 98.5 F | DIASTOLIC BLOOD PRESSURE: 59 MMHG | RESPIRATION RATE: 18 BRPM | HEART RATE: 94 BPM | SYSTOLIC BLOOD PRESSURE: 110 MMHG | OXYGEN SATURATION: 98 %

## 2024-05-30 VITALS
BODY MASS INDEX: 28.79 KG/M2 | HEIGHT: 63 IN | HEART RATE: 81 BPM | WEIGHT: 162.5 LBS | SYSTOLIC BLOOD PRESSURE: 114 MMHG | DIASTOLIC BLOOD PRESSURE: 77 MMHG

## 2024-05-30 DIAGNOSIS — C50.919 METAPLASTIC CARCINOMA OF BREAST: Primary | ICD-10-CM

## 2024-05-30 DIAGNOSIS — G25.81 RESTLESS LEG: ICD-10-CM

## 2024-05-30 DIAGNOSIS — G43.719 INTRACTABLE CHRONIC MIGRAINE WITHOUT AURA AND WITHOUT STATUS MIGRAINOSUS: Primary | ICD-10-CM

## 2024-05-30 DIAGNOSIS — E87.6 HYPOKALEMIA: ICD-10-CM

## 2024-05-30 DIAGNOSIS — R05.3 CHRONIC COUGH: ICD-10-CM

## 2024-05-30 DIAGNOSIS — D50.9 IRON DEFICIENCY ANEMIA, UNSPECIFIED IRON DEFICIENCY ANEMIA TYPE: ICD-10-CM

## 2024-05-30 LAB
ALBUMIN SERPL-MCNC: 4.1 G/DL (ref 3.5–5.2)
ALBUMIN/GLOB SERPL: 1.5 G/DL
ALP SERPL-CCNC: 97 U/L (ref 39–117)
ALT SERPL W P-5'-P-CCNC: 29 U/L (ref 1–33)
ANION GAP SERPL CALCULATED.3IONS-SCNC: 9.6 MMOL/L (ref 5–15)
AST SERPL-CCNC: 24 U/L (ref 1–32)
BASOPHILS # BLD AUTO: 0.02 10*3/MM3 (ref 0–0.2)
BASOPHILS NFR BLD AUTO: 0.4 % (ref 0–1.5)
BILIRUB SERPL-MCNC: 0.3 MG/DL (ref 0–1.2)
BUN SERPL-MCNC: 13 MG/DL (ref 6–20)
BUN/CREAT SERPL: 22.8 (ref 7–25)
CALCIUM SPEC-SCNC: 9 MG/DL (ref 8.6–10.5)
CHLORIDE SERPL-SCNC: 105 MMOL/L (ref 98–107)
CO2 SERPL-SCNC: 26.4 MMOL/L (ref 22–29)
CREAT SERPL-MCNC: 0.57 MG/DL (ref 0.57–1)
DEPRECATED RDW RBC AUTO: 47.9 FL (ref 37–54)
EGFRCR SERPLBLD CKD-EPI 2021: 125.6 ML/MIN/1.73
EOSINOPHIL # BLD AUTO: 0.05 10*3/MM3 (ref 0–0.4)
EOSINOPHIL NFR BLD AUTO: 1.1 % (ref 0.3–6.2)
ERYTHROCYTE [DISTWIDTH] IN BLOOD BY AUTOMATED COUNT: 15.2 % (ref 12.3–15.4)
GLOBULIN UR ELPH-MCNC: 2.8 GM/DL
GLUCOSE SERPL-MCNC: 103 MG/DL (ref 65–99)
HCT VFR BLD AUTO: 34.3 % (ref 34–46.6)
HGB BLD-MCNC: 11.3 G/DL (ref 12–15.9)
IMM GRANULOCYTES # BLD AUTO: 0.01 10*3/MM3 (ref 0–0.05)
IMM GRANULOCYTES NFR BLD AUTO: 0.2 % (ref 0–0.5)
LYMPHOCYTES # BLD AUTO: 1.13 10*3/MM3 (ref 0.7–3.1)
LYMPHOCYTES NFR BLD AUTO: 24.3 % (ref 19.6–45.3)
MCH RBC QN AUTO: 28.2 PG (ref 26.6–33)
MCHC RBC AUTO-ENTMCNC: 32.9 G/DL (ref 31.5–35.7)
MCV RBC AUTO: 85.5 FL (ref 79–97)
MONOCYTES # BLD AUTO: 0.39 10*3/MM3 (ref 0.1–0.9)
MONOCYTES NFR BLD AUTO: 8.4 % (ref 5–12)
NEUTROPHILS NFR BLD AUTO: 3.05 10*3/MM3 (ref 1.7–7)
NEUTROPHILS NFR BLD AUTO: 65.6 % (ref 42.7–76)
NRBC BLD AUTO-RTO: 0 /100 WBC (ref 0–0.2)
PLATELET # BLD AUTO: 165 10*3/MM3 (ref 140–450)
PMV BLD AUTO: 9.5 FL (ref 6–12)
POTASSIUM SERPL-SCNC: 3.4 MMOL/L (ref 3.5–5.2)
PROT SERPL-MCNC: 6.9 G/DL (ref 6–8.5)
RBC # BLD AUTO: 4.01 10*6/MM3 (ref 3.77–5.28)
SODIUM SERPL-SCNC: 141 MMOL/L (ref 136–145)
WBC NRBC COR # BLD AUTO: 4.65 10*3/MM3 (ref 3.4–10.8)

## 2024-05-30 PROCEDURE — 85025 COMPLETE CBC W/AUTO DIFF WBC: CPT

## 2024-05-30 PROCEDURE — 36415 COLL VENOUS BLD VENIPUNCTURE: CPT

## 2024-05-30 PROCEDURE — 80053 COMPREHEN METABOLIC PANEL: CPT

## 2024-05-30 PROCEDURE — G0463 HOSPITAL OUTPT CLINIC VISIT: HCPCS | Performed by: INTERNAL MEDICINE

## 2024-05-30 RX ORDER — ZAVEGEPANT 10 MG/.1ML
1 SPRAY NASAL DAILY PRN
Qty: 6 EACH | Refills: 5 | Status: SHIPPED | OUTPATIENT
Start: 2024-05-30

## 2024-05-30 RX ORDER — BENZONATATE 200 MG/1
200 CAPSULE ORAL 3 TIMES DAILY PRN
Qty: 90 CAPSULE | Refills: 1 | Status: SHIPPED | OUTPATIENT
Start: 2024-05-30

## 2024-05-30 RX ORDER — SODIUM CHLORIDE 9 MG/ML
20 INJECTION, SOLUTION INTRAVENOUS ONCE
Status: CANCELLED | OUTPATIENT
Start: 2024-05-31

## 2024-05-30 NOTE — PROGRESS NOTES
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 She had bilateral mastectomy and bilateral implants on 1/9/24. She is on adjuvant Pembrolizumab.  Reports tolerating well. Has had persistent cough for the last month. Tessalon Perles do help. Cough not worsening. Not much sinus drainage. No fevers or chills. Continues to have muscle pain in her legs. Remains on ropinirole for this. Has right axilla lesion that is getting biopsied tomorrow. Due for breast reconstruction in October. Did have some tingling in her fingers after her last Pembro infusion but was only short lived. Had reaction after her dose of IV iron on 5/8/24, reports she felt terrible for at least a day afterward. Repeat iron labs wnl.       Oncology/Hematology History Overview Note   2012: Lumpectomy of fibroadenoma of right breast    3/2023: Started to have pain and swelling of known area of fibroadenoma of right breast. Started after trauma to breast.     5/26/23: Right breast lumpectomy: Invasive metaplastic carcinoma, high-grade, 4.5 x 4.0 x 3.3 cm with interspersed fibroadenoma, negative margins. ER 0%, TN 0%, HER2 0 by IHC. PDL1 CPS of 50.     6/21/23: MRI breast: Diffuse edema, hypervascularity, and skin thickening of the right breast raising the possibility of inflammatory breast cancer. 2. 4.7 cm seroma in the lateral breast in area of known recent malignant lumpectomy. 3. Metastatic right axillary adenopathy. 4. No evidence of contralateral left breast malignancy. BI-RADS Category 6, known biopsy-proven malignancy.     7/7/23: Right axillary LN biopsy: metastatic adenocarcinoma, consistent with metaplastic breast carcinoma.     7/11/23: CT Chest, abdomen,  pelvis: No definite metastatic disease. 1. Chest:  Pathologically enlarged right axillary lymphadenopathy.  Masslike opacity right upper outer breast with postsurgical changes.  Skin thickening right breast.  Correlate with tissue diagnosis and breast MRI.Small semi solid nodules in the right lung.  These are nonspecific.  A follow-up CT chest in 3 months time is recommended.  Abdomen pelvis:  No findings of metastatic disease to the abdomen or pelvis.     7/12/23: NM bone scan: negative for osseous mets    7/14/23: C1D1 Carboplatin/Paclitaxel/Pembrolizumab  8/4/23: C2D1 Carboplatin/Paclitaxel/Pembrolizumab. C2D15 held due to neutropenia  8/25/23: C3D1 Carboplatin/Paclitaxel/Pembrolizumab. All doses given  9/15/23: C4D1 Carboplatin/Paclitaxel/Pembrolizumab. C4D15 held due to neutropenia  10/6/23: C1D1 Doxorubicin/Cyclophosphamide/Pembrolizumab  10/27/23: C2D1 Doxorubicin/Cyclophosphamide/Pembrolizumab  11/20/23: C3D1 Doxorubicin/Cyclophosphamide/Pembrolizumab. Delayed due to neutropenia. Last cycle of chemo prior to surgery    1/9/24: Bilateral Mastectomy: Right breast: No invasive disease left, intermediate grade DCIS present. ypTisN0 (sn), ER/CT negative, HER2 negative, margins negative, ALH present, left breast benign    2/9/24: C1D1 adjuvant pembrolizumab.     4/9/24: Completed adjuvant radiation (performed in [place])     Metaplastic carcinoma of breast   6/16/2023 Initial Diagnosis    Metaplastic carcinoma of breast     6/16/2023 - 6/16/2023 Chemotherapy    OP BREAST AC DD DOXOrubicin / Cyclophosphamide     7/13/2023 Cancer Staged    Staging form: Breast, AJCC 8th Edition  - Clinical: Stage IIIC (cT4d, cN2, cM0, G3, ER-, CT-, HER2-) - Signed by Ramin Shine MD on 7/13/2023 7/14/2023 - 9/22/2023 Chemotherapy    OP BREAST Pembrolizumab 400 mg / PACLitaxel / CARBOplatin AUC=5     8/12/2023 - 8/12/2023 Chemotherapy    OP BREAST PACLitaxel Adjuvant (Weekly X 12)     10/6/2023 - 11/20/2023  Chemotherapy    OP BREAST Pembrolizumab 200 mg / DOXOrubicin / Cyclophosphamide      1/23/2024 Cancer Staged    Staging form: Breast, AJCC 8th Edition  - Pathologic: ypTis (DCIS), pN0, cM0, ER-, CT-, HER2- - Signed by Ramin Shine MD on 1/23/2024 2/9/2024 -  Chemotherapy    OP Pembrolizumab 200 mg     Triple negative breast cancer (Resolved)   6/28/2023 Initial Diagnosis    Triple negative breast cancer           Review of Systems   Constitutional:  Positive for fatigue.   Gastrointestinal:  Positive for nausea.   Genitourinary:  Negative for breast pain.   Musculoskeletal:  Negative for arthralgias.   Skin:  Negative for rash.   All other systems reviewed and are negative.    Current Outpatient Medications on File Prior to Visit   Medication Sig Dispense Refill    benzonatate (TESSALON) 200 MG capsule  (Patient not taking: Reported on 5/30/2024)      desvenlafaxine (PRISTIQ) 50 MG 24 hr tablet Take 1 tablet by mouth Daily. 90 tablet 1    gabapentin (NEURONTIN) 300 MG capsule Take 1 capsule by mouth 3 (Three) Times a Day As Needed. (Patient not taking: Reported on 5/30/2024)      hydrocortisone 1 % cream  (Patient not taking: Reported on 5/30/2024)      ibuprofen (ADVIL,MOTRIN) 600 MG tablet Take 1 tablet by mouth Every 8 (Eight) Hours As Needed for Mild Pain. (Patient not taking: Reported on 5/30/2024) 90 tablet 0    lidocaine-prilocaine (EMLA) 2.5-2.5 % cream Apply 1 Application topically to the appropriate area as directed Every 2 (Two) Hours As Needed for Mild Pain. (Patient not taking: Reported on 5/30/2024) 5 g 5    methylPREDNISolone (MEDROL) 4 MG dose pack  (Patient not taking: Reported on 5/30/2024)      potassium chloride (K-DUR,KLOR-CON) 20 MEQ CR tablet Take 1 tablet by mouth Daily. (Patient not taking: Reported on 5/30/2024) 30 tablet 0    prochlorperazine (COMPAZINE) 10 MG tablet Take 1 tablet by mouth Every 6 (Six) Hours As Needed for Nausea or Vomiting. (Patient not taking: Reported on  2024) 60 tablet 2    promethazine (PHENERGAN) 50 MG tablet Take 1 tablet by mouth Every 6 (Six) Hours As Needed for Nausea or Vomiting. (Patient not taking: Reported on 2024) 30 tablet 0    rOPINIRole (REQUIP) 1 MG tablet TAKE 1 TABLET BY MOUTH AT BEDTIME, TAKE 1 HOUR BEFORE BEDTIME (Patient not taking: Reported on 2024) 30 tablet 3    Sodium Fluoride 5000 PPM 1.1 % paste       [DISCONTINUED] ubrogepant (Ubrelvy) 100 MG tablet Take 1 tablet by mouth 1 (One) Time As Needed (migraine) for up to 30 doses. One tablet at HA onset, may repeat once in 2 hours if needed. 10 tablet 5     No current facility-administered medications on file prior to visit.       Allergies   Allergen Reactions    Pembrolizumab Other (See Comments)     swollen, red, itching, and warmness to hands, ears, chest, and throat, tightness and hoarse voice.;  resolved with treatment (24)    Tegaderm Ag Mesh [Silver] Itching     Tegaderm that is used over port a cath     Past Medical History:   Diagnosis Date    Asthma     AS CHILD    Breast cancer     COVID-19 vaccine administered      Past Surgical History:   Procedure Laterality Date    BREAST LUMPECTOMY Right     BREAST RECONSTRUCTION Bilateral 2024    Procedure: BREAST RECONSTRUCTION WITH MESH AND IMPLANTS - USE OF SPY;  Surgeon: Jackie Sanders MD;  [place] [facility] [place] [facility] [place]: [place];  Laterality: Bilateral;     SECTION      MASTECTOMY W/ SENTINEL NODE BIOPSY Bilateral 2024    Procedure: BREAST MASTECTOMY WITH RIGHT AXILLARY SENTINEL NODE IDENTIFICATION AND EXCISION BIOPSY;  Surgeon: Christiana Whiting MD;  [place] [facility] [place] [facility] [place]  Service: General;  Laterality: Bilateral;    PORTACATH PLACEMENT      SKIN BIOPSY      TUBAL ABDOMINAL LIGATION       Social History     Socioeconomic History    Marital status: Single    Number of children: 2   Tobacco Use    Smoking status: Never     Passive exposure: Never    Smokeless tobacco: Never   Vaping Use     Vaping status: Never Used   Substance and Sexual Activity    Alcohol use: Not Currently     Comment: OCCASSIONAL    Drug use: Never    Sexual activity: Defer     Family History   Problem Relation Age of Onset    Skin cancer Mother     COPD Mother     Hypertension Father     Hyperlipidemia Father     Diabetes Father     Skin cancer Paternal Grandmother     Lung cancer Paternal Grandmother     Malig Hyperthermia Neg Hx        Objective   Physical Exam  Well appearing patient in no acute distress on RA  Anicteric sclerae, no rash on exposed skin  Respirations non-labored  Awake, alert, and oriented x 4. Speech intact. No gross neurologic deficit  Appropriate mood and affect    Vitals:    05/30/24 1405   BP: 110/59   Pulse: 94   Resp: 18   Temp: 98.5 °F (36.9 °C)   TempSrc: Temporal   SpO2: 98%   Weight: 73.8 kg (162 lb 11.2 oz)   PainSc: 0-No pain                     PHQ-9 Total Score:           Result Review :   The following data was reviewed by: Ramin Shine MD on 05/30/24:  Lab Results   Component Value Date    HGB 10.9 (L) 05/09/2024    HCT 33.3 (L) 05/09/2024    MCV 85.2 05/09/2024     05/09/2024    WBC 5.53 05/09/2024    NEUTROABS 3.90 05/09/2024    LYMPHSABS 1.21 05/09/2024    MONOSABS 0.38 05/09/2024    EOSABS 0.03 05/09/2024    BASOSABS 0.01 05/09/2024     Lab Results   Component Value Date    GLUCOSE 103 (H) 05/30/2024    BUN 13 05/30/2024    CREATININE 0.57 05/30/2024     05/30/2024    K 3.4 (L) 05/30/2024     05/30/2024    CO2 26.4 05/30/2024    CALCIUM 9.0 05/30/2024    PROTEINTOT 6.9 05/30/2024    ALBUMIN 4.1 05/30/2024    BILITOT 0.3 05/30/2024    ALKPHOS 97 05/30/2024    AST 24 05/30/2024    ALT 29 05/30/2024     Lab Results   Component Value Date    MG 1.8 02/29/2024    FREET4 1.20 04/11/2024    TSH 1.350 04/11/2024     Labs personally reviewed.  Iron levels improved.     Plastic surgery note personally reviewed        Mammo Diagnostic Digital Tomosynthesis Right With  CAD    Result Date: 5/15/2024  Right axilla. Partially obscured triangular 1 view asymmetry in the high right axilla possibly related to surgical mesh, however it is uncertain whether this correlates with the hypoechoic focus on today's sonogram. Findings discussed with Dr. Sanders. Please refer to the diagnostic sonogram report from today's date  Tissue Density: Not applicable as the patient has undergone mastectomy.  BI-RADS ASSESSMENT: BI-RADS 4. Suspicious abnormality.   The patient's information is entered into a computerized reminder system with a targeted due date for the next mammogram.  Note:  It has been reported that there is approximately a 15% false negative in mammography.  Therefore, management of a palpable abnormality should not be deferred because of a negative mammogram.            Electronically Signed By-NIKKI DARLING MD On:5/15/2024 2:16 PM      US Axilla Right    Result Date: 5/15/2024  Impression: Right axilla: In the area of breast pain is a 1.7 cm nonpathologically enlarged lymph node. Removal or further assessment of this lymph node based on clinical assessment.  Irregular hypoechogenicity along the edge of the breast implant measuring 2.5-3.5 cm possibly from postsurgical mesh and/or scar. Recommend correlation with surgical history. Ultrasound-guided core biopsy for definitive diagnosis can be performed pending correlation with surgical history and directed clinical assessment. All findings and recommendations discussed directly with the patient at the time of exam. She states that she will have revision/reconstruction of the right breast/right breast implant.  Case discussed with Dr. Sanders  on 5/15/2024 at 1:10 p.m.  Bi-Rads Assessment: BI-RADS 4. Suspicious Abnormality.  The patient's information is entered into a computerized reminder system with a targeted due date for the next mammogram.  Note:  It has been reported that there is approximately a 15% false negative in  mammography.  Therefore, management of a palpable abnormality should not be deferred because of a negative mammogram.   Electronically Signed By-NIKKI DARLING MD On:5/15/2024 2:10 PM      XR Chest PA & Lateral    Result Date: 5/14/2024  Impression: No active cardiopulmonary disease   Electronically Signed By-Reed Jaime On:5/14/2024 10:54 AM           Assessment and Plan    Diagnoses and all orders for this visit:    1. Metaplastic carcinoma of breast (Primary)    2. Iron deficiency anemia, unspecified iron deficiency anemia type    3. Restless leg    4. Chronic cough    5. Hypokalemia    Other orders  -     benzonatate (TESSALON) 200 MG capsule; Take 1 capsule by mouth 3 (Three) Times a Day As Needed for Cough.  Dispense: 90 capsule; Refill: 1  -     sodium chloride 0.9 % infusion  -     diphenhydrAMINE (BENADRYL) IVPB 50 mg  -     Pembrolizumab (KEYTRUDA) 200 mg in sodium chloride 0.9 % 58 mL chemo IVPB            Metaplastic Carcinoma of Right Breast, triple negative  Patient is s/p lumpectomy of 4.5 cm lesion with triple negative metaplastic carcinoma, interspersed with fibroadenoma. Patient has clinical inflammatory breast cancer. MRI breast with concern for inflammatory breast cancer as well as right sided axillary involvement. Left breast without lesions per MRI. Lymph node biopsy 7/7/23 confirmed right axillary involvement by breast carcinoma.  Completed noeadjuvant chemothearpy and immunotherapy with 4 cycles of Carboplatin/Paclitaxel/Pembro (C1 7/1423 and C4 9/15/23) and 3 cycles of Doxorubicin, cyclophosphamide, pembrolizumab (completed C3 11/20/23 with delay due to neutropenia). Completed 7 cycles total. C4 omitted due to accumulation of toxicities.     1/9/24: Bilateral Mastectomy: Right breast: No invasive disease left, intermediate grade DCIS present. ypTisN0 (sn), ER/OK negative, HER2 negative, margins negative, ALH     No residual invasive disease present, so patient with complete pathologic  response on the basis of the National Surgical Adjuvant Breast and Bowel project criteria. DCIS still present as well as ALH, negative margins, nodes negative. Will proceed with adjuvant immunotherapy with Pembrolizumab every 3 weeks to complete 1 year. C1 provided 2/9/24.    C6D1 adjuvant pembrolizumab 5/31/24. Labs appropriate to proceed.     Plan for 10 cycles of adjuvant pembrolizumab.     Adjuvant radiation completed as of 4/9/24.     This is an acute or chronic illness that poses a threat to life or bodily function. The above treatment plan involves a high risk of complications and/or mortality of patient management. Continue to monitor for severe toxicities with labs     JOHNATHAN  Malabsorption of iron  2/29/24 labs consistent with iron deficiency. Anemia now resolved. Ferrlecit first dose 5/8/24 complicated by reaction. Repeat iron labs are normal so further replacement to be held.     Anxiety  Xanax PRN added.     Restless leg  Ropinirole helps, previously increased to 1.0 mg. Could be worse from pembrolizumab. Recommend magnesium supplement.     Cough  Unlikely immunotherapy related but on the differential. CXR clear. Provide tessalon perles. Not worsening. Will monitor.     Hypokalemia  3.4 today. Mild, no intervention needed. Repeat next visit.    Right axilla lesion  5/15/24 u/s performed and showed 1.7 cm enlarged area. Biopsy tomorrow.         Patient Follow Up: C7 adjuvant immunotherapy  Patient was given instructions and counseling regarding her condition or for health maintenance advice. Please see specific information pulled into the AVS if appropriate.

## 2024-05-30 NOTE — PROGRESS NOTES
"Chief Complaint  Migraine    Subjective          Marilee Watts presents to [facility] NEUROLOGY & NEUROSURGERY  History of Present Illness  She presents to the office for Botox follow-up.  Last Botox injection was on 2/23/24.  States she is having 10 headache days per month.  Feels that she has seen improvement in migraines with Botox for preventative therapy.  Uses  Nurtec without good effect.  Denies side effect.        Interval History:   She reports that she developed headaches many years ago. Since that time, her headaches have progressively worsened.   Currently, she reports headaches that are located frontal. She characterizes the headaches as 8/10 in severity, throbbing  in nature with associated photophobia, phonophobia, and nausea. She reports headaches last 8+ hours. She reports 25 headache days per month. She denies associated aura. She denies focal numbness, weakness, speech, and vision changes.   Triggers: stress and menstrual cycle   Symptoms improved by: Dark quiet room and Sleep   She states she is sleeping fairly well. Reports getting 6 hours of sleep per night. denies snoring. Reports refreshing sleep.   Prior prophylactic medications include: topiramate, propranolol, TCAs contraindicated d/t medication interactions, additional anti-hypertensives such as verapamil not appropriate due to hypotension, Emgality  She  uses abortive therapy such as: triptans contraindicated d/t risk for serotonin syndrome, Nurtec, Ubrelvy   Caffeine Use: 2 servings daily  Childbearing potential : tubal ligation   History of Kidney Stones: No  She denies a family history of cerebral aneurysm.                    Objective   Vital Signs:   /77   Pulse 81   Ht 160 cm (63\")   Wt 73.7 kg (162 lb 8 oz)   BMI 28.79 kg/m²     Physical Exam  HENT:      Head: Normocephalic.   Pulmonary:      Effort: Pulmonary effort is normal.   Neurological:      Mental Status: She is alert and oriented to person, " place, and time.      Sensory: Sensation is intact.      Motor: Motor function is intact.      Coordination: Coordination is intact.      Deep Tendon Reflexes: Reflexes are normal and symmetric.        Neurologic Exam     Mental Status   Oriented to person, place, and time.        Result Review :               Assessment and Plan    Diagnoses and all orders for this visit:    1. Intractable chronic migraine without aura and without status migrainosus (Primary)  Assessment & Plan:  Migraines some improved with first round of botox but not yet at goal.  Will continue Botox for preventative therapy and start Zavzpret PRN for abortive therapy.      Botox will be administered per accepted algorithm for chronic migraine with a total of 155 units given divided as follows: 10 units in the , 5 units in the procerus, 20 units in the frontalis, 40 units in the temporalis, 30 units in the occipitalis, 20 units in the cervical paraspinals and 30 units in the trapezius.  This therapy will be given every 12 weeks.           Other orders  -     Zavegepant HCl (Zavzpret) 10 MG/ACT solution; 1 spray into the nostril(s) as directed by provider Daily As Needed (migraine).  Dispense: 6 each; Refill: 5        Follow Up   Return in 8 days (on 6/7/2024) for Botox .  Patient was given instructions and counseling regarding her condition or for health maintenance advice. Please see specific information pulled into the AVS if appropriate.

## 2024-05-31 ENCOUNTER — HOSPITAL ENCOUNTER (OUTPATIENT)
Dept: ONCOLOGY | Facility: HOSPITAL | Age: 29
Discharge: HOME | End: 2024-05-31
Payer: COMMERCIAL

## 2024-05-31 ENCOUNTER — HOSPITAL ENCOUNTER (OUTPATIENT)
Dept: MAMMOGRAPHY | Facility: HOSPITAL | Age: 29
Discharge: HOME | End: 2024-05-31
Payer: COMMERCIAL

## 2024-05-31 ENCOUNTER — PATIENT OUTREACH (OUTPATIENT)
Dept: ONCOLOGY | Facility: HOSPITAL | Age: 29
End: 2024-05-31
Payer: COMMERCIAL

## 2024-05-31 VITALS
WEIGHT: 165.12 LBS | HEART RATE: 78 BPM | SYSTOLIC BLOOD PRESSURE: 115 MMHG | RESPIRATION RATE: 16 BRPM | DIASTOLIC BLOOD PRESSURE: 71 MMHG | HEIGHT: 63 IN | OXYGEN SATURATION: 97 % | TEMPERATURE: 98.1 F | BODY MASS INDEX: 29.26 KG/M2

## 2024-05-31 DIAGNOSIS — C50.919 METAPLASTIC CARCINOMA OF BREAST: Primary | ICD-10-CM

## 2024-05-31 DIAGNOSIS — Z45.2 FITTING AND ADJUSTMENT OF VASCULAR CATHETER: ICD-10-CM

## 2024-05-31 DIAGNOSIS — T22.041A: ICD-10-CM

## 2024-05-31 PROCEDURE — 25810000003 SODIUM CHLORIDE 0.9 % SOLUTION: Performed by: INTERNAL MEDICINE

## 2024-05-31 PROCEDURE — 25010000002 HEPARIN LOCK FLUSH PER 10 UNITS: Performed by: INTERNAL MEDICINE

## 2024-05-31 PROCEDURE — 96413 CHEMO IV INFUSION 1 HR: CPT

## 2024-05-31 PROCEDURE — 25010000002 PEMBROLIZUMAB 100 MG/4ML SOLUTION 4 ML VIAL: Performed by: INTERNAL MEDICINE

## 2024-05-31 PROCEDURE — 25010000002 LIDOCAINE 1 % SOLUTION

## 2024-05-31 PROCEDURE — 96375 TX/PRO/DX INJ NEW DRUG ADDON: CPT

## 2024-05-31 PROCEDURE — 25010000002 DIPHENHYDRAMINE PER 50 MG: Performed by: INTERNAL MEDICINE

## 2024-05-31 RX ORDER — SODIUM CHLORIDE 0.9 % (FLUSH) 0.9 %
20 SYRINGE (ML) INJECTION AS NEEDED
Status: DISCONTINUED | OUTPATIENT
Start: 2024-05-31 | End: 2024-06-01 | Stop reason: HOSPADM

## 2024-05-31 RX ORDER — SODIUM CHLORIDE 9 MG/ML
20 INJECTION, SOLUTION INTRAVENOUS ONCE
Status: COMPLETED | OUTPATIENT
Start: 2024-05-31 | End: 2024-05-31

## 2024-05-31 RX ORDER — LIDOCAINE HYDROCHLORIDE AND EPINEPHRINE 10; 10 MG/ML; UG/ML
INJECTION, SOLUTION INFILTRATION; PERINEURAL
Status: COMPLETED
Start: 2024-05-31 | End: 2024-05-31

## 2024-05-31 RX ORDER — DIAPER,BRIEF,INFANT-TODD,DISP
EACH MISCELLANEOUS
Status: COMPLETED
Start: 2024-05-31 | End: 2024-05-31

## 2024-05-31 RX ORDER — SODIUM CHLORIDE 0.9 % (FLUSH) 0.9 %
20 SYRINGE (ML) INJECTION AS NEEDED
OUTPATIENT
Start: 2024-05-31

## 2024-05-31 RX ORDER — LIDOCAINE HYDROCHLORIDE 10 MG/ML
INJECTION, SOLUTION INFILTRATION; PERINEURAL
Status: COMPLETED
Start: 2024-05-31 | End: 2024-05-31

## 2024-05-31 RX ORDER — HEPARIN SODIUM (PORCINE) LOCK FLUSH IV SOLN 100 UNIT/ML 100 UNIT/ML
500 SOLUTION INTRAVENOUS AS NEEDED
Status: DISCONTINUED | OUTPATIENT
Start: 2024-05-31 | End: 2024-06-01 | Stop reason: HOSPADM

## 2024-05-31 RX ORDER — HEPARIN SODIUM (PORCINE) LOCK FLUSH IV SOLN 100 UNIT/ML 100 UNIT/ML
500 SOLUTION INTRAVENOUS AS NEEDED
OUTPATIENT
Start: 2024-05-31

## 2024-05-31 RX ADMIN — SODIUM CHLORIDE 20 ML/HR: 9 INJECTION, SOLUTION INTRAVENOUS at 10:00

## 2024-05-31 RX ADMIN — LIDOCAINE HYDROCHLORIDE: 10 INJECTION, SOLUTION INFILTRATION; PERINEURAL at 13:39

## 2024-05-31 RX ADMIN — BACITRACIN: 500 OINTMENT TOPICAL at 13:39

## 2024-05-31 RX ADMIN — Medication 20 ML: at 10:56

## 2024-05-31 RX ADMIN — HEPARIN 500 UNITS: 100 SYRINGE at 10:56

## 2024-05-31 RX ADMIN — DIPHENHYDRAMINE HYDROCHLORIDE 50 MG: 50 INJECTION, SOLUTION INTRAMUSCULAR; INTRAVENOUS at 10:02

## 2024-05-31 RX ADMIN — SODIUM CHLORIDE 200 MG: 9 INJECTION, SOLUTION INTRAVENOUS at 10:17

## 2024-05-31 RX ADMIN — LIDOCAINE HYDROCHLORIDE,EPINEPHRINE BITARTRATE: 10; .01 INJECTION, SOLUTION INFILTRATION; PERINEURAL at 13:39

## 2024-06-03 LAB
CYTO UR: NORMAL
LAB AP CASE REPORT: NORMAL
LAB AP CLINICAL INFORMATION: NORMAL
PATH REPORT.FINAL DX SPEC: NORMAL
PATH REPORT.GROSS SPEC: NORMAL

## 2024-06-06 NOTE — ASSESSMENT & PLAN NOTE
Migraines some improved with first round of botox but not yet at goal.  Will continue Botox for preventative therapy and start Zavzpret PRN for abortive therapy.      Botox will be administered per accepted algorithm for chronic migraine with a total of 155 units given divided as follows: 10 units in the , 5 units in the procerus, 20 units in the frontalis, 40 units in the temporalis, 30 units in the occipitalis, 20 units in the cervical paraspinals and 30 units in the trapezius.  This therapy will be given every 12 weeks.

## 2024-06-07 ENCOUNTER — PROCEDURE VISIT (OUTPATIENT)
Dept: NEUROLOGY | Facility: CLINIC | Age: 29
End: 2024-06-07
Payer: COMMERCIAL

## 2024-06-07 DIAGNOSIS — G43.719 INTRACTABLE CHRONIC MIGRAINE WITHOUT AURA AND WITHOUT STATUS MIGRAINOSUS: Primary | ICD-10-CM

## 2024-06-13 ENCOUNTER — PRIOR AUTHORIZATION (OUTPATIENT)
Dept: NEUROLOGY | Facility: CLINIC | Age: 29
End: 2024-06-13
Payer: COMMERCIAL

## 2024-06-20 ENCOUNTER — LAB (OUTPATIENT)
Dept: ONCOLOGY | Facility: HOSPITAL | Age: 29
End: 2024-06-20
Payer: COMMERCIAL

## 2024-06-20 ENCOUNTER — OFFICE VISIT (OUTPATIENT)
Dept: ONCOLOGY | Facility: HOSPITAL | Age: 29
End: 2024-06-20
Payer: COMMERCIAL

## 2024-06-20 VITALS
BODY MASS INDEX: 29.06 KG/M2 | TEMPERATURE: 97.5 F | HEART RATE: 66 BPM | DIASTOLIC BLOOD PRESSURE: 70 MMHG | RESPIRATION RATE: 16 BRPM | OXYGEN SATURATION: 95 % | WEIGHT: 164 LBS | SYSTOLIC BLOOD PRESSURE: 117 MMHG

## 2024-06-20 DIAGNOSIS — C50.919 METAPLASTIC CARCINOMA OF BREAST: ICD-10-CM

## 2024-06-20 DIAGNOSIS — C50.919 METAPLASTIC CARCINOMA OF BREAST: Primary | ICD-10-CM

## 2024-06-20 DIAGNOSIS — R05.3 CHRONIC COUGH: Primary | ICD-10-CM

## 2024-06-20 DIAGNOSIS — G25.81 RESTLESS LEG: ICD-10-CM

## 2024-06-20 LAB
ALBUMIN SERPL-MCNC: 4.2 G/DL (ref 3.5–5.2)
ALBUMIN/GLOB SERPL: 1.8 G/DL
ALP SERPL-CCNC: 99 U/L (ref 39–117)
ALT SERPL W P-5'-P-CCNC: 20 U/L (ref 1–33)
ANION GAP SERPL CALCULATED.3IONS-SCNC: 6.9 MMOL/L (ref 5–15)
AST SERPL-CCNC: 16 U/L (ref 1–32)
BASOPHILS # BLD AUTO: 0.01 10*3/MM3 (ref 0–0.2)
BASOPHILS NFR BLD AUTO: 0.2 % (ref 0–1.5)
BILIRUB SERPL-MCNC: 0.4 MG/DL (ref 0–1.2)
BUN SERPL-MCNC: 13 MG/DL (ref 6–20)
BUN/CREAT SERPL: 23.2 (ref 7–25)
CALCIUM SPEC-SCNC: 9.5 MG/DL (ref 8.6–10.5)
CHLORIDE SERPL-SCNC: 105 MMOL/L (ref 98–107)
CO2 SERPL-SCNC: 27.1 MMOL/L (ref 22–29)
CREAT SERPL-MCNC: 0.56 MG/DL (ref 0.57–1)
DEPRECATED RDW RBC AUTO: 48 FL (ref 37–54)
EGFRCR SERPLBLD CKD-EPI 2021: 126.1 ML/MIN/1.73
EOSINOPHIL # BLD AUTO: 0.03 10*3/MM3 (ref 0–0.4)
EOSINOPHIL NFR BLD AUTO: 0.6 % (ref 0.3–6.2)
ERYTHROCYTE [DISTWIDTH] IN BLOOD BY AUTOMATED COUNT: 14.7 % (ref 12.3–15.4)
GLOBULIN UR ELPH-MCNC: 2.4 GM/DL
GLUCOSE SERPL-MCNC: 81 MG/DL (ref 65–99)
HCT VFR BLD AUTO: 36.8 % (ref 34–46.6)
HGB BLD-MCNC: 12 G/DL (ref 12–15.9)
IMM GRANULOCYTES # BLD AUTO: 0.01 10*3/MM3 (ref 0–0.05)
IMM GRANULOCYTES NFR BLD AUTO: 0.2 % (ref 0–0.5)
LYMPHOCYTES # BLD AUTO: 1.35 10*3/MM3 (ref 0.7–3.1)
LYMPHOCYTES NFR BLD AUTO: 28.4 % (ref 19.6–45.3)
MCH RBC QN AUTO: 28.8 PG (ref 26.6–33)
MCHC RBC AUTO-ENTMCNC: 32.6 G/DL (ref 31.5–35.7)
MCV RBC AUTO: 88.5 FL (ref 79–97)
MONOCYTES # BLD AUTO: 0.38 10*3/MM3 (ref 0.1–0.9)
MONOCYTES NFR BLD AUTO: 8 % (ref 5–12)
NEUTROPHILS NFR BLD AUTO: 2.98 10*3/MM3 (ref 1.7–7)
NEUTROPHILS NFR BLD AUTO: 62.6 % (ref 42.7–76)
PLATELET # BLD AUTO: 146 10*3/MM3 (ref 140–450)
PMV BLD AUTO: 8.8 FL (ref 6–12)
POTASSIUM SERPL-SCNC: 3.7 MMOL/L (ref 3.5–5.2)
PROT SERPL-MCNC: 6.6 G/DL (ref 6–8.5)
RBC # BLD AUTO: 4.16 10*6/MM3 (ref 3.77–5.28)
SODIUM SERPL-SCNC: 139 MMOL/L (ref 136–145)
T4 FREE SERPL-MCNC: 1.1 NG/DL (ref 0.92–1.68)
TSH SERPL DL<=0.05 MIU/L-ACNC: 2.62 UIU/ML (ref 0.27–4.2)
WBC NRBC COR # BLD AUTO: 4.76 10*3/MM3 (ref 3.4–10.8)

## 2024-06-20 PROCEDURE — 84443 ASSAY THYROID STIM HORMONE: CPT

## 2024-06-20 PROCEDURE — 36415 COLL VENOUS BLD VENIPUNCTURE: CPT

## 2024-06-20 PROCEDURE — G0463 HOSPITAL OUTPT CLINIC VISIT: HCPCS | Performed by: INTERNAL MEDICINE

## 2024-06-20 PROCEDURE — 80053 COMPREHEN METABOLIC PANEL: CPT

## 2024-06-20 PROCEDURE — 84439 ASSAY OF FREE THYROXINE: CPT

## 2024-06-20 PROCEDURE — 85025 COMPLETE CBC W/AUTO DIFF WBC: CPT

## 2024-06-20 RX ORDER — SODIUM CHLORIDE 9 MG/ML
20 INJECTION, SOLUTION INTRAVENOUS ONCE
Status: CANCELLED | OUTPATIENT
Start: 2024-06-21

## 2024-06-20 RX ORDER — METHYLPREDNISOLONE 4 MG/1
TABLET ORAL
Qty: 21 TABLET | Refills: 0 | Status: SHIPPED | OUTPATIENT
Start: 2024-06-20

## 2024-06-20 NOTE — PROGRESS NOTES
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 She had bilateral mastectomy and bilateral implants on 1/9/24. She is on adjuvant Pembrolizumab.  Reports tolerating well. Has had persistent cough for over a month. Mostly dry cough. Tessalon Perles do help. Cough not worsening. Not much sinus drainage. No fevers or chills. Continues to have muscle pain in her legs, worse for day or so after treatment with Pembro. Remains on ropinirole for this. Has right axilla lesion that returned benign on 5/31/24. Due to follow up with plastic surgery. Due for breast reconstruction in October. Has umbilical area hernia that causes random pain on exertion that resolves on its own.       Oncology/Hematology History Overview Note   2012: Lumpectomy of fibroadenoma of right breast    3/2023: Started to have pain and swelling of known area of fibroadenoma of right breast. Started after trauma to breast.     5/26/23: Right breast lumpectomy: Invasive metaplastic carcinoma, high-grade, 4.5 x 4.0 x 3.3 cm with interspersed fibroadenoma, negative margins. ER 0%, NV 0%, HER2 0 by IHC. PDL1 CPS of 50.     6/21/23: MRI breast: Diffuse edema, hypervascularity, and skin thickening of the right breast raising the possibility of inflammatory breast cancer. 2. 4.7 cm seroma in the lateral breast in area of known recent malignant lumpectomy. 3. Metastatic right axillary adenopathy. 4. No evidence of contralateral left breast malignancy. BI-RADS Category 6, known biopsy-proven malignancy.     7/7/23: Right axillary LN biopsy: metastatic adenocarcinoma, consistent with metaplastic breast carcinoma.     7/11/23: CT Chest, abdomen, pelvis: No  definite metastatic disease. 1. Chest:  Pathologically enlarged right axillary lymphadenopathy.  Masslike opacity right upper outer breast with postsurgical changes.  Skin thickening right breast.  Correlate with tissue diagnosis and breast MRI.Small semi solid nodules in the right lung.  These are nonspecific.  A follow-up CT chest in 3 months time is recommended.  Abdomen pelvis:  No findings of metastatic disease to the abdomen or pelvis.     7/12/23: NM bone scan: negative for osseous mets    7/14/23: C1D1 Carboplatin/Paclitaxel/Pembrolizumab  8/4/23: C2D1 Carboplatin/Paclitaxel/Pembrolizumab. C2D15 held due to neutropenia  8/25/23: C3D1 Carboplatin/Paclitaxel/Pembrolizumab. All doses given  9/15/23: C4D1 Carboplatin/Paclitaxel/Pembrolizumab. C4D15 held due to neutropenia  10/6/23: C1D1 Doxorubicin/Cyclophosphamide/Pembrolizumab  10/27/23: C2D1 Doxorubicin/Cyclophosphamide/Pembrolizumab  11/20/23: C3D1 Doxorubicin/Cyclophosphamide/Pembrolizumab. Delayed due to neutropenia. Last cycle of chemo prior to surgery    1/9/24: Bilateral Mastectomy: Right breast: No invasive disease left, intermediate grade DCIS present. ypTisN0 (sn), ER/VT negative, HER2 negative, margins negative, ALH present, left breast benign    2/9/24: C1D1 adjuvant pembrolizumab.     4/9/24: Completed adjuvant radiation (performed in [place])     Metaplastic carcinoma of breast   6/16/2023 Initial Diagnosis    Metaplastic carcinoma of breast     6/16/2023 - 6/16/2023 Chemotherapy    OP BREAST AC DD DOXOrubicin / Cyclophosphamide     7/13/2023 Cancer Staged    Staging form: Breast, AJCC 8th Edition  - Clinical: Stage IIIC (cT4d, cN2, cM0, G3, ER-, VT-, HER2-) - Signed by Ramin Shine MD on 7/13/2023 7/14/2023 - 9/22/2023 Chemotherapy    OP BREAST Pembrolizumab 400 mg / PACLitaxel / CARBOplatin AUC=5     8/12/2023 - 8/12/2023 Chemotherapy    OP BREAST PACLitaxel Adjuvant (Weekly X 12)     10/6/2023 - 11/20/2023 Chemotherapy    OP  BREAST Pembrolizumab 200 mg / DOXOrubicin / Cyclophosphamide      1/23/2024 Cancer Staged    Staging form: Breast, AJCC 8th Edition  - Pathologic: ypTis (DCIS), pN0, cM0, ER-, TN-, HER2- - Signed by Ramin Shine MD on 1/23/2024 2/9/2024 -  Chemotherapy    OP Pembrolizumab 200 mg     Triple negative breast cancer (Resolved)   6/28/2023 Initial Diagnosis    Triple negative breast cancer           Review of Systems   Constitutional:  Positive for fatigue.   Gastrointestinal:  Positive for nausea.   Genitourinary:  Negative for breast pain.   Musculoskeletal:  Negative for arthralgias.   Skin:  Negative for rash.   All other systems reviewed and are negative.    Current Outpatient Medications on File Prior to Visit   Medication Sig Dispense Refill    benzonatate (TESSALON) 200 MG capsule Take 1 capsule by mouth 3 (Three) Times a Day As Needed for Cough. 90 capsule 1    desvenlafaxine (PRISTIQ) 50 MG 24 hr tablet Take 1 tablet by mouth Daily. 90 tablet 1    gabapentin (NEURONTIN) 300 MG capsule Take 1 capsule by mouth 3 (Three) Times a Day As Needed. (Patient not taking: Reported on 5/30/2024)      hydrocortisone 1 % cream  (Patient not taking: Reported on 5/30/2024)      ibuprofen (ADVIL,MOTRIN) 600 MG tablet Take 1 tablet by mouth Every 8 (Eight) Hours As Needed for Mild Pain. (Patient not taking: Reported on 5/30/2024) 90 tablet 0    lidocaine-prilocaine (EMLA) 2.5-2.5 % cream Apply 1 Application topically to the appropriate area as directed Every 2 (Two) Hours As Needed for Mild Pain. (Patient not taking: Reported on 5/30/2024) 5 g 5    methylPREDNISolone (MEDROL) 4 MG dose pack  (Patient not taking: Reported on 5/30/2024)      potassium chloride (K-DUR,KLOR-CON) 20 MEQ CR tablet Take 1 tablet by mouth Daily. (Patient not taking: Reported on 5/30/2024) 30 tablet 0    prochlorperazine (COMPAZINE) 10 MG tablet Take 1 tablet by mouth Every 6 (Six) Hours As Needed for Nausea or Vomiting. 60 tablet 2     promethazine (PHENERGAN) 50 MG tablet Take 1 tablet by mouth Every 6 (Six) Hours As Needed for Nausea or Vomiting. (Patient not taking: Reported on 2024) 30 tablet 0    rOPINIRole (REQUIP) 1 MG tablet TAKE 1 TABLET BY MOUTH AT BEDTIME, TAKE 1 HOUR BEFORE BEDTIME (Patient not taking: Reported on 2024) 30 tablet 3    Sodium Fluoride 5000 PPM 1.1 % paste       Zavegepant HCl (Zavzpret) 10 MG/ACT solution 1 spray into the nostril(s) as directed by provider Daily As Needed (migraine). 6 each 5     No current facility-administered medications on file prior to visit.       Allergies   Allergen Reactions    Tegaderm Ag Mesh [Silver] Itching     Tegaderm that is used over port a cath    Pembrolizumab Other (See Comments)     swollen, red, itching, and warmness to hands, ears, chest, and throat, tightness and hoarse voice.;  resolved with treatment (24)     Past Medical History:   Diagnosis Date    Asthma     AS CHILD    Breast cancer     COVID-19 vaccine administered      Past Surgical History:   Procedure Laterality Date    BREAST LUMPECTOMY Right     BREAST RECONSTRUCTION Bilateral 2024    Procedure: BREAST RECONSTRUCTION WITH MESH AND IMPLANTS - USE OF SPY;  Surgeon: Jackie Sanders MD;  Location: [facility] [place];  Service: Plastics;  Laterality: Bilateral;     SECTION      MASTECTOMY W/ SENTINEL NODE BIOPSY Bilateral 2024    Procedure: BREAST MASTECTOMY WITH RIGHT AXILLARY SENTINEL NODE IDENTIFICATION AND EXCISION BIOPSY;  Surgeon: Christiana Whiting MD;  Location: [facility] OR [facility] [place];  Service: General;  Laterality: Bilateral;    PORTACATH PLACEMENT      SKIN BIOPSY      TUBAL ABDOMINAL LIGATION       Social History     Socioeconomic History    Marital status: Single    Number of children: 2   Tobacco Use    Smoking status: Never     Passive exposure: Never    Smokeless tobacco: Never   Vaping Use    Vaping status: Never Used   Substance and Sexual Activity    Alcohol use: Not Currently      Comment: OCCASSIONAL    Drug use: Never    Sexual activity: Defer     Family History   Problem Relation Age of Onset    Skin cancer Mother     COPD Mother     Hypertension Father     Hyperlipidemia Father     Diabetes Father     Skin cancer Paternal Grandmother     Lung cancer Paternal Grandmother     Malig Hyperthermia Neg Hx        Objective   Physical Exam  Well appearing patient in no acute distress on RA  Anicteric sclerae, no rash on exposed skin  Respirations non-labored  Awake, alert, and oriented x 4. Speech intact. No gross neurologic deficit  Appropriate mood and affect    Vitals:    06/20/24 1004   BP: 117/70   Pulse: 66   Resp: 16   Temp: 97.5 °F (36.4 °C)   TempSrc: Temporal   SpO2: 95%   Weight: 74.4 kg (164 lb)   PainSc: 0-No pain                       PHQ-9 Total Score:           Result Review :   The following data was reviewed by: Ramin Shine MD on 06/20/24:  Lab Results   Component Value Date    HGB 12.0 06/20/2024    HCT 36.8 06/20/2024    MCV 88.5 06/20/2024     06/20/2024    WBC 4.76 06/20/2024    NEUTROABS 2.98 06/20/2024    LYMPHSABS 1.35 06/20/2024    MONOSABS 0.38 06/20/2024    EOSABS 0.03 06/20/2024    BASOSABS 0.01 06/20/2024     Lab Results   Component Value Date    GLUCOSE 81 06/20/2024    BUN 13 06/20/2024    CREATININE 0.56 (L) 06/20/2024     06/20/2024    K 3.7 06/20/2024     06/20/2024    CO2 27.1 06/20/2024    CALCIUM 9.5 06/20/2024    PROTEINTOT 6.6 06/20/2024    ALBUMIN 4.2 06/20/2024    BILITOT 0.4 06/20/2024    ALKPHOS 99 06/20/2024    AST 16 06/20/2024    ALT 20 06/20/2024     Lab Results   Component Value Date    MG 1.8 02/29/2024    FREET4 1.20 04/11/2024    TSH 1.350 04/11/2024     Labs personally reviewed.  Iron levels improved. K normal.     Plastic surgery note personally reviewed        US Guided Breast Biopsy With & Without Device initial    Addendum Date: 6/3/2024    Right breast, axilla, ultrasound-guided core biopsy:              -  Dense fibrous tissue, suggestive of capsule              - Focal fat necrosis  The pathology results from the ultrasound-guided core needle biopsy are concordant with the imaging findings. Recommend clinical management per breast surgeon.  Electronically Signed By-Drew Araujo MD On:6/3/2024 8:20 AM      Result Date: 6/3/2024  1. Successful ultrasound guided core biopsy of the ill-defined hypoechoic soft tissue within the right axilla near the implant. No biopsy clip was placed due to patient's reported significant allergy to prior biopsy clip placement. The biopsied area is easily localized via physical exam and ultrasound near the edge of the implant if subsequent excision would ultimately be needed.  The patient tolerated the procedure well without immediate complication. Specimens have been sent to pathology for further analysis.  Addendum will be dictated upon receiving final pathology for concordance and recommendations.   Electronically Signed By-Drew Araujo MD On:5/31/2024 1:57 PM           Assessment and Plan    Diagnoses and all orders for this visit:    1. Chronic cough (Primary)    2. Restless leg    3. Metaplastic carcinoma of breast    Other orders  -     methylPREDNISolone (MEDROL) 4 MG dose pack; Take as directed on package instructions.  Dispense: 21 tablet; Refill: 0  -     sodium chloride 0.9 % infusion  -     diphenhydrAMINE (BENADRYL) IVPB 50 mg  -     Pembrolizumab (KEYTRUDA) 200 mg in sodium chloride 0.9 % 58 mL chemo IVPB          Metaplastic Carcinoma of Right Breast, triple negative  Patient is s/p lumpectomy of 4.5 cm lesion with triple negative metaplastic carcinoma, interspersed with fibroadenoma. Patient has clinical inflammatory breast cancer. MRI breast with concern for inflammatory breast cancer as well as right sided axillary involvement. Left breast without lesions per MRI. Lymph node biopsy 7/7/23 confirmed right axillary involvement by breast carcinoma.  Completed  noeadjuvant chemothearpy and immunotherapy with 4 cycles of Carboplatin/Paclitaxel/Pembro (C1 7/1423 and C4 9/15/23) and 3 cycles of Doxorubicin, cyclophosphamide, pembrolizumab (completed C3 11/20/23 with delay due to neutropenia). Completed 7 cycles total. C4 omitted due to accumulation of toxicities.     1/9/24: Bilateral Mastectomy: Right breast: No invasive disease left, intermediate grade DCIS present. ypTisN0 (sn), ER/[place] negative, HER2 negative, margins negative, ALH     No residual invasive disease present, so patient with complete pathologic response on the basis of the National Surgical Adjuvant Breast and Bowel project criteria. DCIS still present as well as ALH, negative margins, nodes negative. Will proceed with adjuvant immunotherapy with Pembrolizumab every 3 weeks to complete 1 year. C1 provided 2/9/24.    C7D1 adjuvant pembrolizumab 6/21/24. Labs appropriate to proceed.     Plan for 10 cycles of adjuvant pembrolizumab.     Adjuvant radiation completed as of 4/9/24.     This is an acute or chronic illness that poses a threat to life or bodily function. The above treatment plan involves a high risk of complications and/or mortality of patient management. Continue to monitor for severe toxicities with labs       Anxiety  Xanax PRN    Restless leg  Ropinirole helps, previously increased to 1.0 mg. Typically symptoms are worse for day or so after treatment. Could be worse from pembrolizumab. Recommend magnesium supplement.     Cough  Unlikely immunotherapy related but on the differential. CXR clear. Provide tessalon perles which help. Not worsening. Will monitor. Plan for medrol dose pack in 1 week to not effect treatment tomorrow.     Hypokalemia  3.7 today.  Repeat next visit.    Right axilla lesion  5/15/24 u/s performed and showed 1.7 cm enlarged area. Biopsy 5/31/24 showed capsule dense fibrous tissue and focal fat necrosis.        Patient Follow Up: C8 adjuvant immunotherapy  Patient was given  instructions and counseling regarding her condition or for health maintenance advice. Please see specific information pulled into the AVS if appropriate.

## 2024-06-21 ENCOUNTER — HOSPITAL ENCOUNTER (OUTPATIENT)
Dept: ONCOLOGY | Facility: HOSPITAL | Age: 29
Discharge: HOME | End: 2024-06-21
Payer: COMMERCIAL

## 2024-06-21 VITALS
RESPIRATION RATE: 18 BRPM | OXYGEN SATURATION: 98 % | BODY MASS INDEX: 28.95 KG/M2 | HEART RATE: 79 BPM | SYSTOLIC BLOOD PRESSURE: 101 MMHG | WEIGHT: 163.36 LBS | TEMPERATURE: 97.8 F | HEIGHT: 63 IN | DIASTOLIC BLOOD PRESSURE: 75 MMHG

## 2024-06-21 DIAGNOSIS — Z45.2 FITTING AND ADJUSTMENT OF VASCULAR CATHETER: ICD-10-CM

## 2024-06-21 DIAGNOSIS — C50.919 METAPLASTIC CARCINOMA OF BREAST: Primary | ICD-10-CM

## 2024-06-21 PROCEDURE — 25010000002 DIPHENHYDRAMINE PER 50 MG: Performed by: INTERNAL MEDICINE

## 2024-06-21 PROCEDURE — 25010000002 PEMBROLIZUMAB 100 MG/4ML SOLUTION 4 ML VIAL: Performed by: INTERNAL MEDICINE

## 2024-06-21 PROCEDURE — 96413 CHEMO IV INFUSION 1 HR: CPT

## 2024-06-21 PROCEDURE — 25810000003 SODIUM CHLORIDE 0.9 % SOLUTION: Performed by: INTERNAL MEDICINE

## 2024-06-21 PROCEDURE — 96375 TX/PRO/DX INJ NEW DRUG ADDON: CPT

## 2024-06-21 PROCEDURE — 25010000002 HEPARIN LOCK FLUSH PER 10 UNITS: Performed by: INTERNAL MEDICINE

## 2024-06-21 RX ORDER — HEPARIN SODIUM (PORCINE) LOCK FLUSH IV SOLN 100 UNIT/ML 100 UNIT/ML
500 SOLUTION INTRAVENOUS AS NEEDED
OUTPATIENT
Start: 2024-06-21

## 2024-06-21 RX ORDER — SODIUM CHLORIDE 9 MG/ML
20 INJECTION, SOLUTION INTRAVENOUS ONCE
Status: COMPLETED | OUTPATIENT
Start: 2024-06-21 | End: 2024-06-21

## 2024-06-21 RX ORDER — SODIUM CHLORIDE 0.9 % (FLUSH) 0.9 %
20 SYRINGE (ML) INJECTION AS NEEDED
Status: DISCONTINUED | OUTPATIENT
Start: 2024-06-21 | End: 2024-06-22 | Stop reason: HOSPADM

## 2024-06-21 RX ORDER — SODIUM CHLORIDE 0.9 % (FLUSH) 0.9 %
20 SYRINGE (ML) INJECTION AS NEEDED
OUTPATIENT
Start: 2024-06-21

## 2024-06-21 RX ORDER — HEPARIN SODIUM (PORCINE) LOCK FLUSH IV SOLN 100 UNIT/ML 100 UNIT/ML
500 SOLUTION INTRAVENOUS AS NEEDED
Status: DISCONTINUED | OUTPATIENT
Start: 2024-06-21 | End: 2024-06-22 | Stop reason: HOSPADM

## 2024-06-21 RX ADMIN — Medication 20 ML: at 12:09

## 2024-06-21 RX ADMIN — SODIUM CHLORIDE 200 MG: 9 INJECTION, SOLUTION INTRAVENOUS at 11:33

## 2024-06-21 RX ADMIN — SODIUM CHLORIDE 20 ML/HR: 9 INJECTION, SOLUTION INTRAVENOUS at 10:54

## 2024-06-21 RX ADMIN — DIPHENHYDRAMINE HYDROCHLORIDE 50 MG: 50 INJECTION, SOLUTION INTRAMUSCULAR; INTRAVENOUS at 11:11

## 2024-06-21 RX ADMIN — HEPARIN 500 UNITS: 100 SYRINGE at 12:09

## 2024-06-25 ENCOUNTER — OFFICE VISIT (OUTPATIENT)
Dept: PLASTIC SURGERY | Facility: CLINIC | Age: 29
End: 2024-06-25
Payer: COMMERCIAL

## 2024-06-25 VITALS
BODY MASS INDEX: 28.88 KG/M2 | HEIGHT: 63 IN | SYSTOLIC BLOOD PRESSURE: 105 MMHG | WEIGHT: 163 LBS | HEART RATE: 75 BPM | OXYGEN SATURATION: 98 % | DIASTOLIC BLOOD PRESSURE: 72 MMHG

## 2024-06-25 DIAGNOSIS — N65.0 BREAST RECONSTRUCTION DEFORMITY: Primary | ICD-10-CM

## 2024-06-25 PROCEDURE — 99212 OFFICE O/P EST SF 10 MIN: CPT | Performed by: SURGERY

## 2024-06-25 PROCEDURE — 1160F RVW MEDS BY RX/DR IN RCRD: CPT | Performed by: SURGERY

## 2024-06-25 PROCEDURE — 1159F MED LIST DOCD IN RCRD: CPT | Performed by: SURGERY

## 2024-06-25 RX ORDER — GABAPENTIN 300 MG/1
300 CAPSULE ORAL
COMMUNITY

## 2024-06-25 RX ORDER — ROPINIROLE 1 MG/1
1 TABLET, FILM COATED ORAL DAILY
COMMUNITY
Start: 2024-01-23 | End: 2024-06-25

## 2024-07-01 ENCOUNTER — PREP FOR SURGERY (OUTPATIENT)
Dept: OTHER | Facility: HOSPITAL | Age: 29
End: 2024-07-01
Payer: COMMERCIAL

## 2024-07-01 DIAGNOSIS — N65.0 BREAST RECONSTRUCTION DEFORMITY: Primary | ICD-10-CM

## 2024-07-01 RX ORDER — SCOLOPAMINE TRANSDERMAL SYSTEM 1 MG/1
1 PATCH, EXTENDED RELEASE TRANSDERMAL CONTINUOUS
OUTPATIENT
Start: 2024-07-01 | End: 2024-07-04

## 2024-07-01 RX ORDER — ACETAMINOPHEN 500 MG
1000 TABLET ORAL ONCE
OUTPATIENT
Start: 2024-07-01 | End: 2024-07-01

## 2024-07-01 NOTE — PROGRESS NOTES
"Post-op Follow-up            History of Present Illness  Marilee Watts is a 28 y.o. female who presents to [facility] as a post op for BREAST RECONSTRUCTION WITH MESH AND IMPLANTS - USE OF SPY 1/9/24.            Subjective      Tegaderm ag mesh [silver] and Pembrolizumab  Allergies Reconciled.    Review of Systems   Constitutional: Negative.    HENT: Negative.     Eyes: Negative.    Respiratory: Negative.     Cardiovascular: Negative.    Gastrointestinal: Negative.    Endocrine: Negative.    Genitourinary: Negative.    Musculoskeletal: Negative.    Skin:  Positive for rash.        Right breast red, irritation from radiation noted   Allergic/Immunologic: Negative.    Neurological: Negative.    Hematological: Negative.    Psychiatric/Behavioral:  The patient is nervous/anxious.       All review of system has been reviewed and it  is negative except the ones note above.     Objective     /73 (BP Location: Left arm, Patient Position: Sitting, Cuff Size: Adult)   Pulse 96   Ht 160 cm (62.99\")   Wt 74.1 kg (163 lb 6.4 oz)   SpO2 98%   BMI 28.95 kg/m²     Body mass index is 28.95 kg/m².      Physical Exam  Vitals reviewed. Exam conducted with a chaperone present.   Constitutional:       Appearance: Normal appearance.   HENT:      Head: Normocephalic and atraumatic.      Nose: Nose normal.      Mouth/Throat:      Mouth: Mucous membranes are moist.   Eyes:      Extraocular Movements: Extraocular movements intact.   Cardiovascular:      Rate and Rhythm: Normal rate.   Pulmonary:      Effort: Pulmonary effort is normal.   Abdominal:      Palpations: Abdomen is soft.      Hernia: A hernia is present.      Comments: Umbilical hernia noted, patient concerned as she feels like it is getting bigger, feels it when she contracts her abdominal muscles when sitting up   Musculoskeletal:         General: Normal range of motion.      Cervical back: Normal range of motion. "   Skin:     General: Skin is warm.      Findings: Rash present.      Comments: Redness to right breast and chest from radiation, scattered small areas of peeling right sternal border   Neurological:      General: No focal deficit present.      Mental Status: She is alert and oriented to person, place, and time.   Psychiatric:         Mood and Affect: Mood normal.     Breast: minium radiation deformity making right breast appear smaller than left     Result Review :   no new results to review this visit    Assessment and Plan        Diagnoses and all orders for this visit:    1. Breast reconstruction deformity (Primary)                Plan:  Photos obtained today in clinic  Discussed revision plan with pt --Bilateral breast revision with fat graft and nipple reconstruction, port removal, right implant exchange, right axilla nodule biopsy and umbilical hernia repair with possible mesh--possible incision vacuum dressing.  -2hrs After September 9380-50 revision of reconstructed breast  19342 - insertion of breast implant on a separate day from mastectomy   49592 (Repair of anterior abdominal hernia(s) (ie, epigastric, incisional, ventral, umbilical, spigelian), any approach (ie, open, laparoscopic, robotic), initial, including implantation of mesh or other prosthesis when performed, total length of defect(s); less than 3 cm, incarcerated )  36638-41 bilateral nipple reconstruction  88090- Port removal   12026 -  Biopsy or excision of lymph node(s           Implants Allergan    Sientra smooth round gel implant [id number]    x1  Revolve  No mesh, no sizer       Pt will return to clinic for pre op once surgery is scheduled      Scribed by Le Pelaez, acting as a scribe for Jackie Sanders MD, 07/10/24 15:30 EDT.  Jackie Sanders MD's signature on the note affirms that the note adequately documents the care provided.      Jackie Sanders MD  07/10/2024

## 2024-07-10 ENCOUNTER — OFFICE VISIT (OUTPATIENT)
Dept: PLASTIC SURGERY | Facility: CLINIC | Age: 29
End: 2024-07-10
Payer: COMMERCIAL

## 2024-07-10 VITALS
HEART RATE: 96 BPM | BODY MASS INDEX: 28.95 KG/M2 | DIASTOLIC BLOOD PRESSURE: 73 MMHG | WEIGHT: 163.4 LBS | HEIGHT: 63 IN | SYSTOLIC BLOOD PRESSURE: 110 MMHG | OXYGEN SATURATION: 98 %

## 2024-07-10 DIAGNOSIS — N65.0 BREAST RECONSTRUCTION DEFORMITY: Primary | ICD-10-CM

## 2024-07-10 PROCEDURE — 1160F RVW MEDS BY RX/DR IN RCRD: CPT | Performed by: SURGERY

## 2024-07-10 PROCEDURE — 99024 POSTOP FOLLOW-UP VISIT: CPT | Performed by: SURGERY

## 2024-07-10 PROCEDURE — 1159F MED LIST DOCD IN RCRD: CPT | Performed by: SURGERY

## 2024-07-11 ENCOUNTER — LAB (OUTPATIENT)
Dept: ONCOLOGY | Facility: HOSPITAL | Age: 29
End: 2024-07-11
Payer: COMMERCIAL

## 2024-07-11 ENCOUNTER — OFFICE VISIT (OUTPATIENT)
Dept: ONCOLOGY | Facility: HOSPITAL | Age: 29
End: 2024-07-11
Payer: COMMERCIAL

## 2024-07-11 VITALS
SYSTOLIC BLOOD PRESSURE: 123 MMHG | DIASTOLIC BLOOD PRESSURE: 72 MMHG | OXYGEN SATURATION: 99 % | HEIGHT: 63 IN | HEART RATE: 87 BPM | WEIGHT: 163.4 LBS | RESPIRATION RATE: 18 BRPM | TEMPERATURE: 97.4 F | BODY MASS INDEX: 28.95 KG/M2

## 2024-07-11 DIAGNOSIS — C50.919 METAPLASTIC CARCINOMA OF BREAST: Primary | ICD-10-CM

## 2024-07-11 DIAGNOSIS — R51.9 NONINTRACTABLE HEADACHE, UNSPECIFIED CHRONICITY PATTERN, UNSPECIFIED HEADACHE TYPE: ICD-10-CM

## 2024-07-11 DIAGNOSIS — F41.9 ANXIETY: ICD-10-CM

## 2024-07-11 LAB
ALBUMIN SERPL-MCNC: 4.3 G/DL (ref 3.5–5.2)
ALBUMIN/GLOB SERPL: 1.8 G/DL
ALP SERPL-CCNC: 103 U/L (ref 39–117)
ALT SERPL W P-5'-P-CCNC: 24 U/L (ref 1–33)
ANION GAP SERPL CALCULATED.3IONS-SCNC: 6.2 MMOL/L (ref 5–15)
AST SERPL-CCNC: 18 U/L (ref 1–32)
BASOPHILS # BLD AUTO: 0.01 10*3/MM3 (ref 0–0.2)
BASOPHILS NFR BLD AUTO: 0.3 % (ref 0–1.5)
BILIRUB SERPL-MCNC: 0.3 MG/DL (ref 0–1.2)
BUN SERPL-MCNC: 13 MG/DL (ref 6–20)
BUN/CREAT SERPL: 20.3 (ref 7–25)
CALCIUM SPEC-SCNC: 9.7 MG/DL (ref 8.6–10.5)
CHLORIDE SERPL-SCNC: 106 MMOL/L (ref 98–107)
CO2 SERPL-SCNC: 25.8 MMOL/L (ref 22–29)
CREAT SERPL-MCNC: 0.64 MG/DL (ref 0.57–1)
DEPRECATED RDW RBC AUTO: 44.4 FL (ref 37–54)
EGFRCR SERPLBLD CKD-EPI 2021: 122.1 ML/MIN/1.73
EOSINOPHIL # BLD AUTO: 0.03 10*3/MM3 (ref 0–0.4)
EOSINOPHIL NFR BLD AUTO: 0.8 % (ref 0.3–6.2)
ERYTHROCYTE [DISTWIDTH] IN BLOOD BY AUTOMATED COUNT: 13.5 % (ref 12.3–15.4)
GLOBULIN UR ELPH-MCNC: 2.4 GM/DL
GLUCOSE SERPL-MCNC: 80 MG/DL (ref 65–99)
HCT VFR BLD AUTO: 38.7 % (ref 34–46.6)
HGB BLD-MCNC: 12.6 G/DL (ref 12–15.9)
IMM GRANULOCYTES # BLD AUTO: 0 10*3/MM3 (ref 0–0.05)
IMM GRANULOCYTES NFR BLD AUTO: 0 % (ref 0–0.5)
LYMPHOCYTES # BLD AUTO: 1.22 10*3/MM3 (ref 0.7–3.1)
LYMPHOCYTES NFR BLD AUTO: 34.2 % (ref 19.6–45.3)
MCH RBC QN AUTO: 29 PG (ref 26.6–33)
MCHC RBC AUTO-ENTMCNC: 32.6 G/DL (ref 31.5–35.7)
MCV RBC AUTO: 89.2 FL (ref 79–97)
MONOCYTES # BLD AUTO: 0.31 10*3/MM3 (ref 0.1–0.9)
MONOCYTES NFR BLD AUTO: 8.7 % (ref 5–12)
NEUTROPHILS NFR BLD AUTO: 2 10*3/MM3 (ref 1.7–7)
NEUTROPHILS NFR BLD AUTO: 56 % (ref 42.7–76)
PLATELET # BLD AUTO: 150 10*3/MM3 (ref 140–450)
PMV BLD AUTO: 9 FL (ref 6–12)
POTASSIUM SERPL-SCNC: 3.8 MMOL/L (ref 3.5–5.2)
PROT SERPL-MCNC: 6.7 G/DL (ref 6–8.5)
RBC # BLD AUTO: 4.34 10*6/MM3 (ref 3.77–5.28)
SODIUM SERPL-SCNC: 138 MMOL/L (ref 136–145)
WBC NRBC COR # BLD AUTO: 3.57 10*3/MM3 (ref 3.4–10.8)

## 2024-07-11 PROCEDURE — G0463 HOSPITAL OUTPT CLINIC VISIT: HCPCS | Performed by: INTERNAL MEDICINE

## 2024-07-11 PROCEDURE — 85025 COMPLETE CBC W/AUTO DIFF WBC: CPT

## 2024-07-11 PROCEDURE — 80053 COMPREHEN METABOLIC PANEL: CPT

## 2024-07-11 PROCEDURE — 36415 COLL VENOUS BLD VENIPUNCTURE: CPT

## 2024-07-11 RX ORDER — PSEUDOEPHEDRINE HCL 30 MG
30 TABLET ORAL EVERY 4 HOURS PRN
Qty: 42 TABLET | Refills: 0 | Status: SHIPPED | OUTPATIENT
Start: 2024-07-11 | End: 2024-07-18

## 2024-07-11 RX ORDER — SODIUM CHLORIDE 9 MG/ML
20 INJECTION, SOLUTION INTRAVENOUS ONCE
Status: CANCELLED | OUTPATIENT
Start: 2024-07-12

## 2024-07-11 NOTE — PROGRESS NOTES
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 She had bilateral mastectomy and bilateral implants on 1/9/24. She is on adjuvant Pembrolizumab.  Reports tolerating well. Persistent cough has improved. Has had worse headache last few weeks. Reports it as pressure at forehead. No sinus drainage. Reports prior botox helped but most recent one did not. Has had hot flashes last few days. Due for breast reconstruction in October. Has umbilical area hernia.      Oncology/Hematology History Overview Note   2012: Lumpectomy of fibroadenoma of right breast    3/2023: Started to have pain and swelling of known area of fibroadenoma of right breast. Started after trauma to breast.     5/26/23: Right breast lumpectomy: Invasive metaplastic carcinoma, high-grade, 4.5 x 4.0 x 3.3 cm with interspersed fibroadenoma, negative margins. ER 0%, AK 0%, HER2 0 by IHC. PDL1 CPS of 50.     6/21/23: MRI breast: Diffuse edema, hypervascularity, and skin thickening of the right breast raising the possibility of inflammatory breast cancer. 2. 4.7 cm seroma in the lateral breast in area of known recent malignant lumpectomy. 3. Metastatic right axillary adenopathy. 4. No evidence of contralateral left breast malignancy. BI-RADS Category 6, known biopsy-proven malignancy.     7/7/23: Right axillary LN biopsy: metastatic adenocarcinoma, consistent with metaplastic breast carcinoma.     7/11/23: CT Chest, abdomen, pelvis: No definite metastatic disease. 1. Chest:  Pathologically enlarged right axillary lymphadenopathy.  Masslike opacity right upper outer breast with postsurgical changes.  Skin thickening right breast.  Correlate with tissue diagnosis and breast  MRI.Small semi solid nodules in the right lung.  These are nonspecific.  A follow-up CT chest in 3 months time is recommended.  Abdomen pelvis:  No findings of metastatic disease to the abdomen or pelvis.     7/12/23: NM bone scan: negative for osseous mets    7/14/23: C1D1 Carboplatin/Paclitaxel/Pembrolizumab  8/4/23: C2D1 Carboplatin/Paclitaxel/Pembrolizumab. C2D15 held due to neutropenia  8/25/23: C3D1 Carboplatin/Paclitaxel/Pembrolizumab. All doses given  9/15/23: C4D1 Carboplatin/Paclitaxel/Pembrolizumab. C4D15 held due to neutropenia  10/6/23: C1D1 Doxorubicin/Cyclophosphamide/Pembrolizumab  10/27/23: C2D1 Doxorubicin/Cyclophosphamide/Pembrolizumab  11/20/23: C3D1 Doxorubicin/Cyclophosphamide/Pembrolizumab. Delayed due to neutropenia. Last cycle of chemo prior to surgery    1/9/24: Bilateral Mastectomy: Right breast: No invasive disease left, intermediate grade DCIS present. ypTisN0 (sn), ER/SC negative, HER2 negative, margins negative, ALH present, left breast benign    2/9/24: C1D1 adjuvant pembrolizumab.     4/9/24: Completed adjuvant radiation (performed in [place])     Metaplastic carcinoma of breast   6/16/2023 Initial Diagnosis    Metaplastic carcinoma of breast     6/16/2023 - 6/16/2023 Chemotherapy    OP BREAST AC DD DOXOrubicin / Cyclophosphamide     7/13/2023 Cancer Staged    Staging form: Breast, AJCC 8th Edition  - Clinical: Stage IIIC (cT4d, cN2, cM0, G3, ER-, SC-, HER2-) - Signed by Ramin Shine MD on 7/13/2023 7/14/2023 - 9/22/2023 Chemotherapy    OP BREAST Pembrolizumab 400 mg / PACLitaxel / CARBOplatin AUC=5     8/12/2023 - 8/12/2023 Chemotherapy    OP BREAST PACLitaxel Adjuvant (Weekly X 12)     10/6/2023 - 11/20/2023 Chemotherapy    OP BREAST Pembrolizumab 200 mg / DOXOrubicin / Cyclophosphamide      1/23/2024 Cancer Staged    Staging form: Breast, AJCC 8th Edition  - Pathologic: ypTis (DCIS), pN0, cM0, ER-, SC-, HER2- - Signed by Ramin Shine MD on 1/23/2024      2/9/2024 -  Chemotherapy    OP Pembrolizumab 200 mg     Triple negative breast cancer (Resolved)   6/28/2023 Initial Diagnosis    Triple negative breast cancer           Review of Systems   Constitutional:  Positive for fatigue.   Gastrointestinal:  Positive for nausea.   Genitourinary:  Negative for breast pain.   Musculoskeletal:  Negative for arthralgias.   Skin:  Negative for rash.   All other systems reviewed and are negative.    Current Outpatient Medications on File Prior to Visit   Medication Sig Dispense Refill    benzonatate (TESSALON) 200 MG capsule Take 1 capsule by mouth 3 (Three) Times a Day As Needed for Cough. 90 capsule 1    desvenlafaxine (PRISTIQ) 50 MG 24 hr tablet Take 1 tablet by mouth Daily. 90 tablet 1    gabapentin (NEURONTIN) 300 MG capsule Take 1 capsule by mouth.      rOPINIRole (REQUIP) 1 MG tablet TAKE 1 TABLET BY MOUTH AT BEDTIME, TAKE 1 HOUR BEFORE BEDTIME 30 tablet 3    Sodium Fluoride 5000 PPM 1.1 % paste       ubrogepant (Ubrelvy) 100 MG tablet Take 1 tablet by mouth.      methylPREDNISolone (MEDROL) 4 MG dose pack Take as directed on package instructions. (Patient not taking: Reported on 6/25/2024) 21 tablet 0    prochlorperazine (COMPAZINE) 10 MG tablet Take 1 tablet by mouth Every 6 (Six) Hours As Needed for Nausea or Vomiting. (Patient not taking: Reported on 6/25/2024) 60 tablet 2     No current facility-administered medications on file prior to visit.       Allergies   Allergen Reactions    Tegaderm Ag Mesh [Silver] Itching     Tegaderm that is used over port a cath    Pembrolizumab Other (See Comments)     swollen, red, itching, and warmness to hands, ears, chest, and throat, tightness and hoarse voice.;  resolved with treatment (4/12/24)     Past Medical History:   Diagnosis Date    Asthma     AS CHILD    Breast cancer     COVID-19 vaccine administered      Past Surgical History:   Procedure Laterality Date    BREAST LUMPECTOMY Right     BREAST RECONSTRUCTION Bilateral  "2024    Procedure: BREAST RECONSTRUCTION WITH MESH AND IMPLANTS - USE OF SPY;  Surgeon: Jackie Sanders MD;  Location[place] [facility] [place] [facility] [place]  Service: Plastics;  Laterality: Bilateral;     SECTION      MASTECTOMY W/ SENTINEL NODE BIOPSY Bilateral 2024    Procedure: BREAST MASTECTOMY WITH RIGHT AXILLARY SENTINEL NODE IDENTIFICATION AND EXCISION BIOPSY;  Surgeon: Christiana Whiitng MD;  Location: [facility] [place] [facility] [place];  Service: General;  Laterality: Bilateral;    PORTACATH PLACEMENT      SKIN BIOPSY      TUBAL ABDOMINAL LIGATION       Social History     Socioeconomic History    Marital status: Single    Number of children: 2   Tobacco Use    Smoking status: Never     Passive exposure: Never    Smokeless tobacco: Never   Vaping Use    Vaping status: Never Used   Substance and Sexual Activity    Alcohol use: Not Currently     Comment: OCCASSIONAL    Drug use: Never    Sexual activity: Defer     Family History   Problem Relation Age of Onset    Skin cancer Mother     COPD Mother     Hypertension Father     Hyperlipidemia Father     Diabetes Father     Skin cancer Paternal Grandmother     Lung cancer Paternal Grandmother     Malig Hyperthermia Neg Hx        Objective   Physical Exam  Well appearing patient in no acute distress on RA  Anicteric sclerae, no rash on exposed skin  Respirations non-labored  Awake, alert, and oriented x 4. Speech intact. No gross neurologic deficit  Appropriate mood and affect    Vitals:    24 1032   BP: 123/72   Pulse: 87   Resp: 18   Temp: 97.4 °F (36.3 °C)   TempSrc: Temporal   SpO2: 99%   Weight: 74.1 kg (163 lb 6.4 oz)   Height: 160 cm (62.99\")   PainSc: 0-No pain             ECOG score: 0         PHQ-9 Total Score:           Result Review :   The following data was reviewed by: Ramin Shine MD on 24:  Lab Results   Component Value Date    HGB 12.6 2024    HCT 38.7 2024    MCV 89.2 2024     2024    WBC 3.57 2024    " NEUTROABS 2.00 07/11/2024    LYMPHSABS 1.22 07/11/2024    MONOSABS 0.31 07/11/2024    EOSABS 0.03 07/11/2024    BASOSABS 0.01 07/11/2024     Lab Results   Component Value Date    GLUCOSE 80 07/11/2024    BUN 13 07/11/2024    CREATININE 0.64 07/11/2024     07/11/2024    K 3.8 07/11/2024     07/11/2024    CO2 25.8 07/11/2024    CALCIUM 9.7 07/11/2024    PROTEINTOT 6.7 07/11/2024    ALBUMIN 4.3 07/11/2024    BILITOT 0.3 07/11/2024    ALKPHOS 103 07/11/2024    AST 18 07/11/2024    ALT 24 07/11/2024     Lab Results   Component Value Date    MG 1.8 02/29/2024    FREET4 1.10 06/20/2024    TSH 2.620 06/20/2024     Labs personally reviewed.  Iron levels improved. K normal.     Plastic surgery note personally reviewed      No radiology results for the last 30 days.        Assessment and Plan    Diagnoses and all orders for this visit:    1. Metaplastic carcinoma of breast (Primary)    2. Anxiety    3. Nonintractable headache, unspecified chronicity pattern, unspecified headache type    Other orders  -     sodium chloride 0.9 % infusion  -     diphenhydrAMINE (BENADRYL) IVPB 50 mg  -     Pembrolizumab (KEYTRUDA) 200 mg in sodium chloride 0.9 % 58 mL chemo IVPB  -     pseudoephedrine (SUDAFED) 30 MG tablet; Take 1 tablet by mouth Every 4 (Four) Hours As Needed for Congestion for up to 7 days.  Dispense: 42 tablet; Refill: 0          Metaplastic Carcinoma of Right Breast, triple negative  Patient is s/p lumpectomy of 4.5 cm lesion with triple negative metaplastic carcinoma, interspersed with fibroadenoma. Patient has clinical inflammatory breast cancer. MRI breast with concern for inflammatory breast cancer as well as right sided axillary involvement. Left breast without lesions per MRI. Lymph node biopsy 7/7/23 confirmed right axillary involvement by breast carcinoma.  Completed noeadjuvant chemothearpy and immunotherapy with 4 cycles of Carboplatin/Paclitaxel/Pembro (C1 7/1423 and C4 9/15/23) and 3 cycles of  Doxorubicin, cyclophosphamide, pembrolizumab (completed C3 11/20/23 with delay due to neutropenia). Completed 7 cycles total. C4 omitted due to accumulation of toxicities.     1/9/24: Bilateral Mastectomy: Right breast: No invasive disease left, intermediate grade DCIS present. ypTisN0 (sn), ER/[place] negative, HER2 negative, margins negative, ALH     No residual invasive disease present, so patient with complete pathologic response on the basis of the National Surgical Adjuvant Breast and Bowel project criteria. DCIS still present as well as ALH, negative margins, nodes negative. Will proceed with adjuvant immunotherapy with Pembrolizumab every 3 weeks to complete 1 year. C1 provided 2/9/24.    C8D1 adjuvant pembrolizumab 7/12/24. Labs appropriate to proceed.     Plan for 10 cycles of adjuvant pembrolizumab.     Adjuvant radiation completed as of 4/9/24.     This is an acute or chronic illness that poses a threat to life or bodily function. The above treatment plan involves a high risk of complications and/or mortality of patient management. Continue to monitor for severe toxicities with labs     Headache  Frontal pressure. Sudafed x 1 week. Follow up with neurology. Previously on botox. Could consider propanolol.     Anxiety  Xanax PRN    Restless leg  Ropinirole helps, previously increased to 1.0 mg. Typically symptoms are worse for day or so after treatment. Could be worse from pembrolizumab. Recommend magnesium supplement.     Cough  Resolving. Has not needed medrol dose pack.     Right axilla lesion  5/15/24 u/s performed and showed 1.7 cm enlarged area. Biopsy 5/31/24 showed capsule dense fibrous tissue and focal fat necrosis.        Patient Follow Up: C9 adjuvant immunotherapy  Patient was given instructions and counseling regarding her condition or for health maintenance advice. Please see specific information pulled into the AVS if appropriate.

## 2024-07-12 ENCOUNTER — HOSPITAL ENCOUNTER (OUTPATIENT)
Dept: ONCOLOGY | Facility: HOSPITAL | Age: 29
Discharge: HOME | End: 2024-07-12
Payer: COMMERCIAL

## 2024-07-12 VITALS
DIASTOLIC BLOOD PRESSURE: 73 MMHG | HEIGHT: 63 IN | RESPIRATION RATE: 16 BRPM | OXYGEN SATURATION: 100 % | HEART RATE: 75 BPM | WEIGHT: 164.02 LBS | TEMPERATURE: 97.6 F | SYSTOLIC BLOOD PRESSURE: 113 MMHG | BODY MASS INDEX: 29.06 KG/M2

## 2024-07-12 DIAGNOSIS — C50.919 METAPLASTIC CARCINOMA OF BREAST: Primary | ICD-10-CM

## 2024-07-12 DIAGNOSIS — Z45.2 FITTING AND ADJUSTMENT OF VASCULAR CATHETER: ICD-10-CM

## 2024-07-12 PROCEDURE — 25010000002 PEMBROLIZUMAB 100 MG/4ML SOLUTION 4 ML VIAL: Performed by: INTERNAL MEDICINE

## 2024-07-12 PROCEDURE — 25010000002 HEPARIN LOCK FLUSH PER 10 UNITS: Performed by: INTERNAL MEDICINE

## 2024-07-12 PROCEDURE — 25810000003 SODIUM CHLORIDE 0.9 % SOLUTION: Performed by: INTERNAL MEDICINE

## 2024-07-12 PROCEDURE — 25010000002 DIPHENHYDRAMINE PER 50 MG: Performed by: INTERNAL MEDICINE

## 2024-07-12 PROCEDURE — 96375 TX/PRO/DX INJ NEW DRUG ADDON: CPT

## 2024-07-12 PROCEDURE — 96413 CHEMO IV INFUSION 1 HR: CPT

## 2024-07-12 RX ORDER — SODIUM CHLORIDE 0.9 % (FLUSH) 0.9 %
20 SYRINGE (ML) INJECTION AS NEEDED
Status: DISCONTINUED | OUTPATIENT
Start: 2024-07-12 | End: 2024-07-13 | Stop reason: HOSPADM

## 2024-07-12 RX ORDER — SODIUM CHLORIDE 9 MG/ML
20 INJECTION, SOLUTION INTRAVENOUS ONCE
Status: COMPLETED | OUTPATIENT
Start: 2024-07-12 | End: 2024-07-12

## 2024-07-12 RX ORDER — SODIUM CHLORIDE 0.9 % (FLUSH) 0.9 %
20 SYRINGE (ML) INJECTION AS NEEDED
OUTPATIENT
Start: 2024-07-12

## 2024-07-12 RX ORDER — HEPARIN SODIUM (PORCINE) LOCK FLUSH IV SOLN 100 UNIT/ML 100 UNIT/ML
500 SOLUTION INTRAVENOUS AS NEEDED
OUTPATIENT
Start: 2024-07-12

## 2024-07-12 RX ORDER — HEPARIN SODIUM (PORCINE) LOCK FLUSH IV SOLN 100 UNIT/ML 100 UNIT/ML
500 SOLUTION INTRAVENOUS AS NEEDED
Status: DISCONTINUED | OUTPATIENT
Start: 2024-07-12 | End: 2024-07-13 | Stop reason: HOSPADM

## 2024-07-12 RX ADMIN — SODIUM CHLORIDE 20 ML/HR: 9 INJECTION, SOLUTION INTRAVENOUS at 10:42

## 2024-07-12 RX ADMIN — Medication 20 ML: at 11:55

## 2024-07-12 RX ADMIN — SODIUM CHLORIDE 200 MG: 9 INJECTION, SOLUTION INTRAVENOUS at 11:06

## 2024-07-12 RX ADMIN — HEPARIN 500 UNITS: 100 SYRINGE at 11:55

## 2024-07-12 RX ADMIN — DIPHENHYDRAMINE HYDROCHLORIDE 50 MG: 50 INJECTION, SOLUTION INTRAMUSCULAR; INTRAVENOUS at 10:42

## 2024-07-16 ENCOUNTER — PREP FOR SURGERY (OUTPATIENT)
Dept: OTHER | Facility: HOSPITAL | Age: 29
End: 2024-07-16
Payer: COMMERCIAL

## 2024-07-18 RX ORDER — LIDOCAINE AND PRILOCAINE 25; 25 MG/G; MG/G
1 CREAM TOPICAL TAKE AS DIRECTED
Qty: 30 G | Refills: 1 | Status: SHIPPED | OUTPATIENT
Start: 2024-07-18

## 2024-07-22 ENCOUNTER — HOSPITAL ENCOUNTER (OUTPATIENT)
Dept: OCCUPATIONAL THERAPY | Facility: HOSPITAL | Age: 29
Setting detail: THERAPIES SERIES
Discharge: HOME | End: 2024-07-22
Payer: COMMERCIAL

## 2024-07-22 RX ORDER — SODIUM FLUORIDE 6 MG/ML
PASTE, DENTIFRICE DENTAL
OUTPATIENT
Start: 2024-07-22

## 2024-07-25 ENCOUNTER — OFFICE VISIT (OUTPATIENT)
Dept: FAMILY MEDICINE CLINIC | Age: 29
End: 2024-07-25
Payer: COMMERCIAL

## 2024-07-25 VITALS
WEIGHT: 164.6 LBS | DIASTOLIC BLOOD PRESSURE: 73 MMHG | HEART RATE: 85 BPM | TEMPERATURE: 98.1 F | OXYGEN SATURATION: 99 % | SYSTOLIC BLOOD PRESSURE: 109 MMHG | HEIGHT: 63 IN | BODY MASS INDEX: 29.16 KG/M2

## 2024-07-25 DIAGNOSIS — C50.919 METAPLASTIC CARCINOMA OF BREAST: ICD-10-CM

## 2024-07-25 DIAGNOSIS — F39 MOOD DISORDER: ICD-10-CM

## 2024-07-25 DIAGNOSIS — F41.0 ANXIETY ATTACK: Primary | ICD-10-CM

## 2024-07-25 RX ORDER — DESVENLAFAXINE SUCCINATE 50 MG/1
50-100 TABLET, EXTENDED RELEASE ORAL DAILY
Qty: 180 TABLET | Refills: 1 | Status: SHIPPED | OUTPATIENT
Start: 2024-07-25

## 2024-07-25 RX ORDER — BUSPIRONE HYDROCHLORIDE 15 MG/1
15 TABLET ORAL 2 TIMES DAILY PRN
Qty: 60 TABLET | Refills: 1 | Status: SHIPPED | OUTPATIENT
Start: 2024-07-25

## 2024-07-25 RX ORDER — ZAVEGEPANT 10 MG/.1ML
SPRAY NASAL
COMMUNITY
Start: 2024-07-15

## 2024-07-25 NOTE — PROGRESS NOTES
Chief Complaint  Marilee Watts presents to [facility] for Depression (Pt states Pristiq in not helping. )      Subjective     History of Present Illness    MARILEE presents today for follow up on Anxiety and Panic Attacks   She reports that She Is NOT doing well on current treatment of pristiq and has tried multiple medications in the past  in the past with no success or experienced side effects.  The following symptoms are persistent :  difficulty concentrating, feelings of losing control, palpitations, panic attacks, and racing thoughts and medication changes are requested.   She denies current suicidal and homicidal ideation.    Current psychotherapy : No    She reports that her anxiety is worsened just since she is reaching the end of her treatment for her breast cancer and states that this brings her quite a bit of anxiety that she will not have ongoing treatment and monitoring as regularly as she has now.            Assessment and Plan       Diagnoses and all orders for this visit:    1. Anxiety attack (Primary)  Comments:  Will add buspirone to have her take regularly continue the Pristiq follow-up in about 4 to 6 weeks  Orders:  -     busPIRone (BUSPAR) 15 MG tablet; Take 1 tablet by mouth 2 (Two) Times a Day As Needed (anxiety).  Dispense: 60 tablet; Refill: 1  -     desvenlafaxine (PRISTIQ) 50 MG 24 hr tablet; Take 1-2 tablets by mouth Daily.  Dispense: 180 tablet; Refill: 1    2. Mood disorder  -     desvenlafaxine (PRISTIQ) 50 MG 24 hr tablet; Take 1-2 tablets by mouth Daily.  Dispense: 180 tablet; Refill: 1    3. Metaplastic carcinoma of breast            Follow Up   Return in about 6 weeks (around 9/5/2024) for Recheck.  Future Appointments   Date Time Provider Department Center   8/2/2024 10:15 AM CHAIR 16 PARDEEP OP INFU BH PARDEEP OPIF [facility]   8/22/2024 10:00 AM Ramin Shine MD [facility] ONC E521 [facility]   8/22/2024 10:15 AM NURSE/MA ONC VETO [facility] ONC E521 [facility]   8/23/2024  "10:15 AM CHAIR 16 PARDEEP OP INFU [facility] OPIF [facility]   8/29/2024 12:45 PM Alanis Contreras APRN [facility] [place] ETWN [facility]   9/6/2024 10:15 AM Alanis Contreras APRN [facility] [place] [facility]   9/26/2024 11:30 AM RicosergeySakina APRN [facility] [place] BARDS [facility]   10/30/2024  3:15 PM Jackie Sanders MD [facility] [place] [facility]   11/12/2024  2:00 PM PAT 1 [facility] PAT [facility]   11/25/2024  1:30 PM Jackie Sanders MD [facility] [place] [facility]       New Medications Ordered This Visit   Medications    busPIRone (BUSPAR) 15 MG tablet     Sig: Take 1 tablet by mouth 2 (Two) Times a Day As Needed (anxiety).     Dispense:  60 tablet     Refill:  1    desvenlafaxine (PRISTIQ) 50 MG 24 hr tablet     Sig: Take 1-2 tablets by mouth Daily.     Dispense:  180 tablet     Refill:  1       Medications Discontinued During This Encounter   Medication Reason    methylPREDNISolone (MEDROL) 4 MG dose pack *Therapy completed    desvenlafaxine (PRISTIQ) 50 MG 24 hr tablet           Review of Systems    Objective     Vitals:    07/25/24 1147   BP: 109/73   Pulse: 85   Temp: 98.1 °F (36.7 °C)   TempSrc: Oral   SpO2: 99%   Weight: 74.7 kg (164 lb 9.6 oz)   Height: 160 cm (62.99\")     Body mass index is 29.16 kg/m².          Physical Exam  Constitutional:       General: She is not in acute distress.     Appearance: Normal appearance.   HENT:      Head: Normocephalic.   Cardiovascular:      Rate and Rhythm: Normal rate and regular rhythm.   Pulmonary:      Effort: Pulmonary effort is normal.      Breath sounds: Normal breath sounds.   Musculoskeletal:         General: Normal range of motion.   Neurological:      General: No focal deficit present.      Mental Status: She is alert and oriented to person, place, and time.   Psychiatric:         Mood and Affect: Mood normal. Mood is anxious.         Behavior: Behavior normal.            Result Review               Allergies   Allergen Reactions    Tegaderm Ag Mesh [Silver] Itching     Tegaderm that is used over port a cath    " Pembrolizumab Other (See Comments)     swollen, red, itching, and warmness to hands, ears, chest, and throat, tightness and hoarse voice.;  resolved with treatment (24)      Past Medical History:   Diagnosis Date    Asthma     AS CHILD    Breast cancer     COVID-19 vaccine administered      Current Outpatient Medications   Medication Sig Dispense Refill    benzonatate (TESSALON) 200 MG capsule Take 1 capsule by mouth 3 (Three) Times a Day As Needed for Cough. (Patient taking differently: Take 1 capsule by mouth As Needed for Cough.) 90 capsule 1    desvenlafaxine (PRISTIQ) 50 MG 24 hr tablet Take 1-2 tablets by mouth Daily. 180 tablet 1    gabapentin (NEURONTIN) 300 MG capsule Take 1 capsule by mouth As Needed.      lidocaine-prilocaine (EMLA) 2.5-2.5 % cream Apply 1 Application topically to the appropriate area as directed Take As Directed. Apply to port site 30 minutes prior to use 30 g 1    prochlorperazine (COMPAZINE) 10 MG tablet Take 1 tablet by mouth Every 6 (Six) Hours As Needed for Nausea or Vomiting. 60 tablet 2    rOPINIRole (REQUIP) 1 MG tablet TAKE 1 TABLET BY MOUTH AT BEDTIME, TAKE 1 HOUR BEFORE BEDTIME 30 tablet 3    Sodium Fluoride 5000 PPM 1.1 % paste       ubrogepant (Ubrelvy) 100 MG tablet Take 1 tablet by mouth.      Zavzpret 10 MG/ACT solution       busPIRone (BUSPAR) 15 MG tablet Take 1 tablet by mouth 2 (Two) Times a Day As Needed (anxiety). 60 tablet 1     No current facility-administered medications for this visit.     Past Surgical History:   Procedure Laterality Date    BREAST LUMPECTOMY Right     BREAST RECONSTRUCTION Bilateral 2024    Procedure: BREAST RECONSTRUCTION WITH MESH AND IMPLANTS - USE OF SPY;  Surgeon: Jackie Sanders MD;  [place] [facility] [place] [facility] [place]: [place];  Laterality: Bilateral;     SECTION      MASTECTOMY W/ SENTINEL NODE BIOPSY Bilateral 2024    Procedure: BREAST MASTECTOMY WITH RIGHT AXILLARY SENTINEL NODE IDENTIFICATION AND EXCISION  BIOPSY;  Surgeon: Christiana Whiting MD;  [place] [facility] [place]  Service: General;  Laterality: Bilateral;    PORTACATH PLACEMENT      SKIN BIOPSY      TUBAL ABDOMINAL LIGATION        Health Maintenance Due   Topic Date Due    COVID-19 Vaccine (3 - Pfizer risk series) 07/08/2021    HEPATITIS C SCREENING  Never done    INFLUENZA VACCINE  08/01/2024      Immunization History   Administered Date(s) Administered    COVID-19 (PFIZER) Purple Cap Monovalent 05/19/2021, 06/10/2021    DTaP, Unspecified 04/16/1999    HPV Quadrivalent 06/08/2009, 08/11/2009, 12/15/2009    Hepatitis A 06/18/2018    MMR 04/16/1999    Meningococcal, Unspecified 02/24/2012    OPV 04/16/1999    Td (TDVAX) 06/26/2006    Tdap 07/09/2014    Varicella 04/16/1999         Part of this note may be an electronic transcription/translation of spoken language to printed   text using the Dragon Dictation System.      Sakina Alvarez APRN

## 2024-08-01 ENCOUNTER — OFFICE VISIT (OUTPATIENT)
Dept: ONCOLOGY | Facility: HOSPITAL | Age: 29
End: 2024-08-01
Payer: COMMERCIAL

## 2024-08-01 ENCOUNTER — LAB (OUTPATIENT)
Dept: ONCOLOGY | Facility: HOSPITAL | Age: 29
End: 2024-08-01
Payer: COMMERCIAL

## 2024-08-01 VITALS
DIASTOLIC BLOOD PRESSURE: 64 MMHG | RESPIRATION RATE: 18 BRPM | BODY MASS INDEX: 29.14 KG/M2 | HEART RATE: 100 BPM | TEMPERATURE: 98.1 F | OXYGEN SATURATION: 100 % | WEIGHT: 164.46 LBS | HEIGHT: 63 IN | SYSTOLIC BLOOD PRESSURE: 117 MMHG

## 2024-08-01 DIAGNOSIS — C50.919 METAPLASTIC CARCINOMA OF BREAST: Primary | ICD-10-CM

## 2024-08-01 DIAGNOSIS — R51.9 NONINTRACTABLE HEADACHE, UNSPECIFIED CHRONICITY PATTERN, UNSPECIFIED HEADACHE TYPE: ICD-10-CM

## 2024-08-01 LAB
ALBUMIN SERPL-MCNC: 4 G/DL (ref 3.5–5.2)
ALBUMIN/GLOB SERPL: 1.7 G/DL
ALP SERPL-CCNC: 94 U/L (ref 39–117)
ALT SERPL W P-5'-P-CCNC: 23 U/L (ref 1–33)
ANION GAP SERPL CALCULATED.3IONS-SCNC: 5.7 MMOL/L (ref 5–15)
AST SERPL-CCNC: 15 U/L (ref 1–32)
BASOPHILS # BLD AUTO: 0.01 10*3/MM3 (ref 0–0.2)
BASOPHILS NFR BLD AUTO: 0.2 % (ref 0–1.5)
BILIRUB SERPL-MCNC: 0.4 MG/DL (ref 0–1.2)
BUN SERPL-MCNC: 10 MG/DL (ref 6–20)
BUN/CREAT SERPL: 17.2 (ref 7–25)
CALCIUM SPEC-SCNC: 9.4 MG/DL (ref 8.6–10.5)
CHLORIDE SERPL-SCNC: 106 MMOL/L (ref 98–107)
CO2 SERPL-SCNC: 24.3 MMOL/L (ref 22–29)
CREAT SERPL-MCNC: 0.58 MG/DL (ref 0.57–1)
DEPRECATED RDW RBC AUTO: 40.8 FL (ref 37–54)
EGFRCR SERPLBLD CKD-EPI 2021: 125 ML/MIN/1.73
EOSINOPHIL # BLD AUTO: 0.03 10*3/MM3 (ref 0–0.4)
EOSINOPHIL NFR BLD AUTO: 0.7 % (ref 0.3–6.2)
ERYTHROCYTE [DISTWIDTH] IN BLOOD BY AUTOMATED COUNT: 12.9 % (ref 12.3–15.4)
GLOBULIN UR ELPH-MCNC: 2.4 GM/DL
GLUCOSE SERPL-MCNC: 129 MG/DL (ref 65–99)
HCT VFR BLD AUTO: 36.4 % (ref 34–46.6)
HGB BLD-MCNC: 12.1 G/DL (ref 12–15.9)
IMM GRANULOCYTES # BLD AUTO: 0 10*3/MM3 (ref 0–0.05)
IMM GRANULOCYTES NFR BLD AUTO: 0 % (ref 0–0.5)
LYMPHOCYTES # BLD AUTO: 1.22 10*3/MM3 (ref 0.7–3.1)
LYMPHOCYTES NFR BLD AUTO: 28.5 % (ref 19.6–45.3)
MCH RBC QN AUTO: 28.5 PG (ref 26.6–33)
MCHC RBC AUTO-ENTMCNC: 33.2 G/DL (ref 31.5–35.7)
MCV RBC AUTO: 85.6 FL (ref 79–97)
MONOCYTES # BLD AUTO: 0.26 10*3/MM3 (ref 0.1–0.9)
MONOCYTES NFR BLD AUTO: 6.1 % (ref 5–12)
NEUTROPHILS NFR BLD AUTO: 2.76 10*3/MM3 (ref 1.7–7)
NEUTROPHILS NFR BLD AUTO: 64.5 % (ref 42.7–76)
PLATELET # BLD AUTO: 151 10*3/MM3 (ref 140–450)
PMV BLD AUTO: 9.3 FL (ref 6–12)
POTASSIUM SERPL-SCNC: 3.5 MMOL/L (ref 3.5–5.2)
PROT SERPL-MCNC: 6.4 G/DL (ref 6–8.5)
RBC # BLD AUTO: 4.25 10*6/MM3 (ref 3.77–5.28)
SODIUM SERPL-SCNC: 136 MMOL/L (ref 136–145)
WBC NRBC COR # BLD AUTO: 4.28 10*3/MM3 (ref 3.4–10.8)

## 2024-08-01 PROCEDURE — 80053 COMPREHEN METABOLIC PANEL: CPT

## 2024-08-01 PROCEDURE — 36415 COLL VENOUS BLD VENIPUNCTURE: CPT

## 2024-08-01 PROCEDURE — G0463 HOSPITAL OUTPT CLINIC VISIT: HCPCS | Performed by: NURSE PRACTITIONER

## 2024-08-01 PROCEDURE — 85025 COMPLETE CBC W/AUTO DIFF WBC: CPT

## 2024-08-01 RX ORDER — SODIUM CHLORIDE 9 MG/ML
20 INJECTION, SOLUTION INTRAVENOUS ONCE
Status: CANCELLED | OUTPATIENT
Start: 2024-08-02

## 2024-08-01 NOTE — PROGRESS NOTES
Chief Complaint  FOLLOW UP 1 - TRIPLE NEGATIVE BREAST CANCER, treatment for 8/2/2024.     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 She had bilateral mastectomy and bilateral implants on 1/9/24.  Has a follow up surgery in November for the breast cancer reconstruction.      She is on adjuvant Pembrolizumab every 21 days. She has 1 more dose after this one in 3 weeks.   Reports tolerating treatment  well. Persistent cough has improved.  She is doing Boxtox injections for the headaches every 3 months and this uses PRN meds for the headaches which help.     Has umbilical area hernia.    Labs today: 8/1/2024: CBC is normal. WBC 4.28, hemoglobin 12.1, platelet count 151. CMP: Normal except for elevated glucose of 129.       Oncology/Hematology History Overview Note   2012: Lumpectomy of fibroadenoma of right breast    3/2023: Started to have pain and swelling of known area of fibroadenoma of right breast. Started after trauma to breast.     5/26/23: Right breast lumpectomy: Invasive metaplastic carcinoma, high-grade, 4.5 x 4.0 x 3.3 cm with interspersed fibroadenoma, negative margins. ER 0%, WV 0%, HER2 0 by IHC. PDL1 CPS of 50.     6/21/23: MRI breast: Diffuse edema, hypervascularity, and skin thickening of the right breast raising the possibility of inflammatory breast cancer. 2. 4.7 cm seroma in the lateral breast in area of known recent malignant lumpectomy. 3. Metastatic right axillary adenopathy. 4. No evidence of contralateral left breast malignancy. BI-RADS Category 6, known biopsy-proven malignancy.     7/7/23: Right axillary LN biopsy: metastatic adenocarcinoma, consistent with metaplastic breast carcinoma.     7/11/23: CT Chest, abdomen, pelvis: No  definite metastatic disease. 1. Chest:  Pathologically enlarged right axillary lymphadenopathy.  Masslike opacity right upper outer breast with postsurgical changes.  Skin thickening right breast.  Correlate with tissue diagnosis and breast MRI.Small semi solid nodules in the right lung.  These are nonspecific.  A follow-up CT chest in 3 months time is recommended.  Abdomen pelvis:  No findings of metastatic disease to the abdomen or pelvis.     7/12/23: NM bone scan: negative for osseous mets    7/14/23: C1D1 Carboplatin/Paclitaxel/Pembrolizumab  8/4/23: C2D1 Carboplatin/Paclitaxel/Pembrolizumab. C2D15 held due to neutropenia  8/25/23: C3D1 Carboplatin/Paclitaxel/Pembrolizumab. All doses given  9/15/23: C4D1 Carboplatin/Paclitaxel/Pembrolizumab. C4D15 held due to neutropenia  10/6/23: C1D1 Doxorubicin/Cyclophosphamide/Pembrolizumab  10/27/23: C2D1 Doxorubicin/Cyclophosphamide/Pembrolizumab  11/20/23: C3D1 Doxorubicin/Cyclophosphamide/Pembrolizumab. Delayed due to neutropenia. Last cycle of chemo prior to surgery    1/9/24: Bilateral Mastectomy: Right breast: No invasive disease left, intermediate grade DCIS present. ypTisN0 (sn), ER/WA negative, HER2 negative, margins negative, ALH present, left breast benign    2/9/24: C1D1 adjuvant pembrolizumab.     4/9/24: Completed adjuvant radiation (performed in [place])     Metaplastic carcinoma of breast   6/16/2023 Initial Diagnosis    Metaplastic carcinoma of breast     6/16/2023 - 6/16/2023 Chemotherapy    OP BREAST AC DD DOXOrubicin / Cyclophosphamide     7/13/2023 Cancer Staged    Staging form: Breast, AJCC 8th Edition  - Clinical: Stage IIIC (cT4d, cN2, cM0, G3, ER-, WA-, HER2-) - Signed by Ramin Shine MD on 7/13/2023 7/14/2023 - 9/22/2023 Chemotherapy    OP BREAST Pembrolizumab 400 mg / PACLitaxel / CARBOplatin AUC=5     8/12/2023 - 8/12/2023 Chemotherapy    OP BREAST PACLitaxel Adjuvant (Weekly X 12)     10/6/2023 - 11/20/2023 Chemotherapy    OP  BREAST Pembrolizumab 200 mg / DOXOrubicin / Cyclophosphamide      1/23/2024 Cancer Staged    Staging form: Breast, AJCC 8th Edition  - Pathologic: ypTis (DCIS), pN0, cM0, ER-, IL-, HER2- - Signed by Ramin Shine MD on 1/23/2024 2/9/2024 -  Chemotherapy    OP Pembrolizumab 200 mg     Triple negative breast cancer (Resolved)   6/28/2023 Initial Diagnosis    Triple negative breast cancer           Review of Systems   Constitutional:  Positive for fatigue.   HENT: Negative.     Eyes: Negative.    Respiratory:  Positive for cough. Negative for shortness of breath.    Cardiovascular: Negative.    Gastrointestinal:  Positive for nausea.   Endocrine: Negative.    Genitourinary:  Negative for breast pain.   Musculoskeletal:  Negative for arthralgias.   Skin: Negative.  Negative for rash.   Allergic/Immunologic: Negative.    Neurological:  Positive for headache. Weakness: come and go. using Botox. takes PRN meds. no vision changes..  Hematological: Negative.    Psychiatric/Behavioral: Negative.     All other systems reviewed and are negative.    Current Outpatient Medications on File Prior to Visit   Medication Sig Dispense Refill    benzonatate (TESSALON) 200 MG capsule Take 1 capsule by mouth 3 (Three) Times a Day As Needed for Cough. (Patient taking differently: Take 1 capsule by mouth As Needed for Cough.) 90 capsule 1    busPIRone (BUSPAR) 15 MG tablet Take 1 tablet by mouth 2 (Two) Times a Day As Needed (anxiety). 60 tablet 1    desvenlafaxine (PRISTIQ) 50 MG 24 hr tablet Take 1-2 tablets by mouth Daily. 180 tablet 1    gabapentin (NEURONTIN) 300 MG capsule Take 1 capsule by mouth As Needed.      lidocaine-prilocaine (EMLA) 2.5-2.5 % cream Apply 1 Application topically to the appropriate area as directed Take As Directed. Apply to port site 30 minutes prior to use 30 g 1    prochlorperazine (COMPAZINE) 10 MG tablet Take 1 tablet by mouth Every 6 (Six) Hours As Needed for Nausea or Vomiting. 60 tablet 2     rOPINIRole (REQUIP) 1 MG tablet TAKE 1 TABLET BY MOUTH AT BEDTIME, TAKE 1 HOUR BEFORE BEDTIME 30 tablet 3    Sodium Fluoride 5000 PPM 1.1 % paste       ubrogepant (Ubrelvy) 100 MG tablet Take 1 tablet by mouth.      Zavzpret 10 MG/ACT solution        No current facility-administered medications on file prior to visit.       Allergies   Allergen Reactions    Tegaderm Ag Mesh [Silver] Itching     Tegaderm that is used over port a cath    Pembrolizumab Other (See Comments)     swollen, red, itching, and warmness to hands, ears, chest, and throat, tightness and hoarse voice.;  resolved with treatment (24)     Past Medical History:   Diagnosis Date    Asthma     AS CHILD    Breast cancer     COVID-19 vaccine administered      Past Surgical History:   Procedure Laterality Date    BREAST LUMPECTOMY Right     BREAST RECONSTRUCTION Bilateral 2024    Procedure: BREAST RECONSTRUCTION WITH MESH AND IMPLANTS - USE OF SPY;  Surgeon: Jackie Sanders MD;  Location: [facility] OR [facility];  Service: Plastics;  Laterality: Bilateral;     SECTION      MASTECTOMY W/ SENTINEL NODE BIOPSY Bilateral 2024    Procedure: BREAST MASTECTOMY WITH RIGHT AXILLARY SENTINEL NODE IDENTIFICATION AND EXCISION BIOPSY;  Surgeon: Christiana Whiting MD;  Location: [facility] OR [facility] [place];  Service: General;  Laterality: Bilateral;    PORTACATH PLACEMENT      SKIN BIOPSY      TUBAL ABDOMINAL LIGATION       Social History     Socioeconomic History    Marital status: Single    Number of children: 2   Tobacco Use    Smoking status: Never     Passive exposure: Never    Smokeless tobacco: Never   Vaping Use    Vaping status: Never Used   Substance and Sexual Activity    Alcohol use: Not Currently     Comment: OCCASSIONAL    Drug use: Never    Sexual activity: Defer     Family History   Problem Relation Age of Onset    Skin cancer Mother     COPD Mother     Hypertension Father     Hyperlipidemia Father     Diabetes Father     Skin cancer Paternal  "Grandmother     Lung cancer Paternal Grandmother     Malig Hyperthermia Neg Hx        Objective   Physical Exam  Well appearing patient in no acute distress on RA  Anicteric sclerae, no rash on exposed skin  Respirations non-labored, Lungs CTA bilaterally.   Awake, alert, and oriented x 4. Speech intact. No gross neurologic deficit  Appropriate mood and affect    Vitals:    08/01/24 1043   BP: 117/64   Pulse: 100   Resp: 18   Temp: 98.1 °F (36.7 °C)   TempSrc: Temporal   SpO2: 100%   Weight: 74.6 kg (164 lb 7.4 oz)   Height: 160 cm (62.99\")   PainSc: 0-No pain       ECOG score: 0         PHQ-9 Total Score:         Result Review :   The following data was reviewed by: Ramin Shine MD on 08/01/24:  Lab Results   Component Value Date    HGB 12.1 08/01/2024    HCT 36.4 08/01/2024    MCV 85.6 08/01/2024     08/01/2024    WBC 4.28 08/01/2024    NEUTROABS 2.76 08/01/2024    LYMPHSABS 1.22 08/01/2024    MONOSABS 0.26 08/01/2024    EOSABS 0.03 08/01/2024    BASOSABS 0.01 08/01/2024     Lab Results   Component Value Date    GLUCOSE 129 (H) 08/01/2024    BUN 10 08/01/2024    CREATININE 0.58 08/01/2024     08/01/2024    K 3.5 08/01/2024     08/01/2024    CO2 24.3 08/01/2024    CALCIUM 9.4 08/01/2024    PROTEINTOT 6.4 08/01/2024    ALBUMIN 4.0 08/01/2024    BILITOT 0.4 08/01/2024    ALKPHOS 94 08/01/2024    AST 15 08/01/2024    ALT 23 08/01/2024     Lab Results   Component Value Date    MG 1.8 02/29/2024    FREET4 1.10 06/20/2024    TSH 2.620 06/20/2024     Labs personally reviewed.  Iron levels improved. K normal.     Plastic surgery note personally reviewed      No radiology results for the last 30 days.        Assessment and Plan    Diagnoses and all orders for this visit:    1. Metaplastic carcinoma of breast (Primary)    2. Nonintractable headache, unspecified chronicity pattern, unspecified headache type      Metaplastic Carcinoma of Right Breast, triple negative  Patient is s/p lumpectomy of 4.5 " cm lesion with triple negative metaplastic carcinoma, interspersed with fibroadenoma. Patient has clinical inflammatory breast cancer. MRI breast with concern for inflammatory breast cancer as well as right sided axillary involvement. Left breast without lesions per MRI. Lymph node biopsy 7/7/23 confirmed right axillary involvement by breast carcinoma.  Completed noeadjuvant chemothearpy and immunotherapy with 4 cycles of Carboplatin/Paclitaxel/Pembro (C1 7/1423 and C4 9/15/23) and 3 cycles of Doxorubicin, cyclophosphamide, pembrolizumab (completed C3 11/20/23 with delay due to neutropenia). Completed 7 cycles total. C4 omitted due to accumulation of toxicities.     1/9/24: Bilateral Mastectomy: Right breast: No invasive disease left, intermediate grade DCIS present. ypTisN0 (sn), ER/CA negative, HER2 negative, margins negative, ALH     No residual invasive disease present, so patient with complete pathologic response on the basis of the National Surgical Adjuvant Breast and Bowel project criteria. DCIS still present as well as ALH, negative margins, nodes negative. Will proceed with adjuvant immunotherapy with Pembrolizumab every 3 weeks to complete 1 year. C1 provided 2/9/24.    C9D1 adjuvant pembrolizumab 8/02/24. Labs appropriate to proceed.     Plan for 10 cycles of adjuvant pembrolizumab.     Adjuvant radiation completed as of 4/9/24.     This is an acute or chronic illness that poses a threat to life or bodily function. The above treatment plan involves a high risk of complications and/or mortality of patient management. Continue to monitor for severe toxicities with labs     Headache  Frontal pressure. Using botox injections every 3 months. Using PRN med for headaches. Following with neurology.     Anxiety  Xanax PRN    Restless leg  Ropinirole helps, previously increased to 1.0 mg. Typically symptoms are worse for day or so after treatment. Could be worse from pembrolizumab. Recommend magnesium supplement.      Cough  Resolving. Has not needed medrol dose pack recently. Cough improved.     Right axilla lesion  5/15/24 u/s performed and showed 1.7 cm enlarged area. Biopsy 5/31/24 showed capsule dense fibrous tissue and focal fat necrosis.        Patient Follow Up: C10 adjuvant immunotherapy in 3 weeks (last dose)    Patient was given instructions and counseling regarding her condition or for health maintenance advice. Please see specific information pulled into the AVS if appropriate.

## 2024-08-02 ENCOUNTER — HOSPITAL ENCOUNTER (OUTPATIENT)
Dept: ONCOLOGY | Facility: HOSPITAL | Age: 29
Discharge: HOME | End: 2024-08-02
Payer: COMMERCIAL

## 2024-08-02 VITALS
RESPIRATION RATE: 18 BRPM | TEMPERATURE: 98.6 F | HEART RATE: 77 BPM | BODY MASS INDEX: 29.32 KG/M2 | DIASTOLIC BLOOD PRESSURE: 73 MMHG | OXYGEN SATURATION: 99 % | SYSTOLIC BLOOD PRESSURE: 115 MMHG | WEIGHT: 165.5 LBS

## 2024-08-02 DIAGNOSIS — Z45.2 FITTING AND ADJUSTMENT OF VASCULAR CATHETER: ICD-10-CM

## 2024-08-02 DIAGNOSIS — C50.919 METAPLASTIC CARCINOMA OF BREAST: Primary | ICD-10-CM

## 2024-08-02 PROCEDURE — 25010000002 DIPHENHYDRAMINE PER 50 MG: Performed by: INTERNAL MEDICINE

## 2024-08-02 PROCEDURE — 96375 TX/PRO/DX INJ NEW DRUG ADDON: CPT

## 2024-08-02 PROCEDURE — 25010000002 HEPARIN LOCK FLUSH PER 10 UNITS: Performed by: INTERNAL MEDICINE

## 2024-08-02 PROCEDURE — 25010000002 PEMBROLIZUMAB 100 MG/4ML SOLUTION 4 ML VIAL: Performed by: INTERNAL MEDICINE

## 2024-08-02 PROCEDURE — 25810000003 SODIUM CHLORIDE 0.9 % SOLUTION: Performed by: INTERNAL MEDICINE

## 2024-08-02 PROCEDURE — 96413 CHEMO IV INFUSION 1 HR: CPT

## 2024-08-02 RX ORDER — SODIUM CHLORIDE 0.9 % (FLUSH) 0.9 %
20 SYRINGE (ML) INJECTION AS NEEDED
Status: DISCONTINUED | OUTPATIENT
Start: 2024-08-02 | End: 2024-08-03 | Stop reason: HOSPADM

## 2024-08-02 RX ORDER — SODIUM CHLORIDE 0.9 % (FLUSH) 0.9 %
20 SYRINGE (ML) INJECTION AS NEEDED
OUTPATIENT
Start: 2024-08-02

## 2024-08-02 RX ORDER — HEPARIN SODIUM (PORCINE) LOCK FLUSH IV SOLN 100 UNIT/ML 100 UNIT/ML
500 SOLUTION INTRAVENOUS AS NEEDED
Status: DISCONTINUED | OUTPATIENT
Start: 2024-08-02 | End: 2024-08-03 | Stop reason: HOSPADM

## 2024-08-02 RX ORDER — HEPARIN SODIUM (PORCINE) LOCK FLUSH IV SOLN 100 UNIT/ML 100 UNIT/ML
500 SOLUTION INTRAVENOUS AS NEEDED
OUTPATIENT
Start: 2024-08-02

## 2024-08-02 RX ORDER — SODIUM CHLORIDE 9 MG/ML
20 INJECTION, SOLUTION INTRAVENOUS ONCE
Status: COMPLETED | OUTPATIENT
Start: 2024-08-02 | End: 2024-08-02

## 2024-08-02 RX ADMIN — SODIUM CHLORIDE 20 ML/HR: 9 INJECTION, SOLUTION INTRAVENOUS at 10:39

## 2024-08-02 RX ADMIN — DIPHENHYDRAMINE HYDROCHLORIDE 50 MG: 50 INJECTION, SOLUTION INTRAMUSCULAR; INTRAVENOUS at 10:39

## 2024-08-02 RX ADMIN — Medication 20 ML: at 11:53

## 2024-08-02 RX ADMIN — HEPARIN 500 UNITS: 100 SYRINGE at 11:54

## 2024-08-02 RX ADMIN — SODIUM CHLORIDE 200 MG: 9 INJECTION, SOLUTION INTRAVENOUS at 11:04

## 2024-08-22 ENCOUNTER — OFFICE VISIT (OUTPATIENT)
Dept: ONCOLOGY | Facility: HOSPITAL | Age: 29
End: 2024-08-22
Payer: COMMERCIAL

## 2024-08-22 ENCOUNTER — LAB (OUTPATIENT)
Dept: ONCOLOGY | Facility: HOSPITAL | Age: 29
End: 2024-08-22
Payer: COMMERCIAL

## 2024-08-22 VITALS
DIASTOLIC BLOOD PRESSURE: 78 MMHG | WEIGHT: 166.01 LBS | HEART RATE: 75 BPM | SYSTOLIC BLOOD PRESSURE: 114 MMHG | OXYGEN SATURATION: 100 % | TEMPERATURE: 97.8 F | RESPIRATION RATE: 18 BRPM | BODY MASS INDEX: 29.41 KG/M2 | HEIGHT: 63 IN

## 2024-08-22 DIAGNOSIS — C50.919 METAPLASTIC CARCINOMA OF BREAST: Primary | ICD-10-CM

## 2024-08-22 DIAGNOSIS — Z79.899 HX OF LONG-TERM TREATMENT WITH HIGH-RISK MEDICATION: ICD-10-CM

## 2024-08-22 DIAGNOSIS — G43.009 MIGRAINE WITHOUT AURA AND WITHOUT STATUS MIGRAINOSUS, NOT INTRACTABLE: ICD-10-CM

## 2024-08-22 DIAGNOSIS — G25.81 RESTLESS LEG: ICD-10-CM

## 2024-08-22 LAB
ALBUMIN SERPL-MCNC: 4.3 G/DL (ref 3.5–5.2)
ALBUMIN/GLOB SERPL: 1.7 G/DL
ALP SERPL-CCNC: 95 U/L (ref 39–117)
ALT SERPL W P-5'-P-CCNC: 26 U/L (ref 1–33)
ANION GAP SERPL CALCULATED.3IONS-SCNC: 8.6 MMOL/L (ref 5–15)
AST SERPL-CCNC: 20 U/L (ref 1–32)
BASOPHILS # BLD AUTO: 0.01 10*3/MM3 (ref 0–0.2)
BASOPHILS NFR BLD AUTO: 0.2 % (ref 0–1.5)
BILIRUB SERPL-MCNC: 0.4 MG/DL (ref 0–1.2)
BUN SERPL-MCNC: 13 MG/DL (ref 6–20)
BUN/CREAT SERPL: 23.6 (ref 7–25)
CALCIUM SPEC-SCNC: 9.3 MG/DL (ref 8.6–10.5)
CHLORIDE SERPL-SCNC: 104 MMOL/L (ref 98–107)
CO2 SERPL-SCNC: 27.4 MMOL/L (ref 22–29)
CREAT SERPL-MCNC: 0.55 MG/DL (ref 0.57–1)
DEPRECATED RDW RBC AUTO: 43.4 FL (ref 37–54)
EGFRCR SERPLBLD CKD-EPI 2021: 126.6 ML/MIN/1.73
EOSINOPHIL # BLD AUTO: 0.04 10*3/MM3 (ref 0–0.4)
EOSINOPHIL NFR BLD AUTO: 1 % (ref 0.3–6.2)
ERYTHROCYTE [DISTWIDTH] IN BLOOD BY AUTOMATED COUNT: 13.4 % (ref 12.3–15.4)
GLOBULIN UR ELPH-MCNC: 2.6 GM/DL
GLUCOSE SERPL-MCNC: 82 MG/DL (ref 65–99)
HCT VFR BLD AUTO: 39.3 % (ref 34–46.6)
HGB BLD-MCNC: 13 G/DL (ref 12–15.9)
IMM GRANULOCYTES # BLD AUTO: 0 10*3/MM3 (ref 0–0.05)
IMM GRANULOCYTES NFR BLD AUTO: 0 % (ref 0–0.5)
LYMPHOCYTES # BLD AUTO: 1.75 10*3/MM3 (ref 0.7–3.1)
LYMPHOCYTES NFR BLD AUTO: 41.9 % (ref 19.6–45.3)
MCH RBC QN AUTO: 28.9 PG (ref 26.6–33)
MCHC RBC AUTO-ENTMCNC: 33.1 G/DL (ref 31.5–35.7)
MCV RBC AUTO: 87.3 FL (ref 79–97)
MONOCYTES # BLD AUTO: 0.38 10*3/MM3 (ref 0.1–0.9)
MONOCYTES NFR BLD AUTO: 9.1 % (ref 5–12)
NEUTROPHILS NFR BLD AUTO: 2 10*3/MM3 (ref 1.7–7)
NEUTROPHILS NFR BLD AUTO: 47.8 % (ref 42.7–76)
PLATELET # BLD AUTO: 174 10*3/MM3 (ref 140–450)
PMV BLD AUTO: 8.9 FL (ref 6–12)
POTASSIUM SERPL-SCNC: 3.6 MMOL/L (ref 3.5–5.2)
PROT SERPL-MCNC: 6.9 G/DL (ref 6–8.5)
RBC # BLD AUTO: 4.5 10*6/MM3 (ref 3.77–5.28)
SODIUM SERPL-SCNC: 140 MMOL/L (ref 136–145)
T4 FREE SERPL-MCNC: 0.95 NG/DL (ref 0.92–1.68)
TSH SERPL DL<=0.05 MIU/L-ACNC: 2.05 UIU/ML (ref 0.27–4.2)
WBC NRBC COR # BLD AUTO: 4.18 10*3/MM3 (ref 3.4–10.8)

## 2024-08-22 PROCEDURE — 1126F AMNT PAIN NOTED NONE PRSNT: CPT | Performed by: INTERNAL MEDICINE

## 2024-08-22 PROCEDURE — 84439 ASSAY OF FREE THYROXINE: CPT

## 2024-08-22 PROCEDURE — 84443 ASSAY THYROID STIM HORMONE: CPT

## 2024-08-22 PROCEDURE — 80053 COMPREHEN METABOLIC PANEL: CPT

## 2024-08-22 PROCEDURE — 36415 COLL VENOUS BLD VENIPUNCTURE: CPT

## 2024-08-22 PROCEDURE — G0463 HOSPITAL OUTPT CLINIC VISIT: HCPCS | Performed by: INTERNAL MEDICINE

## 2024-08-22 PROCEDURE — 85025 COMPLETE CBC W/AUTO DIFF WBC: CPT

## 2024-08-22 PROCEDURE — 99214 OFFICE O/P EST MOD 30 MIN: CPT | Performed by: INTERNAL MEDICINE

## 2024-08-22 RX ORDER — SODIUM CHLORIDE 9 MG/ML
20 INJECTION, SOLUTION INTRAVENOUS ONCE
Status: CANCELLED | OUTPATIENT
Start: 2024-08-23

## 2024-08-23 ENCOUNTER — HOSPITAL ENCOUNTER (OUTPATIENT)
Dept: ONCOLOGY | Facility: HOSPITAL | Age: 29
Discharge: HOME | End: 2024-08-23
Payer: COMMERCIAL

## 2024-08-23 VITALS
SYSTOLIC BLOOD PRESSURE: 108 MMHG | HEART RATE: 84 BPM | HEIGHT: 63 IN | DIASTOLIC BLOOD PRESSURE: 76 MMHG | RESPIRATION RATE: 18 BRPM | OXYGEN SATURATION: 98 % | TEMPERATURE: 98.9 F | WEIGHT: 166.23 LBS | BODY MASS INDEX: 29.45 KG/M2

## 2024-08-23 DIAGNOSIS — Z45.2 FITTING AND ADJUSTMENT OF VASCULAR CATHETER: ICD-10-CM

## 2024-08-23 DIAGNOSIS — C50.919 METAPLASTIC CARCINOMA OF BREAST: Primary | ICD-10-CM

## 2024-08-23 PROCEDURE — 25010000002 HEPARIN LOCK FLUSH PER 10 UNITS: Performed by: INTERNAL MEDICINE

## 2024-08-23 PROCEDURE — 25010000002 PEMBROLIZUMAB 100 MG/4ML SOLUTION 4 ML VIAL: Performed by: INTERNAL MEDICINE

## 2024-08-23 PROCEDURE — 25810000003 SODIUM CHLORIDE 0.9 % SOLUTION: Performed by: INTERNAL MEDICINE

## 2024-08-23 PROCEDURE — 25010000002 DIPHENHYDRAMINE PER 50 MG: Performed by: INTERNAL MEDICINE

## 2024-08-23 PROCEDURE — 36593 DECLOT VASCULAR DEVICE: CPT

## 2024-08-23 RX ORDER — HEPARIN SODIUM (PORCINE) LOCK FLUSH IV SOLN 100 UNIT/ML 100 UNIT/ML
500 SOLUTION INTRAVENOUS AS NEEDED
OUTPATIENT
Start: 2024-08-23

## 2024-08-23 RX ORDER — SODIUM CHLORIDE 9 MG/ML
20 INJECTION, SOLUTION INTRAVENOUS ONCE
Status: COMPLETED | OUTPATIENT
Start: 2024-08-23 | End: 2024-08-23

## 2024-08-23 RX ORDER — SODIUM CHLORIDE 0.9 % (FLUSH) 0.9 %
20 SYRINGE (ML) INJECTION AS NEEDED
Status: DISCONTINUED | OUTPATIENT
Start: 2024-08-23 | End: 2024-08-24 | Stop reason: HOSPADM

## 2024-08-23 RX ORDER — SODIUM CHLORIDE 0.9 % (FLUSH) 0.9 %
20 SYRINGE (ML) INJECTION AS NEEDED
OUTPATIENT
Start: 2024-08-23

## 2024-08-23 RX ORDER — HEPARIN SODIUM (PORCINE) LOCK FLUSH IV SOLN 100 UNIT/ML 100 UNIT/ML
500 SOLUTION INTRAVENOUS AS NEEDED
Status: DISCONTINUED | OUTPATIENT
Start: 2024-08-23 | End: 2024-08-24 | Stop reason: HOSPADM

## 2024-08-23 RX ADMIN — SODIUM CHLORIDE 20 ML/HR: 9 INJECTION, SOLUTION INTRAVENOUS at 10:31

## 2024-08-23 RX ADMIN — DIPHENHYDRAMINE HYDROCHLORIDE 50 MG: 50 INJECTION, SOLUTION INTRAMUSCULAR; INTRAVENOUS at 10:33

## 2024-08-23 RX ADMIN — SODIUM CHLORIDE 200 MG: 9 INJECTION, SOLUTION INTRAVENOUS at 10:57

## 2024-08-23 RX ADMIN — Medication 20 ML: at 11:35

## 2024-08-23 RX ADMIN — HEPARIN 500 UNITS: 100 SYRINGE at 11:35

## 2024-08-27 DIAGNOSIS — G62.9 NEUROPATHY: Primary | ICD-10-CM

## 2024-08-27 RX ORDER — GABAPENTIN 300 MG/1
300 CAPSULE ORAL 3 TIMES DAILY
Qty: 90 CAPSULE | Refills: 2 | Status: SHIPPED | OUTPATIENT
Start: 2024-08-27

## 2024-08-29 ENCOUNTER — OFFICE VISIT (OUTPATIENT)
Dept: NEUROLOGY | Facility: CLINIC | Age: 29
End: 2024-08-29
Payer: COMMERCIAL

## 2024-08-29 VITALS
DIASTOLIC BLOOD PRESSURE: 73 MMHG | SYSTOLIC BLOOD PRESSURE: 107 MMHG | WEIGHT: 166.8 LBS | HEART RATE: 83 BPM | BODY MASS INDEX: 29.55 KG/M2 | HEIGHT: 63 IN

## 2024-08-29 DIAGNOSIS — G43.719 INTRACTABLE CHRONIC MIGRAINE WITHOUT AURA AND WITHOUT STATUS MIGRAINOSUS: Primary | ICD-10-CM

## 2024-08-29 PROCEDURE — 99213 OFFICE O/P EST LOW 20 MIN: CPT | Performed by: NURSE PRACTITIONER

## 2024-08-29 PROCEDURE — 1159F MED LIST DOCD IN RCRD: CPT | Performed by: NURSE PRACTITIONER

## 2024-08-29 PROCEDURE — 1160F RVW MEDS BY RX/DR IN RCRD: CPT | Performed by: NURSE PRACTITIONER

## 2024-08-29 RX ORDER — FEXOFENADINE HCL 180 MG/1
180 TABLET ORAL DAILY
COMMUNITY

## 2024-08-29 RX ORDER — ZAVEGEPANT 10 MG/.1ML
10 SPRAY NASAL DAILY PRN
Qty: 6 EACH | Refills: 5 | Status: SHIPPED | OUTPATIENT
Start: 2024-08-29

## 2024-08-29 NOTE — ASSESSMENT & PLAN NOTE
Will continue Botox for preventative therapy and  Zavzpret PRN for abortive therapy.      Botox will be administered per accepted algorithm for chronic migraine with a total of 155 units given divided as follows: 10 units in the , 5 units in the procerus, 20 units in the frontalis, 40 units in the temporalis, 30 units in the occipitalis, 20 units in the cervical paraspinals and 30 units in the trapezius.  This therapy will be given every 12 weeks.

## 2024-08-29 NOTE — PROGRESS NOTES
"Chief Complaint  Migraine    Subjective          Marilee aWtts presents to [facility] NEUROLOGY & NEUROSURGERY  History of Present Illness  she presents to the office for Botox follow-up.  Last Botox injection was on 6/7/24.  States she is having 4-5 headache days per month.  Feels that she has seen improvement in migraines with Botox for preventative therapy.  Uses Zavzpret with good effect.  Denies side effect.              History of Present Illness         Objective   Vital Signs:   /73   Pulse 83   Ht 160 cm (62.99\")   Wt 75.7 kg (166 lb 12.8 oz)   BMI 29.56 kg/m²     Physical Exam  HENT:      Head: Normocephalic.   Pulmonary:      Effort: Pulmonary effort is normal.   Neurological:      Mental Status: She is alert and oriented to person, place, and time.      Sensory: Sensation is intact.      Motor: Motor function is intact.      Coordination: Coordination is intact.      Deep Tendon Reflexes: Reflexes are normal and symmetric.        Neurologic Exam     Mental Status   Oriented to person, place, and time.        Result Review :               Assessment and Plan    Diagnoses and all orders for this visit:    1. Intractable chronic migraine without aura and without status migrainosus (Primary)  Assessment & Plan:  Will continue Botox for preventative therapy and  Zavzpret PRN for abortive therapy.      Botox will be administered per accepted algorithm for chronic migraine with a total of 155 units given divided as follows: 10 units in the , 5 units in the procerus, 20 units in the frontalis, 40 units in the temporalis, 30 units in the occipitalis, 20 units in the cervical paraspinals and 30 units in the trapezius.  This therapy will be given every 12 weeks.           Other orders  -     Zavzpret 10 MG/ACT solution; 10 mg into the nostril(s) as directed by provider Daily As Needed (migraine).  Dispense: 6 each; Refill: 5        Assessment & Plan           Follow Up "   Return in 15 days (on 9/13/2024) for Botox .  Patient was given instructions and counseling regarding her condition or for health maintenance advice. Please see specific information pulled into the AVS if appropriate.

## 2024-09-06 ENCOUNTER — PROCEDURE VISIT (OUTPATIENT)
Dept: NEUROLOGY | Facility: CLINIC | Age: 29
End: 2024-09-06
Payer: COMMERCIAL

## 2024-09-06 DIAGNOSIS — G43.719 INTRACTABLE CHRONIC MIGRAINE WITHOUT AURA AND WITHOUT STATUS MIGRAINOSUS: Primary | ICD-10-CM

## 2024-09-24 DIAGNOSIS — F41.0 ANXIETY ATTACK: ICD-10-CM

## 2024-09-26 ENCOUNTER — OFFICE VISIT (OUTPATIENT)
Dept: FAMILY MEDICINE CLINIC | Age: 29
End: 2024-09-26
Payer: COMMERCIAL

## 2024-09-26 VITALS
SYSTOLIC BLOOD PRESSURE: 121 MMHG | DIASTOLIC BLOOD PRESSURE: 75 MMHG | BODY MASS INDEX: 28.42 KG/M2 | WEIGHT: 160.4 LBS | HEART RATE: 74 BPM | OXYGEN SATURATION: 100 % | HEIGHT: 63 IN | TEMPERATURE: 98.1 F

## 2024-09-26 DIAGNOSIS — L24.7 IRRITANT CONTACT DERMATITIS DUE TO PLANTS, EXCEPT FOOD: Primary | ICD-10-CM

## 2024-09-26 DIAGNOSIS — F41.0 ANXIETY ATTACK: ICD-10-CM

## 2024-09-26 PROCEDURE — 96372 THER/PROPH/DIAG INJ SC/IM: CPT | Performed by: NURSE PRACTITIONER

## 2024-09-26 PROCEDURE — 99214 OFFICE O/P EST MOD 30 MIN: CPT | Performed by: NURSE PRACTITIONER

## 2024-09-26 PROCEDURE — 1126F AMNT PAIN NOTED NONE PRSNT: CPT | Performed by: NURSE PRACTITIONER

## 2024-09-26 RX ORDER — BUSPIRONE HYDROCHLORIDE 15 MG/1
15-20 TABLET ORAL 3 TIMES DAILY
Qty: 90 TABLET | Refills: 5 | Status: SHIPPED | OUTPATIENT
Start: 2024-09-26

## 2024-09-26 RX ORDER — TRIAMCINOLONE ACETONIDE 40 MG/ML
40 INJECTION, SUSPENSION INTRA-ARTICULAR; INTRAMUSCULAR ONCE
Status: COMPLETED | OUTPATIENT
Start: 2024-09-26 | End: 2024-09-26

## 2024-09-26 RX ORDER — BUSPIRONE HYDROCHLORIDE 15 MG/1
TABLET ORAL
Qty: 60 TABLET | Refills: 1 | OUTPATIENT
Start: 2024-09-26

## 2024-09-26 RX ADMIN — TRIAMCINOLONE ACETONIDE 40 MG: 40 INJECTION, SUSPENSION INTRA-ARTICULAR; INTRAMUSCULAR at 12:16

## 2024-10-10 ENCOUNTER — TELEPHONE (OUTPATIENT)
Dept: ONCOLOGY | Facility: HOSPITAL | Age: 29
End: 2024-10-10
Payer: COMMERCIAL

## 2024-10-10 NOTE — TELEPHONE ENCOUNTER
Returned patients call and advised we did not have any other appt available at this time.  Pt decided to keep appt and will get her kids out of school early.  Pt stated that appt was made and she was unaware and would like it noted that she will not be able to do appointments late due to having to get her children out of school.

## 2024-10-10 NOTE — TELEPHONE ENCOUNTER
Caller: Marilee Watts    Relationship to patient: Self    Best call back number: [phone]    Type of visit: FOLLOW UP    Requested date: SAME DAY BUT BEFORE 1:30PM OR DIFFERENT DAY     If rescheduling, when is the original appointment: 10/17     Additional notes: PLEASE CALL TO R/S.

## 2024-10-14 ENCOUNTER — TELEPHONE (OUTPATIENT)
Dept: ONCOLOGY | Facility: HOSPITAL | Age: 29
End: 2024-10-14
Payer: COMMERCIAL

## 2024-10-14 NOTE — TELEPHONE ENCOUNTER
RYAN w/pt to see if she can come in at 8:30 am on 11/17/24.  We were able to get a pt to switch spots.  Asked that she call the office to confirm that she can come at that time.

## 2024-10-16 ENCOUNTER — TELEPHONE (OUTPATIENT)
Dept: ONCOLOGY | Facility: HOSPITAL | Age: 29
End: 2024-10-16
Payer: COMMERCIAL

## 2024-10-16 NOTE — TELEPHONE ENCOUNTER
----- Message from Ayana CRUZ sent at 10/16/2024  7:38 AM EDT -----  It looks like She missed her CT chest. She will have to have CT and follow up rescheduled. She can be added the first week of November if necessary.

## 2024-10-21 NOTE — H&P (VIEW-ONLY)
"Pre-op Exam (Pre op 11/19/24)            History of Present Illness  Marilee Watts is a 28 y.o. female who presents to [facility] PLASTIC & RECONSTRUCTIVE SURGERY for Pre-Operative Examination for BREAST RECONSTRUCTION REVISION, Bilateral breast revision with fat graft and nipple reconstruction, port removal, right implant exchange, right axilla nodule biopsy and umbilical hernia repair with possible mesh 11/19/24.    Pt presents today for pre op.    10/25/24 Urine nicotine normal  Subjective        Tegaderm ag mesh [silver] and Pembrolizumab  Allergies Reconciled.    Review of Systems   All review of system has been reviewed and it  is negative except the ones note above.     Objective     /79 (BP Location: Left arm, Patient Position: Sitting, Cuff Size: Adult)   Pulse 75   Ht 160 cm (62.99\")   Wt 70.8 kg (156 lb)   SpO2 99%   BMI 27.64 kg/m²     Body mass index is 27.64 kg/m².    Physical Exam  Right breast with capsular contracture   Result Review :                Assessment and Plan      Diagnoses and all orders for this visit:    1. Pre-operative examination (Primary)  -     Nicotine Screen, Urine - Urine, Clean Catch; Future  -     cephalexin (KEFLEX) 500 MG capsule; Take 1 capsule by mouth 4 (Four) Times a Day for 5 days.  Dispense: 20 capsule; Refill: 0  -     Chlorhexidine Gluconate 4 % solution; Apply 1 Application topically to the appropriate area as directed Daily. Shower daily for 7 days prior to surgery  Dispense: 473 mL; Refill: 0  -     naproxen (NAPROSYN) 250 MG tablet; Take 1 tablet by mouth 2 (Two) Times a Day.  Dispense: 12 tablet; Refill: 0  -     gabapentin (Neurontin) 300 MG capsule; Take 1 capsule by mouth 3 (Three) Times a Day for 18 doses.  Dispense: 18 capsule; Refill: 0  -     acetaminophen (TYLENOL) 500 MG tablet; Take 2 tablets by mouth Every 6 (Six) Hours As Needed for Moderate Pain for up to 18 doses.  Dispense: 18 tablet; Refill: 0  -     ondansetron " (Zofran) 4 MG tablet; Take 1 tablet by mouth Daily As Needed for Nausea or Vomiting for up to 18 doses.  Dispense: 18 tablet; Refill: 0    2. Breast reconstruction deformity          Plan:  Given these options, the patient has verbally expressed an understanding of the risks of surgery and finds these risks acceptable. We will proceed with surgery as soon as possible.    Medications sent to pharmacy  Photos obtained      Scribed by Le Pelaez, acting as a scribe for Jcakie Sanders MD, 10/30/24 15:01 EDT.  Jackie Sanders MD's signature on the note affirms that the note adequately documents the care provided.      Assessment & Plan  1. Bilateral breast reconstruction.  A plan for bilateral breast reconstruction with fat grafting and nipple reconstruction was discussed. The use of non-narcotic medications such as Tylenol, naproxen, and gabapentin was recommended for three days prior to the procedure. Nerve blocks will be administered on the day of the surgery. She was advised to use hot cleanse liquid soap for showering seven days prior to the surgery and again three days prior. Cephalexin and Zofran were prescribed as needed. A drain will be placed on the right side post-surgery due to previous radiation.    2. Umbilical hernia.  Umbilical hernia repair will be performed during the surgery. The hernia will be fixed, and a mesh will be placed.    3. Scar revision.  Scar revision was discussed. The scar will be revised to reduce thickness and adjust its position for better cosmetic results.                     Follow Up     No follow-ups on file.    Patient was given instructions and counseling regarding her condition. Please see specific information pulled into the AVS if appropriate.     Jackie Sanders MD  10/30/2024      Patient or patient representative verbalized consent for the use of Ambient Listening during the visit with  Jackie Sanders MD for chart documentation. 10/30/2024  20:49  EDT

## 2024-10-21 NOTE — PROGRESS NOTES
"Pre-op Exam (Pre op 11/19/24)            History of Present Illness  Marilee Watts is a 28 y.o. female who presents to [facility] PLASTIC & RECONSTRUCTIVE SURGERY for Pre-Operative Examination for BREAST RECONSTRUCTION REVISION, Bilateral breast revision with fat graft and nipple reconstruction, port removal, right implant exchange, right axilla nodule biopsy and umbilical hernia repair with possible mesh 11/19/24.    Pt presents today for pre op.    10/25/24 Urine nicotine normal  Subjective        Tegaderm ag mesh [silver] and Pembrolizumab  Allergies Reconciled.    Review of Systems   All review of system has been reviewed and it  is negative except the ones note above.     Objective     /79 (BP Location: Left arm, Patient Position: Sitting, Cuff Size: Adult)   Pulse 75   Ht 160 cm (62.99\")   Wt 70.8 kg (156 lb)   SpO2 99%   BMI 27.64 kg/m²     Body mass index is 27.64 kg/m².    Physical Exam  Right breast with capsular contracture   Result Review :                Assessment and Plan      Diagnoses and all orders for this visit:    1. Pre-operative examination (Primary)  -     Nicotine Screen, Urine - Urine, Clean Catch; Future  -     cephalexin (KEFLEX) 500 MG capsule; Take 1 capsule by mouth 4 (Four) Times a Day for 5 days.  Dispense: 20 capsule; Refill: 0  -     Chlorhexidine Gluconate 4 % solution; Apply 1 Application topically to the appropriate area as directed Daily. Shower daily for 7 days prior to surgery  Dispense: 473 mL; Refill: 0  -     naproxen (NAPROSYN) 250 MG tablet; Take 1 tablet by mouth 2 (Two) Times a Day.  Dispense: 12 tablet; Refill: 0  -     gabapentin (Neurontin) 300 MG capsule; Take 1 capsule by mouth 3 (Three) Times a Day for 18 doses.  Dispense: 18 capsule; Refill: 0  -     acetaminophen (TYLENOL) 500 MG tablet; Take 2 tablets by mouth Every 6 (Six) Hours As Needed for Moderate Pain for up to 18 doses.  Dispense: 18 tablet; Refill: 0  -     ondansetron " (Zofran) 4 MG tablet; Take 1 tablet by mouth Daily As Needed for Nausea or Vomiting for up to 18 doses.  Dispense: 18 tablet; Refill: 0    2. Breast reconstruction deformity          Plan:  Given these options, the patient has verbally expressed an understanding of the risks of surgery and finds these risks acceptable. We will proceed with surgery as soon as possible.    Medications sent to pharmacy  Photos obtained      Scribed by Le Pelaez, acting as a scribe for Jackie Sanders MD, 10/30/24 15:01 EDT.  Jackie Sanders MD's signature on the note affirms that the note adequately documents the care provided.      Assessment & Plan  1. Bilateral breast reconstruction.  A plan for bilateral breast reconstruction with fat grafting and nipple reconstruction was discussed. The use of non-narcotic medications such as Tylenol, naproxen, and gabapentin was recommended for three days prior to the procedure. Nerve blocks will be administered on the day of the surgery. She was advised to use hot cleanse liquid soap for showering seven days prior to the surgery and again three days prior. Cephalexin and Zofran were prescribed as needed. A drain will be placed on the right side post-surgery due to previous radiation.    2. Umbilical hernia.  Umbilical hernia repair will be performed during the surgery. The hernia will be fixed, and a mesh will be placed.    3. Scar revision.  Scar revision was discussed. The scar will be revised to reduce thickness and adjust its position for better cosmetic results.                     Follow Up     No follow-ups on file.    Patient was given instructions and counseling regarding her condition. Please see specific information pulled into the AVS if appropriate.     Jackie Sanders MD  10/30/2024      Patient or patient representative verbalized consent for the use of Ambient Listening during the visit with  Jackie Sanders MD for chart documentation. 10/30/2024  20:49  EDT

## 2024-10-25 ENCOUNTER — HOSPITAL ENCOUNTER (OUTPATIENT)
Dept: CT IMAGING | Facility: HOSPITAL | Age: 29
Discharge: HOME | End: 2024-10-25
Payer: COMMERCIAL

## 2024-10-25 ENCOUNTER — LAB (OUTPATIENT)
Dept: LAB | Facility: HOSPITAL | Age: 29
End: 2024-10-25
Payer: COMMERCIAL

## 2024-10-25 DIAGNOSIS — Z01.818 PRE-OPERATIVE EXAMINATION: ICD-10-CM

## 2024-10-25 DIAGNOSIS — C50.919 METAPLASTIC CARCINOMA OF BREAST: ICD-10-CM

## 2024-10-25 LAB — COTININE UR-MCNC: NEGATIVE NG/ML

## 2024-10-25 PROCEDURE — 71260 CT THORAX DX C+: CPT

## 2024-10-25 PROCEDURE — 25510000001 IOPAMIDOL PER 1 ML: Performed by: INTERNAL MEDICINE

## 2024-10-25 PROCEDURE — G0480 DRUG TEST DEF 1-7 CLASSES: HCPCS

## 2024-10-25 RX ORDER — IOPAMIDOL 755 MG/ML
100 INJECTION, SOLUTION INTRAVASCULAR
Status: COMPLETED | OUTPATIENT
Start: 2024-10-25 | End: 2024-10-25

## 2024-10-25 RX ADMIN — IOPAMIDOL 100 ML: 755 INJECTION, SOLUTION INTRAVENOUS at 09:31

## 2024-10-28 RX ORDER — ROPINIROLE 1 MG/1
1 TABLET, FILM COATED ORAL NIGHTLY
Qty: 30 TABLET | Refills: 3 | Status: SHIPPED | OUTPATIENT
Start: 2024-10-28

## 2024-10-30 ENCOUNTER — OFFICE VISIT (OUTPATIENT)
Dept: PLASTIC SURGERY | Facility: CLINIC | Age: 29
End: 2024-10-30
Payer: COMMERCIAL

## 2024-10-30 VITALS
SYSTOLIC BLOOD PRESSURE: 117 MMHG | HEART RATE: 75 BPM | DIASTOLIC BLOOD PRESSURE: 79 MMHG | HEIGHT: 63 IN | WEIGHT: 156 LBS | OXYGEN SATURATION: 99 % | BODY MASS INDEX: 27.64 KG/M2

## 2024-10-30 DIAGNOSIS — N65.0 BREAST RECONSTRUCTION DEFORMITY: ICD-10-CM

## 2024-10-30 DIAGNOSIS — Z01.818 PRE-OPERATIVE EXAMINATION: Primary | ICD-10-CM

## 2024-10-30 RX ORDER — GABAPENTIN 300 MG/1
300 CAPSULE ORAL 3 TIMES DAILY
Qty: 18 CAPSULE | Refills: 0 | Status: SHIPPED | OUTPATIENT
Start: 2024-10-30 | End: 2024-11-05

## 2024-10-30 RX ORDER — ACETAMINOPHEN 500 MG
1000 TABLET ORAL EVERY 6 HOURS PRN
Qty: 18 TABLET | Refills: 0 | Status: SHIPPED | OUTPATIENT
Start: 2024-10-30

## 2024-10-30 RX ORDER — CHLORHEXIDINE GLUCONATE 40 MG/ML
30 SOLUTION TOPICAL DAILY
Qty: 473 ML | Refills: 0 | Status: SHIPPED | OUTPATIENT
Start: 2024-10-30

## 2024-10-30 RX ORDER — NAPROXEN 250 MG/1
250 TABLET ORAL 2 TIMES DAILY
Qty: 12 TABLET | Refills: 0 | Status: SHIPPED | OUTPATIENT
Start: 2024-10-30

## 2024-10-30 RX ORDER — ONDANSETRON 4 MG/1
4 TABLET, FILM COATED ORAL DAILY PRN
Qty: 18 TABLET | Refills: 0 | Status: SHIPPED | OUTPATIENT
Start: 2024-10-30

## 2024-10-30 RX ORDER — CEPHALEXIN 500 MG/1
500 CAPSULE ORAL 4 TIMES DAILY
Qty: 20 CAPSULE | Refills: 0 | Status: SHIPPED | OUTPATIENT
Start: 2024-10-30 | End: 2024-11-04

## 2024-11-04 ENCOUNTER — OFFICE VISIT (OUTPATIENT)
Dept: ONCOLOGY | Facility: HOSPITAL | Age: 29
End: 2024-11-04
Payer: COMMERCIAL

## 2024-11-04 VITALS
BODY MASS INDEX: 27.23 KG/M2 | RESPIRATION RATE: 18 BRPM | HEART RATE: 74 BPM | HEIGHT: 63 IN | TEMPERATURE: 97 F | SYSTOLIC BLOOD PRESSURE: 109 MMHG | WEIGHT: 153.66 LBS | OXYGEN SATURATION: 96 % | DIASTOLIC BLOOD PRESSURE: 78 MMHG

## 2024-11-04 DIAGNOSIS — G89.29 CHRONIC NONINTRACTABLE HEADACHE, UNSPECIFIED HEADACHE TYPE: Primary | ICD-10-CM

## 2024-11-04 DIAGNOSIS — F41.9 ANXIETY: ICD-10-CM

## 2024-11-04 DIAGNOSIS — G25.81 RESTLESS LEG: ICD-10-CM

## 2024-11-04 DIAGNOSIS — R51.9 CHRONIC NONINTRACTABLE HEADACHE, UNSPECIFIED HEADACHE TYPE: Primary | ICD-10-CM

## 2024-11-04 DIAGNOSIS — C50.919 METAPLASTIC CARCINOMA OF BREAST: ICD-10-CM

## 2024-11-04 PROCEDURE — G0463 HOSPITAL OUTPT CLINIC VISIT: HCPCS | Performed by: INTERNAL MEDICINE

## 2024-11-04 NOTE — PROGRESS NOTES
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 She had bilateral mastectomy and bilateral implants on 1/9/24. She completed adjuvant pembrolizumab in August. Reports her restless leg symptoms are much improved. Reports her headaches are improved. She gets botox for this and follows with neurology. She is done renovating a house that was constructed in 1933. She is breathing at baseline. Repeat CT chest was normal, results discussed. Due for bilateral breast revision and reconstruction with port removal and right implant exchange as well as umbilical hernia repair with Dr. Sanders on 11/19/24. She did have some pain in her left lower quadrant of abdomen for a few days that occurred after stretching/unusual position. It is improving. No constipation reported.     Oncology/Hematology History Overview Note   2012: Lumpectomy of fibroadenoma of right breast    3/2023: Started to have pain and swelling of known area of fibroadenoma of right breast. Started after trauma to breast.     5/26/23: Right breast lumpectomy: Invasive metaplastic carcinoma, high-grade, 4.5 x 4.0 x 3.3 cm with interspersed fibroadenoma, negative margins. ER 0%, AK 0%, HER2 0 by IHC. PDL1 CPS of 50.     6/21/23: MRI breast: Diffuse edema, hypervascularity, and skin thickening of the right breast raising the possibility of inflammatory breast cancer. 2. 4.7 cm seroma in the lateral breast in area of known recent malignant lumpectomy. 3. Metastatic right axillary adenopathy. 4. No evidence of contralateral left breast malignancy. BI-RADS Category 6, known biopsy-proven malignancy.     7/7/23: Right axillary LN biopsy: metastatic adenocarcinoma, consistent with  metaplastic breast carcinoma.     7/11/23: CT Chest, abdomen, pelvis: No definite metastatic disease. 1. Chest:  Pathologically enlarged right axillary lymphadenopathy.  Masslike opacity right upper outer breast with postsurgical changes.  Skin thickening right breast.  Correlate with tissue diagnosis and breast MRI.Small semi solid nodules in the right lung.  These are nonspecific.  A follow-up CT chest in 3 months time is recommended.  Abdomen pelvis:  No findings of metastatic disease to the abdomen or pelvis.     7/12/23: NM bone scan: negative for osseous mets    7/14/23: C1D1 Carboplatin/Paclitaxel/Pembrolizumab  8/4/23: C2D1 Carboplatin/Paclitaxel/Pembrolizumab. C2D15 held due to neutropenia  8/25/23: C3D1 Carboplatin/Paclitaxel/Pembrolizumab. All doses given  9/15/23: C4D1 Carboplatin/Paclitaxel/Pembrolizumab. C4D15 held due to neutropenia  10/6/23: C1D1 Doxorubicin/Cyclophosphamide/Pembrolizumab  10/27/23: C2D1 Doxorubicin/Cyclophosphamide/Pembrolizumab  11/20/23: C3D1 Doxorubicin/Cyclophosphamide/Pembrolizumab. Delayed due to neutropenia. Last cycle of chemo prior to surgery    1/9/24: Bilateral Mastectomy: Right breast: No invasive disease left, intermediate grade DCIS present. ypTisN0 (sn), ER/MA negative, HER2 negative, margins negative, ALH present, left breast benign    2/9/24: C1D1 adjuvant pembrolizumab.     4/9/24: Completed adjuvant radiation (performed in [place])    8/22/24: C10 adjuvant pembrolizumab 200 mg. Last cycle of planned immunotherapy.      Metaplastic carcinoma of breast   6/16/2023 Initial Diagnosis    Metaplastic carcinoma of breast     6/16/2023 - 6/16/2023 Chemotherapy    OP BREAST AC DD DOXOrubicin / Cyclophosphamide     7/13/2023 Cancer Staged    Staging form: Breast, AJCC 8th Edition  - Clinical: Stage IIIC (cT4d, cN2, cM0, G3, ER-, MA-, HER2-) - Signed by Ramin Shine MD on 7/13/2023 7/14/2023 - 9/22/2023 Chemotherapy    OP BREAST Pembrolizumab 400 mg /  PACLitaxel / CARBOplatin AUC=5     8/12/2023 - 8/12/2023 Chemotherapy    OP BREAST PACLitaxel Adjuvant (Weekly X 12)     10/6/2023 - 11/20/2023 Chemotherapy    OP BREAST Pembrolizumab 200 mg / DOXOrubicin / Cyclophosphamide      1/23/2024 Cancer Staged    Staging form: Breast, AJCC 8th Edition  - Pathologic: ypTis (DCIS), pN0, cM0, ER-, GA-, HER2- - Signed by Ramin Shine MD on 1/23/2024 2/9/2024 -  Chemotherapy    OP Pembrolizumab 200 mg     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 Negative for appetite change, diaphoresis, fever, unexpected weight gain and unexpected weight loss.   HENT:  Negative for hearing loss, mouth sores, sore throat, swollen glands, trouble swallowing and voice change.    Eyes:  Negative for blurred vision.   Respiratory:  Negative for cough, shortness of breath and wheezing.    Cardiovascular:  Negative for chest pain and palpitations.   Gastrointestinal:  Positive for nausea. Negative for abdominal pain, blood in stool, constipation, diarrhea and vomiting.   Endocrine: Negative for cold intolerance and heat intolerance.   Genitourinary:  Negative for breast pain, difficulty urinating, dysuria, frequency, hematuria and urinary incontinence.   Musculoskeletal:  Negative for arthralgias, back pain and myalgias.   Skin:  Negative for rash, skin lesions and wound.   Neurological:  Negative for dizziness, seizures, weakness, numbness and headache.   Hematological:  Does not bruise/bleed easily.   Psychiatric/Behavioral:  Negative for depressed mood. The patient is not nervous/anxious.    All other systems reviewed and are negative.    Current Outpatient Medications on File Prior to Visit   Medication Sig Dispense Refill    benzonatate (TESSALON) 200 MG capsule Take 1 capsule by mouth 3 (Three) Times a Day As Needed for Cough. 90 capsule 1     desvenlafaxine (PRISTIQ) 50 MG 24 hr tablet Take 1 tablet by mouth Daily. 90 tablet 1    gabapentin (NEURONTIN) 300 MG capsule Take 1 capsule by mouth.      rOPINIRole (REQUIP) 1 MG tablet TAKE 1 TABLET BY MOUTH AT BEDTIME, TAKE 1 HOUR BEFORE BEDTIME 30 tablet 3    Sodium Fluoride 5000 PPM 1.1 % paste       ubrogepant (Ubrelvy) 100 MG tablet Take 1 tablet by mouth.      methylPREDNISolone (MEDROL) 4 MG dose pack Take as directed on package instructions. (Patient not taking: Reported on 2024) 21 tablet 0    prochlorperazine (COMPAZINE) 10 MG tablet Take 1 tablet by mouth Every 6 (Six) Hours As Needed for Nausea or Vomiting. (Patient not taking: Reported on 2024) 60 tablet 2     No current facility-administered medications on file prior to visit.       Allergies   Allergen Reactions    Tegaderm Ag Mesh [Silver] Itching     Tegaderm that is used over port a cath    Pembrolizumab Other (See Comments)     swollen, red, itching, and warmness to hands, ears, chest, and throat, tightness and hoarse voice.;  resolved with treatment (24)     Past Medical History:   Diagnosis Date    Asthma     AS CHILD    Breast cancer     COVID-19 vaccine administered      Past Surgical History:   Procedure Laterality Date    BREAST LUMPECTOMY Right     BREAST RECONSTRUCTION Bilateral 2024    Procedure: BREAST RECONSTRUCTION WITH MESH AND IMPLANTS - USE OF SPY;  Surgeon: Jackie Sanders MD;  [place] [facility] [place] [facility] [place]  Service: [place];  Laterality: Bilateral;     SECTION      MASTECTOMY W/ SENTINEL NODE BIOPSY Bilateral 2024    Procedure: BREAST MASTECTOMY WITH RIGHT AXILLARY SENTINEL NODE IDENTIFICATION AND EXCISION BIOPSY;  Surgeon: Christiana Whiting MD;  [place] [facility] [place] [facility] [place]  Service: General;  Laterality: Bilateral;    PORTACATH PLACEMENT      SKIN BIOPSY      TUBAL ABDOMINAL LIGATION       Social History     Socioeconomic History    Marital status: Single    Number of children: 2   Tobacco  Use    Smoking status: Never     Passive exposure: Never    Smokeless tobacco: Never   Vaping Use    Vaping status: Never Used   Substance and Sexual Activity    Alcohol use: Not Currently     Comment: OCCASSIONAL    Drug use: Never    Sexual activity: Defer     Family History   Problem Relation Age of Onset    Skin cancer Mother     COPD Mother     Hypertension Father     Hyperlipidemia Father     Diabetes Father     Skin cancer Paternal Grandmother     Lung cancer Paternal Grandmother     Malig Hyperthermia Neg Hx        Objective   Physical Exam  Well appearing patient in no acute distress on RA  Anicteric sclerae, no rash on exposed skin  Respirations non-labored  Awake, alert, and oriented x 4. Speech intact. No gross neurologic deficit  Appropriate mood and affect    Vitals:    11/04/24 0832   BP: 109/78   Pulse: 74   Resp: 18   Temp: 97 °F (36.1 °C)   SpO2: 96%           ECOG score: 0         PHQ-9 Total Score:           Result Review :   The following data was reviewed by: Ramin Shine MD on 11/04/24:  Lab Results   Component Value Date    HGB 12.6 07/11/2024    HCT 38.7 07/11/2024    MCV 89.2 07/11/2024     07/11/2024    WBC 3.57 07/11/2024    NEUTROABS 2.00 07/11/2024    LYMPHSABS 1.22 07/11/2024    MONOSABS 0.31 07/11/2024    EOSABS 0.03 07/11/2024    BASOSABS 0.01 07/11/2024     Lab Results   Component Value Date    GLUCOSE 80 07/11/2024    BUN 13 07/11/2024    CREATININE 0.64 07/11/2024     07/11/2024    K 3.8 07/11/2024     07/11/2024    CO2 25.8 07/11/2024    CALCIUM 9.7 07/11/2024    PROTEINTOT 6.7 07/11/2024    ALBUMIN 4.3 07/11/2024    BILITOT 0.3 07/11/2024    ALKPHOS 103 07/11/2024    AST 18 07/11/2024    ALT 24 07/11/2024     Lab Results   Component Value Date    MG 1.8 02/29/2024    FREET4 1.10 06/20/2024    TSH 2.620 06/20/2024     Labs personally reviewed.  Iron levels improved. K normal.     Plastic surgery note personally reviewed    CT Chest personally reviewed  and per my independent read with no evidence of cancer.       CT Chest With Contrast Diagnostic    Result Date: 10/26/2024  No acute abnormalities. No evidence of metastatic disease in the chest. Electronically Signed: Momo Leung MD  10/26/2024 3:12 AM EDT  Workstation ID: [id number]         Assessment and Plan    Diagnoses and all orders for this visit:    1. Chronic nonintractable headache, unspecified headache type (Primary)    2. Metaplastic carcinoma of breast    3. Anxiety    4. Restless leg            Metaplastic Carcinoma of Right Breast, triple negative  Patient is s/p lumpectomy of 4.5 cm lesion with triple negative metaplastic carcinoma, interspersed with fibroadenoma. Patient has clinical inflammatory breast cancer. MRI breast with concern for inflammatory breast cancer as well as right sided axillary involvement. Left breast without lesions per MRI. Lymph node biopsy 7/7/23 confirmed right axillary involvement by breast carcinoma.  Completed noeadjuvant chemothearpy and immunotherapy with 4 cycles of Carboplatin/Paclitaxel/Pembro (C1 7/1423 and C4 9/15/23) and 3 cycles of Doxorubicin, cyclophosphamide, pembrolizumab (completed C3 11/20/23 with delay due to neutropenia). Completed 7 cycles total. C4 omitted due to accumulation of toxicities.     1/9/24: Bilateral Mastectomy: Right breast: No invasive disease left, intermediate grade DCIS present. ypTisN0 (sn), ER/WI negative, HER2 negative, margins negative, ALH     No residual invasive disease present, so patient with complete pathologic response on the basis of the National Surgical Adjuvant Breast and Bowel project criteria. DCIS still present as well as ALH, negative margins, nodes negative. Will proceed with adjuvant immunotherapy with Pembrolizumab every 3 weeks to complete 1 year. C1 provided 2/9/24. Adjuvant radiation completed as of 4/9/24.    C10D1 adjuvant pembrolizumab 8/22/24. Labs appropriate to proceed. This is last planned cycle of  immunotherapy. No further adjuvant treatment indicated.     Repeat CT chest 10/25/24 negative for malignancy. Will hold on repeat scans for symptoms or concerns for recurrence    Reconstruction planned for 11/19/24 with plastic surgery.     RTC 6 months. Counseled to call and inform us for any new concerning symptoms (I.e. new pain or breast related changes, etc).      Headache  Follow up with neurology. On botox, which helps.     Anxiety  Xanax PRN    Restless leg  Ropinirole helps, previously increased to 1.0 mg. Typically symptoms are worse for day or so after treatment. Symptoms much improved as of 11/4/24 so likely related to pembrolizumab.     Right axilla lesion  5/15/24 u/s performed and showed 1.7 cm enlarged area. Biopsy 5/31/24 showed capsule dense fibrous tissue and focal fat necrosis.        Patient Follow Up: 6 months  Patient was given instructions and counseling regarding her condition or for health maintenance advice. Please see specific information pulled into the AVS if appropriate.

## 2024-11-07 ENCOUNTER — TELEPHONE (OUTPATIENT)
Dept: FAMILY MEDICINE CLINIC | Age: 29
End: 2024-11-07

## 2024-11-07 NOTE — TELEPHONE ENCOUNTER
----- Message from Kiesha Olivares sent at 11/7/2024 11:57 AM EST -----  Pt was on my scheduled but canceled. She is not utd on tdap. Please call and tell her she needs tdap within 72 hrs after injury. On the appt it says she stepped on a nail 2 days ago. susana Urbina

## 2024-11-11 ENCOUNTER — OFFICE VISIT (OUTPATIENT)
Dept: FAMILY MEDICINE CLINIC | Age: 29
End: 2024-11-11
Payer: COMMERCIAL

## 2024-11-11 VITALS
HEART RATE: 82 BPM | WEIGHT: 156.4 LBS | SYSTOLIC BLOOD PRESSURE: 108 MMHG | HEIGHT: 63 IN | TEMPERATURE: 97.8 F | OXYGEN SATURATION: 99 % | BODY MASS INDEX: 27.71 KG/M2 | DIASTOLIC BLOOD PRESSURE: 76 MMHG

## 2024-11-11 DIAGNOSIS — R41.840 ATTENTION AND CONCENTRATION DEFICIT: ICD-10-CM

## 2024-11-11 DIAGNOSIS — F39 MOOD DISORDER: Primary | ICD-10-CM

## 2024-11-11 DIAGNOSIS — S91.332A PUNCTURE WOUND OF LEFT FOOT, INITIAL ENCOUNTER: ICD-10-CM

## 2024-11-11 PROCEDURE — 99214 OFFICE O/P EST MOD 30 MIN: CPT | Performed by: NURSE PRACTITIONER

## 2024-11-11 PROCEDURE — 1126F AMNT PAIN NOTED NONE PRSNT: CPT | Performed by: NURSE PRACTITIONER

## 2024-11-11 PROCEDURE — 1160F RVW MEDS BY RX/DR IN RCRD: CPT | Performed by: NURSE PRACTITIONER

## 2024-11-11 PROCEDURE — 90471 IMMUNIZATION ADMIN: CPT | Performed by: NURSE PRACTITIONER

## 2024-11-11 PROCEDURE — 1159F MED LIST DOCD IN RCRD: CPT | Performed by: NURSE PRACTITIONER

## 2024-11-11 PROCEDURE — 90715 TDAP VACCINE 7 YRS/> IM: CPT | Performed by: NURSE PRACTITIONER

## 2024-11-11 RX ORDER — BUPROPION HYDROCHLORIDE 150 MG/1
150 TABLET ORAL DAILY
Qty: 30 TABLET | Refills: 2 | Status: SHIPPED | OUTPATIENT
Start: 2024-11-11

## 2024-11-11 NOTE — PROGRESS NOTES
Chief Complaint  Marilee Watts presents to [facility] for Medication Review and Foot Injury (Pt stated she stepped on two nae nails Lt foot )    Subjective     History of Present Illness  MARILEE presents today for follow up on Anxiety, ADHD, and Panic Attacks   She reports that She Is NOT doing well on current treatment of pristiq, buspirone PRN (unable to take daily as makes her too drowsy and has tried Lexapro, Zoloft, and Abilify in the past with no success or experienced side effects.  The following symptoms are persistent :  chest pain, difficulty concentrating, feelings of losing control, panic attacks, and racing thoughts and medication changes are requested.   She denies current suicidal and homicidal ideation.    Current psychotherapy : No  She states she would like to have an evaluation for possible treatment for ADD/ ADHD .  She is currently scheduled for surgery again next week for breast reconstruction after breast cancer.  She feels that some of this may be related, but she has struggled with anxiety much of her life as well as inability to focus and complete tasks    Marilee was also recently cleaning up a new house that they bought and stepped on a nail twice.  She is not up-to-date on her tetanus.  She reports that the area is sore and tender to touch but there is no longer redness her never had any drainage        Assessment and Plan     Diagnoses and all orders for this visit:    1. Mood disorder (Primary)  Comments:  referral  Orders:  -     buPROPion XL (Wellbutrin XL) 150 MG 24 hr tablet; Take 1 tablet by mouth Daily.  Dispense: 30 tablet; Refill: 2    2. Attention and concentration deficit  Comments:  advised we get her in with specialist since this continues to be uncontrolled. We will add wellbutrin to see if helps while waiting the referral  Orders:  -     Ambulatory Referral to Psychiatry    3. Puncture wound of left foot, initial  "encounter  Comments:  advised to keep [facility] on area  return if worsens- consider Xray  Orders:  -     Tdap Vaccine => 8yo IM (BOOSTRIX/ADACEL)            Follow Up   Return in about 8 weeks (around 1/6/2025) for Recheck.  Future Appointments   Date Time Provider Department Center   11/12/2024  2:00 PM PAT 1 [facility]   11/25/2024  1:30 PM Jackie Sanders MD [facility] [place] [facility]   12/4/2024  3:45 PM Alanis Contreras APRJOSE MANUEL [facility] [place] [facility]   1/15/2025  9:00 AM Sakina Alvarez APRJOSE MANUEL [facility] PC [place] [facility]   1/30/2025  9:30 AM Alanis Contreras APRJOSE MANUEL [facility] [place] [facility]   3/26/2025 11:30 AM Sakina Alvarez APRJOSE MANUEL [facility] PC [place] [facility]   4/7/2025 11:30 AM Ramin Shine MD [facility] ONC [address] [place]       New Medications Ordered This Visit   Medications    buPROPion XL (Wellbutrin XL) 150 MG 24 hr tablet     Sig: Take 1 tablet by mouth Daily.     Dispense:  30 tablet     Refill:  2       Medications Discontinued During This Encounter   Medication Reason    gabapentin (NEURONTIN) 300 MG capsule *Therapy completed    Chlorhexidine Gluconate 4 % solution *Therapy completed    gabapentin (Neurontin) 300 MG capsule Duplicate order    naproxen (NAPROSYN) 250 MG tablet *Therapy completed    ondansetron (Zofran) 4 MG tablet *Therapy completed    benzonatate (TESSALON) 200 MG capsule *Therapy completed    rOPINIRole (REQUIP) 1 MG tablet *Therapy completed          Review of Systems    Objective     Vitals:    11/11/24 0821   BP: 108/76   BP Location: Left arm   Patient Position: Sitting   Cuff Size: Adult   Pulse: 82   Temp: 97.8 °F (36.6 °C)   TempSrc: Oral   SpO2: 99%   Weight: 70.9 kg (156 lb 6.4 oz)   Height: 160 cm (62.99\")            Physical Exam  Constitutional:       General: She is not in acute distress.     Appearance: Normal appearance.   HENT:      Head: Normocephalic.   Cardiovascular:      Rate and Rhythm: Normal rate and regular rhythm.   Pulmonary:      Effort: Pulmonary effort is normal.      Breath sounds: Normal " breath sounds.   Musculoskeletal:         General: Normal range of motion.   Skin:     Findings: Wound (left heel) present.   Neurological:      General: No focal deficit present.      Mental Status: She is alert and oriented to person, place, and time.   Psychiatric:         Mood and Affect: Mood normal. Mood is anxious.         Behavior: Behavior normal.               Result Review          Allergies   Allergen Reactions    Tegaderm Ag Mesh [Silver] Itching     Tegaderm that is used over port a cath    Pembrolizumab Other (See Comments)     (Keytruda) swollen, red, itching, and warmness to hands, ears, chest, and throat, tightness and hoarse voice.;  resolved with treatment (4/12/24)      Past Medical History:   Diagnosis Date    Asthma     AS CHILD    Breast cancer     COVID-19 vaccine administered      Current Outpatient Medications   Medication Sig Dispense Refill    acetaminophen (TYLENOL) 500 MG tablet Take 2 tablets by mouth Every 6 (Six) Hours As Needed for Moderate Pain for up to 18 doses. 18 tablet 0    busPIRone (BUSPAR) 15 MG tablet Take 1-1.5 tablets by mouth 3 (Three) Times a Day. 90 tablet 5    desvenlafaxine (PRISTIQ) 50 MG 24 hr tablet Take 1-2 tablets by mouth Daily. 180 tablet 1    fexofenadine (ALLEGRA) 180 MG tablet Take 1 tablet by mouth Daily As Needed.      Sodium Fluoride 5000 PPM 1.1 % paste       Zavzpret 10 MG/ACT solution 10 mg into the nostril(s) as directed by provider Daily As Needed (migraine). (Patient taking differently: Administer 10 mg into the nostril(s) as directed by provider Daily As Needed (migraine). Takes as needed) 6 each 5    buPROPion XL (Wellbutrin XL) 150 MG 24 hr tablet Take 1 tablet by mouth Daily. 30 tablet 2     No current facility-administered medications for this visit.     Past Surgical History:   Procedure Laterality Date    BREAST LUMPECTOMY Right     BREAST RECONSTRUCTION Bilateral 1/9/2024    Procedure: BREAST RECONSTRUCTION WITH MESH AND IMPLANTS - USE OF  SPY;  Surgeon: Jackie Sanders MD;  Location: [facility];  Service: Plastics;  Laterality: Bilateral;     SECTION      MASTECTOMY W/ SENTINEL NODE BIOPSY Bilateral 2024    Procedure: BREAST MASTECTOMY WITH RIGHT AXILLARY SENTINEL NODE IDENTIFICATION AND EXCISION BIOPSY;  Surgeon: Christiana Whiting MD;  Location: [facility];  Service: General;  Laterality: Bilateral;    PORTACATH PLACEMENT      SKIN BIOPSY      TUBAL ABDOMINAL LIGATION        Health Maintenance Due   Topic Date Due    HEPATITIS C SCREENING  Never done    ANNUAL PHYSICAL  2024      Immunization History   Administered Date(s) Administered    COVID-19 (PFIZER) Purple Cap Monovalent 2021, 06/10/2021    DTaP, Unspecified 1999    HPV Quadrivalent 2009, 2009, 12/15/2009    Hepatitis A 2018    MMR 1999    Meningococcal, Unspecified 2012    OPV 1999    Td (TDVAX) 2006    Tdap 2014, 2024    Varicella 1999         Part of this note may be an electronic transcription/translation of spoken language to printed   text using the Dragon Dictation System.      MARIO Bullock

## 2024-11-18 ENCOUNTER — ANESTHESIA EVENT (OUTPATIENT)
Dept: PERIOP | Facility: HOSPITAL | Age: 29
End: 2024-11-18
Payer: COMMERCIAL

## 2024-11-19 ENCOUNTER — HOSPITAL ENCOUNTER (OUTPATIENT)
Facility: HOSPITAL | Age: 29
Setting detail: HOSPITAL OUTPATIENT SURGERY
Discharge: HOME | End: 2024-11-19
Attending: SURGERY | Admitting: SURGERY
Payer: COMMERCIAL

## 2024-11-19 ENCOUNTER — ANESTHESIA (OUTPATIENT)
Dept: PERIOP | Facility: HOSPITAL | Age: 29
End: 2024-11-19
Payer: COMMERCIAL

## 2024-11-19 VITALS
BODY MASS INDEX: 27.58 KG/M2 | TEMPERATURE: 97 F | HEIGHT: 63 IN | RESPIRATION RATE: 16 BRPM | DIASTOLIC BLOOD PRESSURE: 51 MMHG | SYSTOLIC BLOOD PRESSURE: 96 MMHG | WEIGHT: 155.65 LBS | HEART RATE: 78 BPM | OXYGEN SATURATION: 95 %

## 2024-11-19 DIAGNOSIS — N65.0 BREAST RECONSTRUCTION DEFORMITY: ICD-10-CM

## 2024-11-19 LAB — COTININE UR-MCNC: NEGATIVE NG/ML

## 2024-11-19 PROCEDURE — 25010000002 EPINEPHRINE (ANAPHYLAXIS) 1 MG/ML SOLUTION: Performed by: SURGERY

## 2024-11-19 PROCEDURE — 25010000002 HYDROMORPHONE 1 MG/ML SOLUTION

## 2024-11-19 PROCEDURE — 25010000002 KETOROLAC TROMETHAMINE PER 15 MG

## 2024-11-19 PROCEDURE — 63710000001 PROMETHAZINE PER 12.5 MG

## 2024-11-19 PROCEDURE — 25010000002 BUPIVACAINE (PF) 0.5 % SOLUTION: Performed by: SURGERY

## 2024-11-19 PROCEDURE — 15771 GRFG AUTOL FAT LIPO 50 CC/<: CPT | Performed by: SURGERY

## 2024-11-19 PROCEDURE — 25810000003 LACTATED RINGERS PER 1000 ML: Performed by: ANESTHESIOLOGY

## 2024-11-19 PROCEDURE — 11046 DBRDMT MUSC&/FSCA EA ADDL: CPT | Performed by: SURGERY

## 2024-11-19 PROCEDURE — 15772 GRFG AUTOL FAT LIPO EA ADDL: CPT | Performed by: SURGERY

## 2024-11-19 PROCEDURE — 19370 REVJ PERI-IMPLT CAPSULE BRST: CPT | Performed by: SURGERY

## 2024-11-19 PROCEDURE — 36590 REMOVAL TUNNELED CV CATH: CPT | Performed by: SURGERY

## 2024-11-19 PROCEDURE — 25010000002 SUGAMMADEX 200 MG/2ML SOLUTION

## 2024-11-19 PROCEDURE — 25010000002 CEFAZOLIN PER 500 MG: Performed by: SURGERY

## 2024-11-19 PROCEDURE — 88300 SURGICAL PATH GROSS: CPT | Performed by: SURGERY

## 2024-11-19 PROCEDURE — 25010000002 FENTANYL CITRATE (PF) 50 MCG/ML SOLUTION

## 2024-11-19 PROCEDURE — 25010000002 MIDAZOLAM PER 1MG: Performed by: ANESTHESIOLOGY

## 2024-11-19 PROCEDURE — 88302 TISSUE EXAM BY PATHOLOGIST: CPT | Performed by: SURGERY

## 2024-11-19 PROCEDURE — 25010000002 PROPOFOL 200 MG/20ML EMULSION

## 2024-11-19 PROCEDURE — 11043 DBRDMT MUSC&/FSCA 1ST 20/<: CPT | Performed by: SURGERY

## 2024-11-19 PROCEDURE — 25010000002 ONDANSETRON PER 1 MG

## 2024-11-19 PROCEDURE — G0480 DRUG TEST DEF 1-7 CLASSES: HCPCS | Performed by: SURGERY

## 2024-11-19 PROCEDURE — 25010000002 LIDOCAINE PF 2% 2 % SOLUTION

## 2024-11-19 PROCEDURE — 25010000002 DEXAMETHASONE PER 1 MG: Performed by: SURGERY

## 2024-11-19 PROCEDURE — C1789 PROSTHESIS, BREAST, IMP: HCPCS | Performed by: SURGERY

## 2024-11-19 PROCEDURE — 25010000002 DEXAMETHASONE PER 1 MG

## 2024-11-19 DEVICE — BRST SIL HSCPLS OPUSLUXE SMOTH RND HI/PROJ 415CC: Type: IMPLANTABLE DEVICE | Site: BREAST | Status: FUNCTIONAL

## 2024-11-19 RX ORDER — MEPERIDINE HYDROCHLORIDE 25 MG/ML
12.5 INJECTION INTRAMUSCULAR; INTRAVENOUS; SUBCUTANEOUS
Status: DISCONTINUED | OUTPATIENT
Start: 2024-11-19 | End: 2024-11-19 | Stop reason: HOSPADM

## 2024-11-19 RX ORDER — FENTANYL CITRATE 50 UG/ML
INJECTION, SOLUTION INTRAMUSCULAR; INTRAVENOUS AS NEEDED
Status: DISCONTINUED | OUTPATIENT
Start: 2024-11-19 | End: 2024-11-19 | Stop reason: SURG

## 2024-11-19 RX ORDER — SCOLOPAMINE TRANSDERMAL SYSTEM 1 MG/1
1 PATCH, EXTENDED RELEASE TRANSDERMAL CONTINUOUS
Status: DISCONTINUED | OUTPATIENT
Start: 2024-11-19 | End: 2024-11-19 | Stop reason: HOSPADM

## 2024-11-19 RX ORDER — ACETAMINOPHEN 500 MG
1000 TABLET ORAL ONCE
Status: DISCONTINUED | OUTPATIENT
Start: 2024-11-19 | End: 2024-11-19 | Stop reason: HOSPADM

## 2024-11-19 RX ORDER — ONDANSETRON 2 MG/ML
4 INJECTION INTRAMUSCULAR; INTRAVENOUS ONCE AS NEEDED
Status: DISCONTINUED | OUTPATIENT
Start: 2024-11-19 | End: 2024-11-19 | Stop reason: HOSPADM

## 2024-11-19 RX ORDER — DEXMEDETOMIDINE HYDROCHLORIDE 100 UG/ML
INJECTION, SOLUTION INTRAVENOUS AS NEEDED
Status: DISCONTINUED | OUTPATIENT
Start: 2024-11-19 | End: 2024-11-19 | Stop reason: SURG

## 2024-11-19 RX ORDER — KETOROLAC TROMETHAMINE 15 MG/ML
INJECTION, SOLUTION INTRAMUSCULAR; INTRAVENOUS AS NEEDED
Status: DISCONTINUED | OUTPATIENT
Start: 2024-11-19 | End: 2024-11-19 | Stop reason: SURG

## 2024-11-19 RX ORDER — ACETAMINOPHEN 500 MG
1000 TABLET ORAL ONCE
Status: COMPLETED | OUTPATIENT
Start: 2024-11-19 | End: 2024-11-19

## 2024-11-19 RX ORDER — SODIUM CHLORIDE, SODIUM LACTATE, POTASSIUM CHLORIDE, CALCIUM CHLORIDE 600; 310; 30; 20 MG/100ML; MG/100ML; MG/100ML; MG/100ML
20 INJECTION, SOLUTION INTRAVENOUS CONTINUOUS PRN
Status: DISCONTINUED | OUTPATIENT
Start: 2024-11-19 | End: 2024-11-19 | Stop reason: HOSPADM

## 2024-11-19 RX ORDER — OXYCODONE HYDROCHLORIDE 5 MG/1
5 TABLET ORAL
Status: DISCONTINUED | OUTPATIENT
Start: 2024-11-19 | End: 2024-11-19 | Stop reason: HOSPADM

## 2024-11-19 RX ORDER — TRANEXAMIC ACID 10 MG/ML
1000 INJECTION, SOLUTION INTRAVENOUS
Status: COMPLETED | OUTPATIENT
Start: 2024-11-19 | End: 2024-11-19

## 2024-11-19 RX ORDER — DEXAMETHASONE SODIUM PHOSPHATE 4 MG/ML
INJECTION, SOLUTION INTRA-ARTICULAR; INTRALESIONAL; INTRAMUSCULAR; INTRAVENOUS; SOFT TISSUE AS NEEDED
Status: DISCONTINUED | OUTPATIENT
Start: 2024-11-19 | End: 2024-11-19 | Stop reason: HOSPADM

## 2024-11-19 RX ORDER — PROMETHAZINE HYDROCHLORIDE 12.5 MG/1
25 TABLET ORAL ONCE AS NEEDED
Status: COMPLETED | OUTPATIENT
Start: 2024-11-19 | End: 2024-11-19

## 2024-11-19 RX ORDER — LIDOCAINE HYDROCHLORIDE 20 MG/ML
INJECTION, SOLUTION EPIDURAL; INFILTRATION; INTRACAUDAL; PERINEURAL AS NEEDED
Status: DISCONTINUED | OUTPATIENT
Start: 2024-11-19 | End: 2024-11-19 | Stop reason: SURG

## 2024-11-19 RX ORDER — EPINEPHRINE 1 MG/ML
INJECTION, SOLUTION INTRAMUSCULAR; SUBCUTANEOUS AS NEEDED
Status: DISCONTINUED | OUTPATIENT
Start: 2024-11-19 | End: 2024-11-19 | Stop reason: HOSPADM

## 2024-11-19 RX ORDER — PROMETHAZINE HYDROCHLORIDE 25 MG/1
25 SUPPOSITORY RECTAL ONCE AS NEEDED
Status: COMPLETED | OUTPATIENT
Start: 2024-11-19 | End: 2024-11-19

## 2024-11-19 RX ORDER — ONDANSETRON 2 MG/ML
INJECTION INTRAMUSCULAR; INTRAVENOUS AS NEEDED
Status: DISCONTINUED | OUTPATIENT
Start: 2024-11-19 | End: 2024-11-19 | Stop reason: SURG

## 2024-11-19 RX ORDER — TRANEXAMIC ACID 100 MG/ML
500 INJECTION, SOLUTION INTRAVENOUS ONCE
Status: DISCONTINUED | OUTPATIENT
Start: 2024-11-19 | End: 2024-11-19 | Stop reason: HOSPADM

## 2024-11-19 RX ORDER — ROCURONIUM BROMIDE 10 MG/ML
INJECTION, SOLUTION INTRAVENOUS AS NEEDED
Status: DISCONTINUED | OUTPATIENT
Start: 2024-11-19 | End: 2024-11-19 | Stop reason: SURG

## 2024-11-19 RX ORDER — BUPIVACAINE HYDROCHLORIDE 5 MG/ML
INJECTION, SOLUTION EPIDURAL; INTRACAUDAL AS NEEDED
Status: DISCONTINUED | OUTPATIENT
Start: 2024-11-19 | End: 2024-11-19 | Stop reason: HOSPADM

## 2024-11-19 RX ORDER — PROPOFOL 10 MG/ML
INJECTION, EMULSION INTRAVENOUS AS NEEDED
Status: DISCONTINUED | OUTPATIENT
Start: 2024-11-19 | End: 2024-11-19 | Stop reason: SURG

## 2024-11-19 RX ORDER — DEXAMETHASONE SODIUM PHOSPHATE 4 MG/ML
INJECTION, SOLUTION INTRA-ARTICULAR; INTRALESIONAL; INTRAMUSCULAR; INTRAVENOUS; SOFT TISSUE AS NEEDED
Status: DISCONTINUED | OUTPATIENT
Start: 2024-11-19 | End: 2024-11-19 | Stop reason: SURG

## 2024-11-19 RX ORDER — MIDAZOLAM HYDROCHLORIDE 2 MG/2ML
2 INJECTION, SOLUTION INTRAMUSCULAR; INTRAVENOUS ONCE
Status: COMPLETED | OUTPATIENT
Start: 2024-11-19 | End: 2024-11-19

## 2024-11-19 RX ORDER — SODIUM CHLORIDE, SODIUM LACTATE, POTASSIUM CHLORIDE, AND CALCIUM CHLORIDE .6; .31; .03; .02 G/100ML; G/100ML; G/100ML; G/100ML
INJECTION, SOLUTION INTRAVENOUS AS NEEDED
Status: DISCONTINUED | OUTPATIENT
Start: 2024-11-19 | End: 2024-11-19 | Stop reason: HOSPADM

## 2024-11-19 RX ADMIN — ROCURONIUM BROMIDE 10 MG: 10 INJECTION, SOLUTION INTRAVENOUS at 09:31

## 2024-11-19 RX ADMIN — LIDOCAINE HYDROCHLORIDE 60 MG: 20 INJECTION, SOLUTION EPIDURAL; INFILTRATION; INTRACAUDAL; PERINEURAL at 07:32

## 2024-11-19 RX ADMIN — ONDANSETRON 4 MG: 2 INJECTION INTRAMUSCULAR; INTRAVENOUS at 07:38

## 2024-11-19 RX ADMIN — PROMETHAZINE HYDROCHLORIDE 25 MG: 12.5 TABLET ORAL at 11:15

## 2024-11-19 RX ADMIN — SODIUM CHLORIDE, POTASSIUM CHLORIDE, SODIUM LACTATE AND CALCIUM CHLORIDE: 600; 310; 30; 20 INJECTION, SOLUTION INTRAVENOUS at 07:26

## 2024-11-19 RX ADMIN — ROCURONIUM BROMIDE 60 MG: 10 INJECTION, SOLUTION INTRAVENOUS at 07:32

## 2024-11-19 RX ADMIN — DEXMEDETOMIDINE HYDROCHLORIDE 10 MCG: 100 INJECTION, SOLUTION, CONCENTRATE INTRAVENOUS at 09:58

## 2024-11-19 RX ADMIN — SODIUM CHLORIDE, POTASSIUM CHLORIDE, SODIUM LACTATE AND CALCIUM CHLORIDE: 600; 310; 30; 20 INJECTION, SOLUTION INTRAVENOUS at 09:37

## 2024-11-19 RX ADMIN — TRANEXAMIC ACID 1000 MG: 10 INJECTION, SOLUTION INTRAVENOUS at 07:20

## 2024-11-19 RX ADMIN — DEXMEDETOMIDINE HYDROCHLORIDE 10 MCG: 100 INJECTION, SOLUTION, CONCENTRATE INTRAVENOUS at 09:07

## 2024-11-19 RX ADMIN — FENTANYL CITRATE 50 MCG: 50 INJECTION, SOLUTION INTRAMUSCULAR; INTRAVENOUS at 08:00

## 2024-11-19 RX ADMIN — ACETAMINOPHEN 1000 MG: 500 TABLET ORAL at 06:46

## 2024-11-19 RX ADMIN — HYDROMORPHONE HYDROCHLORIDE 0.5 MG: 1 INJECTION, SOLUTION INTRAMUSCULAR; INTRAVENOUS; SUBCUTANEOUS at 10:35

## 2024-11-19 RX ADMIN — DEXMEDETOMIDINE HYDROCHLORIDE 10 MCG: 100 INJECTION, SOLUTION, CONCENTRATE INTRAVENOUS at 08:44

## 2024-11-19 RX ADMIN — SODIUM CHLORIDE 2000 MG: 9 INJECTION, SOLUTION INTRAVENOUS at 07:40

## 2024-11-19 RX ADMIN — SUGAMMADEX 200 MG: 100 INJECTION, SOLUTION INTRAVENOUS at 10:22

## 2024-11-19 RX ADMIN — DEXMEDETOMIDINE HYDROCHLORIDE 10 MCG: 100 INJECTION, SOLUTION, CONCENTRATE INTRAVENOUS at 07:56

## 2024-11-19 RX ADMIN — ROCURONIUM BROMIDE 30 MG: 10 INJECTION, SOLUTION INTRAVENOUS at 08:35

## 2024-11-19 RX ADMIN — SCOPALAMINE 1 PATCH: 1 PATCH, EXTENDED RELEASE TRANSDERMAL at 06:47

## 2024-11-19 RX ADMIN — MIDAZOLAM HYDROCHLORIDE 2 MG: 1 INJECTION, SOLUTION INTRAMUSCULAR; INTRAVENOUS at 07:24

## 2024-11-19 RX ADMIN — PROPOFOL 160 MG: 10 INJECTION, EMULSION INTRAVENOUS at 07:32

## 2024-11-19 RX ADMIN — FENTANYL CITRATE 50 MCG: 50 INJECTION, SOLUTION INTRAMUSCULAR; INTRAVENOUS at 07:32

## 2024-11-19 RX ADMIN — DEXAMETHASONE SODIUM PHOSPHATE 8 MG: 4 INJECTION, SOLUTION INTRAMUSCULAR; INTRAVENOUS at 07:38

## 2024-11-19 RX ADMIN — KETOROLAC TROMETHAMINE 30 MG: 15 INJECTION, SOLUTION INTRAMUSCULAR; INTRAVENOUS at 10:23

## 2024-11-19 NOTE — DISCHARGE INSTRUCTIONS
Ok for regular diet  Do not drive for next 2 weeks  Ok to shower in 3 days but be aware you are a fall risk so have someone with you or place a chair in the shower. It is ok to wet the breast. Keep dressings over the drains intact.  No sponge or cloths. Wash the drain site before other parts of the body  Strip drains q 8 hours and record drain output daily. Wash hands or wear gloves when manipulating drains.  Do not use your under garments to secure your drains.   Do not exercise for the next 6 weeks.  Sleep in an upright position  No bra for 1 week. After that, wear a comfortable soft bra  Wear abdominal binder during the day.   Ok to move arms slowly to the shoulder level (brushing hair movement)  Do not fly for 2 months    Take post-operative medications as directed on the bottle.      DISCHARGE INSTRUCTIONS  SURGICAL / AMBULATORY  PROCEDURES      For your surgery you had:  General anesthesia (you may have a sore throat for the first 24 hours)  You received a medicated patch for nausea prevention today (behind your ear). It is recommended that you remove it 24-48 hours post-operatively. It must be removed within 72 hours.     You may experience dizziness, drowsiness, or light-headedness for several hours following surgery/procedure.  Do not stay alone today or tonight.  Limit your activity for 24 hours.  Resume your diet slowly.  Follow whatever special dietary instructions you may have been given by your doctor.  You should not drive or operate machinery, drink alcohol, or sign legally binding documents for 24 hours or while you are taking pain medication.  Last dose of pain medication was given at:   .  NOTIFY YOUR DOCTOR IF YOU EXPERIENCE ANY OF THE FOLLOWING:  Temperature greater than 101 degrees Fahrenheit  Shaking Chills  Redness or excessive drainage from incision  Nausea, vomiting and/or pain that is not controlled by prescribed medications  Increase in bleeding or bleeding that is excessive  Unable to  urinate in 6 hours after surgery  If unable to reach your doctor, please go to the closest Emergency Room  Medications per physician instructions as indicated on Discharge Medication Information Sheet.  SPECIAL INSTRUCTIONS:                                     If you experience any issues or concerns, please contact the office at [phone]. If after hours a call service will notify the on-call provider.

## 2024-11-19 NOTE — ANESTHESIA PREPROCEDURE EVALUATION
Anesthesia Evaluation     Patient summary reviewed and Nursing notes reviewed   no history of anesthetic complications:   NPO Solid Status: > 8 hours  NPO Liquid Status: > 2 hours           Airway   Mallampati: I  TM distance: >3 FB  Neck ROM: full  No difficulty expected  Dental      Pulmonary - negative pulmonary ROS and normal exam    breath sounds clear to auscultation  Cardiovascular - negative cardio ROS and normal exam  Exercise tolerance: good (4-7 METS)    Rhythm: regular  Rate: normal        Neuro/Psych  (+) headaches, psychiatric history Anxiety  GI/Hepatic/Renal/Endo    (+) GERD    Musculoskeletal (-) negative ROS    Abdominal    Substance History - negative use     OB/GYN negative ob/gyn ROS         Other      history of cancer remission    ROS/Med Hx Other: PAT Nursing Notes unavailable.               Anesthesia Plan    ASA 2     general     (Patient understands anesthesia not responsible for dental damage.)  intravenous induction     Anesthetic plan, risks, benefits, and alternatives have been provided, discussed and informed consent has been obtained with: patient.    Use of blood products discussed with patient .    Plan discussed with CRNA.    CODE STATUS:

## 2024-11-19 NOTE — ANESTHESIA POSTPROCEDURE EVALUATION
Patient: Marilee Watts    Procedure Summary       Date: 11/19/24 Room / Location: [facility] OR [address] [place] [address] OR [facility]    Anesthesia Start: 0726 Anesthesia Stop: 1038    Procedure: Bilateral breast revision with fat graft and nipple reconstruction, port removal, right implant exchange,umbilical hernia repair (Bilateral: Breast) Diagnosis:       Breast reconstruction deformity      (Breast reconstruction deformity [N65.0])    Surgeons: Jackie Sanders MD Provider: Russ Cali MD    Anesthesia Type: general ASA Status: 2            Anesthesia Type: general    Vitals  Vitals Value Taken Time   /69 11/19/24 1114   Temp 36.1 °C (97 °F) 11/19/24 1101   Pulse 89 11/19/24 1116   Resp 16 11/19/24 1101   SpO2 95 % 11/19/24 1116   Vitals shown include unfiled device data.        Post Anesthesia Care and Evaluation    Patient location during evaluation: bedside  Patient participation: complete - patient participated  Level of consciousness: awake  Pain score: 2  Pain management: adequate    Airway patency: patent  Anesthetic complications: No anesthetic complications  PONV Status: none  Cardiovascular status: acceptable and stable  Respiratory status: acceptable  Hydration status: acceptable

## 2024-11-19 NOTE — PROGRESS NOTES
"Chief Complaint  Post-op Follow-up (Breast Recon Revision)    Subjective          History of Present Illness  Marilee Watts is a 28 y.o. female who presents to [facility] for Postoperative Follow-Up of BREAST RECONSTRUCTION REVISION, Bilateral breast revision with fat graft and nipple reconstruction, port removal, right implant exchange,umbilical hernia repair with possible mesh 11/19/24.    Pt presents today for 1w post op.      Allergies: Pembrolizumab and Tegaderm ag mesh [silver]  Allergies Reconciled.    Review of Systems   All review of system has been reviewed and it  is negative except the ones note above.     Objective     /73 (BP Location: Left arm, Patient Position: Sitting, Cuff Size: Adult)   Pulse 82   Ht 160 cm (62.99\")   Wt 71.1 kg (156 lb 12.8 oz)   SpO2 98%   BMI 27.78 kg/m²     Body mass index is 27.78 kg/m².            Physical Exam        General Inspection: Incision healing well, no swelling, no erythema, no drainage.    Result Review :                Assessment and Plan      Diagnoses and all orders for this visit:    1. Breast reconstruction deformity (Primary)        Plan:     Assessment & Plan            Follow Up     Return in about 1 week (around 12/2/2024) for possible drain removal with Evelyn.    Patient was given instructions and counseling regarding her condition. Please see specific information pulled into the AVS if appropriate.     Jackie Sanders MD  11/25/2024      Patient or patient representative verbalized consent for the use of Ambient Listening during the visit with  Jackie Sanders MD for chart documentation. 11/27/2024  11:15 EST   "

## 2024-11-19 NOTE — INTERVAL H&P NOTE
H&P reviewed. The patient was examined and there are no changes to the H&P.    Patient is not tachycardic  Respirations are non elaborated  Vitals:    11/19/24 0625   BP: 110/84   Pulse: 77   Resp: 16   Temp: 98.7 °F (37.1 °C)   SpO2: 98%     Risks and benefits reminded to patient who agrees to proceed

## 2024-11-19 NOTE — OP NOTE
Plastic Surgery Op Note  BILATERAL BREAST RECONSTRUCTION REVISION WITH FAT GRAFT, RIGHT PARTIAL CAPSULECTOMY, RIGHT IMPLANT EXCHANGE , DEBRIDEMENT OF RIGHT AXILLA AND CHEST WALL, BILATERAL NIPPLE RECONSTRUCTION AND UMBILICAL HERNIA REPAIR WITHOUT MESH. PORT REMOVAL    Marilee Watts  11/19/2024    Pre-op Diagnosis:   Breast reconstruction deformity [N65.0]    Post-Op Diagnosis Codes:     * Breast reconstruction deformity [N65.0]      Anesthesia: General    Staff:   Circulator: Jos Moya RN  Scrub Person: Saba Jang  Assistant: Evelyn Lion PA-C; Elisa Roberts RN CSA    Surgeon: Dr Sanders  First Assistant: PETRA Prado who was instrumental in helping with hemostasis, visualization and retraction structures as well as surgical closure.  Her skilled assistance was necessary for the success of this case.      Estimated Blood Loss: 100 mL    Specimens:   Order Name Source Comment Collection Info Order Time   NICOTINE SCREEN, URINE Urine, Clean Catch  Collected By: Abbe Jaramillo 11/19/2024  6:13 AM     Release to patient   Routine Release        TISSUE PATHOLOGY EXAM Breast, Right  Collected By: Jackie Sanders MD 11/19/2024  9:27 AM     Release to patient   Routine Release              Drains:   Closed/Suction Drain 1 Inferior;Lateral;Right Breast Bulb 15 Fr. (Active)   Dressing Status Clean;Dry;Intact 11/19/24 1014       [REMOVED] Closed/Suction Drain 1 Inferior;Right Breast Bulb 15 Fr. (Removed)       [REMOVED] Closed/Suction Drain 1 Inferior;Left Breast Bulb 15 Fr. (Removed)       Findings:   Intense radiation injury to the right breast and chest wall.    Implant was intact   ADM was well incorporated  Port removed to 27 cm   Excisional debridement of the radiation injury to the muscle 7x 3 cm   Nipple cutaneous flaps: 3x 4 cm on each side.   Fat graft: 280 cc      Implants:      Right:   Sientra Smooth round gel implant [id number]   No mesh   Left:  No implants were changed      Complications: none     After risks and benefits were discussed with patient and informed consent was signed, patient was taken to the OR and positioned supine. The surgical site was then prepped in a sterile fashion way and a time out was performed confirming patient's name and procedure to be performed. All surgical team was introduced by name.   I started with tumescent injection over the abdomen and with a cannula #4, lipoaspirate was obtained and fat was trapped on a fat graft system. While the fat was being processed, I proceeded with the remaining of the surgery.  Then, I incised the port incision and removed the old port. The incision was closed with 3 layers of 3-0 vicryl. Then, the right breast was incised and the old implant removed. The pocket inspected and part of the capsule excised. The radiation injury was debrided. Then, I performed neve block. Fat graft was done on both breasts. I irrigated, placed a drain and then placed a new implant. The incision was closed with 2-0 PDS and 3-0 vicryl. Bilateral nipple reconstruction was performed with C-V cutaneous flaps that were closed with 3-0 vicryl and 4-0 monocryl.   I then incised the umbilicus and dissected the pre peritoneal fat. The fascia defect was less than 1 cm square and I closed with interrupted 0 PDS sutures x 2 . The skin was then closed with 3-0 vicryl. The incisions were then closed with surgical glue system .   Patient tolerated procedure well, there were no complications, all instruments were checked and patient was awakened and taken to PACU in stable condition.  I was present for the entire procedure.     Date: 11/19/2024  Time: 10:23 KAREN Sanders MD  11/19/24  10:23 KAREN

## 2024-11-25 ENCOUNTER — OFFICE VISIT (OUTPATIENT)
Dept: PLASTIC SURGERY | Facility: CLINIC | Age: 29
End: 2024-11-25
Payer: COMMERCIAL

## 2024-11-25 VITALS
BODY MASS INDEX: 27.78 KG/M2 | WEIGHT: 156.8 LBS | DIASTOLIC BLOOD PRESSURE: 73 MMHG | HEART RATE: 82 BPM | SYSTOLIC BLOOD PRESSURE: 109 MMHG | OXYGEN SATURATION: 98 % | HEIGHT: 63 IN

## 2024-11-25 DIAGNOSIS — N65.0 BREAST RECONSTRUCTION DEFORMITY: Primary | ICD-10-CM

## 2024-11-25 PROCEDURE — 99024 POSTOP FOLLOW-UP VISIT: CPT | Performed by: SURGERY

## 2024-12-02 ENCOUNTER — OFFICE VISIT (OUTPATIENT)
Dept: PLASTIC SURGERY | Facility: CLINIC | Age: 29
End: 2024-12-02
Payer: COMMERCIAL

## 2024-12-02 VITALS
HEART RATE: 80 BPM | HEIGHT: 63 IN | BODY MASS INDEX: 28.17 KG/M2 | SYSTOLIC BLOOD PRESSURE: 109 MMHG | OXYGEN SATURATION: 98 % | WEIGHT: 159 LBS | DIASTOLIC BLOOD PRESSURE: 77 MMHG

## 2024-12-02 DIAGNOSIS — Z09 POSTOPERATIVE FOLLOW-UP: Primary | ICD-10-CM

## 2024-12-02 DIAGNOSIS — L24.9 IRRITANT CONTACT DERMATITIS, UNSPECIFIED TRIGGER: ICD-10-CM

## 2024-12-02 PROCEDURE — 1160F RVW MEDS BY RX/DR IN RCRD: CPT | Performed by: PHYSICIAN ASSISTANT

## 2024-12-02 PROCEDURE — 1159F MED LIST DOCD IN RCRD: CPT | Performed by: PHYSICIAN ASSISTANT

## 2024-12-02 PROCEDURE — 99024 POSTOP FOLLOW-UP VISIT: CPT | Performed by: PHYSICIAN ASSISTANT

## 2024-12-02 RX ORDER — SULFAMETHOXAZOLE AND TRIMETHOPRIM 800; 160 MG/1; MG/1
1 TABLET ORAL 2 TIMES DAILY
Qty: 28 TABLET | Refills: 0 | Status: SHIPPED | OUTPATIENT
Start: 2024-12-02

## 2024-12-02 RX ORDER — PREDNISONE 20 MG/1
TABLET ORAL
Qty: 6 TABLET | Refills: 0 | Status: SHIPPED | OUTPATIENT
Start: 2024-12-02

## 2024-12-02 NOTE — PROGRESS NOTES
"Chief Complaint  Post-op Follow-up    Subjective          History of Present Illness  Marilee Watts is a 28 y.o. female who presents to [facility]GERY for Postoperative Follow-Up of Bilateral breast revision with fat graft and nipple reconstruction, port removal, right implant exchange,umbilical hernia repair 11/19/24.    Pt presents today for 2w post op.  Drain less than 10 mls daily for last few days.  Patient is very red and irritated where the tape and incision line is.  She states that it is very itchy.  She went to [facility] and was given a steroid injection 3 days ago, last Friday.      Allergies: Pembrolizumab and Tegaderm ag mesh [silver]  Allergies Reconciled.    Review of Systems   All review of system has been reviewed and it  is negative except the ones note above.     Objective     /77 (BP Location: Left arm, Patient Position: Sitting, Cuff Size: Adult)   Pulse 80   Ht 160 cm (62.99\")   Wt 72.1 kg (159 lb)   SpO2 98%   BMI 28.17 kg/m²     Body mass index is 28.17 kg/m².            Physical Exam        General Inspection: no acute distress, comfortable.    Breasts - well demarcated erythema around all incisions, no vesicles or scale.  Incisions are intact without any dehiscence. No drainage note on exam today from any of the incisions, including the umbilicus. Expected swelling and ecchymosis, improving.     Result Review :                Assessment and Plan      Diagnoses and all orders for this visit:    1. Postoperative follow-up (Primary)    2. Irritant contact dermatitis, unspecified trigger    Other orders  -     predniSONE (DELTASONE) 20 MG tablet; Take 1 tab po for 4 days qam with food, then take 1/2 tab po qam for 4 days.  Dispense: 6 tablet; Refill: 0  -     sulfamethoxazole-trimethoprim (Bactrim DS) 800-160 MG per tablet; Take 1 tablet by mouth 2 (Two) Times a Day.  Dispense: 28 tablet; Refill: 0        Plan:     Assessment & Plan  1. " Post-surgical follow-up.    The patient's condition is generally satisfactory, with the exception of a small area of contact dermatitis likely due to adhesive exposure. The axillary region, where significant scar tissue was released, is expected to remain tender for some time. The patient has been informed that the final outcome of the surgery will not be fully apparent until approximately 3 months post-procedure. She has been cautioned to avoid strenuous activities that could lead to fluid accumulation, necessitating aspiration. A prescription for Bactrim DS BID for 2 weeks has also been issued, to be taken twice daily with meals. She has been instructed to monitor for any new rashes or itching, which could indicate a reaction to the medication. The use of Benadryl cream is permissible, but hydrocortisone cream should not be applied to the incision site.    2. Contact dermatitis.  The contact dermatitis is likely due to adhesive/glue exposure (Exofin Fusion). She has been advised to use Benadryl cream for itching. Hydrocortisone cream should not be applied to the incision site to avoid breaking down the incision. Patient received steroid injection (?Solu-medrol) from [facility], but she is still having some itching. I sent in a 20mg prednisone taper for 8 days (20mg for 4 days, 10mg for 4 days) and advised patient she may start if her itching does not improve or worsens before her next appt. Instructions on proper use and potential side effects discussed.       Follow-up  The patient is scheduled for a follow-up visit in 1 week, or earlier if necessary.              Follow Up     Return in about 1 week (around 12/9/2024) for fluid check.    Patient was given instructions and counseling regarding her condition. Please see specific information pulled into the AVS if appropriate.     Evelyn Lino PA-C  12/02/2024      Patient or patient representative verbalized consent for the use of Ambient Listening during the  visit with  Evelyn Lion PA-C for chart documentation. 12/2/2024  10:50 EST

## 2024-12-04 ENCOUNTER — PROCEDURE VISIT (OUTPATIENT)
Dept: NEUROLOGY | Facility: CLINIC | Age: 29
End: 2024-12-04
Payer: COMMERCIAL

## 2024-12-04 DIAGNOSIS — G43.719 INTRACTABLE CHRONIC MIGRAINE WITHOUT AURA AND WITHOUT STATUS MIGRAINOSUS: Primary | ICD-10-CM

## 2024-12-06 NOTE — PROGRESS NOTES
"Chief Complaint  Post-op Follow-up    Subjective          History of Present Illness  Marilee Watts is a 28 y.o. female who presents to [facility]ON[facility] SURGERY for Postoperative Follow-Up of Bilateral breast revision with fat graft and nipple reconstruction, port removal, right implant exchange,umbilical hernia repair 11/19/24.     Pt presents today for 3w post op.    History of Present Illness  The patient is a 28-year-old female following up for breast reconstruction revision. She is 3 weeks postoperative.    She reports experiencing redness and itching, which she attributes to the adhesive used during her procedure. She has been attempting to remove the glue, suspecting it to be the cause of her recurring symptoms. She has been using alcohol to remove the adhesive residue. She was prescribed an oral steroid, which provided some relief, but the symptoms have started to reappear. She has been applying Benadryl cream to manage the itching.    She also notes a few small knots, which she was informed could be fat deposits. She has been avoiding wearing a bra due to the itching, but when she does wear one, it provides some relief.    She has been taking antibiotics and Zofran as needed for occasional nausea.        Allergies: Pembrolizumab and Tegaderm ag mesh [silver]  Allergies Reconciled.    Review of Systems   All review of system has been reviewed and it  is negative except the ones note above.     Objective     /76 (BP Location: Left arm, Patient Position: Sitting, Cuff Size: Adult)   Pulse 76   Ht 160 cm (62.99\")   Wt 70.6 kg (155 lb 9.6 oz)   SpO2 98%   BMI 27.57 kg/m²     Body mass index is 27.57 kg/m².            Physical Exam  Eczematous erythematous eruption noted near the former port site and at the superior portion of the right breast. No clinical evidence of any fluid on the right breast.      General Inspection: Incisions healing well, no swelling, no " erythema, no drainage. No areas of dehiscence.     Result Review :                Assessment and Plan      Diagnoses and all orders for this visit:    1. Postoperative follow-up (Primary)    2. S/P umbilical hernia repair, follow-up exam    3. Irritant dermatitis    Other orders  -     triamcinolone (KENALOG) 0.1 % cream; Apply BID to affected areas on the body PRN itching until clear.  Dispense: 80 g; Refill: 1        Plan:     Assessment & Plan  1. Postoperative follow-up.  The surgical scar appears to be healing well. There is an eczematous erythematous eruption near the former port site and at the superior portion of the right breast, likely due to adhesive irritation. No clinical evidence of fluid on the right breast. The patient reports some knots which could be from the fat grafting and are expected to last several months.     2. Irritant dermatitis.  The patient reports redness and itching at the surgical site. She has been using Benadryl cream, which has provided some relief. A prescription for Triamcinolone 0.1% cream will be provided to help with the symptoms. Adhesive remover pads were provided for use after showering to help remove any remaining adhesive.    Follow-up  Return in 2 weeks for follow up with Dr. Sanders for her 1 mo post-op.          Follow Up     Return in about 2 weeks (around 12/23/2024) for postop.    Patient was given instructions and counseling regarding her condition. Please see specific information pulled into the AVS if appropriate.     Evelyn Lion PA-C  12/09/2024      Patient or patient representative verbalized consent for the use of Ambient Listening during the visit with  Evelyn Lion PA-C for chart documentation. 12/10/2024  08:49 EST

## 2024-12-09 ENCOUNTER — OFFICE VISIT (OUTPATIENT)
Dept: PLASTIC SURGERY | Facility: CLINIC | Age: 29
End: 2024-12-09
Payer: COMMERCIAL

## 2024-12-09 VITALS
BODY MASS INDEX: 27.57 KG/M2 | HEART RATE: 76 BPM | WEIGHT: 155.6 LBS | DIASTOLIC BLOOD PRESSURE: 76 MMHG | OXYGEN SATURATION: 98 % | HEIGHT: 63 IN | SYSTOLIC BLOOD PRESSURE: 109 MMHG

## 2024-12-09 DIAGNOSIS — Z09 POSTOPERATIVE FOLLOW-UP: Primary | ICD-10-CM

## 2024-12-09 DIAGNOSIS — L24.9 IRRITANT DERMATITIS: ICD-10-CM

## 2024-12-09 DIAGNOSIS — Z09 S/P UMBILICAL HERNIA REPAIR, FOLLOW-UP EXAM: ICD-10-CM

## 2024-12-09 PROCEDURE — 99024 POSTOP FOLLOW-UP VISIT: CPT | Performed by: PHYSICIAN ASSISTANT

## 2024-12-09 PROCEDURE — 1160F RVW MEDS BY RX/DR IN RCRD: CPT | Performed by: PHYSICIAN ASSISTANT

## 2024-12-09 PROCEDURE — 1159F MED LIST DOCD IN RCRD: CPT | Performed by: PHYSICIAN ASSISTANT

## 2024-12-10 RX ORDER — TRIAMCINOLONE ACETONIDE 1 MG/G
CREAM TOPICAL
Qty: 80 G | Refills: 1 | Status: SHIPPED | OUTPATIENT
Start: 2024-12-10

## 2024-12-10 NOTE — PROGRESS NOTES
"Chief Complaint  Post-op Follow-up (1m post op)    Subjective          History of Present Illness  Marilee Watts is a 29 y.o. female who presents to [facility] PLASTIC [facility] RECONSTRU[facility]IVE SURGERY for Postoperative Follow-Up of Bilateral breast revision with fat graft and nipple reconstruction, port removal, right implant exchange,umbilical hernia repair 11/19/24.    Pt presents today for 1m post op. Doing well.  Pt states when she rolls over in bed or touches the area where her port was.    She doesn't feel like she has any fluid.      History of Present Illness          Allergies: Pembrolizumab and Tegaderm ag mesh [silver]  Allergies Reconciled.    Review of Systems   All review of system has been reviewed and it  is negative except the ones note above.     Objective     /69 (BP Location: Left arm, Patient Position: Sitting, Cuff Size: Adult)   Pulse 85   Ht 160 cm (62.99\")   Wt 71.2 kg (157 lb)   SpO2 97%   BMI 27.82 kg/m²     Body mass index is 27.82 kg/m².            Physical Exam        General Inspection: Incision healing well, no swelling, no erythema, no drainage.    Result Review :                Assessment and Plan      Diagnoses and all orders for this visit:    1. Breast reconstruction deformity (Primary)        Plan:     Assessment & Plan  Right breast is hard. Plan to proceed with ultrasound every week for 10 weeks.           Follow Up     No follow-ups on file.    Patient was given instructions and counseling regarding her condition. Please see specific information pulled into the AVS if appropriate.     Jackie Sanders MD  12/19/2024      Patient or patient representative verbalized consent for the use of Ambient Listening during the visit with  Jackie Sanders MD for chart documentation. 12/19/2024  15:09 EST    "

## 2024-12-19 ENCOUNTER — OFFICE VISIT (OUTPATIENT)
Dept: PLASTIC SURGERY | Facility: CLINIC | Age: 29
End: 2024-12-19
Payer: COMMERCIAL

## 2024-12-19 VITALS
WEIGHT: 157 LBS | BODY MASS INDEX: 27.82 KG/M2 | HEIGHT: 63 IN | HEART RATE: 85 BPM | OXYGEN SATURATION: 97 % | DIASTOLIC BLOOD PRESSURE: 69 MMHG | SYSTOLIC BLOOD PRESSURE: 126 MMHG

## 2024-12-19 DIAGNOSIS — N65.0 BREAST RECONSTRUCTION DEFORMITY: Primary | ICD-10-CM

## 2024-12-19 RX ORDER — NAPROXEN 250 MG/1
250 TABLET ORAL
COMMUNITY
Start: 2024-10-31 | End: 2024-12-19

## 2024-12-26 RX ORDER — ROPINIROLE 1 MG/1
1 TABLET, FILM COATED ORAL NIGHTLY
Qty: 90 TABLET | Refills: 3 | Status: SHIPPED | OUTPATIENT
Start: 2024-12-26

## 2024-12-26 RX ORDER — LIDOCAINE/PRILOCAINE 2.5 %-2.5%
1 CREAM (GRAM) TOPICAL TAKE AS DIRECTED
Qty: 30 G | Refills: 1 | Status: SHIPPED | OUTPATIENT
Start: 2024-12-26

## 2024-12-27 ENCOUNTER — CLINICAL SUPPORT (OUTPATIENT)
Dept: PLASTIC SURGERY | Facility: CLINIC | Age: 29
End: 2024-12-27
Payer: COMMERCIAL

## 2024-12-27 DIAGNOSIS — L58.9 RADIATION DERMATITIS: Primary | ICD-10-CM

## 2024-12-27 NOTE — PROGRESS NOTES
Theraputic Ultrasound done in office today to right breast end of incision for 7 min, 1.1w/cm2 3.3mhz. Patient tolerated well and will return for next scheduled ultrasound/appt.

## 2024-12-29 DIAGNOSIS — F39 MOOD DISORDER: ICD-10-CM

## 2024-12-29 DIAGNOSIS — F41.0 ANXIETY ATTACK: ICD-10-CM

## 2024-12-30 RX ORDER — DESVENLAFAXINE 50 MG/1
50 TABLET, FILM COATED, EXTENDED RELEASE ORAL DAILY
Qty: 90 TABLET | Refills: 0 | Status: SHIPPED | OUTPATIENT
Start: 2024-12-30

## 2025-01-03 ENCOUNTER — CLINICAL SUPPORT (OUTPATIENT)
Dept: PLASTIC SURGERY | Facility: CLINIC | Age: 30
End: 2025-01-03
Payer: COMMERCIAL

## 2025-01-03 ENCOUNTER — OFFICE VISIT (OUTPATIENT)
Dept: PLASTIC SURGERY | Facility: CLINIC | Age: 30
End: 2025-01-03
Payer: COMMERCIAL

## 2025-01-03 VITALS
HEIGHT: 63 IN | BODY MASS INDEX: 27.82 KG/M2 | SYSTOLIC BLOOD PRESSURE: 114 MMHG | HEART RATE: 84 BPM | DIASTOLIC BLOOD PRESSURE: 74 MMHG | WEIGHT: 157 LBS | OXYGEN SATURATION: 99 %

## 2025-01-03 DIAGNOSIS — L58.9 RADIATION DERMATITIS: Primary | ICD-10-CM

## 2025-01-03 DIAGNOSIS — Z09 POSTOPERATIVE FOLLOW-UP: Primary | ICD-10-CM

## 2025-01-03 NOTE — PROGRESS NOTES
3rd Theraputic Ultrasound done in office today to right breast end of incision for 7 min, 1.1w/cm2 3.3mhz. Patient tolerated well and will return for next scheduled ultrasound/appt.

## 2025-01-03 NOTE — PROGRESS NOTES
"Chief Complaint  Post-op Follow-up    Subjective          History of Present Illness  Marilee Watts is a 29 y.o. female who presents to [facility]IC [facility] RECON[facility]IVE SURGERY for Postoperative Follow-Up of Bilateral breast revision with fat graft and nipple reconstruction, port removal, right implant exchange,umbilical hernia repair 11/19/24.    Pt presents today with concerns regarding a bulge above her belly button and an odd pulling feeling.          Allergies: Pembrolizumab and Tegaderm ag mesh [silver]  Allergies Reconciled.    Review of Systems   All review of system has been reviewed and it  is negative except the ones note above.     Objective     /74 (BP Location: Left arm, Patient Position: Sitting, Cuff Size: Adult)   Pulse 84   Ht 160 cm (62.99\")   Wt 71.2 kg (157 lb)   SpO2 99%   BMI 27.82 kg/m²     Body mass index is 27.82 kg/m².      General Inspection: no acute distress, comfortable.    Umbilicus - Well healed surgical scar, no dehiscence. Slightly tender with palpation.   Lower abdomen - no visible eruption, no vesicles or scale. Loose skin noted on lower abdomen above the mons pubis and above the umbilicus.     Result Review :                Assessment and Plan      Diagnoses and all orders for this visit:    1. Postoperative follow-up (Primary)          Plan:     Advised patient that her discomfort is most likely normal post-operative pain from the liposuction procedure and may also account for the loose skin of the lower abdomen. She may continue to wear her abdominal binder as needed. She is almost 7 weeks post-op and advised that it usually takes a full 3 months for post-operative symptoms to fully subside.     She is thinking that she may need another fat grafting session to obtain more breast symmetry for her Right breast. I advised her to follow up with Dr. Sanders in Feb '25 to discuss and Dr. Sanders can examine her abdominal area as well. She may use " the Triamcinolone cream to her lower abdomen for the irritation in the meantime. Loose fitting clothing was advised as well.     She will continue her therapeutic U/S treatments for her Right breast, which she believes are helping.         Follow Up     Return for f/u in Feb with Dr. Sanders .    Patient was given instructions and counseling regarding her condition. Please see specific information pulled into the AVS if appropriate.     Evelyn Lion PA-C  01/03/2025      Patient or patient representative verbalized consent for the use of Ambient Listening during the visit with  Evelyn Lion PA-C for chart documentation. 1/3/2025  15:09 EST

## 2025-01-05 NOTE — PROGRESS NOTES
"Post-op Follow-up (3 month post op)            History of Present Illness  Marilee Watts is a 28 y.o. female who presents to [facility] as a post op for BREAST RECONSTRUCTION WITH MESH AND IMPLANTS - USE OF SPY 1/9/24.    Pt presents today for 3 month post op. Doing well.  Pt states when she is laying down face down she can feel a lump in right breast. She noticed it a couple days ago.    Pt finished 28 sessions of radiation today.    Pt currently having sinus issues and has no taste/smell. Pt currently taking steroid pack.  Has tested negative for covid.      Subjective  I'm glad to get radiation finished with    Tegaderm ag mesh [silver]  Allergies Reconciled.    Review of Systems   Constitutional: Negative.    HENT: Negative.     Eyes: Negative.    Respiratory: Negative.     Cardiovascular: Negative.    Gastrointestinal: Negative.    Endocrine: Negative.    Genitourinary: Negative.    Musculoskeletal: Negative.    Skin:  Positive for rash.        Right breast red, irritation from radiation noted   Allergic/Immunologic: Negative.    Neurological: Negative.    Hematological: Negative.    Psychiatric/Behavioral:  The patient is nervous/anxious.       All review of system has been reviewed and it  is negative except the ones note above.     Objective     /76 (BP Location: Left arm, Patient Position: Sitting, Cuff Size: Adult)   Pulse 83   Ht 160 cm (62.99\")   Wt 72.6 kg (160 lb)   SpO2 98%   BMI 28.35 kg/m²     Body mass index is 28.35 kg/m².      Physical Exam  Vitals reviewed. Exam conducted with a chaperone present.   Constitutional:       Appearance: Normal appearance.   HENT:      Head: Normocephalic and atraumatic.      Nose: Nose normal.      Mouth/Throat:      Mouth: Mucous membranes are moist.   Eyes:      Extraocular Movements: Extraocular movements intact.   Cardiovascular:      Rate and Rhythm: Normal rate.   Pulmonary:      Effort: Pulmonary " effort is normal.   Abdominal:      Palpations: Abdomen is soft.      Hernia: A hernia is present.      Comments: Umbilical hernia noted, patient concerned as she feels like it is getting bigger, feels it when she contracts her abdominal muscles when sitting up   Musculoskeletal:         General: Normal range of motion.      Cervical back: Normal range of motion.   Skin:     General: Skin is warm.      Findings: Rash present.      Comments: Redness to right breast and chest from radiation, scattered small areas of peeling right sternal border   Neurological:      General: No focal deficit present.      Mental Status: She is alert and oriented to person, place, and time.   Psychiatric:         Mood and Affect: Mood normal.       Breast: left breast soft, healing scar noted, no open areas   Right breast radiation redness noted, she finished last treatment this am. Right lateral scar is tight and pulling at axilla. Some asymmetry of breast noted.     Result Review :   no new results to review this visit    Assessment and Plan      Diagnoses and all orders for this visit:    1. S/P breast reconstruction, bilateral (Primary)      Additional Order(s):       Plan:  Photos obtained today in clinic.   Finish steroid pack for sinuses/allergies.   Patient has been putting cream from radiation on her right breast for itchiness.   RTC in 3 months for 6 month post op with Dr. Sanders.  Discussed with patient she will only be 3 months recovery from radiation and usual plan is to wait a full 6 months after radiation for any revision surgeries, will schedule next appointment with Dr. Sanders to discuss revision and when she can get on surgery schedule.     Scribed by Rosina Epstein MA, acting as a scribe for MARIO Hoyt, 04/09/24 11:52 EDT.  MARIO Hoyt's signature on the note affirms that the note adequately documents the care provided.        Patient was given instructions and counseling regarding her  condition. Please see specific information pulled into the AVS if appropriate.     Anh Chaney, MARIO  04/09/2024            05-Jan-2025 12:26

## 2025-01-07 ENCOUNTER — TELEPHONE (OUTPATIENT)
Dept: PSYCHIATRY | Facility: CLINIC | Age: 30
End: 2025-01-07
Payer: COMMERCIAL

## 2025-01-09 ENCOUNTER — TELEMEDICINE (OUTPATIENT)
Dept: BEHAVIORAL HEALTH | Facility: CLINIC | Age: 30
End: 2025-01-09
Payer: COMMERCIAL

## 2025-01-09 DIAGNOSIS — F41.0 GENERALIZED ANXIETY DISORDER WITH PANIC ATTACKS: Primary | ICD-10-CM

## 2025-01-09 DIAGNOSIS — T50.905A ADVERSE EFFECT OF DRUG, INITIAL ENCOUNTER: ICD-10-CM

## 2025-01-09 DIAGNOSIS — Z71.89 MEDICATION CARE PLAN DISCUSSED WITH PATIENT: ICD-10-CM

## 2025-01-09 DIAGNOSIS — Z79.899 MEDICATION MANAGEMENT: ICD-10-CM

## 2025-01-09 DIAGNOSIS — F41.1 GENERALIZED ANXIETY DISORDER WITH PANIC ATTACKS: Primary | ICD-10-CM

## 2025-01-09 RX ORDER — BUSPIRONE HYDROCHLORIDE 15 MG/1
7.5 TABLET ORAL 2 TIMES DAILY
Start: 2025-01-09

## 2025-01-09 NOTE — PATIENT INSTRUCTIONS
"1. Should you want to get in touch with your provider, MARIO Woods, please contact MY Medical Assistant, Pennie, directly at [phone].  Recommend saving Pennie's direct number in phone as this is the PREFERRED & EASIEST way to get in contact with your provider.  Please leave a voice mail if you do not get an answer and she will return your call within 24 hrs. You will NOT be able to contact provider on fg microtect, as Behavioral Health Providers are restricted. YOU MUST CALL [phone]  If you need to speak with the on call provider after hours or on weekends, please Contact the [facility] ([phone]) and staff will be able to page the provider on call directly.     2.  In the event you need to cancel an appointment, please notify the office at least 24 hrs prior:   Contact **Pennie Medical Assistant at [facility] directly at [phone] or the [facility] ([phone])     3. MEDICATION REFILLS:  PLEASE CALL THE PHARMACY TO REQUEST ALL MEDICATION REFILLS or via "[employer] TO ENSURE YOU ARE RECEIVING YOUR MEDICATIONS IN A TIMELY MANNER. The pharmacy or [employer] will send this request ELECTRONICALLY to the ordering provider.   IF YOU USE AN AUTOMATED SERVICE AT THE PHARMACY FOR REFILLS AND ARE TOLD THERE ARE \"NO REFILLS REMAINING\"   PLEASE CALL THE PHARMACY & SPEAK TO A LIVE PERSON TO VERIFY IT IS THE MOST UP TO DATE PRESCRIPTION ON FILE.    All new prescriptions will have a different number, therefore, if you were given refills for a medication today or at last visit it will not have the same number as the previous prescription.     Going forward any medications for your mental health including any previously prescribed by your primary care provider will now be managed by MARIO Woods. Contact provider's MA INITIALLY when down to 5-7 days remaining to ensure new refill(s) will have this provider name on prescription for future refills.  If requested from current bottle, via " "mychart, or via pharmacy the request would be directed to that ordering provider. And to prevent confusion, inaccurate dosing, inaccurate medication refills, the management of psychotropic and/or mental health medications should only be ordered by this mental health provider.    4.  In the event you have personal crisis, contact the following crisis numbers: Suicide Prevention Hotline 1-231.270.8887 or *988, [facility] Helpline 7-826-502-BAUDILIO; [facility] Emergency Room [phone]; text HELLO to 414412; or 911.  If you feel like harming yourself or others, call 911 right away.  You can call the [facility] Suicide and Crisis Lifeline at  988   to speak with a counselor at the lifeline, or you can connect with one using their online chat  .    5.  Never stop an antidepressant medicine without first talking to a healthcare provider. Suddenly stopping this type of medication can cause withdrawal symptoms.    6.  Counseling and talk therapy  Counseling or therapy teaches you new coping skills and more adaptive ways of thinking about problems. These tools can help you make positive changes. The benefits of counseling often last long after treatment sessions have stopped.    7.   We would appreciate your feedback, please scan the QRS code on the back of your appointment card (or see below) and complete a brief survey.  [place] location is still not available, so please click \"[place]\" location.  Thank you     8. NO SHOW POLICY/SAME DAY CANCEL POLICY:  We understand unexpected circumstances arise; however, anytime you miss your appointment we are unable to provide you appropriate care.  In addition, each appointment missed could have been used to provide care for others.  We ask that you call at least 24 hours in advance to cancel or reschedule an appointment. We would like to take this opportunity to remind you of our policy stating patients who miss THREE or more appointments without cancelling or rescheduling 24 " "hours in advance of the appointment may be subject to cancellation of any further visits with our clinic and recommendation to seek in-person services/visits. Please call [phone] to reschedule your appointment. If there are reasons that make it difficult for you to keep the appointments, please call and let us know how we can help. Please understand that medication prescribing will not continue without seeing your provider.        SPECIFIC RECOMMENDATIONS:     1.      Medications discussed at this encounter:                   -  DECREASE Buspar FROM 15 mg daily TO 7.5 mg (break 15 mg tab in half) by mouth twice daily. Absorption is affected by food, so administration with or without food should be consistent.    2.      Psychotherapy recommendations: Declined     3.     Return to clinic: 5 weeks Tues. 2/11/25 at 1020 am in office    4.  Coping mechanisms discussed with patient today as a way to gain control over feelings to prevent situation or panic attack from getting worse: grounding techniques using 5 senses (name 3 things you can see, smell, touch, etc.), slow paced breathing techniques- focus on door inhaling and exhaling at each corner, application of cold compress/ice pack/water on face or opening freezer door to allow cold air to be breathed in taking slow deep breaths, closing eyes and focus on positive thoughts.     Please arrive at least 15 minutes before your scheduled appointment time to complete check in process.      IF you are scheduled for a [employer] VIDEO visit, YOU MUST COMPLETE THE \"E-CHECK IN\" PROCESS PRIOR TO BEGINNING THE VISIT, You may still complete the E-Check in for in office visits prior to appointment, you will receive multiple text/email reminders which will direct you further if needed.            "

## 2025-01-09 NOTE — PROGRESS NOTES
This provider [place] [address] in closed office to ensure privacy. The Patient is seen remotely using [employer]. Patient is being seen via telehealth and confirm that they are in a secure environment for this session. The patient's condition being diagnosed/treated is appropriate for telemedicine. The provider identified himself/herself: herself as well as her credentials.   The patient gave consent to be seen remotely, and when consent is given they understand that the consent allows for patient identifiable information to be sent to a third party as needed.   They may refuse to be seen remotely at any time. The electronic data is encrypted and password protected, and the patient has been advised of the potential risks to privacy not withstanding such measures.    You have chosen to receive care through a telehealth visit.  Do you consent to use a video/audio connection for your medical care today? Yes      Mode of Visit: Video  Location of patient: -HOME-  Location of provider: +HOME+  You have chosen to receive care through a telehealth visit.  The patient has signed the video visit consent form.  The visit included audio and video interaction. No technical issues occurred during this visit. However, at start of visit patient had difficulty figuring out how to allow access to camera, after approximately 5 minutes patient was able to be seen via video.       Shagufta Watts is a 29 y.o. female who presents today for initial evaluation     Referring Provider:  Sakina Alvarez, MARIO  [address] [address]    Chief Complaint:  anxiety with panic attacks, adverse reaction to medication, medication management, medication care plan dicussed    Answers submitted by the patient for this visit:  Primary Reason for Visit (Submitted on 1/9/2025)  What is the primary reason for your visit?: Anxiety  Anxiety (Submitted on 1/9/2025)  Chief  "Complaint: Anxiety  Visit: initial  Onset: 1 to 5 years ago  Progression since onset: worse  Frequency: constantly  Severity: severe  depressed mood: No  dry mouth: Yes  excessive worry: Yes  insomnia: Yes  irritability: Yes  malaise/fatigue: Yes  obsessions: No  Sleep quality: fair  Hours of sleep per night: 8 Hours  Aggravated by: medication  Additional information: So much worse since cancer    Patient informed of role of this provider which will primarily include medication management of mental health conditions. Explained the difference between provider role vs. therapy/counseling services which include CBT (talk therapy) and do not include management of medications which are typically 45 minutes to 1 hr in length, however, if patient was in agreement to start therapy this provider can provide a contact list and/or refer. Patient verbalized understanding and agreed to proceed.    History of Present Illness: Patient presents today via [employer] Video visit from home, located in [place], [place], with a history of anxiety for which treatment began a few years ago.  Patient is currently prescribed Pristiq 100 mg daily in the morning (11/30/22, taking current dose for approximately 1 yr) and Buspar 15 mg daily, which have provided effectiveness.  Patient has a PMHX of inflammatory breast cancer diagnosed 6/16/23 with chemotherapy and radiation treatment, bilateral mastectomy with bilateral implants 1/9/24, second surgery 11/2024 revision of right side due to radiation with hernia repair goes back 2/9/25 for follow up and currently doing  heated ultrasound therapy to right breast and will have a 3rd final surgery eventually, GERD, migraines, RLS.    Patient goal of treatment \"I want to be leveled out, anxiety controlled and energy to continue my day.\"    Patient informed reason for referral was for an ADHD evaluation, however, patient voices difficulty with anxiety and inability to complete tasks due to feeling so drowsy " "after taking Buspar.   Anxiety has increased since breast cancer diagnosis and surgeries which have been reoccurring.  Patient was working at [employer] 3rd shift, though has not been working since summer of 2023 upon starting chemotherapy.  Patient plans to return to work after completing 10 sessions of the heated ultrasound therapy and recovery from another surgery expected later this year.      Patient currently will take 2 of the 50 mg Pristiq routinely every morning around 7am. When kids wake up in the morning. Patient informs provider was told she could take a total of 100 mg of Pristiq if needed and since started the 100 mg for at least 1 yr 1/2023, \"its the best thing I have been on.\"  Everyday around 12 noon - 2pm notices anxiety will spike, chews on fingernails, inside of cheek, middle of back between shoulder blades will hurt, chest hurts \"uncomfortable tightness in arms\", nerve issue-gets real nervous, then physical discomfort. Thought was having a stroke.  Take at least one of the Buspar 15 mg everyday which feels is effective, though \"a few hours later I find myself in bed, no energy to do anything.\" Has not had for 1-2 days at the most. Going to sleep, dozing off, cannot complete household tasks after taking.  Impaired feeling starts to set in within the hour of taking will then take a nap. Denies trying a lower dose of Buspar.  Felt shakey, weird with short trial of Wellbutrin then started Buspar which caused severe drowsiness.    Depression: Patient complains of depression. She complains of anhedonia, difficulty concentrating, and fatigue     Anxiety:  The patient endorses to the following symptoms of anxiety including: excessive anxiety and worry about a number of events or activities for more days than not, being easily fatigued, difficulty concentrating or mind going blank, irritability, and sleep disturbance which have caused impairment in important areas of daily functioning.  The patient has had " symptoms of anxiety for several years, which have worsened over the last 1.5 since cancer diagnosis.    PHQ-9 Depression Screening  PHQ-9 Total Score: (Patient-Rptd) 7 (PHQ2-2)    Little interest or pleasure in doing things? (Patient-Rptd) Over half   Feeling down, depressed, or hopeless? (Patient-Rptd) Not at all   PHQ-2 Total Score (Patient-Rptd) 2   Trouble falling or staying asleep, or sleeping too much? (Patient-Rptd) Not at all   Feeling tired or having little energy? (Patient-Rptd) Almost all   Poor appetite or overeating? (Patient-Rptd) Not at all   Feeling bad about yourself - or that you are a failure or have let yourself or your family down? (Patient-Rptd) Not at all   Trouble concentrating on things, such as reading the newspaper or watching television? (Patient-Rptd) Over half   Moving or speaking so slowly that other people could have noticed? Or the opposite - being so fidgety or restless that you have been moving around a lot more than usual? (Patient-Rptd) Not at all   Thoughts that you would be better off dead, or of hurting yourself in some way? (Patient-Rptd) Not at all   PHQ-9 Total Score (Patient-Rptd) 7   If you checked off any problems, how difficult have these problems made it for you to do your work, take care of things at home, or get along with other people? (Patient-Rptd) Very difficult       DEE-7  Feeling nervous, anxious or on edge: (Patient-Rptd) Nearly every day  Not being able to stop or control worrying: (Patient-Rptd) Nearly every day  Worrying too much about different things: (Patient-Rptd) Nearly every day  Trouble Relaxing: (Patient-Rptd) Not at all  Being so restless that it is hard to sit still: (Patient-Rptd) Not at all  Feeling afraid as if something awful might happen: (Patient-Rptd) More than half the days  Becoming easily annoyed or irritable: (Patient-Rptd) Nearly every day  DEE 7 Total Score: (Patient-Rptd) 14  If you checked any problems, how difficult have these  problems made it for you to do your work, take care of things at home, or get along with other people: (Patient-Rptd) Very difficult    The following portions of the patient's history were reviewed and updated as appropriate: allergies, current medications, past family history, past medical history, past social history, and past surgical history.     Past Surgical History:  Past Surgical History:   Procedure Laterality Date    BREAST LUMPECTOMY Right     BREAST RECONSTRUCTION Bilateral 2024    Procedure: BREAST RECONSTRUCTION WITH MESH AND IMPLANTS - USE OF SPY;  Surgeon: Jackie Sanders MD;  [place] [facility] [place] [facility] [place];  Service: Plastics;  Laterality: Bilateral;    BREAST RECONSTRUCTION Bilateral 2024    Procedure: Bilateral breast revision with fat graft and nipple reconstruction, port removal, right implant exchange,umbilical hernia repair;  Surgeon: Jackie Sanders MD;  Location: [facility] [place];  Service: Plastics;  Laterality: Bilateral;     SECTION      MASTECTOMY W/ SENTINEL NODE BIOPSY Bilateral 2024    Procedure: BREAST MASTECTOMY WITH RIGHT AXILLARY SENTINEL NODE IDENTIFICATION AND EXCISION BIOPSY;  Surgeon: Christiana Whiting MD;  Location: [facility];  Service: General;  Laterality: Bilateral;    PORTACATH PLACEMENT      SKIN BIOPSY      TUBAL ABDOMINAL LIGATION         Problem List:  Patient Active Problem List   Diagnosis    Allergic rhinitis    Gastroesophageal reflux disease    Metaplastic carcinoma of breast    Breast fibroadenoma, right    Disproportion between native breast and reconstructed breast    Fitting and adjustment of vascular catheter    Intractable chronic migraine without aura and without status migrainosus    Chemotherapy-induced nausea    Postoperative follow-up    S/P breast reconstruction, bilateral    Iron deficiency anemia    Malabsorption of iron    Burn of right axilla    Breast reconstruction deformity       Allergy:   Allergies   Allergen  Reactions    Pembrolizumab Other (See Comments)     (Keytruda) swollen, red, itching, and warmness to hands, ears, chest, and throat, tightness and hoarse voice.;  resolved with treatment (4/12/24)    Tegaderm Ag Mesh [Silver] Itching     Tegaderm that is used over port a cath  ITCHING AND REDNESS        Discontinued Medications:  Medications Discontinued During This Encounter   Medication Reason    busPIRone (BUSPAR) 15 MG tablet Dose adjustment       Current Medications:   Current Outpatient Medications   Medication Sig Dispense Refill    busPIRone (BUSPAR) 15 MG tablet Take 0.5 tablets by mouth 2 (Two) Times a Day. Indications: Anxiety Disorder      desvenlafaxine (PRISTIQ) 50 MG 24 hr tablet TAKE 1 TABLET BY MOUTH DAILY (Patient taking differently: Take 2 tablets by mouth Daily.) 90 tablet 0    fexofenadine (ALLEGRA) 180 MG tablet Take 1 tablet by mouth Daily As Needed.      lidocaine-prilocaine (EMLA) 2.5-2.5 % cream Apply 1 Application topically to the appropriate area as directed Take As Directed. Apply to port site 30 minutes prior to use 30 g 1    rOPINIRole (REQUIP) 1 MG tablet Take 1 tablet by mouth Every Night. Take 1 hour before bedtime. 90 tablet 3    Sodium Fluoride 5000 PPM 1.1 % paste Daily.      triamcinolone (KENALOG) 0.1 % cream Apply BID to affected areas on the body PRN itching until clear. 80 g 1    Zavzpret 10 MG/ACT solution 10 mg into the nostril(s) as directed by provider Daily As Needed (migraine). (Patient taking differently: Administer 10 mg into the nostril(s) as directed by provider Daily As Needed (migraine). Takes as needed) 6 each 5     No current facility-administered medications for this visit.       Past Medical History:  Past Medical History:   Diagnosis Date    Anxiety     Asthma     AS CHILD    Breast cancer     CHEMO/ RADIATION COMPLETED AUGUST 2024    COVID-19 vaccine administered        Past Psychiatric History:  Began Treatment: a few years before cancer diagnosis  "  Diagnoses:Anxiety  Psychiatrist: possibly  someone in [place] prior to   Therapist: possibly   [place] prior to   Admission History:Denies  Medication Trials: Wellbutrin  mg added to Pristiq 24-24 ineffective and asked to switch back to Pristiq due to shakiness, feeling weird; Xanax 0.5 mg twice daily as needed per oncology 23- ; Ativan 0.5 mg every 8hrs as needed per oncology 23; Hydroxyzine 25-50 mg 3 times daily as needed 2022 ineffective; (Lexapro-  ; Zoloft-  ; Abilify- \"one of them made my chest hurt and back\")  every medicine made it worse,then would \"I hate taking medication, I don't know if that's where its from.\"   Self Harm: Denies  Suicide Attempts:Denies   Psychosis, Anxiety, Depression: Denies    Substance Abuse History: As of 23  Types:Denies all, including illicit  Withdrawal Symptoms:Denies  Longest Period Sober:Not Applicable   AA: Not applicable   Legal: n/a    Social History: As of 23  Martial Status:Boyfriend   Employed:Yes and If so, where [employer]  currently on medical leave has been off work for approximately 1.5 yrs since summer of 2023; generally works 3rd shift, and plans to switch to day shift; 530 pm-4 am and 1 hour drive home  Kids:Yes or If so, how many 2 boys  age 6 and 10  House:Lives in a house with boyfriend    History: Denies  Access to Guns: No    Social History     Socioeconomic History    Marital status: Significant Other    Number of children: 2    Highest education level: High school graduate   Tobacco Use    Smoking status: Never     Passive exposure: Never    Smokeless tobacco: Never   Vaping Use    Vaping status: Never Used   Substance and Sexual Activity    Alcohol use: Not Currently     Comment: OCCASSIONAL    Drug use: Never    Sexual activity: Defer       Family History:   Suicide Attempts: Denies  Suicide Completions:Denies      Family History   Problem Relation Age of Onset    Skin cancer " Mother     COPD Mother     Hypertension Father     Hyperlipidemia Father     Diabetes Father     Anxiety disorder Sister     Depression Sister     Skin cancer Paternal Grandmother     Lung cancer Paternal Grandmother     [place] Hyperthermia Neg Hx        Developmental History:   Born: [place]  Siblings: 1 brother, 1 sister  Childhood: Denies Abuse  High School:Completed  College:Denies    Mental Status Exam:   Hygiene:   good  Cooperation:  Cooperative  Eye Contact:  Good  Psychomotor Behavior:  Appropriate  Affect:  Appropriate  Mood: anxious  Speech:  Normal  Thought Process:  Goal directed  Thought Content:  Mood congruent  Suicidal:  None  Homicidal:  None  Hallucinations:  None  Delusion:  None  Memory:  Intact  Orientation:  Grossly intact  Reliability:  good  Insight:  Good  Judgement:  Good  Impulse Control:  Good  Physical/Medical Issues:  Yes    inflammatory breast cancer diagnosed 6/16/23 with chemotherapy and radiation treatment, bilateral mastectomy with bilateral implants 1/9/24, second surgery 11/2024 revision of right side due to radiation with hernia repair goes back 2/9/25 for follow up and currently doing  heated ultrasound therapy to right breast and will have a 3rd final surgery eventually, GERD, migraines, RLS.    Review of Systems:  Review of Systems   Constitutional:  Positive for fatigue.   Respiratory:  Positive for shortness of breath.         With anxiety and panic attacks   Cardiovascular:  Positive for chest pain and palpitations.        With anxiety/panic attacks   Gastrointestinal:  Negative for nausea.   Allergic/Immunologic: Positive for immunocompromised state.   Neurological:  Negative for dizziness and seizures.   Psychiatric/Behavioral:  Positive for decreased concentration. Negative for agitation, confusion, self-injury, sleep disturbance and suicidal ideas. The patient is nervous/anxious. The patient is not hyperactive.          Physical Exam:  Physical Exam  Psychiatric:          "Attention and Perception: Attention and perception normal.         Mood and Affect: Mood and affect normal. Mood is anxious.         Speech: Speech normal.         Behavior: Behavior normal. Behavior is cooperative.         Thought Content: Thought content normal. Thought content does not include suicidal ideation. Thought content does not include suicidal plan.         Cognition and Memory: Cognition and memory normal.         Judgment: Judgment normal.         Vital Signs:   There were no vitals taken for this visit.     Lab Results:   Admission on 11/19/2024, Discharged on 11/19/2024   Component Date Value Ref Range Status    Cotinine Screen, Urine  11/19/2024 Negative  Negative Final    Case Report 11/19/2024    Final                    Value:Surgical Pathology Report                         Case: [id number]                                  Authorizing Provider:  Jcakie Sanders MD  Collected:           11/19/2024 09:27 AM          Ordering Location:     [facility]  Received:            11/19/2024 12:53 PM                                 OR                                                                           Pathologist:           Iglesia Mg MD                                                            Specimens:   1) - Breast, Right, Right breast capsule                                                            2) - Breast, Right, Right Breast implant                                                   Clinical Information 11/19/2024    Final                    Value:Breast reconstruction deformity      Final Diagnosis 11/19/2024    Final                    Value:1. Right breast capsule, excision:   - Dense fibrous tissue, consistent with capsule      2. Right breast implant, removal:   - Intact breast implant (gross only diagnosis)      Gross Description 11/19/2024    Final                    Value:1. Breast, Right.  Received in formalin and labeled \"right breast capsule\" is a " "10.0 x 9.0 x 3.0 cm aggregate of tan, fibrous, capsular soft tissue and fragments with a 7.0 x 0.7 cm skin ellipse.  Sectioning reveals a tan, rubbery cut surface.  Representative sections are submitted in 1 cassette.   AFUA  2. Breast, Right.  The specimen is received fresh labeled \" right breast implant\" and consists of a 12.5 x 4.5 x 3.5 cm translucent, intact breast implant inscribed with \" Sientra 1072 1-415  [id number].\"  No defects or adherent soft tissue fragments are identified.  No sections are submitted following gross examination by staff pathologist Iglesia Mg.        Microscopic Description 11/19/2024    Final                    Value:Microscopic examination performed.     Lab on 10/25/2024   Component Date Value Ref Range Status    Cotinine Screen, Urine  10/25/2024 Negative  Negative Final   Lab on 08/22/2024   Component Date Value Ref Range Status    Glucose 08/22/2024 82  65 - 99 mg/dL Final    BUN 08/22/2024 13  6 - 20 mg/dL Final    Creatinine 08/22/2024 0.55 (L)  0.57 - 1.00 mg/dL Final    Sodium 08/22/2024 140  136 - 145 mmol/L Final    Potassium 08/22/2024 3.6  3.5 - 5.2 mmol/L Final    Chloride 08/22/2024 104  98 - 107 mmol/L Final    CO2 08/22/2024 27.4  22.0 - 29.0 mmol/L Final    Calcium 08/22/2024 9.3  8.6 - 10.5 mg/dL Final    Total Protein 08/22/2024 6.9  6.0 - 8.5 g/dL Final    Albumin 08/22/2024 4.3  3.5 - 5.2 g/dL Final    ALT (SGPT) 08/22/2024 26  1 - 33 U/L Final    AST (SGOT) 08/22/2024 20  1 - 32 U/L Final    Alkaline Phosphatase 08/22/2024 95  39 - 117 U/L Final    Total Bilirubin 08/22/2024 0.4  0.0 - 1.2 mg/dL Final    Globulin 08/22/2024 2.6  gm/dL Final    A/G Ratio 08/22/2024 1.7  g/dL Final    BUN/Creatinine Ratio 08/22/2024 23.6  7.0 - 25.0 Final    Anion Gap 08/22/2024 8.6  5.0 - 15.0 mmol/L Final    eGFR 08/22/2024 126.6  >60.0 mL/min/1.73 Final    TSH 08/22/2024 2.050  0.270 - 4.200 uIU/mL Final    Free T4 08/22/2024 0.95  0.92 - 1.68 ng/dL Final    WBC 08/22/2024 " 4.18  3.40 - 10.80 10*3/mm3 Final    RBC 08/22/2024 4.50  3.77 - 5.28 10*6/mm3 Final    Hemoglobin 08/22/2024 13.0  12.0 - 15.9 g/dL Final    Hematocrit 08/22/2024 39.3  34.0 - 46.6 % Final    MCV 08/22/2024 87.3  79.0 - 97.0 fL Final    MCH 08/22/2024 28.9  26.6 - 33.0 pg Final    MCHC 08/22/2024 33.1  31.5 - 35.7 g/dL Final    RDW 08/22/2024 13.4  12.3 - 15.4 % Final    RDW-SD 08/22/2024 43.4  37.0 - 54.0 fl Final    MPV 08/22/2024 8.9  6.0 - 12.0 fL Final    Platelets 08/22/2024 174  140 - 450 10*3/mm3 Final    Neutrophil % 08/22/2024 47.8  42.7 - 76.0 % Final    Lymphocyte % 08/22/2024 41.9  19.6 - 45.3 % Final    Monocyte % 08/22/2024 9.1  5.0 - 12.0 % Final    Eosinophil % 08/22/2024 1.0  0.3 - 6.2 % Final    Basophil % 08/22/2024 0.2  0.0 - 1.5 % Final    Immature Grans % 08/22/2024 0.0  0.0 - 0.5 % Final    Neutrophils, Absolute 08/22/2024 2.00  1.70 - 7.00 10*3/mm3 Final    Lymphocytes, Absolute 08/22/2024 1.75  0.70 - 3.10 10*3/mm3 Final    Monocytes, Absolute 08/22/2024 0.38  0.10 - 0.90 10*3/mm3 Final    Eosinophils, Absolute 08/22/2024 0.04  0.00 - 0.40 10*3/mm3 Final    Basophils, Absolute 08/22/2024 0.01  0.00 - 0.20 10*3/mm3 Final    Immature Grans, Absolute 08/22/2024 0.00  0.00 - 0.05 10*3/mm3 Final   Lab on 08/01/2024   Component Date Value Ref Range Status    Glucose 08/01/2024 129 (H)  65 - 99 mg/dL Final    BUN 08/01/2024 10  6 - 20 mg/dL Final    Creatinine 08/01/2024 0.58  0.57 - 1.00 mg/dL Final    Sodium 08/01/2024 136  136 - 145 mmol/L Final    Potassium 08/01/2024 3.5  3.5 - 5.2 mmol/L Final    Chloride 08/01/2024 106  98 - 107 mmol/L Final    CO2 08/01/2024 24.3  22.0 - 29.0 mmol/L Final    Calcium 08/01/2024 9.4  8.6 - 10.5 mg/dL Final    Total Protein 08/01/2024 6.4  6.0 - 8.5 g/dL Final    Albumin 08/01/2024 4.0  3.5 - 5.2 g/dL Final    ALT (SGPT) 08/01/2024 23  1 - 33 U/L Final    AST (SGOT) 08/01/2024 15  1 - 32 U/L Final    Alkaline Phosphatase 08/01/2024 94  39 - 117 U/L Final     Total Bilirubin 08/01/2024 0.4  0.0 - 1.2 mg/dL Final    Globulin 08/01/2024 2.4  gm/dL Final    A/G Ratio 08/01/2024 1.7  g/dL Final    BUN/Creatinine Ratio 08/01/2024 17.2  7.0 - 25.0 Final    Anion Gap 08/01/2024 5.7  5.0 - 15.0 mmol/L Final    eGFR 08/01/2024 125.0  >60.0 mL/min/1.73 Final    WBC 08/01/2024 4.28  3.40 - 10.80 10*3/mm3 Final    RBC 08/01/2024 4.25  3.77 - 5.28 10*6/mm3 Final    Hemoglobin 08/01/2024 12.1  12.0 - 15.9 g/dL Final    Hematocrit 08/01/2024 36.4  34.0 - 46.6 % Final    MCV 08/01/2024 85.6  79.0 - 97.0 fL Final    MCH 08/01/2024 28.5  26.6 - 33.0 pg Final    MCHC 08/01/2024 33.2  31.5 - 35.7 g/dL Final    RDW 08/01/2024 12.9  12.3 - 15.4 % Final    RDW-SD 08/01/2024 40.8  37.0 - 54.0 fl Final    MPV 08/01/2024 9.3  6.0 - 12.0 fL Final    Platelets 08/01/2024 151  140 - 450 10*3/mm3 Final    Neutrophil % 08/01/2024 64.5  42.7 - 76.0 % Final    Lymphocyte % 08/01/2024 28.5  19.6 - 45.3 % Final    Monocyte % 08/01/2024 6.1  5.0 - 12.0 % Final    Eosinophil % 08/01/2024 0.7  0.3 - 6.2 % Final    Basophil % 08/01/2024 0.2  0.0 - 1.5 % Final    Immature Grans % 08/01/2024 0.0  0.0 - 0.5 % Final    Neutrophils, Absolute 08/01/2024 2.76  1.70 - 7.00 10*3/mm3 Final    Lymphocytes, Absolute 08/01/2024 1.22  0.70 - 3.10 10*3/mm3 Final    Monocytes, Absolute 08/01/2024 0.26  0.10 - 0.90 10*3/mm3 Final    Eosinophils, Absolute 08/01/2024 0.03  0.00 - 0.40 10*3/mm3 Final    Basophils, Absolute 08/01/2024 0.01  0.00 - 0.20 10*3/mm3 Final    Immature Grans, Absolute 08/01/2024 0.00  0.00 - 0.05 10*3/mm3 Final   Lab on 07/11/2024   Component Date Value Ref Range Status    Glucose 07/11/2024 80  65 - 99 mg/dL Final    BUN 07/11/2024 13  6 - 20 mg/dL Final    Creatinine 07/11/2024 0.64  0.57 - 1.00 mg/dL Final    Sodium 07/11/2024 138  136 - 145 mmol/L Final    Potassium 07/11/2024 3.8  3.5 - 5.2 mmol/L Final    Chloride 07/11/2024 106  98 - 107 mmol/L Final    CO2 07/11/2024 25.8  22.0 - 29.0 mmol/L  Final    Calcium 07/11/2024 9.7  8.6 - 10.5 mg/dL Final    Total Protein 07/11/2024 6.7  6.0 - 8.5 g/dL Final    Albumin 07/11/2024 4.3  3.5 - 5.2 g/dL Final    ALT (SGPT) 07/11/2024 24  1 - 33 U/L Final    AST (SGOT) 07/11/2024 18  1 - 32 U/L Final    Alkaline Phosphatase 07/11/2024 103  39 - 117 U/L Final    Total Bilirubin 07/11/2024 0.3  0.0 - 1.2 mg/dL Final    Globulin 07/11/2024 2.4  gm/dL Final    A/G Ratio 07/11/2024 1.8  g/dL Final    BUN/Creatinine Ratio 07/11/2024 20.3  7.0 - 25.0 Final    Anion Gap 07/11/2024 6.2  5.0 - 15.0 mmol/L Final    eGFR 07/11/2024 122.1  >60.0 mL/min/1.73 Final    WBC 07/11/2024 3.57  3.40 - 10.80 10*3/mm3 Final    RBC 07/11/2024 4.34  3.77 - 5.28 10*6/mm3 Final    Hemoglobin 07/11/2024 12.6  12.0 - 15.9 g/dL Final    Hematocrit 07/11/2024 38.7  34.0 - 46.6 % Final    MCV 07/11/2024 89.2  79.0 - 97.0 fL Final    MCH 07/11/2024 29.0  26.6 - 33.0 pg Final    MCHC 07/11/2024 32.6  31.5 - 35.7 g/dL Final    RDW 07/11/2024 13.5  12.3 - 15.4 % Final    RDW-SD 07/11/2024 44.4  37.0 - 54.0 fl Final    MPV 07/11/2024 9.0  6.0 - 12.0 fL Final    Platelets 07/11/2024 150  140 - 450 10*3/mm3 Final    Neutrophil % 07/11/2024 56.0  42.7 - 76.0 % Final    Lymphocyte % 07/11/2024 34.2  19.6 - 45.3 % Final    Monocyte % 07/11/2024 8.7  5.0 - 12.0 % Final    Eosinophil % 07/11/2024 0.8  0.3 - 6.2 % Final    Basophil % 07/11/2024 0.3  0.0 - 1.5 % Final    Immature Grans % 07/11/2024 0.0  0.0 - 0.5 % Final    Neutrophils, Absolute 07/11/2024 2.00  1.70 - 7.00 10*3/mm3 Final    Lymphocytes, Absolute 07/11/2024 1.22  0.70 - 3.10 10*3/mm3 Final    Monocytes, Absolute 07/11/2024 0.31  0.10 - 0.90 10*3/mm3 Final    Eosinophils, Absolute 07/11/2024 0.03  0.00 - 0.40 10*3/mm3 Final    Basophils, Absolute 07/11/2024 0.01  0.00 - 0.20 10*3/mm3 Final    Immature Grans, Absolute 07/11/2024 0.00  0.00 - 0.05 10*3/mm3 Final       EKG Results:  No orders to display       Imaging Results:  CT Chest With Contrast  Diagnostic    Result Date: 10/26/2024  No acute abnormalities. No evidence of metastatic disease in the chest. Electronically Signed: Momo Leung MD  10/26/2024 3:12 AM EDT  Workstation ID: [id number]        Assessment & Plan   Diagnoses and all orders for this visit:    1. Generalized anxiety disorder with panic attacks (Primary)  -     busPIRone (BUSPAR) 15 MG tablet; Take 0.5 tablets by mouth 2 (Two) Times a Day. Indications: Anxiety Disorder    2. Adverse effect of drug, initial encounter    3. Medication management    4. Medication care plan discussed with patient        Visit Diagnoses:    ICD-10-CM ICD-9-CM   1. Generalized anxiety disorder with panic attacks  F41.1 300.02    F41.0 300.01   2. Adverse effect of drug, initial encounter  T50.905A E947.9   3. Medication management  Z79.899 V58.69   4. Medication care plan discussed with patient  Z71.89 V65.49       PLAN:  Safety: No acute safety concerns  Therapy: Declines  Patient educated and encouraged to start therapy to develop new coping skills and more adaptive ways of thinking about problems. These tools can help make positive changes. The benefits of counseling often last long after treatment sessions have stopped.  Risk Assessment: Risk of self-harm acutely is low.  Risk factors include anxiety disorder and mood disorder.  Protective factors include no family history, denies access to guns/weapons, no present SI, no history of suicide attempts or self-harm in the past, minimal AODA, healthcare seeking, future orientation, willingness to engage in care.  Risk of self-harm chronically is also low, but could be further elevated in the event of treatment noncompliance and/or AODA.  Meds:  -DECREASE Buspar FROM 15 mg by mouth once daily TO 7.5 mg (instructed to break current 15 mg tablet in half) by mouth twice daily to target anxiety. Absorption is affected by food, so administration with or without food should be consistent. Risks, benefits,  alternatives discussed with patient including nausea, GI upset, dizziness, mild sedation, and falls risk.  After discussion of these risks and benefits, the patient voiced understanding and agreed to proceed. UPDATED ORDER AS A NO PRINT, as patient has adequate supply.    -Continue Desvenlafaxine (Pristiq) 100 mg by mouth daily to target anxiety and depression.  Discussed all risks, benefits, alternatives, and side effects of Pristiq including but not limited to GI symptoms (N/V/D, constipation), sexual dysfunction, dizziness, insomnia, sweating, hyperhidrosis, anxiety, bleeding risk, seizure risk, CNS depression, dyslipidemia, activation of hamlet or hypomania, increased fragility fracture risk, hyponatremia, ocular effects, HTN, withdrawal syndrome following abrupt discontinuation, serotonin syndrome, and activation of suicidal ideation and behavior. Discussed the need for patient to immediately call the office for any new or worsening symptoms, such as worsening depression; feeling nervous or restless; suicidal thoughts or actions; or other changes changes in mood or behavior, and all other concerns. Patient educated on med compliance and the risks of suddenly stopping this medication or missing doses. Patient verbalized understanding and is agreeable to taking Pristiq. Addressed all questions and concerns.  Updated dose to 100 mg as patient reported taking 2 of the 50 mg for the last year which remains effective.  Labs: n/a  Patient informed that going forward refills of future or current psychotropic medications previously prescribed by PCP will now be managed by this provider, and to contact provider MA INITIALLY when down to 5-7 days remaining to ensure new refill(s) will have this provider name on prescription for future refills. Explained to patient if requested from current bottle, via mychart, or via pharmacy the request would be directed to ordering provider. And to prevent confusion, inaccurate dosing,  inaccurate medication refills, the management of psychotropic and/or mental health medications should only be ordered by this mental health provider. Patient verbalized understanding and agreed to proceed.    Advised patient to sign up for text alerts with current pharmacy, which will inform patient when a medication is ready, cost of medication, and when a refill is needed.  Patient reports currently enrolled.    Patient informed if a medication is requested via telephone to office, MyChart, or with pharmacy directly, to check with their pharmacy (via phone, vicenta) or MyChart to check status of those requests before contacting the office.    Coping mechanisms discussed with patient today as a way to gain control over feelings to prevent situation or panic attack from getting worse: grounding techniques using 5 senses (name 3 things you can see, smell, touch, etc.), slow paced breathing techniques- focus on door inhaling and exhaling at each corner, application of cold compress/ice pack/water on face or opening freezer door to allow cold air to be breathed in taking slow deep breaths, closing eyes and focus on positive thoughts.   Discussed and given patient the following education materials: which will be sent via mail.  -Grounding Techniques, Cognitive distortions, 5 ways to defuse anxious thoughts, and What is Mindfulness with tips printout given to patient, provided by [employer] -How to Stop Over thinking Tips and coping strategies print out given to patient today from [facility].    11. Patient given direct contact number to provider's MA in [place], [phone], during video visit today.     Patient presentation seems most consistent with anxiety with panic attacks and an adverse reaction to dose of Buspar.  Decreasing Buspar dose due to drowsiness after taking 15 mg dose once daily which is impairing ability to function the remainder of the day which then worsens mood.   The plan was discussed  with the patient. The patient was given time to ask questions and these questions were answered. At the conclusion of their visit they had no additional questions or concerns and all questions were answered to their satisfaction.   Patient was given instructions and counseling regarding condition and for health maintenance advice. Please see specific information pulled into the AVS if appropriate.   Patient to contact provider if symptoms worsen or fail to improve.      Patient screened positive for depression based on a PHQ-9 score of 7 on 1/9/2025. Follow-up recommendations include: Prescribed antidepressant medication treatment and Suicide Risk Assessment performed. (PHQ2-2)      TREATMENT PLAN/GOALS: Continue supportive psychotherapy efforts and medications as indicated. Treatment and medication options discussed during today's visit. Patient ackowledged and verbally consented to continue with current treatment plan and was educated on the importance of compliance with treatment and follow-up appointments.    MEDICATION ISSUES:  TIFFANIE reviewed as expected.  Discussed medication options and treatment plan of prescribed medication as well as the risks, benefits, and side effects including potential falls, possible impaired driving and metabolic adversities among others. Patient is agreeable to call the office with any worsening of symptoms or onset of side effects. Patient is agreeable to call 911 or go to the nearest ER should he/she begin having SI/HI. No medication side effects or related complaints today.     MEDS ORDERED DURING VISIT:  New Medications Ordered This Visit   Medications    busPIRone (BUSPAR) 15 MG tablet     Sig: Take 0.5 tablets by mouth 2 (Two) Times a Day. Indications: Anxiety Disorder       Return in about 5 weeks (around 2/13/2025) for medication check.         I spent 72 minutes caring for Marilee on this date of service. This time includes time spent by me in the following activities:  preparing for the visit, reviewing tests, obtaining and/or reviewing a separately obtained history, performing a medically appropriate examination and/or evaluation, counseling and educating the patient/family/caregiver, ordering medications, tests, or procedures, referring and communicating with other health care professionals, documenting information in the medical record, care coordination, and scheduling .      This document has been electronically signed by MARIO Woods  January 12, 2025 21:57 EST           Part of this note may be an electronic transcription/translation of spoken language to printed text using the Dragon Dictation System.

## 2025-01-10 ENCOUNTER — CLINICAL SUPPORT (OUTPATIENT)
Dept: PLASTIC SURGERY | Facility: CLINIC | Age: 30
End: 2025-01-10
Payer: COMMERCIAL

## 2025-01-10 DIAGNOSIS — L58.9 RADIATION DERMATITIS: Primary | ICD-10-CM

## 2025-01-10 NOTE — PROGRESS NOTES
Theraputic Ultrasound done in office today to right breast end of incision for 7 min, 1.1w/cm2 3.3mhz. Patient tolerated well and will return for next scheduled ultrasound/appt.    4/10

## 2025-01-17 ENCOUNTER — CLINICAL SUPPORT (OUTPATIENT)
Dept: PLASTIC SURGERY | Facility: CLINIC | Age: 30
End: 2025-01-17
Payer: COMMERCIAL

## 2025-01-17 DIAGNOSIS — L58.9 RADIATION DERMATITIS: Primary | ICD-10-CM

## 2025-01-17 NOTE — PROGRESS NOTES
Theraputic Ultrasound done in office today to right breast end of incision for 7 min, 1.1w/cm2 3.3mhz. Patient tolerated well and will return for next scheduled ultrasound/appt.    5/10

## 2025-01-23 NOTE — PROGRESS NOTES
"Chief Complaint  Post-op Follow-up    Subjective          History of Present Illness  Marilee Watts is a 29 y.o. female who presents to [facility] for Postoperative Follow-Up of Bilateral breast revision with fat graft and nipple reconstruction, port removal, right implant exchange,umbilical hernia repair 11/19/24.    Pt presents today for 2.5 post op. Doing well with no issues or concerns.    Pt states she has noticed left breast scar softening since starting weekly us.          Allergies: Pembrolizumab and Tegaderm ag mesh [silver]  Allergies Reconciled.    Review of Systems   All review of system has been reviewed and it  is negative except the ones note above.     Objective     /79 (BP Location: Left arm, Patient Position: Sitting, Cuff Size: Adult)   Pulse 85   Ht 160 cm (62.99\")   Wt 71.2 kg (157 lb)   SpO2 98%   BMI 27.82 kg/m²     Body mass index is 27.82 kg/m².      General Inspection: no acute distress, comfortable.  Right breast still constricted but better, right axilla still with indentation.     Result Review :                Assessment and Plan      Diagnoses and all orders for this visit:    1. Breast reconstruction deformity (Primary)    2. Radiation dermatitis            Plan:       Assessment & Plan  1. Bilateral breast reconstruction revision.    2. Right capsulotomy.    3. Release of the right axillary scar.    Consent: bilateral breast reconstruction revision with fat graft, right capsulotomy and release of right axillary scar. - 2 hrs   [id number] revision of reconstructed breast      Follow Up     No follow-ups on file.    Patient was given instructions and counseling regarding her condition. Please see specific information pulled into the AVS if appropriate.     Jackie Sanders MD  02/06/2025      Patient or patient representative verbalized consent for the use of Ambient Listening during the visit with  Jackie Sanders, " MD for chart documentation. 2/7/2025  15:09 EST

## 2025-01-24 ENCOUNTER — PATIENT MESSAGE (OUTPATIENT)
Dept: PLASTIC SURGERY | Facility: CLINIC | Age: 30
End: 2025-01-24
Payer: COMMERCIAL

## 2025-01-24 ENCOUNTER — CLINICAL SUPPORT (OUTPATIENT)
Dept: PLASTIC SURGERY | Facility: CLINIC | Age: 30
End: 2025-01-24
Payer: COMMERCIAL

## 2025-01-24 DIAGNOSIS — L58.9 RADIATION DERMATITIS: Primary | ICD-10-CM

## 2025-01-24 NOTE — PROGRESS NOTES
Theraputic Ultrasound done in office today to right breast end of incision for 7 min, 1.1w/cm2 3.3mhz. Patient tolerated well and will return for next scheduled ultrasound/appt.    6/10  Pt right breast is still higher than left.

## 2025-01-29 PROBLEM — L58.9 RADIATION DERMATITIS: Status: ACTIVE | Noted: 2025-01-29

## 2025-01-30 ENCOUNTER — TELEMEDICINE (OUTPATIENT)
Dept: NEUROLOGY | Facility: CLINIC | Age: 30
End: 2025-01-30
Payer: COMMERCIAL

## 2025-01-30 DIAGNOSIS — G43.719 INTRACTABLE CHRONIC MIGRAINE WITHOUT AURA AND WITHOUT STATUS MIGRAINOSUS: Primary | ICD-10-CM

## 2025-01-30 NOTE — PROGRESS NOTES
Chief Complaint  Migraine    Subjective          Marilee Watts presents to [facility]  History of Present Illness  she presents to the office for Botox follow-up.  Last Botox injection was on 12/4/24.  States she is having 2-3 headache days per month.  Feels that she has seen improvement in migraines with Botox for preventative therapy.  Uses  Zavzpret with good effect.  Denies side effect.              History of Present Illness         Objective   Vital Signs:   There were no vitals taken for this visit.    Physical Exam  HENT:      Head: Normocephalic.   Pulmonary:      Effort: Pulmonary effort is normal.   Neurological:      Mental Status: She is alert and oriented to person, place, and time.      Sensory: Sensation is intact.      Motor: Motor function is intact.      Coordination: Coordination is intact.      Deep Tendon Reflexes: Reflexes are normal and symmetric.        Neurological Exam  Mental Status  Alert. Oriented to person, place, and time.    Sensory  Normal sensation.    Reflexes  Deep tendon reflexes are 2+ and symmetric in all four extremities.    Coordination    Finger-to-nose, rapid alternating movements and heel-to-shin normal bilaterally without dysmetria.      Result Review :   CBC:  Lab Results   Component Value Date    WBC 4.18 08/22/2024    RBC 4.50 08/22/2024    HGB 13.0 08/22/2024    HCT 39.3 08/22/2024    MCV 87.3 08/22/2024    MCH 28.9 08/22/2024    MCHC 33.1 08/22/2024    RDW 13.4 08/22/2024     08/22/2024     CMP:  Lab Results   Component Value Date    BUN 13 08/22/2024    CREATININE 0.55 (L) 08/22/2024     08/22/2024    K 3.6 08/22/2024     08/22/2024    CALCIUM 9.3 08/22/2024    ALBUMIN 4.3 08/22/2024    BILITOT 0.4 08/22/2024    ALKPHOS 95 08/22/2024    AST 20 08/22/2024    ALT 26 08/22/2024     TSH/FREE T4:   Lab Results   Component Value Date    TSH 2.050 08/22/2024    FREET4 0.95 08/22/2024                Assessment  and Plan    Diagnoses and all orders for this visit:    1. Intractable chronic migraine without aura and without status migrainosus (Primary)  Assessment & Plan:  Will continue Botox for preventative therapy and  Zavzpret PRN for abortive therapy.      Botox will be administered per accepted algorithm for chronic migraine with a total of 155 units given divided as follows: 10 units in the , 5 units in the procerus, 20 units in the frontalis, 40 units in the temporalis, 30 units in the occipitalis, 20 units in the cervical paraspinals and 30 units in the trapezius.  This therapy will be given every 12 weeks.               Assessment & Plan           Follow Up   Return in 1 month (on 2/28/2025) for Botox .  Patient was given instructions and counseling regarding her condition or for health maintenance advice. Please see specific information pulled into the AVS if appropriate.       Patient or patient representative verbalized consent for the use of Ambient Listening during the visit with  MARIO Dillard for chart documentation. 1/30/2025  09:37 EST    Mode of Visit: Video  Location of patient: -HOME-  Location of provider: +[facility]+  You have chosen to receive care through a telehealth visit.  The patient has signed the video visit consent form.  The visit included audio and video interaction. No technical issues occurred during this visit.

## 2025-01-31 ENCOUNTER — CLINICAL SUPPORT (OUTPATIENT)
Dept: PLASTIC SURGERY | Facility: CLINIC | Age: 30
End: 2025-01-31
Payer: COMMERCIAL

## 2025-01-31 DIAGNOSIS — L58.9 RADIATION DERMATITIS: Primary | ICD-10-CM

## 2025-01-31 NOTE — PROGRESS NOTES
Theraputic Ultrasound done in office today to right breast for 7 min, 1.1w/cm2 3.3mhz. Patient tolerated well and will return for next scheduled ultrasound/appt. Patient has concerns with bulge above her umbilicus. She states it has a pulling sensation. She will mention this to Dr. Sanders at her next appointment.

## 2025-02-01 DIAGNOSIS — F41.0 GENERALIZED ANXIETY DISORDER WITH PANIC ATTACKS: ICD-10-CM

## 2025-02-01 DIAGNOSIS — F41.1 GENERALIZED ANXIETY DISORDER WITH PANIC ATTACKS: ICD-10-CM

## 2025-02-04 ENCOUNTER — OFFICE VISIT (OUTPATIENT)
Dept: FAMILY MEDICINE CLINIC | Age: 30
End: 2025-02-04
Payer: COMMERCIAL

## 2025-02-04 VITALS
DIASTOLIC BLOOD PRESSURE: 81 MMHG | OXYGEN SATURATION: 99 % | BODY MASS INDEX: 28.17 KG/M2 | SYSTOLIC BLOOD PRESSURE: 122 MMHG | TEMPERATURE: 98.7 F | HEART RATE: 91 BPM | WEIGHT: 159 LBS

## 2025-02-04 DIAGNOSIS — F41.0 GENERALIZED ANXIETY DISORDER WITH PANIC ATTACKS: Primary | ICD-10-CM

## 2025-02-04 DIAGNOSIS — F41.0 ANXIETY ATTACK: ICD-10-CM

## 2025-02-04 DIAGNOSIS — F41.1 GENERALIZED ANXIETY DISORDER WITH PANIC ATTACKS: Primary | ICD-10-CM

## 2025-02-04 PROCEDURE — 1126F AMNT PAIN NOTED NONE PRSNT: CPT | Performed by: NURSE PRACTITIONER

## 2025-02-04 PROCEDURE — 99213 OFFICE O/P EST LOW 20 MIN: CPT | Performed by: NURSE PRACTITIONER

## 2025-02-04 RX ORDER — BUSPIRONE HYDROCHLORIDE 7.5 MG/1
7.5 TABLET ORAL 2 TIMES DAILY
Qty: 60 TABLET | Refills: 0 | Status: SHIPPED | OUTPATIENT
Start: 2025-02-04 | End: 2025-03-06

## 2025-02-04 RX ORDER — NAPROXEN 500 MG/1
500 TABLET ORAL 2 TIMES DAILY WITH MEALS
COMMUNITY
Start: 2025-01-30 | End: 2026-01-30

## 2025-02-04 NOTE — PROGRESS NOTES
Chief Complaint  Marilee Watts presents to [facility] for Anxiety (Follow up medication )    Subjective       Med follow up  Symptoms are: recurrent.   Onset was at an unknown time.   Symptoms occur: daily.  Pertinent negative symptoms include no abdominal pain, no anorexia, no joint pain, no change in stool, no chest pain, no chills, no congestion, no cough, no diaphoresis, no fatigue, no fever, no headaches, no joint swelling, no myalgias, no nausea, no neck pain, no numbness, no rash, no sore throat, no swollen glands, no dysuria, no vertigo, no visual change, no vomiting and no weakness.     Alla is in today to follow up on her anxiety which is managed by Ade Neri.  She will be scheduling with her next week.  She reports that they did make some changes to her medication but feels that her anxiety is worse.  She takes Pristiq 10mg in AM and buspirone 7.5 mg around 1pm.  She feels like around 3 pm , she drops off / feels completely drained.      Assessment and Plan     Diagnoses and all orders for this visit:    1. Generalized anxiety disorder with panic attacks (Primary)  Comments:  advised she discuss her concerns/ symptoms with Ade, try moving her medication around at the recommended dose at different times of the day f/u as schedule    2. Anxiety attack            Follow Up   Return in about 3 months (around 5/4/2025) for Recheck.  Future Appointments   Date Time Provider Department Center   2/6/2025 10:30 AM Jackie Sanders MD [facility] [place]   2/11/2025 10:20 AM Ade Neri APRN [facility] [place]   2/14/2025  9:00 AM NURSE/MA PLASTIC SURG [place] [facility] [place]   2/21/2025  9:00 AM NURSE/MA PLASTIC SURG [place] [facility] [place]   2/28/2025 10:30 AM Alanis Contreras APRN [facility] [place]   3/26/2025 11:30 AM Sakina Alvarez APRN [facility] [place]   4/7/2025 11:30 AM Ramin Shine MD [facility] [place]       No orders of the defined types were placed  in this encounter.      Medications Discontinued During This Encounter   Medication Reason    lidocaine-prilocaine (EMLA) 2.5-2.5 % cream *Therapy completed    rOPINIRole (REQUIP) 1 MG tablet *Therapy completed    triamcinolone (KENALOG) 0.1 % cream *Therapy completed          Review of Systems   Constitutional:  Negative for chills, diaphoresis, fatigue and fever.   HENT:  Negative for congestion, sore throat and swollen glands.    Respiratory:  Negative for cough.    Cardiovascular:  Negative for chest pain.   Gastrointestinal:  Negative for abdominal pain, anorexia, nausea and vomiting.   Genitourinary:  Negative for dysuria.   Musculoskeletal:  Negative for joint pain, myalgias and neck pain.   Skin:  Negative for rash.   Neurological:  Negative for vertigo, weakness and numbness.       Objective     Vitals:    02/04/25 1331   BP: 122/81   BP Location: Left arm   Patient Position: Sitting   Cuff Size: Adult   Pulse: 91   Temp: 98.7 °F (37.1 °C)   TempSrc: Oral   SpO2: 99%   Weight: 72.1 kg (159 lb)            Physical Exam  Constitutional:       General: She is not in acute distress.     Appearance: Normal appearance.   HENT:      Head: Normocephalic.   Cardiovascular:      Rate and Rhythm: Normal rate and regular rhythm.   Pulmonary:      Effort: Pulmonary effort is normal.      Breath sounds: Normal breath sounds.   Musculoskeletal:         General: Normal range of motion.   Neurological:      General: No focal deficit present.      Mental Status: She is alert and oriented to person, place, and time.   Psychiatric:         Mood and Affect: Mood normal. Mood is anxious.         Behavior: Behavior normal.               Result Review          Allergies   Allergen Reactions    Pembrolizumab Other (See Comments)     (Keytruda) swollen, red, itching, and warmness to hands, ears, chest, and throat, tightness and hoarse voice.;  resolved with treatment (4/12/24)    Tegaderm Ag Mesh [Silver] Itching     Tegaderm that  is used over port a cath  ITCHING AND REDNESS      Past Medical History:   Diagnosis Date    Allergic     Anxiety     Asthma     AS CHILD    Breast cancer     CHEMO/ RADIATION COMPLETED 2024    COVID-19 vaccine administered     Headache      Current Outpatient Medications   Medication Sig Dispense Refill    busPIRone (BUSPAR) 7.5 MG tablet Take 1 tablet by mouth 2 (Two) Times a Day for 30 days. Indications: Anxiety Disorder 60 tablet 0    desvenlafaxine (PRISTIQ) 50 MG 24 hr tablet TAKE 1 TABLET BY MOUTH DAILY (Patient taking differently: Take 2 tablets by mouth Daily.) 90 tablet 0    fexofenadine (ALLEGRA) 180 MG tablet Take 1 tablet by mouth Daily As Needed.      naproxen (NAPROSYN) 500 MG tablet Take 1 tablet by mouth 2 (Two) Times a Day With Meals.      Sodium Fluoride 5000 PPM 1.1 % paste Daily.      Zavzpret 10 MG/ACT solution 10 mg into the nostril(s) as directed by provider Daily As Needed (migraine). (Patient taking differently: Administer 10 mg into the nostril(s) as directed by provider Daily As Needed (migraine). Takes as needed) 6 each 5     No current facility-administered medications for this visit.     Past Surgical History:   Procedure Laterality Date    BREAST LUMPECTOMY Right     BREAST RECONSTRUCTION Bilateral 2024    Procedure: BREAST RECONSTRUCTION WITH MESH AND IMPLANTS - USE OF SPY;  Surgeon: Jackie Sanders MD;  Location: [facility];  Service: Plastics;  Laterality: Bilateral;    BREAST RECONSTRUCTION Bilateral 2024    Procedure: Bilateral breast revision with fat graft and nipple reconstruction, port removal, right implant exchange,umbilical hernia repair;  Surgeon: Jackie Sanders MD;  Location: [facility];  Service: Plastics;  Laterality: Bilateral;     SECTION      LYMPH NODE BIOPSY      MASTECTOMY W/ SENTINEL NODE BIOPSY Bilateral 2024    Procedure: BREAST MASTECTOMY WITH RIGHT AXILLARY SENTINEL NODE IDENTIFICATION AND EXCISION  BIOPSY;  Surgeon: Christiana Whiting MD;  Location: [facility];  Service: General;  Laterality: Bilateral;    PORTACATH PLACEMENT      SKIN BIOPSY      TUBAL ABDOMINAL LIGATION      UMBILICAL HERNIA REPAIR        Health Maintenance Due   Topic Date Due    HEPATITIS C SCREENING  Never done    ANNUAL PHYSICAL  11/03/2024      Immunization History   Administered Date(s) Administered    COVID-19 (PFIZER) Purple Cap Monovalent 05/19/2021, 06/10/2021    DTaP, Unspecified 04/16/1999    HPV Quadrivalent 06/08/2009, 08/11/2009, 12/15/2009    Hepatitis A 06/18/2018    MMR 04/16/1999    Meningococcal, Unspecified 02/24/2012    OPV 04/16/1999    Td (TDVAX) 06/26/2006    Tdap 07/09/2014, 11/11/2024    Varicella 04/16/1999         Part of this note may be an electronic transcription/translation of spoken language to printed   text using the Dragon Dictation System.      MARIO Bullock

## 2025-02-05 ENCOUNTER — PRIOR AUTHORIZATION (OUTPATIENT)
Dept: NEUROLOGY | Facility: CLINIC | Age: 30
End: 2025-02-05
Payer: COMMERCIAL

## 2025-02-06 ENCOUNTER — OFFICE VISIT (OUTPATIENT)
Dept: PLASTIC SURGERY | Facility: CLINIC | Age: 30
End: 2025-02-06
Payer: COMMERCIAL

## 2025-02-06 VITALS
HEART RATE: 85 BPM | OXYGEN SATURATION: 98 % | SYSTOLIC BLOOD PRESSURE: 119 MMHG | BODY MASS INDEX: 27.82 KG/M2 | HEIGHT: 63 IN | DIASTOLIC BLOOD PRESSURE: 79 MMHG | WEIGHT: 157 LBS

## 2025-02-06 DIAGNOSIS — L58.9 RADIATION DERMATITIS: ICD-10-CM

## 2025-02-06 DIAGNOSIS — N65.0 BREAST RECONSTRUCTION DEFORMITY: Primary | ICD-10-CM

## 2025-02-06 PROCEDURE — 99024 POSTOP FOLLOW-UP VISIT: CPT | Performed by: SURGERY

## 2025-02-07 ENCOUNTER — PREP FOR SURGERY (OUTPATIENT)
Dept: OTHER | Facility: HOSPITAL | Age: 30
End: 2025-02-07
Payer: COMMERCIAL

## 2025-02-07 DIAGNOSIS — L58.9 RADIATION DERMATITIS: ICD-10-CM

## 2025-02-07 DIAGNOSIS — N65.0 BREAST RECONSTRUCTION DEFORMITY: Primary | ICD-10-CM

## 2025-02-07 RX ORDER — SCOPOLAMINE 1 MG/3D
1 PATCH, EXTENDED RELEASE TRANSDERMAL CONTINUOUS
OUTPATIENT
Start: 2025-02-07 | End: 2025-02-10

## 2025-02-07 RX ORDER — ACETAMINOPHEN 500 MG
1000 TABLET ORAL ONCE
OUTPATIENT
Start: 2025-02-07 | End: 2025-02-07

## 2025-02-07 RX ORDER — TRANEXAMIC ACID 10 MG/ML
1000 INJECTION, SOLUTION INTRAVENOUS
OUTPATIENT
Start: 2025-02-07

## 2025-02-14 ENCOUNTER — CLINICAL SUPPORT (OUTPATIENT)
Dept: PLASTIC SURGERY | Facility: CLINIC | Age: 30
End: 2025-02-14
Payer: COMMERCIAL

## 2025-02-14 DIAGNOSIS — L58.9 RADIATION DERMATITIS: Primary | ICD-10-CM

## 2025-02-14 NOTE — PROGRESS NOTES
Theraputic Ultrasound done in office today to right breast for 7 min, 1.1w/cm2 3.3mhz. Patient tolerated well and will return for next scheduled ultrasound/appt.   9/10  Pt is starting to see right breast drop, does have some rippling on lower half of breast.

## 2025-02-17 ENCOUNTER — OFFICE VISIT (OUTPATIENT)
Dept: BEHAVIORAL HEALTH | Facility: CLINIC | Age: 30
End: 2025-02-17
Payer: COMMERCIAL

## 2025-02-17 VITALS
HEART RATE: 85 BPM | HEIGHT: 63 IN | WEIGHT: 160.8 LBS | DIASTOLIC BLOOD PRESSURE: 74 MMHG | BODY MASS INDEX: 28.49 KG/M2 | SYSTOLIC BLOOD PRESSURE: 122 MMHG

## 2025-02-17 DIAGNOSIS — Z71.89 MEDICATION CARE PLAN DISCUSSED WITH PATIENT: ICD-10-CM

## 2025-02-17 DIAGNOSIS — F41.1 GENERALIZED ANXIETY DISORDER WITH PANIC ATTACKS: Primary | ICD-10-CM

## 2025-02-17 DIAGNOSIS — F41.0 GENERALIZED ANXIETY DISORDER WITH PANIC ATTACKS: Primary | ICD-10-CM

## 2025-02-17 DIAGNOSIS — R41.840 ATTENTION AND CONCENTRATION DEFICIT: ICD-10-CM

## 2025-02-17 DIAGNOSIS — Z79.899 MEDICATION MANAGEMENT: ICD-10-CM

## 2025-02-17 PROCEDURE — 1160F RVW MEDS BY RX/DR IN RCRD: CPT | Performed by: NURSE PRACTITIONER

## 2025-02-17 PROCEDURE — 99214 OFFICE O/P EST MOD 30 MIN: CPT | Performed by: NURSE PRACTITIONER

## 2025-02-17 PROCEDURE — 1159F MED LIST DOCD IN RCRD: CPT | Performed by: NURSE PRACTITIONER

## 2025-02-17 RX ORDER — DESVENLAFAXINE 100 MG/1
100 TABLET, EXTENDED RELEASE ORAL EVERY MORNING
Qty: 90 TABLET | Refills: 1 | Status: SHIPPED | OUTPATIENT
Start: 2025-02-17

## 2025-02-17 NOTE — PROGRESS NOTES
"  Subjective   Marilee Watts is a 29 y.o. female who presents today for follow up    Referring Provider:  No referring provider defined for this encounter.    Chief Complaint:  anxiety with panic attacks, attention and concentration deficit, medication management, medication care plan dicussed      History of Present Illness:   2/17/25:  Patient presents today in office 6 yr old son, Kaz, at last visit dose of Buspar was changed from reported dose of 15 mg daily to 7.5 mg routinely twice daily due to excessive drowsiness with 15 mg impairing ability to function throughout the day.  Taking dose earlier in day felt she was dragging more early then midday and later, and is now taking midday 2pm and 5-6 pm, having issue with trouble sleeping, tossing and turning. And now more anxiety, chewing on fingers, sides of cheeks. \"I am doing so much during the day its exhautstimg me at end of day.\" Mentions at recent PCP visit that ADHD symptoms may be reason for feeling she is dragging during the day and over exerting self. \"I am constantly moving, doing something, all day.\" Explains difficulty remaining on one task, easily distracted.     Anxiety was fine while taking Buspar 7.5 mg in the morning 730-8pm and midday 2pm, felt more calmer. The drowsiness, head change is occurring just now earlier in the day as patient was taking Buspar only 15 mg in the afternoon. Out of sort feeling. Worried about taking it multiple times per day. Feels best with Pristiq.  Anxiety is worse with medication changes, \"now I have anxiety is it this or that makes me so much worse. I like to stick with what I know.\"     Patient continues to take Pristiq 100 mg daily in the morning.     Panic attacks:not reported, though near at times due to changes of times of Buspar.     1/9/25:  INIITAL EVAL  Answers submitted by the patient for this visit:  Primary Reason for Visit (Submitted on 1/9/2025)  What is the primary reason for your visit?: " "Anxiety  Anxiety (Submitted on 1/9/2025)  Chief Complaint: Anxiety  Visit: initial  Onset: 1 to 5 years ago  Progression since onset: worse  Frequency: constantly  Severity: severe  depressed mood: No  dry mouth: Yes  excessive worry: Yes  insomnia: Yes  irritability: Yes  malaise/fatigue: Yes  obsessions: No  Sleep quality: fair  Hours of sleep per night: 8 Hours  Aggravated by: medication  Additional information: So much worse since cancer    Patient informed of role of this provider which will primarily include medication management of mental health conditions. Explained the difference between provider role vs. therapy/counseling services which include CBT (talk therapy) and do not include management of medications which are typically 45 minutes to 1 hr in length, however, if patient was in agreement to start therapy this provider can provide a contact list and/or refer. Patient verbalized understanding and agreed to proceed.    Patient presents today via eigitalhart Video visit from home, located in [place], [place], with a history of anxiety for which treatment began a few years ago.  Patient is currently prescribed Pristiq 100 mg daily in the morning (11/30/22, taking current dose for approximately 1 yr) and Buspar 15 mg daily, which have provided effectiveness.  Patient has a PMHX of inflammatory breast cancer diagnosed 6/16/23 with chemotherapy and radiation treatment, bilateral mastectomy with bilateral implants 1/9/24, second surgery 11/2024 revision of right side due to radiation with hernia repair goes back 2/9/25 for follow up and currently doing  heated ultrasound therapy to right breast and will have a 3rd final surgery eventually, GERD, migraines, RLS.    Patient goal of treatment \"I want to be leveled out, anxiety controlled and energy to continue my day.\"    Patient informed reason for referral was for an ADHD evaluation, however, patient voices difficulty with anxiety and inability to complete tasks due " "to feeling so drowsy after taking Buspar.   Anxiety has increased since breast cancer diagnosis and surgeries which have been reoccurring.  Patient was working at [employer] 3rd shift, though has not been working since summer of 2023 upon starting chemotherapy.  Patient plans to return to work after completing 10 sessions of the heated ultrasound therapy and recovery from another surgery expected later this year.      Patient currently will take 2 of the 50 mg Pristiq routinely every morning around 7am. When kids wake up in the morning. Patient informs provider was told she could take a total of 100 mg of Pristiq if needed and since started the 100 mg for at least 1 yr 1/2023, \"its the best thing I have been on.\"  Everyday around 12 noon - 2pm notices anxiety will spike, chews on fingernails, inside of cheek, middle of back between shoulder blades will hurt, chest hurts \"uncomfortable tightness in arms\", nerve issue-gets real nervous, then physical discomfort. Thought was having a stroke.  Take at least one of the Buspar 15 mg everyday which feels is effective, though \"a few hours later I find myself in bed, no energy to do anything.\" Has not had for 1-2 days at the most. Going to sleep, dozing off, cannot complete household tasks after taking.  Impaired feeling starts to set in within the hour of taking will then take a nap. Denies trying a lower dose of Buspar.  Felt shakey, weird with short trial of Wellbutrin then started Buspar which caused severe drowsiness.    Depression: Patient complains of depression. She complains of anhedonia, difficulty concentrating, and fatigue     Anxiety:  The patient endorses to the following symptoms of anxiety including: excessive anxiety and worry about a number of events or activities for more days than not, being easily fatigued, difficulty concentrating or mind going blank, irritability, and sleep disturbance which have caused impairment in important areas of daily functioning.  " The patient has had symptoms of anxiety for several years, which have worsened over the last 1.5 since cancer diagnosis.    PHQ-9 Depression Screening  PHQ-9 Total Score:   1/9/2025 7 (PHQ2-2)    Little interest or pleasure in doing things?     Feeling down, depressed, or hopeless?     PHQ-2 Total Score     Trouble falling or staying asleep, or sleeping too much?     Feeling tired or having little energy?     Poor appetite or overeating?     Feeling bad about yourself - or that you are a failure or have let yourself or your family down?     Trouble concentrating on things, such as reading the newspaper or watching television?     Moving or speaking so slowly that other people could have noticed? Or the opposite - being so fidgety or restless that you have been moving around a lot more than usual?     Thoughts that you would be better off dead, or of hurting yourself in some way?     PHQ-9 Total Score     If you checked off any problems, how difficult have these problems made it for you to do your work, take care of things at home, or get along with other people?         DEE-7   1/9/2025 14    The following portions of the patient's history were reviewed and updated as appropriate: allergies, current medications, past family history, past medical history, past social history, and past surgical history.     Past Surgical History:  Past Surgical History:   Procedure Laterality Date    BREAST LUMPECTOMY Right     BREAST RECONSTRUCTION Bilateral 01/09/2024    Procedure: BREAST RECONSTRUCTION WITH MESH AND IMPLANTS - USE OF SPY;  Surgeon: Jackie Sanders MD;  Location: [facility] OR [facility] [place];  Service: Plastics;  Laterality: Bilateral;    BREAST RECONSTRUCTION Bilateral 11/19/2024    Procedure: Bilateral breast revision with fat graft and nipple reconstruction, port removal, right implant exchange,umbilical hernia repair;  Surgeon: Jackie Sanders MD;  Location: [facility] OR [facility] [place];  Service: Plastics;  Laterality:  Bilateral;     SECTION      LYMPH NODE BIOPSY      MASTECTOMY W/ SENTINEL NODE BIOPSY Bilateral 2024    Procedure: BREAST MASTECTOMY WITH RIGHT AXILLARY SENTINEL NODE IDENTIFICATION AND EXCISION BIOPSY;  Surgeon: Christiana Whiting MD;  Location: [facility] [place];  Service: General;  Laterality: Bilateral;    PORTACATH PLACEMENT      SKIN BIOPSY      TUBAL ABDOMINAL LIGATION      UMBILICAL HERNIA REPAIR         Problem List:  Patient Active Problem List   Diagnosis    Allergic rhinitis    Gastroesophageal reflux disease    Metaplastic carcinoma of breast    Breast fibroadenoma, right    Disproportion between native breast and reconstructed breast    Fitting and adjustment of vascular catheter    Intractable chronic migraine without aura and without status migrainosus    Chemotherapy-induced nausea    Postoperative follow-up    S/P breast reconstruction, bilateral    Iron deficiency anemia    Malabsorption of iron    Burn of right axilla    Breast reconstruction deformity    Radiation dermatitis       Allergy:   Allergies   Allergen Reactions    Pembrolizumab Other (See Comments)     (Keytruda) swollen, red, itching, and warmness to hands, ears, chest, and throat, tightness and hoarse voice.;  resolved with treatment (24)    Tegaderm Ag Mesh [Silver] Itching     Tegaderm that is used over port a cath  ITCHING AND REDNESS        Discontinued Medications:  Medications Discontinued During This Encounter   Medication Reason    busPIRone (BUSPAR) 7.5 MG tablet Side effects    desvenlafaxine (PRISTIQ) 50 MG 24 hr tablet Reorder         Current Medications:   Current Outpatient Medications   Medication Sig Dispense Refill    desvenlafaxine (PRISTIQ) 100 MG 24 hr tablet Take 1 tablet by mouth Every Morning. Indications: anxiety 90 tablet 1    naproxen (NAPROSYN) 500 MG tablet Take 1 tablet by mouth 2 (Two) Times a Day With Meals.      Sodium Fluoride 5000 PPM 1.1 % paste Daily.      Zavzpret 10 MG/ACT  "solution 10 mg into the nostril(s) as directed by provider Daily As Needed (migraine). (Patient taking differently: Administer 10 mg into the nostril(s) as directed by provider Daily As Needed (migraine). Takes as needed) 6 each 5    fexofenadine (ALLEGRA) 180 MG tablet Take 1 tablet by mouth Daily As Needed. (Patient not taking: Reported on 2025)       No current facility-administered medications for this visit.       Past Medical History:  Past Medical History:   Diagnosis Date    Allergic     Anxiety     Asthma     AS CHILD    Breast cancer     CHEMO/ RADIATION COMPLETED 2024    COVID-19 vaccine administered     Headache        Past Psychiatric History:  Began Treatment: a few years before cancer diagnosis   Diagnoses:Anxiety  Psychiatrist: possibly  someone in [place] prior to   Therapist: possibly   [place] prior to   Admission History:Denies  Medication Trials: Wellbutrin  mg added to Pristiq 24-24 ineffective and asked to switch back to Pristiq due to shakiness, feeling weird; Xanax 0.5 mg twice daily as needed per oncology 23- ; Ativan 0.5 mg every 8hrs as needed per oncology 23; Hydroxyzine 25-50 mg 3 times daily as needed 2022 ineffective; (Lexapro-  ; Zoloft-  ; Abilify- \"one of them made my chest hurt and back\")  every medicine made it worse,then would \"I hate taking medication, I don't know if that's where its from.\" Buspar 15 mg daily, 7.5 mg twice daily continued to have dizziness, feeling out of sort, dragging feeling after taking at smaller dose.  Self Harm: Denies  Suicide Attempts:Denies   Psychosis, Anxiety, Depression: Denies    Substance Abuse History: As of 23  Types:Denies all, including illicit  Withdrawal Symptoms:Denies  Longest Period Sober:Not Applicable   AA: Not applicable   Legal: n/a    Social History: As of 23  Martial Status:Boyfriend   Employed:Yes and If so, where [employer]  currently on medical leave " has been off work for approximately 1.5 yrs since summer of 2023; generally works 3rd shift, and plans to switch to day shift; 530 pm-4 am and 1 hour drive home  Kids:Yes or If so, how many 2 boys  age 6 and 10  House:Lives in a house with boyfriend    History: Denies  Access to Guns: No    Social History     Socioeconomic History    Marital status: Significant Other    Number of children: 2    Highest education level: High school graduate   Tobacco Use    Smoking status: Never     Passive exposure: Never    Smokeless tobacco: Never   Vaping Use    Vaping status: Never Used   Substance and Sexual Activity    Alcohol use: Not Currently     Comment: OCCASSIONAL    Drug use: Never    Sexual activity: Not Currently     Partners: Male     Birth control/protection: Other, Tubal ligation       Family History:   Suicide Attempts: Denies  Suicide Completions:Denies      Family History   Problem Relation Age of Onset    Skin cancer Mother     COPD Mother     Hypertension Father     Hyperlipidemia Father     Diabetes Father     Arthritis Father     Anxiety disorder Sister     Depression Sister     Skin cancer Paternal Grandmother     Lung cancer Paternal Grandmother     Asthma Paternal Grandmother     Cancer Paternal Grandmother     Anxiety disorder Sister     Cancer Paternal Aunt     Malig Hyperthermia Neg Hx        Developmental History:   Born: [place]  Siblings: 1 brother, 1 sister  Childhood: Denies Abuse  High School:Completed  College:Denies    Mental Status Exam:   Hygiene:   good  Cooperation:  Cooperative  Eye Contact:  Good  Psychomotor Behavior:  Appropriate  Affect:  Appropriate  Mood: anxious  Speech:  Normal  Thought Process:  Goal directed  Thought Content:  Mood congruent  Suicidal:  None  Homicidal:  None  Hallucinations:  None  Delusion:  None  Memory:  Intact  Orientation:  Grossly intact  Reliability:  good  Insight:  Good  Judgement:  Good  Impulse Control:  Good  Physical/Medical Issues:  Yes     "inflammatory breast cancer diagnosed 6/16/23 with chemotherapy and radiation treatment, bilateral mastectomy with bilateral implants 1/9/24, second surgery 11/2024 revision of right side due to radiation with hernia repair goes back 2/9/25 for follow up and currently doing  heated ultrasound therapy to right breast and will have a 3rd final surgery eventually, GERD, migraines, RLS.    Review of Systems:  Review of Systems   Constitutional:  Positive for fatigue.   Respiratory:  Positive for shortness of breath.         With anxiety and panic attacks   Cardiovascular:  Positive for chest pain and palpitations.        With anxiety/panic attacks   Gastrointestinal:  Negative for nausea.   Allergic/Immunologic: Positive for immunocompromised state.   Neurological:  Negative for dizziness and seizures.   Psychiatric/Behavioral:  Positive for decreased concentration. Negative for agitation, confusion, self-injury, sleep disturbance and suicidal ideas. The patient is nervous/anxious. The patient is not hyperactive.          Physical Exam:  Physical Exam  Psychiatric:         Attention and Perception: Attention and perception normal.         Mood and Affect: Mood and affect normal. Mood is anxious.         Speech: Speech normal.         Behavior: Behavior normal. Behavior is cooperative.         Thought Content: Thought content normal. Thought content does not include suicidal ideation. Thought content does not include suicidal plan.         Cognition and Memory: Cognition and memory normal.         Judgment: Judgment normal.         Vital Signs:   /74   Pulse 85   Ht 160 cm (62.99\")   Wt 72.9 kg (160 lb 12.8 oz)   BMI 28.49 kg/m²      Office notes from care team reviewed:  Office Visit with Jackie Sanders MD (02/06/2025)   Office Visit with Sakina Alvarez APRN (02/04/2025)       Lab Results:   Admission on 11/19/2024, Discharged on 11/19/2024   Component Date Value Ref Range Status    Cotinine Screen, " "Urine  11/19/2024 Negative  Negative Final    Case Report 11/19/2024    Final                    Value:Surgical Pathology Report                         Case: [id number]                                  Authorizing Provider:  Jackie Sanders MD  Collected:           11/19/2024 09:27 AM          Ordering Location:     [facility]  Received:            11/19/2024 12:53 PM                                 OR                                                                           Pathologist:           Iglesia Mg MD                                                            Specimens:   1) - Breast, Right, Right breast capsule                                                            2) - Breast, Right, Right Breast implant                                                   Clinical Information 11/19/2024    Final                    Value:Breast reconstruction deformity      Final Diagnosis 11/19/2024    Final                    Value:1. Right breast capsule, excision:   - Dense fibrous tissue, consistent with capsule      2. Right breast implant, removal:   - Intact breast implant (gross only diagnosis)      Gross Description 11/19/2024    Final                    Value:1. Breast, Right.  Received in formalin and labeled \"right breast capsule\" is a 10.0 x 9.0 x 3.0 cm aggregate of tan, fibrous, capsular soft tissue and fragments with a 7.0 x 0.7 cm skin ellipse.  Sectioning reveals a tan, rubbery cut surface.  Representative sections are submitted in 1 cassette.   AFUA  2. Breast, Right.  The specimen is received fresh labeled \" right breast implant\" and consists of a 12.5 x 4.5 x 3.5 cm translucent, intact breast implant inscribed with \" Sientra 1072 1-145  [id number].\"  No defects or adherent soft tissue fragments are identified.  No sections are submitted following gross examination by staff pathologist Iglesia Mg.        Microscopic Description 11/19/2024    Final                    " Value:Microscopic examination performed.     Lab on 10/25/2024   Component Date Value Ref Range Status    Cotinine Screen, Urine  10/25/2024 Negative  Negative Final   Lab on 08/22/2024   Component Date Value Ref Range Status    Glucose 08/22/2024 82  65 - 99 mg/dL Final    BUN 08/22/2024 13  6 - 20 mg/dL Final    Creatinine 08/22/2024 0.55 (L)  0.57 - 1.00 mg/dL Final    Sodium 08/22/2024 140  136 - 145 mmol/L Final    Potassium 08/22/2024 3.6  3.5 - 5.2 mmol/L Final    Chloride 08/22/2024 104  98 - 107 mmol/L Final    CO2 08/22/2024 27.4  22.0 - 29.0 mmol/L Final    Calcium 08/22/2024 9.3  8.6 - 10.5 mg/dL Final    Total Protein 08/22/2024 6.9  6.0 - 8.5 g/dL Final    Albumin 08/22/2024 4.3  3.5 - 5.2 g/dL Final    ALT (SGPT) 08/22/2024 26  1 - 33 U/L Final    AST (SGOT) 08/22/2024 20  1 - 32 U/L Final    Alkaline Phosphatase 08/22/2024 95  39 - 117 U/L Final    Total Bilirubin 08/22/2024 0.4  0.0 - 1.2 mg/dL Final    Globulin 08/22/2024 2.6  gm/dL Final    A/G Ratio 08/22/2024 1.7  g/dL Final    BUN/Creatinine Ratio 08/22/2024 23.6  7.0 - 25.0 Final    Anion Gap 08/22/2024 8.6  5.0 - 15.0 mmol/L Final    eGFR 08/22/2024 126.6  >60.0 mL/min/1.73 Final    TSH 08/22/2024 2.050  0.270 - 4.200 uIU/mL Final    Free T4 08/22/2024 0.95  0.92 - 1.68 ng/dL Final    WBC 08/22/2024 4.18  3.40 - 10.80 10*3/mm3 Final    RBC 08/22/2024 4.50  3.77 - 5.28 10*6/mm3 Final    Hemoglobin 08/22/2024 13.0  12.0 - 15.9 g/dL Final    Hematocrit 08/22/2024 39.3  34.0 - 46.6 % Final    MCV 08/22/2024 87.3  79.0 - 97.0 fL Final    MCH 08/22/2024 28.9  26.6 - 33.0 pg Final    MCHC 08/22/2024 33.1  31.5 - 35.7 g/dL Final    RDW 08/22/2024 13.4  12.3 - 15.4 % Final    RDW-SD 08/22/2024 43.4  37.0 - 54.0 fl Final    MPV 08/22/2024 8.9  6.0 - 12.0 fL Final    Platelets 08/22/2024 174  140 - 450 10*3/mm3 Final    Neutrophil % 08/22/2024 47.8  42.7 - 76.0 % Final    Lymphocyte % 08/22/2024 41.9  19.6 - 45.3 % Final    Monocyte % 08/22/2024 9.1   5.0 - 12.0 % Final    Eosinophil % 08/22/2024 1.0  0.3 - 6.2 % Final    Basophil % 08/22/2024 0.2  0.0 - 1.5 % Final    Immature Grans % 08/22/2024 0.0  0.0 - 0.5 % Final    Neutrophils, Absolute 08/22/2024 2.00  1.70 - 7.00 10*3/mm3 Final    Lymphocytes, Absolute 08/22/2024 1.75  0.70 - 3.10 10*3/mm3 Final    Monocytes, Absolute 08/22/2024 0.38  0.10 - 0.90 10*3/mm3 Final    Eosinophils, Absolute 08/22/2024 0.04  0.00 - 0.40 10*3/mm3 Final    Basophils, Absolute 08/22/2024 0.01  0.00 - 0.20 10*3/mm3 Final    Immature Grans, Absolute 08/22/2024 0.00  0.00 - 0.05 10*3/mm3 Final       EKG Results:  No orders to display       Imaging Results:  CT Chest With Contrast Diagnostic    Result Date: 10/26/2024  No acute abnormalities. No evidence of metastatic disease in the chest. Electronically Signed: Momo Leung MD  10/26/2024 3:12 AM EDT  Workstation ID: [id number]        Assessment & Plan   Diagnoses and all orders for this visit:    1. Generalized anxiety disorder with panic attacks (Primary)  -     desvenlafaxine (PRISTIQ) 100 MG 24 hr tablet; Take 1 tablet by mouth Every Morning. Indications: anxiety  Dispense: 90 tablet; Refill: 1    2. Attention and concentration deficit    3. Medication management    4. Medication care plan discussed with patient          Visit Diagnoses:    ICD-10-CM ICD-9-CM   1. Generalized anxiety disorder with panic attacks  F41.1 300.02    F41.0 300.01   2. Attention and concentration deficit  R41.840 799.51   3. Medication management  Z79.899 V58.69   4. Medication care plan discussed with patient  Z71.89 V65.49         PLAN:  Safety: No acute safety concerns  Therapy: Declines  Risk Assessment: Risk of self-harm acutely is low.  Risk factors include anxiety disorder and mood disorder.  Protective factors include no family history, denies access to guns/weapons, no present SI, no history of suicide attempts or self-harm in the past, minimal AODA, healthcare seeking, future orientation,  willingness to engage in care.  Risk of self-harm chronically is also low, but could be further elevated in the event of treatment noncompliance and/or AODA.  Meds:  -STOP Buspar due to dizziness, feeling out of sort, dragging feeling after taking at smaller dose.    -Continue Desvenlafaxine (Pristiq) 100 mg by mouth daily to target anxiety and depression.  Discussed all risks, benefits, alternatives, and side effects of Pristiq including but not limited to GI symptoms (N/V/D, constipation), sexual dysfunction, dizziness, insomnia, sweating, hyperhidrosis, anxiety, bleeding risk, seizure risk, CNS depression, dyslipidemia, activation of hamlet or hypomania, increased fragility fracture risk, hyponatremia, ocular effects, HTN, withdrawal syndrome following abrupt discontinuation, serotonin syndrome, and activation of suicidal ideation and behavior. Discussed the need for patient to immediately call the office for any new or worsening symptoms, such as worsening depression; feeling nervous or restless; suicidal thoughts or actions; or other changes changes in mood or behavior, and all other concerns. Patient educated on med compliance and the risks of suddenly stopping this medication or missing doses. Patient verbalized understanding and is agreeable to taking Pristiq. Addressed all questions and concerns.  Refilled today with the 100 mg tabs, with note to pharmacy informing of change from 50 mg though same dose, and to fill when appropriate.   Labs: n/a  Patient instructed to request refills when appropriate, when down to 5-7 days remaining via pharmacy or via MyChart.       Symptoms of anxiety, panic attacks are under fair control with current medication regimen.  Suspect worsened anxiety is related to time between Buspar doses are only 3 hrs apart, and due to intolerance as patient reported feeling out of sort and dizzy, Buspar will be discontinued.   The plan was discussed with the patient. The patient was given  time to ask questions and these questions were answered. At the conclusion of their visit they had no additional questions or concerns and all questions were answered to their satisfaction.   Patient was given instructions and counseling regarding condition and for health maintenance advice. Please see specific information pulled into the AVS if appropriate.    Patient to contact provider if symptoms worsen or fail to improve.        1/9/25:   -Safety: No acute safety concerns  -Therapy: Declines  Patient educated and encouraged to start therapy to develop new coping skills and more adaptive ways of thinking about problems. These tools can help make positive changes. The benefits of counseling often last long after treatment sessions have stopped.  -DECREASE Buspar FROM 15 mg by mouth once daily TO 7.5 mg (instructed to break current 15 mg tablet in half) by mouth twice daily to target anxiety. Absorption is affected by food, so administration with or without food should be consistent. Risks, benefits, alternatives discussed with patient including nausea, GI upset, dizziness, mild sedation, and falls risk.  After discussion of these risks and benefits, the patient voiced understanding and agreed to proceed. UPDATED ORDER AS A NO PRINT, as patient has adequate supply.    -Continue Desvenlafaxine (Pristiq) 100 mg by mouth daily to target anxiety and depression.  Discussed all risks, benefits, alternatives, and side effects of Pristiq including but not limited to GI symptoms (N/V/D, constipation), sexual dysfunction, dizziness, insomnia, sweating, hyperhidrosis, anxiety, bleeding risk, seizure risk, CNS depression, dyslipidemia, activation of hamlet or hypomania, increased fragility fracture risk, hyponatremia, ocular effects, HTN, withdrawal syndrome following abrupt discontinuation, serotonin syndrome, and activation of suicidal ideation and behavior. Discussed the need for patient to immediately call the office for  any new or worsening symptoms, such as worsening depression; feeling nervous or restless; suicidal thoughts or actions; or other changes changes in mood or behavior, and all other concerns. Patient educated on med compliance and the risks of suddenly stopping this medication or missing doses. Patient verbalized understanding and is agreeable to taking Pristiq. Addressed all questions and concerns.  Updated dose to 100 mg as patient reported taking 2 of the 50 mg for the last year which remains effective.    -Patient informed that going forward refills of future or current psychotropic medications previously prescribed by PCP will now be managed by this provider, and to contact provider MA INITIALLY when down to 5-7 days remaining to ensure new refill(s) will have this provider name on prescription for future refills. Explained to patient if requested from current bottle, via mychart, or via pharmacy the request would be directed to ordering provider. And to prevent confusion, inaccurate dosing, inaccurate medication refills, the management of psychotropic and/or mental health medications should only be ordered by this mental health provider. Patient verbalized understanding and agreed to proceed.    -Advised patient to sign up for text alerts with current pharmacy, which will inform patient when a medication is ready, cost of medication, and when a refill is needed.  Patient reports currently enrolled.    -Patient informed if a medication is requested via telephone to office, MyChart, or with pharmacy directly, to check with their pharmacy (via phone, vicenta) or [facility] to check status of those requests before contacting the office.    -Coping mechanisms discussed with patient today as a way to gain control over feelings to prevent situation or panic attack from getting worse: grounding techniques using 5 senses (name 3 things you can see, smell, touch, etc.), slow paced breathing techniques- focus on door inhaling and  exhaling at each corner, application of cold compress/ice pack/water on face or opening freezer door to allow cold air to be breathed in taking slow deep breaths, closing eyes and focus on positive thoughts.   -Discussed and given patient the following education materials: which will be sent via mail.  -Grounding Techniques, Cognitive distortions, 5 ways to defuse anxious thoughts, and What is Mindfulness with tips printout given to patient, provided by [employer] -How to Stop Over thinking Tips and coping strategies print out given to patient today from [facility].    -Patient given direct contact number to provider's MA in [place], [phone], during video visit today.     Patient presentation seems most consistent with anxiety with panic attacks and an adverse reaction to dose of Buspar.  Decreasing Buspar dose due to drowsiness after taking 15 mg dose once daily which is impairing ability to function the remainder of the day which then worsens mood.   The plan was discussed with the patient. The patient was given time to ask questions and these questions were answered. At the conclusion of their visit they had no additional questions or concerns and all questions were answered to their satisfaction.   Patient was given instructions and counseling regarding condition and for health maintenance advice. Please see specific information pulled into the AVS if appropriate.   Patient to contact provider if symptoms worsen or fail to improve.      Patient screened positive for depression based on a PHQ-9 score of 7 on 1/9/2025. Follow-up recommendations include: Prescribed antidepressant medication treatment and Suicide Risk Assessment performed. (PHQ2-2)      TREATMENT PLAN/GOALS: Continue supportive psychotherapy efforts and medications as indicated. Treatment and medication options discussed during today's visit. Patient ackowledged and verbally consented to continue with current treatment plan and  was educated on the importance of compliance with treatment and follow-up appointments.    MEDICATION ISSUES:  TIFFANIE reviewed as expected.  Discussed medication options and treatment plan of prescribed medication as well as the risks, benefits, and side effects including potential falls, possible impaired driving and metabolic adversities among others. Patient is agreeable to call the office with any worsening of symptoms or onset of side effects. Patient is agreeable to call 911 or go to the nearest ER should he/she begin having SI/HI. No medication side effects or related complaints today.     MEDS ORDERED DURING VISIT:  New Medications Ordered This Visit   Medications    desvenlafaxine (PRISTIQ) 100 MG 24 hr tablet     Sig: Take 1 tablet by mouth Every Morning. Indications: anxiety     Dispense:  90 tablet     Refill:  1     Changing to 100 mg tablet same dose       Return in about 6 weeks (around 3/31/2025) for medication check.         I spent 33 minutes caring for Marilee on this date of service. This time includes time spent by me in the following activities: preparing for the visit, obtaining and/or reviewing a separately obtained history, performing a medically appropriate examination and/or evaluation, counseling and educating the patient/family/caregiver, ordering medications, tests, or procedures, referring and communicating with other health care professionals, documenting information in the medical record, and care coordination.     This document has been electronically signed by MARIO Woods  February 17, 2025 16:00 EST           Part of this note may be an electronic transcription/translation of spoken language to printed text using the Dragon Dictation System.

## 2025-02-17 NOTE — PATIENT INSTRUCTIONS
"Should you want to get in touch with your provider, MARIO Woods, please contact MY Medical Assistant, Pennie, directly at [phone].  Recommend saving Pennie's direct number in phone as this is the PREFERRED & EASIEST way to get in contact with your provider.  Please leave a voice mail if you do not get an answer and she will return your call within 24 hrs. You will NOT be able to contact provider on One[facility], as Behavioral Health Providers are restricted. YOU MUST CALL [phone]  If you need to speak with the on call provider after hours or on weekends, please Contact the [facility] ([phone]) and staff will be able to page the provider on call directly.     SPECIFIC RECOMMENDATIONS:     1.      Medications discussed at this encounter:                   -  STOP Buspar    Refilled Pristiq as an 100 mg tablet for 90 days with 1 refill     2.      Psychotherapy recommendations:  Declined     3.     Return to clinic: 6 weeks    Please arrive at least 15 minutes before your scheduled appointment time to complete check in process.      IF you are scheduled for a OneName VIDEO visit, YOU MUST COMPLETE THE \"E-CHECK IN\" PROCESS PRIOR TO BEGINNING THE VISIT, You may still complete the E-Check in for in office visits prior to appointment, you will receive multiple text/email reminders which will direct you further if needed.            "

## 2025-02-21 ENCOUNTER — CLINICAL SUPPORT (OUTPATIENT)
Dept: PLASTIC SURGERY | Facility: CLINIC | Age: 30
End: 2025-02-21
Payer: COMMERCIAL

## 2025-02-21 DIAGNOSIS — L58.9 RADIATION DERMATITIS: Primary | ICD-10-CM

## 2025-02-21 NOTE — PROGRESS NOTES
Theraputic Ultrasound done in office today to right breast for 7 min, 1.1w/cm2 3.3mhz. Patient tolerated well and will return for next scheduled ultrasound/appt.   10/10  Pt still having some cording in right underarm. She will reach out to lymphedema to see if she needs a new referral.

## 2025-02-28 DIAGNOSIS — C50.919 METAPLASTIC CARCINOMA OF BREAST: Primary | ICD-10-CM

## 2025-03-06 NOTE — PROGRESS NOTES
Chief Complaint  Consult    Subjective               Initial evaluation  Marilee Watts is a 29 y.o. female who presents to [facility] as a consult  for abdominal surgery   Chief Complaint   Patient presents with    Consult   .   Patients current weight is 160 lbs.  Patient weight has been stable at current weight for 2 years. Patient has rash and moisture under abdominal fold. Treats rash with Nystatin powder and anti itch cream. History of surgeries:  and umbilical hernia repair.  Patient has 2 children, is done having children. Patient does not have issues with activities of daily living.      Follow-Up of Bilateral breast revision with fat graft and nipple reconstruction, port removal, right implant exchange,umbilical hernia repair 24.     History of Present Illness  The patient presents for evaluation of right breast, abdominal skin redundancy, and right hand pain.    She has been attempting to alleviate discomfort in her breasts through massage, but reports no significant improvement. She notes a discrepancy in size between her right and left breasts, with the right being smaller. She expresses interest in understanding the potential benefits of fat grafting to expand the implant and tissue. She recalls that the mesh was not replaced during her previous surgery.    She reports an improvement in her abdominal condition, but experiences rashes during the summer, which she attributes to heat entrapment. The severity of these rashes increases when she wears pants. She has a history of 2 C-sections and believes that insurance typically covers the lower half of the abdomen but not the upper. She is considering another fat transfer procedure. She also mentions a persistent bulge when sitting, despite having undergone hernia repair. She expresses concern about the potential for further drooping if additional fat is removed. She has a scheduled surgery in  "05/2025, which marks 6 months since her last procedure.    She has recently returned to work, where she is required to perform 10-hour shifts. She experiences pain extending from her arm to her hand, describing it as a burning sensation akin to her hand falling asleep. She also reports numbness in her fingertips. She has attempted to schedule an appointment with a neurologist due to concerns about potential nerve damage. She notes that her arms have started to swell, which she fears may be exacerbated by her job's lifting requirements. She has no prior history of these symptoms. She is concerned about the possibility of lymphedema and does not want to worsen her condition. She has tried to consult with her lymphedema doctor but has been unsuccessful. She expresses anxiety about the needle-like sensation in her hand and worries about potential cancer recurrence in the scar. She has not yet returned to work full-time, having only completed one 15-hour shift before experiencing hand issues.        Allergies: Adhesive glue [wound dressing adhesive], Pembrolizumab, and Tegaderm ag mesh [silver]  Allergies Reconciled.    Review of Systems  All system were reviewed and were negative, except the ones noted above.     Objective     /80 (BP Location: Left arm, Patient Position: Sitting, Cuff Size: Adult)   Pulse 87   Ht 160 cm (62.99\")   Wt 72.6 kg (160 lb)   SpO2 98%   BMI 28.35 kg/m²     Body mass index is 28.35 kg/m².           Physical Exam  The right breast shows improvement but still has some limitations in movement. There is retraction in the scar area of the right breast.       Patient is not tachycardic   Respiration is non elaborated.   Excess skin above and below umbilicus with grade 3 panniculus. Significant moisture, hyperpigmentation, and irritation over lower abdominal fold, does not have palpable hernia, and no open areas. Lipodystrophy of abdomen.      Result Review :       Procedures          "       Assessment & Plan  1. Right breast reconstruction.  The right breast shows improvement but still has some limitations in movement and retraction in the scar area. The plan is to proceed with a right breast reconstruction revision, which will include mesh replacement and fat grafting.    2. Abdominal skin redundancy.  She is complaining about excessive skin in her abdomen, which is likely due to previous surgeries and weight changes. A cosmetic procedure for scar revision on her  will be performed later on in the office.    3. Right hand pain.  She reports tingling, burning, and numbness in her right hand, which may be due to carpal tunnel syndrome or median nerve compression. A referral will be made for her to be evaluated by orthopedics or neurosurgery for potential carpal tunnel syndrome.    PROCEDURE  The patient has a history of 2 C-sections and hernia repair.      Consent: right breast reconstruction revision with mesh replacement, fat graft, possible implant exchange        CPT codes:     23659-91  implantation of biologic implant  07540- delayed insertion of breast prosthesis following reconstruction  54926- revision of reconstructed breast      Implants Sientra:  Sientra Smooth round gel implant [id number]  x1      Mesh:    Mesh:  alloderm rectangle 16x20 perforated [id number] x2     OR time: 3 hours         Follow Up     No follow-ups on file.    Patient was given instructions and counseling regarding her condition. Please see specific information pulled into the AVS if appropriate.     Jackie Sanders MD  2025    Patient or patient representative verbalized consent for the use of Ambient Listening during the visit with  Jackie Sanders MD for chart documentation. 3/24/2025  17:40 EDT

## 2025-03-08 DIAGNOSIS — F41.0 GENERALIZED ANXIETY DISORDER WITH PANIC ATTACKS: ICD-10-CM

## 2025-03-08 DIAGNOSIS — F41.1 GENERALIZED ANXIETY DISORDER WITH PANIC ATTACKS: ICD-10-CM

## 2025-03-11 RX ORDER — BUSPIRONE HYDROCHLORIDE 7.5 MG/1
7.5 TABLET ORAL 2 TIMES DAILY
Qty: 60 TABLET | Refills: 0 | OUTPATIENT
Start: 2025-03-11

## 2025-03-11 NOTE — TELEPHONE ENCOUNTER
busPIRone HCL 7.5 MG TABLET       The original prescription was discontinued on 2/17/2025 by Ade Neri APRN for the following reason: Side effects. Renewing this prescription may not be appropriate.     Last ordered: 1 month ago (2/4/2025) by MARIO Woods     FOLLOW UP 03/31/2025

## 2025-03-17 ENCOUNTER — TELEPHONE (OUTPATIENT)
Dept: NEUROLOGY | Facility: CLINIC | Age: 30
End: 2025-03-17
Payer: COMMERCIAL

## 2025-03-17 NOTE — TELEPHONE ENCOUNTER
Provider: SHAHNAZ BASSETT     Caller: Marilee Watts    Relationship to Patient: Self    Phone Number: [phone]    Reason for Call: PT MISSED HER BOTOX APPT ON 2/28/25 AND WOULD LIKE TO GET THIS R/S.   PT HAS BEEN HAVING A LOT OF HEADACHES.   PLEASE CALL PT TO SCHEDULE    THANK YOU

## 2025-03-19 ENCOUNTER — CONSULT (OUTPATIENT)
Dept: PLASTIC SURGERY | Facility: CLINIC | Age: 30
End: 2025-03-19
Payer: COMMERCIAL

## 2025-03-19 VITALS
HEIGHT: 63 IN | HEART RATE: 87 BPM | DIASTOLIC BLOOD PRESSURE: 80 MMHG | BODY MASS INDEX: 28.35 KG/M2 | WEIGHT: 160 LBS | SYSTOLIC BLOOD PRESSURE: 110 MMHG | OXYGEN SATURATION: 98 %

## 2025-03-19 DIAGNOSIS — R20.2 NUMBNESS AND TINGLING OF RIGHT HAND: Primary | ICD-10-CM

## 2025-03-19 DIAGNOSIS — N65.0 BREAST RECONSTRUCTION DEFORMITY: ICD-10-CM

## 2025-03-19 DIAGNOSIS — R20.0 NUMBNESS AND TINGLING OF RIGHT HAND: Primary | ICD-10-CM

## 2025-03-19 NOTE — TELEPHONE ENCOUNTER
Caller: Marilee Watts    Relationship: Self    Best call back number: [phone]    What was the call regarding: PT CALLED TO CHECK STATUS OF HER BOTOX APPT REQUEST. I ADVISED PT THAT OFFICE WILL CONTACT HER DIRECTLY WHEN READY TO GET HER RESCHEDULED FOR HER BOTOX APPT.    Do you require a callback: YES, PLEASE.    PLEASE REVIEW AND ADVISE.   Oxybutynin Counseling:  I discussed with the patient the risks of oxybutynin including but not limited to skin rash, drowsiness, dry mouth, difficulty urinating, and blurred vision.

## 2025-03-21 PROBLEM — R20.2 NUMBNESS AND TINGLING OF RIGHT HAND: Status: ACTIVE | Noted: 2025-03-21

## 2025-03-21 PROBLEM — R20.0 NUMBNESS AND TINGLING OF RIGHT HAND: Status: ACTIVE | Noted: 2025-03-21

## 2025-03-24 ENCOUNTER — PREP FOR SURGERY (OUTPATIENT)
Dept: OTHER | Facility: HOSPITAL | Age: 30
End: 2025-03-24
Payer: COMMERCIAL

## 2025-03-24 DIAGNOSIS — N65.0 BREAST RECONSTRUCTION DEFORMITY: Primary | ICD-10-CM

## 2025-03-24 DIAGNOSIS — L58.9 RADIATION DERMATITIS: ICD-10-CM

## 2025-03-24 RX ORDER — ACETAMINOPHEN 500 MG
1000 TABLET ORAL ONCE
OUTPATIENT
Start: 2025-03-24 | End: 2025-03-24

## 2025-03-24 RX ORDER — SCOPOLAMINE 1 MG/3D
1 PATCH, EXTENDED RELEASE TRANSDERMAL CONTINUOUS
OUTPATIENT
Start: 2025-03-24 | End: 2025-03-27

## 2025-03-24 RX ORDER — TRANEXAMIC ACID 10 MG/ML
1000 INJECTION, SOLUTION INTRAVENOUS
OUTPATIENT
Start: 2025-03-24

## 2025-03-26 ENCOUNTER — HOSPITAL ENCOUNTER (OUTPATIENT)
Facility: HOSPITAL | Age: 30
Setting detail: THERAPIES SERIES
Discharge: HOME | End: 2025-03-26
Payer: COMMERCIAL

## 2025-03-26 ENCOUNTER — TELEPHONE (OUTPATIENT)
Dept: FAMILY MEDICINE CLINIC | Age: 30
End: 2025-03-26

## 2025-03-26 ENCOUNTER — OFFICE VISIT (OUTPATIENT)
Dept: FAMILY MEDICINE CLINIC | Age: 30
End: 2025-03-26
Payer: COMMERCIAL

## 2025-03-26 VITALS
SYSTOLIC BLOOD PRESSURE: 109 MMHG | HEIGHT: 63 IN | TEMPERATURE: 98.2 F | WEIGHT: 161.6 LBS | HEART RATE: 89 BPM | OXYGEN SATURATION: 100 % | BODY MASS INDEX: 28.63 KG/M2 | DIASTOLIC BLOOD PRESSURE: 76 MMHG

## 2025-03-26 DIAGNOSIS — F41.0 ANXIETY ATTACK: ICD-10-CM

## 2025-03-26 DIAGNOSIS — Z17.0 MALIGNANT NEOPLASM OF UPPER-OUTER QUADRANT OF RIGHT BREAST IN FEMALE, ESTROGEN RECEPTOR POSITIVE: ICD-10-CM

## 2025-03-26 DIAGNOSIS — L90.5 SCAR CONDITION AND FIBROSIS OF SKIN: Primary | ICD-10-CM

## 2025-03-26 DIAGNOSIS — R41.840 ATTENTION AND CONCENTRATION DEFICIT: Primary | ICD-10-CM

## 2025-03-26 DIAGNOSIS — M25.60 JOINT STIFFNESS: ICD-10-CM

## 2025-03-26 DIAGNOSIS — C50.411 MALIGNANT NEOPLASM OF UPPER-OUTER QUADRANT OF RIGHT BREAST IN FEMALE, ESTROGEN RECEPTOR POSITIVE: ICD-10-CM

## 2025-03-26 DIAGNOSIS — I89.0 LYMPHEDEMA: ICD-10-CM

## 2025-03-26 DIAGNOSIS — F39 MOOD DISORDER: ICD-10-CM

## 2025-03-26 DIAGNOSIS — R52 PAIN: ICD-10-CM

## 2025-03-26 PROCEDURE — 1159F MED LIST DOCD IN RCRD: CPT | Performed by: NURSE PRACTITIONER

## 2025-03-26 PROCEDURE — 97535 SELF CARE MNGMENT TRAINING: CPT | Performed by: OCCUPATIONAL THERAPIST

## 2025-03-26 PROCEDURE — 1126F AMNT PAIN NOTED NONE PRSNT: CPT | Performed by: NURSE PRACTITIONER

## 2025-03-26 PROCEDURE — 1160F RVW MEDS BY RX/DR IN RCRD: CPT | Performed by: NURSE PRACTITIONER

## 2025-03-26 PROCEDURE — 93702 BIS XTRACELL FLUID ANALYSIS: CPT | Performed by: OCCUPATIONAL THERAPIST

## 2025-03-26 PROCEDURE — 99214 OFFICE O/P EST MOD 30 MIN: CPT | Performed by: NURSE PRACTITIONER

## 2025-03-26 RX ORDER — BUPROPION HYDROCHLORIDE 150 MG/1
150 TABLET, EXTENDED RELEASE ORAL 2 TIMES DAILY
Qty: 60 TABLET | Refills: 5 | Status: SHIPPED | OUTPATIENT
Start: 2025-03-26

## 2025-03-26 NOTE — PROGRESS NOTES
Chief Complaint  Marilee Watts presents to [facility] for Anxiety (6 month ) and Annual Exam    Subjective     History of Present Illness  Marilee is in today desiring new referral to evaluate for ADD/ADHD.  She is currently under the care of Daniele Neri and reports that medication changes have been made, but she is not happy with the changes, she feels like she has been going backwards and her anxiety is worse.  She has tried multiple medications in thje past to treat her anxiety, but Pristiq has been the most effective.  Previously taking 50 daily and will take additional 50 in afternoon if needed.  She is now taking 100mg daily and feels that this is working ok, but her anxiety is still bad in the afternoon, now feels that she has more agitation than previously.  She states that she is unable to complete taks, gets side tracked in conversations easily, feels exhausted by the end of the day.  She has tried therapy in past- but did not feel of benefit.      Assessment and Plan     Diagnoses and all orders for this visit:    1. Attention and concentration deficit (Primary)  Comments:  we will place order for formal evaluation for diagnosis.  She will also try Wellbutrin along with her Pristiq to see if able to stay more focused.  f/u 4-6 week  Orders:  -     buPROPion SR (Wellbutrin SR) 150 MG 12 hr tablet; Take 1 tablet by mouth 2 (Two) Times a Day.  Dispense: 60 tablet; Refill: 5  -     Ambulatory Referral to Neuropsychology    2. Mood disorder  Comments:  continue current treatment at current dose - add Wellbutrin  150 BID  Orders:  -     Ambulatory Referral to Neuropsychology    3. Anxiety attack  -     Ambulatory Referral to Neuropsychology            Follow Up   Return in about 6 weeks (around 5/7/2025) for Recheck.  Future Appointments   Date Time Provider Department Center   3/28/2025  8:15 AM Alanis Contreras [place] [facility] [place]   4/7/2025  1:30 PM Ramin Shine  "MD CLIVE [facility] [place]C ETWN [facility]   4/10/2025 10:00 AM GeorgeAstrida, JCARLOS BH PARDEEP OPOP[facility]   4/16/2025 10:30 AM Rita Silva APRN [facility] NS ETOWN [facility]   4/30/2025 10:30 AM Sakina Alvarez APRN [facility] PC BARDS [facility]   10/1/2025  9:00 AM Sakina Alvarez APRJOSE MANUEL [facility] BARDS [facility]       New Medications Ordered This Visit   Medications    buPROPion SR (Wellbutrin SR) 150 MG 12 hr tablet     Sig: Take 1 tablet by mouth 2 (Two) Times a Day.     Dispense:  60 tablet     Refill:  5       There are no discontinued medications.       Review of Systems    Objective     Vitals:    03/26/25 1138   BP: 109/76   BP Location: Left arm   Patient Position: Sitting   Cuff Size: Adult   Pulse: 89   Temp: 98.2 °F (36.8 °C)   TempSrc: Oral   SpO2: 100%   Weight: 73.3 kg (161 lb 9.6 oz)   Height: 160 cm (62.99\")         Physical Exam  Constitutional:       General: She is not in acute distress.     Appearance: Normal appearance.   HENT:      Head: Normocephalic.   Cardiovascular:      Rate and Rhythm: Normal rate and regular rhythm.   Pulmonary:      Effort: Pulmonary effort is normal.      Breath sounds: Normal breath sounds.   Musculoskeletal:         General: Normal range of motion.   Neurological:      General: No focal deficit present.      Mental Status: She is alert and oriented to person, place, and time.   Psychiatric:         Mood and Affect: Mood normal.         Behavior: Behavior normal.               Result Review          Allergies   Allergen Reactions    Adhesive Glue [Wound Dressing Adhesive] Rash    Pembrolizumab Other (See Comments)     (Keytruda) swollen, red, itching, and warmness to hands, ears, chest, and throat, tightness and hoarse voice.;  resolved with treatment (4/12/24)    Tegaderm Ag Mesh [Silver] Itching     Tegaderm that is used over port a cath  ITCHING AND REDNESS      Past Medical History:   Diagnosis Date    Allergic     Anxiety     Asthma     AS CHILD    Breast cancer     CHEMO/ RADIATION COMPLETED AUGUST "     COVID-19 vaccine administered     Headache      Current Outpatient Medications   Medication Sig Dispense Refill    desvenlafaxine (PRISTIQ) 100 MG 24 hr tablet Take 1 tablet by mouth Every Morning. Indications: anxiety 90 tablet 1    fexofenadine (ALLEGRA) 180 MG tablet Take 1 tablet by mouth Daily As Needed.      naproxen (NAPROSYN) 500 MG tablet Take 1 tablet by mouth 2 (Two) Times a Day With Meals.      Sodium Fluoride 5000 PPM 1.1 % paste Daily.      Zavzpret 10 MG/ACT solution 10 mg into the nostril(s) as directed by provider Daily As Needed (migraine). (Patient taking differently: Administer 10 mg into the nostril(s) as directed by provider Daily As Needed (migraine). Takes as needed) 6 each 5    buPROPion SR (Wellbutrin SR) 150 MG 12 hr tablet Take 1 tablet by mouth 2 (Two) Times a Day. 60 tablet 5     No current facility-administered medications for this visit.     Past Surgical History:   Procedure Laterality Date    BREAST LUMPECTOMY Right     BREAST RECONSTRUCTION Bilateral 2024    Procedure: BREAST RECONSTRUCTION WITH MESH AND IMPLANTS - USE OF SPY;  Surgeon: Jackie Sanders MD;  [facility] [place];  Service: Plastics;  Laterality: Bilateral;    BREAST RECONSTRUCTION Bilateral 2024    Procedure: Bilateral breast revision with fat graft and nipple reconstruction, port removal, right implant exchange,umbilical hernia repair;  Surgeon: Jackie Sanders MD;  [facility]: [facility] [place];  Service: Plastics;  Laterality: Bilateral;     SECTION      LYMPH NODE BIOPSY      MASTECTOMY W/ SENTINEL NODE BIOPSY Bilateral 2024    Procedure: BREAST MASTECTOMY WITH RIGHT AXILLARY SENTINEL NODE IDENTIFICATION AND EXCISION BIOPSY;  Surgeon: Christiana Whiting MD;  [facility]: [facility] [place];  Service: General;  Laterality: Bilateral;    PORTACATH PLACEMENT      SKIN BIOPSY      TUBAL ABDOMINAL LIGATION      UMBILICAL HERNIA REPAIR        Health Maintenance Due   Topic Date  Due    HEPATITIS C SCREENING  Never done      Immunization History   Administered Date(s) Administered    COVID-19 (PFIZER) Purple Cap Monovalent 05/19/2021, 06/10/2021    DTaP, Unspecified 04/16/1999    HPV Quadrivalent 06/08/2009, 08/11/2009, 12/15/2009    Hepatitis A 06/18/2018    MMR 04/16/1999    Meningococcal, Unspecified 02/24/2012    OPV 04/16/1999    Td (TDVAX) 06/26/2006    Tdap 07/09/2014, 11/11/2024    Varicella 04/16/1999         Part of this note may be an electronic transcription/translation of spoken language to printed   text using the Dragon Dictation System.      Sakina Alvarez, APRN

## 2025-03-26 NOTE — THERAPY RE-EVALUATION
Outpatient Occupational Therapy Lymphedema Re-Evaluation   [facility]     Patient Name: Marilee Watts  : 1995  MRN: [id number]  Today's Date: 3/26/2025      Visit Date: 2025    Patient Active Problem List   Diagnosis    Allergic rhinitis    Gastroesophageal reflux disease    Metaplastic carcinoma of breast    Breast fibroadenoma, right    Disproportion between native breast and reconstructed breast    Fitting and adjustment of vascular catheter    Intractable chronic migraine without aura and without status migrainosus    Chemotherapy-induced nausea    Postoperative follow-up    S/P breast reconstruction, bilateral    Iron deficiency anemia    Malabsorption of iron    Burn of right axilla    Breast reconstruction deformity    Radiation dermatitis    Numbness and tingling of right hand        Past Medical History:   Diagnosis Date    Allergic     Anxiety     Asthma     AS CHILD    Breast cancer     CHEMO/ RADIATION COMPLETED 2024    COVID-19 vaccine administered     Headache         Past Surgical History:   Procedure Laterality Date    BREAST LUMPECTOMY Right     BREAST RECONSTRUCTION Bilateral 2024    Procedure: BREAST RECONSTRUCTION WITH MESH AND IMPLANTS - USE OF SPY;  Surgeon: Jackie Sanders MD;  Location: [facility] OR [facility] [place];  Service: Plastics;  Laterality: Bilateral;    BREAST RECONSTRUCTION Bilateral 2024    Procedure: Bilateral breast revision with fat graft and nipple reconstruction, port removal, right implant exchange,umbilical hernia repair;  Surgeon: Jackie Sanders MD;  Location: [facility] OR [facility] [place];  Service: Plastics;  Laterality: Bilateral;     SECTION      LYMPH NODE BIOPSY      MASTECTOMY W/ SENTINEL NODE BIOPSY Bilateral 2024    Procedure: BREAST MASTECTOMY WITH RIGHT AXILLARY SENTINEL NODE IDENTIFICATION AND EXCISION BIOPSY;  Surgeon: Christiana Whiting MD;  Location: [facility] OR [facility] [place];  Service: General;  Laterality: Bilateral;    PORTACATH  PLACEMENT      SKIN BIOPSY      TUBAL ABDOMINAL LIGATION      UMBILICAL HERNIA REPAIR           Visit Dx:     ICD-10-CM ICD-9-CM   1. Scar condition and fibrosis of skin  L90.5 709.2   2. Lymphedema  I89.0 457.1   3. Joint stiffness  M25.60 719.50   4. Pain  R52 780.96   5. Malignant neoplasm of upper-outer quadrant of right breast in female, estrogen receptor positive  C50.411 174.4    Z17.0 V86.0        Patient History       Row Name 03/26/25 0900             History    Chief Complaint --  Lymphedema surveillance program  -TD      Brief Description of Current Complaint Marilee is a 29-year-old female with a diagnosis of metaplastic carcinoma of the right breast.  Patient underwent bilateral mastectomy with reconstruction on January 9, 2023.  -TD         Fall Risk Assessment    Any falls in the past year: No  -TD      Does patient have a fear of falling No  -TD         Services    Are you currently receiving Home Health services No  -TD      Do you plan to receive Home Health services in the near future No  -TD         Daily Activities    Primary Language English  -TD      Are you able to read Yes  -TD      Are you able to write Yes  -TD      How does patient learn best? Listening;Reading;Demonstration  -TD         Safety    Are you being hurt, hit, or frightened by anyone at home or in your life? No  -TD      Are you being neglected by a caregiver No  -TD      Have you had any of the following issues with Anxiety  Pt on medication  -TD                User Key  (r) = Recorded By, (t) = Taken By, (c) = Cosigned By      Initials Name Provider Type    TD Daniela Vazquez OT Occupational Therapist                     Lymphedema       Row Name 03/26/25 0900             Subjective Pain    Able to rate subjective pain? yes  -TD      Pre-Treatment Pain Level 2  -TD      Post-Treatment Pain Level 1  -TD         Lymphedema Assessment    Lymphedema Classification RUE:;at risk/stage 0  -TD      Lymphedema Cancer Related Sx  "bilateral;simple mastectomy;reconstructive;sentinel node biopsy  -TD      Lymphedema Surgery Comments 1/2024  -TD      Lymph Nodes Removed # 2  -TD      Positive Lymph Nodes # 0  -TD      Chemo Received yes  -TD      Chemo Treatments #/Timeframe 7  -TD      Radiation Therapy Received yes  -TD      Radiation Treatments #/Timeframe 28  -TD         LLIS - Physical Concerns    The amount of pain associated with my lymphedema is: 3  -TD      The amount of limb heaviness associated with my lymphedema is: 1  -TD      The amount of skin tightness associated with my lymphedema is: 1  -TD      The size of my swollen limb(s) seems: 1  -TD      Lymphedema affects the movement of my swollen limb(s): 1  -TD      The strength in my swollen limb(s) is: 1  -TD         LLIS - Psychosocial Concerns    Lymphedema affects my body image (i.e., \"how I think I look\"). 0  -TD      Lymphedema affects my socializing with others. 0  -TD      Lymphedema affects my intimate relations with spouse or partner (rate 0 if not applicable 0  -TD      Lymphedema \"gets me down\" (i.e., depression, frustration, or anger) 0  -TD      I must rely on others for help due to my lymphedema. 0  -TD      I know what to do to manage my lymphedema 3  -TD         LLIS - Functional Concerns    Lymphedema affects my ability to perform self-care activities (i.e. eating, dressing, hygiene) 0  -TD      Lymphedema affects my ability to perform routine home or work-related activities. 0  -TD      Lymphedema affects my performance of preferred leisure activities. 0  -TD      Lymphedema affects proper fit of clothing/shoes 0  -TD      Lymphedema affects my sleep 0  -TD         Posture/Observations    Posture- WNL Posture is WNL  -TD         General ROM    GENERAL ROM COMMENTS BUE are WFL with tightness in end ranges  -TD         MMT (Manual Muscle Testing)    General MMT Comments BUE are WLF  -TD         Skin Changes/Observations    Location/Assessment Upper Quadrant  -TD   "    Upper Quadrant Conditions bilateral:;clean;dry  -TD      Upper Quadrant Color/Pigment bilateral:;normal  -TD      Skin Observations Comment Pt has cording in the axilla and elbow.  -TD         L-Dex Bioimpedence Screening    L-Dex Measurement Extremity RUE  -TD      L-Dex Patient Position Standing  -TD      L-Dex UE Dominate Side Right  -TD      L-Dex UE At Risk Side Right  -TD      L-Dex UE Pre Surgical Value Yes  -TD      L-Dex UE Score -2.6  -TD      L-Dex UE Baseline Score 0  -TD      L-Dex UE Value Change -2.6  -TD      $ L-Dex Charge yes  -TD         Lymphedema Life Impact Scale Totals    A.  Total Q1 - Q17 (Do not include Q18) 11  -TD      B.  Total number of questions answered (Q1-Q17) 17  -TD      C. Divide A by B 0.65  -TD      D. Multiple C by 25 16.25  -TD                User Key  (r) = Recorded By, (t) = Taken By, (c) = Cosigned By      Initials Name Provider Type    Daniela Hagen OT Occupational Therapist                     OT Ortho       Row Name 03/26/25 0900             Orthotics & Prosthetics Management    Orthosis Location other (see comments)  Juzo soft max 20-30mmhg size: 2 max long color: black  -TD      Additional Documentation Orthosis Location (Row)  -TD                User Key  (r) = Recorded By, (t) = Taken By, (c) = Cosigned By      Initials Name Provider Type    Daniela Hagen OT Occupational Therapist                    The patient had a follow up SOZO measurement which I reviewed today. The score is   WNL, see scanned to EMR. Bioimpedance spectroscopy helps identify the   onset of lymphedema in an arm or leg before patients experience noticeable swelling. Research has   shown that 92% of patients with early detection of lymphedema using L-Dex combined with   intervention do not progress to chronic lymphedema through three years. Additionally, as of March 2023, the NCCN Guidelines® for Survivorship recommend proactive screening for lymphedema using   bioimpedance  spectroscopy. Whenever possible, patients are tested for baseline L-Dex score before   cancer treatment begins and then are reassessed during regular follow-up visits using the SOZO device.   Otherwise, this can be started postoperatively and continued during regular follow-up visits. If the   patient’s L-Dex score increases above normal levels, that is a sign that lymphedema is developing and a   referral is made to physical therapy for further evaluation and early compression treatment.   Lymphedema assessment with the SOZO L-Dex score is recommended to be done every 3 months for   the first 3 years and then every 6 months for years 4 and 5 followed by annually afterwards     Therapy Education  Education Details: Pt was educated on HEP and stretches  Given: HEP, Symptoms/condition management, Edema management, Pain management  Program: New  How Provided: Verbal, Demonstration  Provided to: Patient  Level of Understanding: Teach back education performed, Verbalized, Demonstrated  55806 - OT Self Care/Mgmt Minutes: 25         OT Goals       Row Name 03/26/25 1156          Time Calculation    OT Goal Re-Cert Due Date 04/25/25  -TD               User Key  (r) = Recorded By, (t) = Taken By, (c) = Cosigned By      Initials Name Provider Type    Daniela Hagen, JCARLOS Occupational Therapist                  1. Post Breast Surgery Care/at risk for Lymphedema  LTG 1: 90 days:  As an indicator of no exacerbation of lymphedema staging, the patient will present with an L-Dex score less than [10] points from preoperative baseline.              STATUS: on going  STG 1a:   30 days: To prevent exacerbation of mixed edema to lymphedema, patient will utilize the 2 postsurgical compression garments daily.                 STATUS: MET, on going  STG 1b: 30 days: Patient will be independent with self-manual lymphatic massage.               STATUS: on going  STG 1c: 30 days:  Patient will be independent with identification of signs and  symptoms of lymphedema exasperation per stoplight to recovery education handout.              STATUS: on going  STG 1 d: 30 days: Patient will be independent with HEP to prevent advancement in lymphedema staging.              STATUS: on going  TREATMENT:  Self Care/ADL retraining, Therapeutic Activity, Neuromuscular Re-education, Therapeutic Exercise, Bioimpedence Fluid Analysis, Post-Surgical compression garement [address] [facility] [employer] Kit 2860K, Orthotic Management and training,  and Manual Therapy     OT Assessment/Plan       Row Name 03/26/25 0912          OT Assessment    Functional Limitations Limitations in functional capacity and performance  -TD     Impairments Impaired lymphatic circulation  -TD     Assessment Comments Marilee is a 29-year-old female with a diagnosis of metaplastic carcinoma of the right breast. Patient underwent a bilateral mastectomy with reconstruction on January 9, 2023. Pt had two lymphnodes removed at this time. Pt l-dex score is WFL limits at this time but had lymphatic cords present.  Pt will benefit from treatment.   Pt Patient would benefit from continued skilled occupational therapy to prevent increased staging of lymphedema, increased pain, and decreased range of motion.  -TD     OT Diagnosis at risk for lymphedema  -TD     OT Rehab Potential Good  -TD     Patient/caregiver participated in establishment of treatment plan and goals Yes  -TD     Patient would benefit from skilled therapy intervention Yes  -TD        OT Plan    OT Frequency 1x/week;2x/week  -TD     Predicted Duration of Therapy Intervention (OT) 8 weeks  -TD     Planned CPT's? OT EVAL LOW COMPLEXITY: 08939;OT RE-EVAL: 75258;OT THER ACT EA 15 MIN: 49371GT;OT THER PROC EA 15 MIN: 14840JI;OT SELF CARE/MGMT/TRAIN 15 MIN: 04821;OT MANUAL THERAPY EA 15 MIN: 05305;OT BIS XTRACELL FLUID ANALYSIS: 68178;OT CARE PLAN EA 15 MIN;OT ORTHOTIC MGMT/TRAIN EA 15 MIN: 79359;OT ORTHO/PROSTHET CHECKOUT EA 15 MIN:  17811  -TD     Planned Therapy Interventions (Optional Details) home exercise program;manual therapy techniques;stretching;strengthening;ROM (Range of Motion);prosthetic fitting/training;postural re-education;patient/family education;orthotic fitting/training  -TD     OT Plan Comments continue POC  -TD               User Key  (r) = Recorded By, (t) = Taken By, (c) = Cosigned By      Initials Name Provider Type    TD Daniela Vazquez OT Occupational Therapist                              Time Calculation:   Timed Charges  56359 - OT Self Care/Mgmt Minutes: 25  Total Minutes  Timed Charges Total Minutes: 25   Total Minutes: 25     Therapy Charges for Today       Code Description Service Date Service Provider Modifiers Qty    31177474198 HC PT BIS XTRACELL FLUID ANALYSIS 3/26/2025 Daniela Vazquez OT  1    [id number]  OT SELF CARE/MGMT/TRAIN EA 15 MIN 3/26/2025 Daniela Vazquez OT GO 2                      Daniela Vazquez OT  3/26/2025

## 2025-03-28 ENCOUNTER — PROCEDURE VISIT (OUTPATIENT)
Dept: NEUROLOGY | Facility: CLINIC | Age: 30
End: 2025-03-28
Payer: COMMERCIAL

## 2025-03-28 DIAGNOSIS — G43.719 INTRACTABLE CHRONIC MIGRAINE WITHOUT AURA AND WITHOUT STATUS MIGRAINOSUS: Primary | ICD-10-CM

## 2025-04-01 ENCOUNTER — TELEPHONE (OUTPATIENT)
Dept: FAMILY MEDICINE CLINIC | Age: 30
End: 2025-04-01
Payer: COMMERCIAL

## 2025-04-01 NOTE — TELEPHONE ENCOUNTER
I only have PAP results from last year.  Call and see what she is referring to and she may need to be calling GYN instead of me  Not sure about her bladder either     3/27/25 12:35 PM  You routed this conversation to Sakina Alvarez APRN Kiesler, Frances, LPN to [facility]  [place]      3/27/25  8:32 AM  Mouser Patient.    3/27/25  8:23 AM  Michelle Michelle LPN routed this conversation to [facility]  Marilee Watts to P [facility] (supporting MARIO Guevara)        3/26/25  9:19 PM  Hey girl, I just seen the results of the Pap smear.. does that look fine?   Also I haven’t gotten a call for whatever that place was called I wanted to get referred to for helping control my pee and help with the other issues. Thanks in advance

## 2025-04-01 NOTE — TELEPHONE ENCOUNTER
Pt is referring to pelvic floor therapy . I informed her about the pap results from last year . She said that  she had one done with  GYN last month at [facility]   I informed her to call there for results and or questions

## 2025-04-07 ENCOUNTER — OFFICE VISIT (OUTPATIENT)
Dept: ONCOLOGY | Facility: HOSPITAL | Age: 30
End: 2025-04-07
Payer: COMMERCIAL

## 2025-04-07 VITALS
SYSTOLIC BLOOD PRESSURE: 107 MMHG | WEIGHT: 162.7 LBS | DIASTOLIC BLOOD PRESSURE: 82 MMHG | RESPIRATION RATE: 18 BRPM | OXYGEN SATURATION: 100 % | HEIGHT: 63 IN | HEART RATE: 66 BPM | TEMPERATURE: 98.1 F | BODY MASS INDEX: 28.83 KG/M2

## 2025-04-07 DIAGNOSIS — R51.9 CHRONIC NONINTRACTABLE HEADACHE, UNSPECIFIED HEADACHE TYPE: ICD-10-CM

## 2025-04-07 DIAGNOSIS — G89.29 CHRONIC NONINTRACTABLE HEADACHE, UNSPECIFIED HEADACHE TYPE: ICD-10-CM

## 2025-04-07 DIAGNOSIS — F41.9 ANXIETY: ICD-10-CM

## 2025-04-07 DIAGNOSIS — R20.2 PARESTHESIA OF RIGHT UPPER EXTREMITY: ICD-10-CM

## 2025-04-07 DIAGNOSIS — Z98.890 S/P BREAST RECONSTRUCTION, BILATERAL: Primary | ICD-10-CM

## 2025-04-07 PROCEDURE — G0463 HOSPITAL OUTPT CLINIC VISIT: HCPCS | Performed by: INTERNAL MEDICINE

## 2025-04-07 PROCEDURE — 99214 OFFICE O/P EST MOD 30 MIN: CPT | Performed by: INTERNAL MEDICINE

## 2025-04-07 PROCEDURE — 1126F AMNT PAIN NOTED NONE PRSNT: CPT | Performed by: INTERNAL MEDICINE

## 2025-04-07 NOTE — PROGRESS NOTES
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 She had bilateral mastectomy and bilateral implants on 1/9/24. She completed adjuvant pembrolizumab in August 2024.  Reports improved headaches.  On Botox which helps migraines.  Continues to have occasional headaches.  Reports they are frontal in position.  Patient recently went back to work and noticed tingling in her right fingers and hand after a shift where she was doing a lot of repetitive motion. Got even worse after another shift the next day.  Also reports similar tingling symptoms from her right axilla to her elbow.  She was found to have cords near her elbow by OT.  They worked these out.  She has follow-up with plastic surgery next month for another reconstructive surgery on her scar tissue.  Last reconstructive surgery was in November.  Benign pathology.  She does report a knot at the top of her right breast near her skin.  She has occasional nausea, which can be worse in the mornings.  No fevers or chills.  No bone pain reported.  Denies any restless leg but has occasional leg soreness.    Oncology/Hematology History Overview Note   2012: Lumpectomy of fibroadenoma of right breast    3/2023: Started to have pain and swelling of known area of fibroadenoma of right breast. Started after trauma to breast.     5/26/23: Right breast lumpectomy: Invasive metaplastic carcinoma, high-grade, 4.5 x 4.0 x 3.3 cm with interspersed fibroadenoma, negative margins. ER 0%, LA 0%, HER2 0 by IHC. PDL1 CPS of 50.     6/21/23: MRI breast: Diffuse edema, hypervascularity, and skin thickening of the right breast raising the possibility of inflammatory breast cancer. 2. 4.7 cm seroma in the  lateral breast in area of known recent malignant lumpectomy. 3. Metastatic right axillary adenopathy. 4. No evidence of contralateral left breast malignancy. BI-RADS Category 6, known biopsy-proven malignancy.     7/7/23: Right axillary LN biopsy: metastatic adenocarcinoma, consistent with metaplastic breast carcinoma.     7/11/23: CT Chest, abdomen, pelvis: No definite metastatic disease. 1. Chest:  Pathologically enlarged right axillary lymphadenopathy.  Masslike opacity right upper outer breast with postsurgical changes.  Skin thickening right breast.  Correlate with tissue diagnosis and breast MRI.Small semi solid nodules in the right lung.  These are nonspecific.  A follow-up CT chest in 3 months time is recommended.  Abdomen pelvis:  No findings of metastatic disease to the abdomen or pelvis.     7/12/23: NM bone scan: negative for osseous mets    7/14/23: C1D1 Carboplatin/Paclitaxel/Pembrolizumab  8/4/23: C2D1 Carboplatin/Paclitaxel/Pembrolizumab. C2D15 held due to neutropenia  8/25/23: C3D1 Carboplatin/Paclitaxel/Pembrolizumab. All doses given  9/15/23: C4D1 Carboplatin/Paclitaxel/Pembrolizumab. C4D15 held due to neutropenia  10/6/23: C1D1 Doxorubicin/Cyclophosphamide/Pembrolizumab  10/27/23: C2D1 Doxorubicin/Cyclophosphamide/Pembrolizumab  11/20/23: C3D1 Doxorubicin/Cyclophosphamide/Pembrolizumab. Delayed due to neutropenia. Last cycle of chemo prior to surgery    1/9/24: Bilateral Mastectomy: Right breast: No invasive disease left, intermediate grade DCIS present. ypTisN0 (sn), ER/MT negative, HER2 negative, margins negative, ALH present, left breast benign    2/9/24: C1D1 adjuvant pembrolizumab.     4/9/24: Completed adjuvant radiation (performed in [place])    8/22/24: C10 adjuvant pembrolizumab 200 mg. Last cycle of planned immunotherapy.      Metaplastic carcinoma of breast   6/16/2023 Initial Diagnosis    Metaplastic carcinoma of breast     6/16/2023 - 6/16/2023 Chemotherapy    OP BREAST AC DD  DOXOrubicin / Cyclophosphamide     7/13/2023 Cancer Staged    Staging form: Breast, AJCC 8th Edition  - Clinical: Stage IIIC (cT4d, cN2, cM0, G3, ER-, WV-, HER2-) - Signed by Ramin Shine MD on 7/13/2023 7/14/2023 - 9/22/2023 Chemotherapy    OP BREAST Pembrolizumab 400 mg / PACLitaxel / CARBOplatin AUC=5     8/12/2023 - 8/12/2023 Chemotherapy    OP BREAST PACLitaxel Adjuvant (Weekly X 12)     10/6/2023 - 11/20/2023 Chemotherapy    OP BREAST Pembrolizumab 200 mg / DOXOrubicin / Cyclophosphamide      1/23/2024 Cancer Staged    Staging form: Breast, AJCC 8th Edition  - Pathologic: ypTis (DCIS), pN0, cM0, ER-, WV-, HER2- - Signed by Ramin Shine MD on 1/23/2024 2/9/2024 -  Chemotherapy    OP Pembrolizumab 200 mg     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 Negative for appetite change, diaphoresis, fever, unexpected weight gain and unexpected weight loss.   HENT:  Negative for hearing loss, mouth sores, sore throat, swollen glands, trouble swallowing and voice change.    Eyes:  Negative for blurred vision.   Respiratory:  Negative for cough, shortness of breath and wheezing.    Cardiovascular:  Negative for chest pain and palpitations.   Gastrointestinal:  Positive for nausea. Negative for abdominal pain, blood in stool, constipation, diarrhea and vomiting.   Endocrine: Negative for cold intolerance and heat intolerance.   Genitourinary:  Negative for breast pain, difficulty urinating, dysuria, frequency, hematuria and urinary incontinence.   Musculoskeletal:  Negative for arthralgias, back pain and myalgias.   Skin:  Negative for rash, skin lesions and wound.   Neurological:  Negative for dizziness, seizures, weakness, numbness and headache.   Hematological:  Does not bruise/bleed easily.   Psychiatric/Behavioral:  Negative for depressed mood. The patient is not  nervous/anxious.    All other systems reviewed and are negative.    Current Outpatient Medications on File Prior to Visit   Medication Sig Dispense Refill    benzonatate (TESSALON) 200 MG capsule Take 1 capsule by mouth 3 (Three) Times a Day As Needed for Cough. 90 capsule 1    desvenlafaxine (PRISTIQ) 50 MG 24 hr tablet Take 1 tablet by mouth Daily. 90 tablet 1    gabapentin (NEURONTIN) 300 MG capsule Take 1 capsule by mouth.      rOPINIRole (REQUIP) 1 MG tablet TAKE 1 TABLET BY MOUTH AT BEDTIME, TAKE 1 HOUR BEFORE BEDTIME 30 tablet 3    Sodium Fluoride 5000 PPM 1.1 % paste       ubrogepant (Ubrelvy) 100 MG tablet Take 1 tablet by mouth.      methylPREDNISolone (MEDROL) 4 MG dose pack Take as directed on package instructions. (Patient not taking: Reported on 2024) 21 tablet 0    prochlorperazine (COMPAZINE) 10 MG tablet Take 1 tablet by mouth Every 6 (Six) Hours As Needed for Nausea or Vomiting. (Patient not taking: Reported on 2024) 60 tablet 2     No current facility-administered medications on file prior to visit.       Allergies   Allergen Reactions    Tegaderm Ag Mesh [Silver] Itching     Tegaderm that is used over port a cath    Pembrolizumab Other (See Comments)     swollen, red, itching, and warmness to hands, ears, chest, and throat, tightness and hoarse voice.;  resolved with treatment (24)     Past Medical History:   Diagnosis Date    Asthma     AS CHILD    Breast cancer     COVID-19 vaccine administered      Past Surgical History:   Procedure Laterality Date    BREAST LUMPECTOMY Right     BREAST RECONSTRUCTION Bilateral 2024    Procedure: BREAST RECONSTRUCTION WITH MESH AND IMPLANTS - USE OF SPY;  Surgeon: Jackie Sanders MD;  Location: [facility] OR [place];  Service: Plastics;  Laterality: Bilateral;     SECTION      MASTECTOMY W/ SENTINEL NODE BIOPSY Bilateral 2024    Procedure: BREAST MASTECTOMY WITH RIGHT AXILLARY SENTINEL NODE IDENTIFICATION AND EXCISION BIOPSY;   Surgeon: Christiana Whiting MD;  [place] [facility] [place] [facility] [place]  Service: General;  Laterality: Bilateral;    PORTACATH PLACEMENT      SKIN BIOPSY      TUBAL ABDOMINAL LIGATION       Social History     Socioeconomic History    Marital status: Single    Number of children: 2   Tobacco Use    Smoking status: Never     Passive exposure: Never    Smokeless tobacco: Never   Vaping Use    Vaping status: Never Used   Substance and Sexual Activity    Alcohol use: Not Currently     Comment: OCCASSIONAL    Drug use: Never    Sexual activity: Defer     Family History   Problem Relation Age of Onset    Skin cancer Mother     COPD Mother     Hypertension Father     Hyperlipidemia Father     Diabetes Father     Skin cancer Paternal Grandmother     Lung cancer Paternal Grandmother     Malig Hyperthermia Neg Hx        Objective   Physical Exam  Well appearing patient in no acute distress on RA  Anicteric sclerae, no rash on exposed skin  Respirations non-labored  Awake, alert, and oriented x 4. Speech intact. No gross neurologic deficit  Appropriate mood and affect    Vitals:    04/07/25 1306   BP: 107/82   Pulse: 66   Resp: 18   Temp: 98.1 °F (36.7 °C)   SpO2: 100%             ECOG score: 0         PHQ-9 Total Score:           Result Review :   The following data was reviewed by: Ramin Shine MD on 04/07/25:  Lab Results   Component Value Date    HGB 12.6 07/11/2024    HCT 38.7 07/11/2024    MCV 89.2 07/11/2024     07/11/2024    WBC 3.57 07/11/2024    NEUTROABS 2.00 07/11/2024    LYMPHSABS 1.22 07/11/2024    MONOSABS 0.31 07/11/2024    EOSABS 0.03 07/11/2024    BASOSABS 0.01 07/11/2024     Lab Results   Component Value Date    GLUCOSE 80 07/11/2024    BUN 13 07/11/2024    CREATININE 0.64 07/11/2024     07/11/2024    K 3.8 07/11/2024     07/11/2024    CO2 25.8 07/11/2024    CALCIUM 9.7 07/11/2024    PROTEINTOT 6.7 07/11/2024    ALBUMIN 4.3 07/11/2024    BILITOT 0.3 07/11/2024    ALKPHOS 103 07/11/2024    AST  18 07/11/2024    ALT 24 07/11/2024     Lab Results   Component Value Date    MG 1.8 02/29/2024    FREET4 1.10 06/20/2024    TSH 2.620 06/20/2024     Labs personally reviewed.  Iron levels improved. K normal.     Plastic surgery note personally reviewed    PCP note personally reviewed    Gyn note personally reviewed        No radiology results for the last 30 days.        Assessment and Plan    Diagnoses and all orders for this visit:    1. S/P breast reconstruction, bilateral (Primary)    2. Paresthesia of right upper extremity    3. Anxiety    4. Chronic nonintractable headache, unspecified headache type          Metaplastic Carcinoma of Right Breast, triple negative  Patient is s/p lumpectomy of 4.5 cm lesion with triple negative metaplastic carcinoma, interspersed with fibroadenoma. Patient has clinical inflammatory breast cancer. MRI breast with concern for inflammatory breast cancer as well as right sided axillary involvement. Left breast without lesions per MRI. Lymph node biopsy 7/7/23 confirmed right axillary involvement by breast carcinoma.  Completed noeadjuvant chemothearpy and immunotherapy with 4 cycles of Carboplatin/Paclitaxel/Pembro (C1 7/1423 and C4 9/15/23) and 3 cycles of Doxorubicin, cyclophosphamide, pembrolizumab (completed C3 11/20/23 with delay due to neutropenia). Completed 7 cycles total. C4 omitted due to accumulation of toxicities.     1/9/24: Bilateral Mastectomy: Right breast: No invasive disease left, intermediate grade DCIS present. ypTisN0 (sn), ER/FL negative, HER2 negative, margins negative, ALH     No residual invasive disease present, so patient with complete pathologic response on the basis of the National Surgical Adjuvant Breast and Bowel project criteria. DCIS still present as well as ALH, negative margins, nodes negative. C1 provided 2/9/24. Adjuvant radiation completed as of 4/9/24. C10D1 adjuvant pembrolizumab 8/22/24, final cycle of immunotherapy.     Repeat CT chest  10/25/24 negative for malignancy. Will hold on repeat scans for symptoms or concerns for recurrence.    Reconstruction completed 11/19/24 with plastic surgery. Has another planned for May.     4/7/25: No signs/symptoms recurrence.  RTC 6 months. Counseled to call and inform us for any new concerning symptoms (I.e. new pain or breast related changes, etc).    RUE paresthesias  Likely related to axilla surgery and or ulnar nerve entrapment. Unlikely chemotherapy as symptoms developed months after chemo completion. NSGY referral pending. Recommend continued follow up with OT.     Headaches, chronic  Follow up with neurology. On botox, which helps.     Anxiety  Xanax PRN    Restless leg  Ropinirole helps, previously increased to 1.0 mg. Typically symptoms are worse for day or so after treatment. Symptoms much improved as of 11/4/24 so likely related to pembrolizumab.     Right axilla lesion  5/15/24 u/s performed and showed 1.7 cm enlarged area. Biopsy 5/31/24 showed capsule dense fibrous tissue and focal fat necrosis.        Patient Follow Up: 6 months  Patient was given instructions and counseling regarding her condition or for health maintenance advice. Please see specific information pulled into the AVS if appropriate.

## 2025-04-10 ENCOUNTER — HOSPITAL ENCOUNTER (OUTPATIENT)
Facility: HOSPITAL | Age: 30
Setting detail: THERAPIES SERIES
Discharge: HOME | End: 2025-04-10
Payer: COMMERCIAL

## 2025-04-10 DIAGNOSIS — M25.60 JOINT STIFFNESS: ICD-10-CM

## 2025-04-10 DIAGNOSIS — R52 PAIN: ICD-10-CM

## 2025-04-10 DIAGNOSIS — Z91.89 AT RISK FOR LYMPHEDEMA: Primary | ICD-10-CM

## 2025-04-10 DIAGNOSIS — Z17.0 MALIGNANT NEOPLASM OF UPPER-OUTER QUADRANT OF RIGHT BREAST IN FEMALE, ESTROGEN RECEPTOR POSITIVE: ICD-10-CM

## 2025-04-10 DIAGNOSIS — C50.411 MALIGNANT NEOPLASM OF UPPER-OUTER QUADRANT OF RIGHT BREAST IN FEMALE, ESTROGEN RECEPTOR POSITIVE: ICD-10-CM

## 2025-04-10 DIAGNOSIS — L90.5 SCAR CONDITION AND FIBROSIS OF SKIN: ICD-10-CM

## 2025-04-10 PROCEDURE — 97140 MANUAL THERAPY 1/> REGIONS: CPT | Performed by: OCCUPATIONAL THERAPIST

## 2025-04-10 NOTE — THERAPY TREATMENT NOTE
Outpatient Occupational Therapy Lymphedema Treatment Note   [facility]     Patient Name: Marilee Watts  : 1995  MRN: [id number]  Today's Date: 4/10/2025      Visit Date: 04/10/2025    Patient Active Problem List   Diagnosis    Allergic rhinitis    Gastroesophageal reflux disease    Metaplastic carcinoma of breast    Breast fibroadenoma, right    Disproportion between native breast and reconstructed breast    Fitting and adjustment of vascular catheter    Intractable chronic migraine without aura and without status migrainosus    Chemotherapy-induced nausea    Postoperative follow-up    S/P breast reconstruction, bilateral    Iron deficiency anemia    Malabsorption of iron    Burn of right axilla    Breast reconstruction deformity    Radiation dermatitis    Numbness and tingling of right hand        Past Medical History:   Diagnosis Date    Allergic     Anxiety     Asthma     AS CHILD    Breast cancer     CHEMO/ RADIATION COMPLETED 2024    COVID-19 vaccine administered     Headache         Past Surgical History:   Procedure Laterality Date    BREAST LUMPECTOMY Right     BREAST RECONSTRUCTION Bilateral 2024    Procedure: BREAST RECONSTRUCTION WITH MESH AND IMPLANTS - USE OF SPY;  Surgeon: Jackie Sanders MD;  [place] [facility] [place] [facility] [place]  Service: Plastics;  Laterality: Bilateral;    BREAST RECONSTRUCTION Bilateral 2024    Procedure: Bilateral breast revision with fat graft and nipple reconstruction, port removal, right implant exchange,umbilical hernia repair;  Surgeon: Jackie Sanders MD;  [place] [facility] [place] [facility] [place]  Service: Plastics;  Laterality: Bilateral;     SECTION      LYMPH NODE BIOPSY      MASTECTOMY W/ SENTINEL NODE BIOPSY Bilateral 2024    Procedure: BREAST MASTECTOMY WITH RIGHT AXILLARY SENTINEL NODE IDENTIFICATION AND EXCISION BIOPSY;  Surgeon: Christiana Whiting MD;  [place] [facility] [place] [facility] [place]  Service: General;  Laterality: Bilateral;    PORTACATH  PLACEMENT      SKIN BIOPSY      TUBAL ABDOMINAL LIGATION      UMBILICAL HERNIA REPAIR           Visit Dx:      ICD-10-CM ICD-9-CM   1. At risk for lymphedema  Z91.89 V49.89   2. Scar condition and fibrosis of skin  L90.5 709.2   3. Joint stiffness  M25.60 719.50   4. Pain  R52 780.96   5. Malignant neoplasm of upper-outer quadrant of right breast in female, estrogen receptor positive  C50.411 174.4    Z17.0 V86.0        Lymphedema       Row Name 04/10/25 1300             Subjective Pain    Able to rate subjective pain? yes  -TD      Pre-Treatment Pain Level 4  -TD      Post-Treatment Pain Level 1  -TD         Subjective    Subjective Comments Pt reports right shoulder is tight  -TD         Lymphedema Assessment    Lymphedema Classification RUE:;at risk/stage 0  -TD      Lymphedema Cancer Related Sx bilateral;simple mastectomy;reconstructive;sentinel node biopsy  -TD      Lymphedema Surgery Comments 1/2024  -TD      Lymph Nodes Removed # 2  -TD      Positive Lymph Nodes # 0  -TD      Chemo Received yes  -TD      Chemo Treatments #/Timeframe 7  -TD      Radiation Therapy Received yes  -TD      Radiation Treatments #/Timeframe 28  -TD         Manual Lymphatic Drainage    Manual Therapy Therapist completed PORI protocol to address tightness in the right axilla and right chest wall.  -TD                User Key  (r) = Recorded By, (t) = Taken By, (c) = Cosigned By      Initials Name Provider Type    TD Daniela Vazquez, OT Occupational Therapist                             OT Assessment/Plan       Row Name 04/10/25 2436          OT Assessment    Functional Limitations Limitations in functional capacity and performance  -TD     Impairments Impaired lymphatic circulation  -TD     Assessment Comments Marilee is a 29-year-old female with a diagnosis of metaplastic carcinoma of the right breast. Patient underwent a bilateral mastectomy with reconstruction on January 9, 2023.  Pt tolerated treatment well and will be seen in two  weeks for treatment.  Patient would benefit from continued skilled occupational therapy to prevent increased staging of lymphedema, increased pain, and decreased range of motion.  -TD     OT Diagnosis at risk for lymphedema  -TD     OT Rehab Potential Good  -TD     Patient/caregiver participated in establishment of treatment plan and goals Yes  -TD     Patient would benefit from skilled therapy intervention Yes  -TD        OT Plan    OT Plan Comments continue POC  -TD               User Key  (r) = Recorded By, (t) = Taken By, (c) = Cosigned By      Initials Name Provider Type    Daniela Hagen OT Occupational Therapist                        Manual Rx (Last 36 Hours)       Manual Treatments       Row Name 04/10/25 1337             Total Minutes    18626 - OT Manual Therapy Minutes 25  -TD                User Key  (r) = Recorded By, (t) = Taken By, (c) = Cosigned By      Initials Name Provider Type    Daniela Hagen OT Occupational Therapist                      Therapy Education  Education Details: Pt was educated on stretches  Given: HEP, Symptoms/condition management, Edema management, Pain management  Program: Reinforced  How Provided: Verbal, Demonstration  Provided to: Patient  Level of Understanding: Teach back education performed, Verbalized, Demonstrated                Time Calculation:   Timed Charges  94735 - OT Manual Therapy Minutes: 25  Total Minutes  Timed Charges Total Minutes: 25   Total Minutes: 25     Therapy Charges for Today       Code Description Service Date Service Provider Modifiers Qty    [id number] HC OT MANUAL THERAPY EA 15 MIN 4/10/2025 Daniela Vazquez OT GO 2                        Daniela Vazquez OT  4/10/2025

## 2025-04-16 ENCOUNTER — APPOINTMENT (OUTPATIENT)
Facility: HOSPITAL | Age: 30
End: 2025-04-16
Payer: COMMERCIAL

## 2025-04-16 ENCOUNTER — OFFICE VISIT (OUTPATIENT)
Dept: NEUROSURGERY | Facility: CLINIC | Age: 30
End: 2025-04-16
Payer: COMMERCIAL

## 2025-04-16 VITALS
SYSTOLIC BLOOD PRESSURE: 103 MMHG | BODY MASS INDEX: 27.91 KG/M2 | HEIGHT: 63 IN | DIASTOLIC BLOOD PRESSURE: 72 MMHG | WEIGHT: 157.5 LBS

## 2025-04-16 DIAGNOSIS — R20.0 NUMBNESS AND TINGLING OF RIGHT HAND: Primary | ICD-10-CM

## 2025-04-16 DIAGNOSIS — R20.2 NUMBNESS AND TINGLING OF RIGHT HAND: Primary | ICD-10-CM

## 2025-04-16 NOTE — PROGRESS NOTES
"Chief Complaint  Numbness (Right hand) and Tingling (Right hand)    Subjective          Marilee Watts who is a 29 y.o. year old female who presents to [facility] for evaluation of right hand numbness.    History of Present Illness  Pt referred to [facility] for right hand numbness. She has a history of metastatic breast CA, with mastectomy and lymph node removal on the right in addition to chemotherapy. She has been followed by plastic surgery for breast reconstruction. 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 She is right handed. Upon returning to work she experience pain symptoms in the right arm from the axilla to the elbow, along with numbness into the palm of her hand and all the fingers. This would typically occur only with repetitive arm use or constant gripping of an object such as a  drill or her phone. The numbness progresses to burning and tingling at time, which can be uncomfortable. She had a follow up with Plastic Surgery, concerned that she may be developing lymphedema. This was ruled out due to no swelling in the right arm. She has been referred to our office for further evaluation.     She denies weakness in the arm or hand. She denies neck or shoulder pain.      History of Previous Spinal Surgery?: No    Nicotine use: non-smoker    BMI: Body mass index is 27.91 kg/m².      Review of Systems   Neurological:  Positive for numbness.   All other systems reviewed and are negative.       Objective   Vital Signs:   /72 (BP Location: Right arm, Patient Position: Sitting)   Ht 160 cm (62.99\")   Wt 71.4 kg (157 lb 8 oz)   BMI 27.91 kg/m²       Physical Exam  Vitals reviewed.   Constitutional:       Appearance: Normal appearance.   Musculoskeletal:      Right shoulder: No tenderness. Normal range of motion.      Left shoulder: No tenderness. Normal range of motion.      " Cervical back: No tenderness. No pain with movement. Normal range of motion.   Neurological:      Mental Status: She is alert.      Motor: Motor strength is normal.     Deep Tendon Reflexes:      Reflex Scores:       Tricep reflexes are 2+ on the right side and 2+ on the left side.       Bicep reflexes are 2+ on the right side and 2+ on the left side.       Brachioradialis reflexes are 2+ on the right side and 2+ on the left side.       Neurological Exam  Mental Status  Alert.    Motor   Strength is 5/5 throughout all four extremities.    Sensory  Sensation is intact to light touch, pinprick, vibration and proprioception in all four extremities.  Positive Tinels at the right wrist.    Reflexes                                            Right                      Left  Brachioradialis                    2+                         2+  Biceps                                 2+                         2+  Triceps                                2+                         2+    Right pathological reflexes: Lisa's absent.  Left pathological reflexes: Lisa's absent.    Gait  Casual gait is normal including stance, stride, and arm swing.      Physical Exam         Result Review :                 Assessment and Plan    Diagnoses and all orders for this visit:    1. Numbness and tingling of right hand (Primary)  -     EMG & Nerve Conduction Test; Future        Assessment & Plan     Her symptoms are consistent with a carpal tunnel syndrome. Will proceed with EMG/NCV to evaluate for an entrapment neuropathy. Advised wearing a wrist splint at bedtime and as needed. She will follow up after EMG.    I spent 30 minutes caring for Marilee on this date of service. This time includes time spent by me in the following activities:preparing for the visit, reviewing tests, obtaining and/or reviewing a separately obtained history, performing a medically appropriate examination and/or evaluation , counseling and educating the  patient/family/caregiver, ordering medications, tests, or procedures, documenting information in the medical record, independently interpreting results and communicating that information with the patient/family/caregiver, and care coordination.    Follow Up   Return in about 6 weeks (around 5/28/2025).  Patient was given instructions and counseling regarding her condition or for health maintenance advice.     Patient or patient representative verbalized consent for the use of Ambient Listening during the visit with  MARIO Trujillo for chart documentation. 4/16/2025  10:49 EDT    -EMG/NCV   -Cock wrist splint  -Follow up after EMG

## 2025-04-21 ENCOUNTER — APPOINTMENT (OUTPATIENT)
Facility: HOSPITAL | Age: 30
End: 2025-04-21
Payer: COMMERCIAL

## 2025-04-22 DIAGNOSIS — R20.0 NUMBNESS AND TINGLING OF RIGHT HAND: Primary | ICD-10-CM

## 2025-04-22 DIAGNOSIS — R20.2 NUMBNESS AND TINGLING OF RIGHT HAND: Primary | ICD-10-CM

## 2025-04-28 ENCOUNTER — APPOINTMENT (OUTPATIENT)
Facility: HOSPITAL | Age: 30
End: 2025-04-28
Payer: COMMERCIAL

## 2025-04-30 NOTE — PROGRESS NOTES
"Pre-op Exam (Pre op/)            History of Present Illness  Marilee Watts is a 29 y.o. female who presents to [facility] for Pre-Operative Examination for BREAST RECONSTRUCTION REVISION, right breast reconstruction revision with mesh replacement, fat graft, possible implant exchange 25.    Pt presents today for pre op.      History of Present Illness  The patient presents for evaluation of breast implants.    She reports experiencing pain in the area of her breast implant, which she attributes to a recent laser procedure. She has been engaging in tanning activities and expresses concern about the potential need for additional skin removal during any future surgical interventions. She recalls an incident prior to her trip to [place] where she experienced a popping sensation while reaching, followed by a softening of the area. She is currently utilizing a sports bra for support. She also mentions a  scar that appears to be protruding.          Subjective        Adhesive glue [wound dressing adhesive], Pembrolizumab, and Tegaderm ag mesh [silver]  Allergies Reconciled.    Review of Systems   All review of system has been reviewed and it  is negative except the ones note above.     Objective     /86 (BP Location: Left arm, Patient Position: Sitting, Cuff Size: Adult)   Pulse 77   Ht 160 cm (62.99\")   Wt 72.1 kg (159 lb)   SpO2 98%   BMI 28.17 kg/m²     Body mass index is 28.17 kg/m².           Physical Exam    Right breast smaller than the left.     Result Review :                Assessment and Plan      Diagnoses and all orders for this visit:    1. Pre-operative examination (Primary)  -     Nicotine Screen, Urine - Urine, Clean Catch; Future  -     cephalexin (KEFLEX) 500 MG capsule; Take 1 capsule by mouth 4 (Four) Times a Day for 5 days.  Dispense: 20 capsule; Refill: 0  -     Chlorhexidine Gluconate 4 % solution; Apply 1 Application " topically to the appropriate area as directed Daily. Shower daily for 7 days prior to surgery  Dispense: 473 mL; Refill: 0  -     naproxen (NAPROSYN) 250 MG tablet; Take 1 tablet by mouth 2 (Two) Times a Day.  Dispense: 12 tablet; Refill: 0  -     gabapentin (Neurontin) 300 MG capsule; Take 1 capsule by mouth 3 (Three) Times a Day for 18 doses.  Dispense: 18 capsule; Refill: 0  -     ondansetron (Zofran) 4 MG tablet; Take 1 tablet by mouth Daily As Needed for Nausea or Vomiting for up to 18 doses.  Dispense: 18 tablet; Refill: 0  -     acetaminophen (TYLENOL) 500 MG tablet; Take 2 tablets by mouth Every 6 (Six) Hours As Needed for Moderate Pain for up to 18 doses.  Dispense: 18 tablet; Refill: 0    2. Breast reconstruction deformity        Plan:    Assessment & Plan  1. Breast implants.  The discomfort experienced may be a residual effect of the mastectomy procedure. The swelling observed could potentially be a manifestation of lymphedema, which is known to occur in the arm and breast regions. A surgical intervention is planned, which will involve the removal of the existing implant and mesh, followed by the insertion of a new mesh and plug. The procedure will be performed through the same incision as the previous surgery. Additionally, a fat graft will be conducted to achieve symmetry between the breasts. If the size discrepancy persists post-surgery, adjustments can be made to ensure uniformity.             Follow Up     No follow-ups on file.    Patient was given instructions and counseling regarding her condition. Please see specific information pulled into the AVS if appropriate.     Jackie Sanders MD  05/08/2025      Patient or patient representative verbalized consent for the use of Ambient Listening during the visit with  Jackie Sanders MD for chart documentation. 5/9/2025  16:01 EDT

## 2025-04-30 NOTE — H&P (VIEW-ONLY)
"Pre-op Exam (Pre op/)            History of Present Illness  Marilee Watts is a 29 y.o. female who presents to [facility]ON[facility] SURGERY for Pre-Operative Examination for BREAST RECONSTRUCTION REVISION, right breast reconstruction revision with mesh replacement, fat graft, possible implant exchange 25.    Pt presents today for pre op.      History of Present Illness  The patient presents for evaluation of breast implants.    She reports experiencing pain in the area of her breast implant, which she attributes to a recent laser procedure. She has been engaging in tanning activities and expresses concern about the potential need for additional skin removal during any future surgical interventions. She recalls an incident prior to her trip to [place] where she experienced a popping sensation while reaching, followed by a softening of the area. She is currently utilizing a sports bra for support. She also mentions a  scar that appears to be protruding.          Subjective        Adhesive glue [wound dressing adhesive], Pembrolizumab, and Tegaderm ag mesh [silver]  Allergies Reconciled.    Review of Systems   All review of system has been reviewed and it  is negative except the ones note above.     Objective     /86 (BP Location: Left arm, Patient Position: Sitting, Cuff Size: Adult)   Pulse 77   Ht 160 cm (62.99\")   Wt 72.1 kg (159 lb)   SpO2 98%   BMI 28.17 kg/m²     Body mass index is 28.17 kg/m².           Physical Exam    Right breast smaller than the left.     Result Review :                Assessment and Plan      Diagnoses and all orders for this visit:    1. Pre-operative examination (Primary)  -     Nicotine Screen, Urine - Urine, Clean Catch; Future  -     cephalexin (KEFLEX) 500 MG capsule; Take 1 capsule by mouth 4 (Four) Times a Day for 5 days.  Dispense: 20 capsule; Refill: 0  -     Chlorhexidine Gluconate 4 % solution; Apply 1 Application " topically to the appropriate area as directed Daily. Shower daily for 7 days prior to surgery  Dispense: 473 mL; Refill: 0  -     naproxen (NAPROSYN) 250 MG tablet; Take 1 tablet by mouth 2 (Two) Times a Day.  Dispense: 12 tablet; Refill: 0  -     gabapentin (Neurontin) 300 MG capsule; Take 1 capsule by mouth 3 (Three) Times a Day for 18 doses.  Dispense: 18 capsule; Refill: 0  -     ondansetron (Zofran) 4 MG tablet; Take 1 tablet by mouth Daily As Needed for Nausea or Vomiting for up to 18 doses.  Dispense: 18 tablet; Refill: 0  -     acetaminophen (TYLENOL) 500 MG tablet; Take 2 tablets by mouth Every 6 (Six) Hours As Needed for Moderate Pain for up to 18 doses.  Dispense: 18 tablet; Refill: 0    2. Breast reconstruction deformity        Plan:    Assessment & Plan  1. Breast implants.  The discomfort experienced may be a residual effect of the mastectomy procedure. The swelling observed could potentially be a manifestation of lymphedema, which is known to occur in the arm and breast regions. A surgical intervention is planned, which will involve the removal of the existing implant and mesh, followed by the insertion of a new mesh and plug. The procedure will be performed through the same incision as the previous surgery. Additionally, a fat graft will be conducted to achieve symmetry between the breasts. If the size discrepancy persists post-surgery, adjustments can be made to ensure uniformity.             Follow Up     No follow-ups on file.    Patient was given instructions and counseling regarding her condition. Please see specific information pulled into the AVS if appropriate.     Jackie Sanders MD  05/08/2025      Patient or patient representative verbalized consent for the use of Ambient Listening during the visit with  Jackie Sanders MD for chart documentation. 5/9/2025  16:01 EDT

## 2025-05-06 ENCOUNTER — APPOINTMENT (OUTPATIENT)
Facility: HOSPITAL | Age: 30
End: 2025-05-06
Payer: COMMERCIAL

## 2025-05-08 ENCOUNTER — OFFICE VISIT (OUTPATIENT)
Dept: PLASTIC SURGERY | Facility: CLINIC | Age: 30
End: 2025-05-08
Payer: COMMERCIAL

## 2025-05-08 VITALS
HEIGHT: 63 IN | HEART RATE: 77 BPM | WEIGHT: 159 LBS | OXYGEN SATURATION: 98 % | DIASTOLIC BLOOD PRESSURE: 86 MMHG | SYSTOLIC BLOOD PRESSURE: 120 MMHG | BODY MASS INDEX: 28.17 KG/M2

## 2025-05-08 DIAGNOSIS — N65.0 BREAST RECONSTRUCTION DEFORMITY: ICD-10-CM

## 2025-05-08 DIAGNOSIS — Z01.818 PRE-OPERATIVE EXAMINATION: Primary | ICD-10-CM

## 2025-05-08 RX ORDER — CHLORHEXIDINE GLUCONATE 40 MG/ML
30 SOLUTION TOPICAL DAILY
Qty: 473 ML | Refills: 0 | Status: SHIPPED | OUTPATIENT
Start: 2025-05-08

## 2025-05-08 RX ORDER — ONDANSETRON 4 MG/1
4 TABLET, FILM COATED ORAL DAILY PRN
Qty: 18 TABLET | Refills: 0 | Status: SHIPPED | OUTPATIENT
Start: 2025-05-08

## 2025-05-08 RX ORDER — ACETAMINOPHEN 500 MG
1000 TABLET ORAL EVERY 6 HOURS PRN
Qty: 18 TABLET | Refills: 0 | Status: SHIPPED | OUTPATIENT
Start: 2025-05-08

## 2025-05-08 RX ORDER — GABAPENTIN 300 MG/1
300 CAPSULE ORAL 3 TIMES DAILY
Qty: 18 CAPSULE | Refills: 0 | Status: SHIPPED | OUTPATIENT
Start: 2025-05-08 | End: 2025-05-14

## 2025-05-08 RX ORDER — CEPHALEXIN 500 MG/1
500 CAPSULE ORAL 4 TIMES DAILY
Qty: 20 CAPSULE | Refills: 0 | Status: SHIPPED | OUTPATIENT
Start: 2025-05-08 | End: 2025-05-13

## 2025-05-08 RX ORDER — NAPROXEN 250 MG/1
250 TABLET ORAL 2 TIMES DAILY
Qty: 12 TABLET | Refills: 0 | Status: SHIPPED | OUTPATIENT
Start: 2025-05-08

## 2025-05-12 ENCOUNTER — LAB (OUTPATIENT)
Dept: LAB | Facility: HOSPITAL | Age: 30
End: 2025-05-12
Payer: COMMERCIAL

## 2025-05-12 ENCOUNTER — OFFICE VISIT (OUTPATIENT)
Dept: FAMILY MEDICINE CLINIC | Age: 30
End: 2025-05-12
Payer: COMMERCIAL

## 2025-05-12 VITALS
DIASTOLIC BLOOD PRESSURE: 71 MMHG | TEMPERATURE: 98.3 F | HEART RATE: 70 BPM | HEIGHT: 63 IN | WEIGHT: 161.8 LBS | BODY MASS INDEX: 28.67 KG/M2 | SYSTOLIC BLOOD PRESSURE: 103 MMHG | OXYGEN SATURATION: 97 %

## 2025-05-12 DIAGNOSIS — F41.1 GENERALIZED ANXIETY DISORDER WITH PANIC ATTACKS: Primary | ICD-10-CM

## 2025-05-12 DIAGNOSIS — Z01.818 PRE-OPERATIVE EXAMINATION: ICD-10-CM

## 2025-05-12 DIAGNOSIS — F41.0 GENERALIZED ANXIETY DISORDER WITH PANIC ATTACKS: Primary | ICD-10-CM

## 2025-05-12 DIAGNOSIS — R00.2 PALPITATIONS: ICD-10-CM

## 2025-05-12 LAB — COTININE UR-MCNC: NEGATIVE NG/ML

## 2025-05-12 PROCEDURE — 99214 OFFICE O/P EST MOD 30 MIN: CPT | Performed by: NURSE PRACTITIONER

## 2025-05-12 PROCEDURE — G0480 DRUG TEST DEF 1-7 CLASSES: HCPCS

## 2025-05-12 PROCEDURE — 1126F AMNT PAIN NOTED NONE PRSNT: CPT | Performed by: NURSE PRACTITIONER

## 2025-05-12 RX ORDER — DESVENLAFAXINE 50 MG/1
50-100 TABLET, FILM COATED, EXTENDED RELEASE ORAL EVERY MORNING
Qty: 180 TABLET | Refills: 1 | Status: SHIPPED | OUTPATIENT
Start: 2025-05-12

## 2025-05-12 RX ORDER — PROPRANOLOL HYDROCHLORIDE 60 MG/1
60 CAPSULE, EXTENDED RELEASE ORAL DAILY
Qty: 30 CAPSULE | Refills: 1 | Status: SHIPPED | OUTPATIENT
Start: 2025-05-12

## 2025-05-12 NOTE — PROGRESS NOTES
Chief Complaint  Marilee Watts presents to [facility] for Anxiety    Subjective     Anxiety    Marilee is in today to follow-up on recent changes to her medication.  She was in about a month ago and has since stopped going to psychiatry.  Her last visit 4 weeks ago we did add bupropion along with her Pristiq to see if this helped with her focus.  We also placed a referral to neuropsych to get a formal evaluation.  She is not scheduled to see them until October - Dr. Morales.  She is in today reporting this has been completely ineffective against her concentration and actually feels like her anxiety is much worse.  She does report that her job is much of the source of her anxiety - along with juggling her home life.  When she was not working, she felt her anxiety was much better controlled as she was not having to juggle work and home responsibilities     Assessment and Plan     Diagnoses and all orders for this visit:    1. Generalized anxiety disorder with panic attacks (Primary)  Comments:  discontinue wellbutrin as has been ineffective against her focus/ attention.  Continue pristiq and advise try propranolol   f/u after surgery  Orders:  -     propranolol LA (Inderal LA) 60 MG 24 hr capsule; Take 1 capsule by mouth Daily.  Dispense: 30 capsule; Refill: 1  -     desvenlafaxine (PRISTIQ) 50 MG 24 hr tablet; Take 1-2 tablets by mouth Every Morning. Indications: anxiety  Dispense: 180 tablet; Refill: 1    2. Palpitations  Comments:  try propranolol   follow up after surgery            Follow Up   Return in about 8 weeks (around 7/7/2025) for Recheck.  Future Appointments   Date Time Provider Department Center   5/14/2025 10:00 AM Daniela Vazquez OT [facility] OPOPV [facility]   5/20/2025 11:00 AM Lisa Pa OT [facility] OPOPV [facility]   5/28/2025  9:30 AM Rita Silva APRN [facility] JR LAWS [facility]   5/28/2025 10:30 AM Daniela Vazquez OT [facility] OPOPV [facility]   6/3/2025 11:30 AM Althea  "Teodora ASHLEY MD [facility] [place] ETOWN [facility]   6/20/2025  8:45 AM Alanis Contreras APRN [facility] SOWMYA ETWN [facility]   10/1/2025  9:00 AM Sakina Alvarez APRN [facility] PC BARDS [facility]   10/3/2025  8:30 AM Laz Jackson MD [facility] BH ETWN [place]   10/9/2025 10:15 AM Ramin Shine MD [facility] ONC ETWN [facility]       New Medications Ordered This Visit   Medications    propranolol LA (Inderal LA) 60 MG 24 hr capsule     Sig: Take 1 capsule by mouth Daily.     Dispense:  30 capsule     Refill:  1    desvenlafaxine (PRISTIQ) 50 MG 24 hr tablet     Sig: Take 1-2 tablets by mouth Every Morning. Indications: anxiety     Dispense:  180 tablet     Refill:  1     Changing to 100 mg tablet same dose       Medications Discontinued During This Encounter   Medication Reason    buPROPion SR (Wellbutrin SR) 150 MG 12 hr tablet Not Efficacious    desvenlafaxine (PRISTIQ) 100 MG 24 hr tablet           Review of Systems    Objective     Vitals:    05/12/25 0803   BP: 103/71   BP Location: Left arm   Patient Position: Sitting   Pulse: 70   Temp: 98.3 °F (36.8 °C)   TempSrc: Oral   SpO2: 97%   Weight: 73.4 kg (161 lb 12.8 oz)   Height: 160 cm (62.99\")         Physical Exam  Constitutional:       General: She is not in acute distress.     Appearance: Normal appearance.   HENT:      Head: Normocephalic.   Cardiovascular:      Rate and Rhythm: Normal rate and regular rhythm.   Pulmonary:      Effort: Pulmonary effort is normal.      Breath sounds: Normal breath sounds.   Musculoskeletal:         General: Normal range of motion.   Neurological:      General: No focal deficit present.      Mental Status: She is alert and oriented to person, place, and time.   Psychiatric:         Mood and Affect: Mood normal. Mood is anxious.         Behavior: Behavior normal.               Result Review          Allergies   Allergen Reactions    Adhesive Glue [Wound Dressing Adhesive] Rash    Pembrolizumab Other (See Comments)     (Keytruda) swollen, red, itching, and warmness to hands, " ears, chest, and throat, tightness and hoarse voice.;  resolved with treatment (4/12/24)    Tegaderm Ag Mesh [Silver] Itching     Tegaderm that is used over port a cath  ITCHING AND REDNESS      Past Medical History:   Diagnosis Date    Allergic     Anxiety     Asthma     AS CHILD    Breast cancer     CHEMO/ RADIATION COMPLETED AUGUST 2024    COVID-19 vaccine administered     Headache      Current Outpatient Medications   Medication Sig Dispense Refill    acetaminophen (TYLENOL) 500 MG tablet Take 2 tablets by mouth Every 6 (Six) Hours As Needed for Moderate Pain for up to 18 doses. 18 tablet 0    cephalexin (KEFLEX) 500 MG capsule Take 1 capsule by mouth 4 (Four) Times a Day for 5 days. 20 capsule 0    Chlorhexidine Gluconate 4 % solution Apply 1 Application topically to the appropriate area as directed Daily. Shower daily for 7 days prior to surgery 473 mL 0    desvenlafaxine (PRISTIQ) 50 MG 24 hr tablet Take 1-2 tablets by mouth Every Morning. Indications: anxiety 180 tablet 1    fexofenadine (ALLEGRA) 180 MG tablet Take 1 tablet by mouth Daily As Needed.      gabapentin (Neurontin) 300 MG capsule Take 1 capsule by mouth 3 (Three) Times a Day for 18 doses. 18 capsule 0    naproxen (NAPROSYN) 250 MG tablet Take 1 tablet by mouth 2 (Two) Times a Day. 12 tablet 0    naproxen (NAPROSYN) 500 MG tablet Take 1 tablet by mouth 2 (Two) Times a Day With Meals.      ondansetron (Zofran) 4 MG tablet Take 1 tablet by mouth Daily As Needed for Nausea or Vomiting for up to 18 doses. 18 tablet 0    Sodium Fluoride 5000 PPM 1.1 % paste Daily.      Zavzpret 10 MG/ACT solution 10 mg into the nostril(s) as directed by provider Daily As Needed (migraine). 6 each 5    propranolol LA (Inderal LA) 60 MG 24 hr capsule Take 1 capsule by mouth Daily. 30 capsule 1     No current facility-administered medications for this visit.     Past Surgical History:   Procedure Laterality Date    BREAST LUMPECTOMY Right     BREAST RECONSTRUCTION  Bilateral 2024    Procedure: BREAST RECONSTRUCTION WITH MESH AND IMPLANTS - USE OF SPY;  Surgeon: Jackie Sanders MD;  [facility]: [facility];  Service: Plastics;  Laterality: Bilateral;    BREAST RECONSTRUCTION Bilateral 2024    Procedure: Bilateral breast revision with fat graft and nipple reconstruction, port removal, right implant exchange,umbilical hernia repair;  Surgeon: Jackie Sanders MD;  Location: [facility];  Service: Plastics;  Laterality: Bilateral;     SECTION      LYMPH NODE BIOPSY      MASTECTOMY W/ SENTINEL NODE BIOPSY Bilateral 2024    Procedure: BREAST MASTECTOMY WITH RIGHT AXILLARY SENTINEL NODE IDENTIFICATION AND EXCISION BIOPSY;  Surgeon: Christiana Whiting MD;  Location: [facility];  Service: General;  Laterality: Bilateral;    PORTACATH PLACEMENT      SKIN BIOPSY      TUBAL ABDOMINAL LIGATION      UMBILICAL HERNIA REPAIR        Health Maintenance Due   Topic Date Due    HEPATITIS C SCREENING  Never done      Immunization History   Administered Date(s) Administered    COVID-19 (PFIZER) Purple Cap Monovalent 2021, 06/10/2021    DTaP, Unspecified 1999    HPV Quadrivalent 2009, 2009, 12/15/2009    Hepatitis A 2018    MMR 1999    Meningococcal, Unspecified 2012    OPV 1999    Td (TDVAX) 2006    Tdap 2014, 2024    Varicella 1999         Part of this note may be an electronic transcription/translation of spoken language to printed   text using the Dragon Dictation System.      MARIO Bullock

## 2025-05-14 ENCOUNTER — HOSPITAL ENCOUNTER (OUTPATIENT)
Facility: HOSPITAL | Age: 30
Setting detail: THERAPIES SERIES
Discharge: HOME | End: 2025-05-14
Payer: COMMERCIAL

## 2025-05-14 DIAGNOSIS — C50.411 MALIGNANT NEOPLASM OF UPPER-OUTER QUADRANT OF RIGHT BREAST IN FEMALE, ESTROGEN RECEPTOR POSITIVE: ICD-10-CM

## 2025-05-14 DIAGNOSIS — L90.5 SCAR CONDITION AND FIBROSIS OF SKIN: ICD-10-CM

## 2025-05-14 DIAGNOSIS — Z17.0 MALIGNANT NEOPLASM OF UPPER-OUTER QUADRANT OF RIGHT BREAST IN FEMALE, ESTROGEN RECEPTOR POSITIVE: ICD-10-CM

## 2025-05-14 DIAGNOSIS — Z91.89 AT RISK FOR LYMPHEDEMA: Primary | ICD-10-CM

## 2025-05-14 DIAGNOSIS — R52 PAIN: ICD-10-CM

## 2025-05-14 DIAGNOSIS — M25.60 JOINT STIFFNESS: ICD-10-CM

## 2025-05-14 PROCEDURE — 97140 MANUAL THERAPY 1/> REGIONS: CPT | Performed by: OCCUPATIONAL THERAPIST

## 2025-05-14 NOTE — THERAPY TREATMENT NOTE
Outpatient Occupational Therapy Lymphedema Treatment Note   [facility]     Patient Name: Marilee Watts  : 1995  MRN: [id number]  Today's Date: 2025      Visit Date: 2025    Patient Active Problem List   Diagnosis    Allergic rhinitis    Gastroesophageal reflux disease    Metaplastic carcinoma of breast    Breast fibroadenoma, right    Disproportion between native breast and reconstructed breast    Fitting and adjustment of vascular catheter    Intractable chronic migraine without aura and without status migrainosus    Chemotherapy-induced nausea    Postoperative follow-up    S/P breast reconstruction, bilateral    Iron deficiency anemia    Malabsorption of iron    Burn of right axilla    Breast reconstruction deformity    Radiation dermatitis    Numbness and tingling of right hand        Past Medical History:   Diagnosis Date    Allergic     Anxiety     Asthma     AS CHILD    Breast cancer     CHEMO/ RADIATION COMPLETED 2024    COVID-19 vaccine administered     Headache         Past Surgical History:   Procedure Laterality Date    BREAST LUMPECTOMY Right     BREAST RECONSTRUCTION Bilateral 2024    Procedure: BREAST RECONSTRUCTION WITH MESH AND IMPLANTS - USE OF SPY;  Surgeon: Jackie Sanders MD;  Location: [facility];  Service: Plastics;  Laterality: Bilateral;    BREAST RECONSTRUCTION Bilateral 2024    Procedure: Bilateral breast revision with fat graft and nipple reconstruction, port removal, right implant exchange,umbilical hernia repair;  Surgeon: Jackie Sanders MD;  Location: [facility];  Service: Plastics;  Laterality: Bilateral;     SECTION      LYMPH NODE BIOPSY      MASTECTOMY W/ SENTINEL NODE BIOPSY Bilateral 2024    Procedure: BREAST MASTECTOMY WITH RIGHT AXILLARY SENTINEL NODE IDENTIFICATION AND EXCISION BIOPSY;  Surgeon: Christiana Whiting MD;  Location: [facility];  Service: General;  Laterality: Bilateral;    PORTACATH  PLACEMENT      SKIN BIOPSY      TUBAL ABDOMINAL LIGATION      UMBILICAL HERNIA REPAIR           Visit Dx:      ICD-10-CM ICD-9-CM   1. At risk for lymphedema  Z91.89 V49.89   2. Scar condition and fibrosis of skin  L90.5 709.2   3. Joint stiffness  M25.60 719.50   4. Pain  R52 780.96   5. Malignant neoplasm of upper-outer quadrant of right breast in female, estrogen receptor positive  C50.411 174.4    Z17.0 V86.0        Lymphedema       Row Name 05/14/25 1200             Subjective Pain    Able to rate subjective pain? yes  -TD      Pre-Treatment Pain Level 5  -TD      Post-Treatment Pain Level 1  -TD         Subjective    Subjective Comments Pt reports her right axilla is tight this date  -TD         Lymphedema Assessment    Lymphedema Classification RUE:;at risk/stage 0  -TD      Lymphedema Cancer Related Sx bilateral;simple mastectomy;reconstructive;sentinel node biopsy  -TD      Lymphedema Surgery Comments 1/2024  -TD      Lymph Nodes Removed # 2  -TD      Positive Lymph Nodes # 0  -TD      Chemo Received yes  -TD      Chemo Treatments #/Timeframe 7  -TD      Radiation Therapy Received yes  -TD      Radiation Treatments #/Timeframe 28  -TD         Manual Lymphatic Drainage    Manual Therapy Therapist completed PORI protocol to address tightness in the right axilla.  Pt had lympahtic cords present that the therapist was able to access and treat.  -TD                User Key  (r) = Recorded By, (t) = Taken By, (c) = Cosigned By      Initials Name Provider Type    TD Daniela Vazquez OT Occupational Therapist                             OT Assessment/Plan       Row Name 05/14/25 1234          OT Assessment    Functional Limitations Limitations in functional capacity and performance  -TD     Impairments Impaired lymphatic circulation  -TD     Assessment Comments Marilee is a 29-year-old female with a diagnosis of metaplastic carcinoma of the right breast. Patient underwent a bilateral mastectomy with reconstruction on  January 9, 2023. Pt tolerated treatment well and reported relief.  Patient would benefit from continued skilled occupational therapy to prevent increased staging of lymphedema, increased pain, and decreased range of motion.  -TD     OT Diagnosis at risk for lymphedema  -TD     OT Rehab Potential Good  -TD     Patient/caregiver participated in establishment of treatment plan and goals Yes  -TD     Patient would benefit from skilled therapy intervention Yes  -TD        OT Plan    OT Plan Comments continue POC  -TD               User Key  (r) = Recorded By, (t) = Taken By, (c) = Cosigned By      Initials Name Provider Type    Daniela Hagen OT Occupational Therapist                        Manual Rx (Last 36 Hours)       Manual Treatments       Row Name 05/14/25 1235             Total Minutes    26749 - OT Manual Therapy Minutes 25  -TD                User Key  (r) = Recorded By, (t) = Taken By, (c) = Cosigned By      Initials Name Provider Type    Daniela Hagen, OT Occupational Therapist                      Therapy Education  Education Details: Pt was educated on MLD and stretches  Given: HEP, Symptoms/condition management, Edema management, Pain management  Program: Reinforced  How Provided: Verbal, Demonstration  Provided to: Patient  Level of Understanding: Teach back education performed, Verbalized, Demonstrated                Time Calculation:   Timed Charges  38680 - OT Manual Therapy Minutes: 25  Total Minutes  Timed Charges Total Minutes: 25   Total Minutes: 25     Therapy Charges for Today       Code Description Service Date Service Provider Modifiers Qty    [id number]  OT MANUAL THERAPY EA 15 MIN 5/14/2025 Daniela Vazquez OT GO 2                        Daniela Vazquez OT  5/14/2025

## 2025-05-19 NOTE — PROGRESS NOTES
"Chief Complaint  Post-op Follow-up (1 week post op)    Subjective          Initial evaluation  Marilee Watts is a 29 y.o. female who presents to [facility] PLAST[facility] RECON[facility] SURGERY for Postoperative Follow-Up of BREAST RECONSTRUCTION REVISION, right breast reconstruction revision with mesh replacement, fat graft, possible implant exchange 5/27/25.    She is 1 week post op. Right drain intact. She did not bring drain log with her. She states it has been 15CC's or less. She has been having some burning sensations from the drain at the top of her right breast. She feels a knot in the center of chest on right side. She is not sure if its the drain causing the pressure. Is done with antibiotics. Has healed blisters from tegaderm on bilateral sides. She states she has popped some of them on right side. Some bruising present.         History of Present Illness  The patient presents for evaluation of breast pain.    She reports experiencing severe pain in her breast, which she describes as feeling like a knot. The pain intensifies when she bends over or attempts to lift her foot, leading her to suspect potential nerve involvement. She has a history of undergoing radiation therapy on the affected breast.    ALLERGIES  The patient is allergic to TEGADERM.      Allergies: Iron, Adhesive glue [wound dressing adhesive], Pembrolizumab, and Tegaderm ag mesh [silver]  Allergies Reconciled.    Review of Systems   All review of system has been reviewed and it  is negative except the ones note above.     Objective     /86 (BP Location: Left arm, Patient Position: Sitting, Cuff Size: Adult)   Pulse 100   Ht 165.1 cm (65\")   Wt 73.9 kg (163 lb)   SpO2 99%   BMI 27.12 kg/m²     Body mass index is 27.12 kg/m².            General Inspection: No acute distress, comfortable. Incision healing well, no swelling, no erythema, no drainage.         Result Review :                Assessment and Plan  "     Diagnoses and all orders for this visit:    1. Breast reconstruction deformity (Primary)        Plan:        1. Breast pain.  The discomfort is likely due to the presence of a fat graft. She reports significant pain when bending over or putting on pants, which may be related to nerve irritation. The dressing will be changed but not removed to monitor output. She is advised to avoid bending over to minimize pain.    Follow-up  The patient will follow up next week.          Follow Up     No follow-ups on file.    Patient was given instructions and counseling regarding her condition. Please see specific information pulled into the AVS if appropriate.     Jackie Sanders MD  06/02/2025      Patient or patient representative verbalized consent for the use of Ambient Listening during the visit with  Jackie Sanders MD for chart documentation. 6/2/2025  13:35 EDT

## 2025-05-20 ENCOUNTER — APPOINTMENT (OUTPATIENT)
Facility: HOSPITAL | Age: 30
End: 2025-05-20
Payer: COMMERCIAL

## 2025-05-22 DIAGNOSIS — C50.919 METAPLASTIC CARCINOMA OF BREAST: Primary | ICD-10-CM

## 2025-05-22 DIAGNOSIS — Z79.899 HX OF LONG-TERM TREATMENT WITH HIGH-RISK MEDICATION: ICD-10-CM

## 2025-05-22 RX ORDER — CEPHALEXIN 500 MG/1
500 CAPSULE ORAL 4 TIMES DAILY
COMMUNITY
End: 2025-06-02

## 2025-05-22 RX ORDER — GABAPENTIN 300 MG/1
300 CAPSULE ORAL 3 TIMES DAILY
COMMUNITY
End: 2025-06-02

## 2025-05-22 NOTE — PRE-PROCEDURE INSTRUCTIONS
PATIENT INSTRUCTED TO BE:    - NOTHING TO EAT AFTER MIDNIGHT OR CHEW, EXCEPT CAN HAVE SIPS OF WATER WITH MEDICATIONS OR CLEAR LIQUIDS 2 HOURS PRIOR TO SURGERY ARRIVAL TIME , NO MORE THAN 8 OZ. (NOTHING RED)     - TO HOLD ALL VITAMINS, SUPPLEMENTS, NSAIDS FOR ONE WEEK PRIOR TO THEIR SURGICAL PROCEDURE        - INSTRUCTED PT TO USE SURGICAL SOAP AS INSTRUCTED PER DR. MODI USE THE SOAP FROM NECK TO TOES, AVOID THEIR FACE, HAIR, AND PRIVATE PARTS. IF USE THE SOAP THE NIGHT PRIOR TO SURGERY, CHANGE BED LINENS AND NO PETS IN THE BED.     INSTRUCTED NO LOTIONS, JEWELRY, PIERCINGS,  NAIL POLISH, OR DEODORANT DAY OF SURGERY        -INSTRUCTED TO TAKE THE FOLLOWING MEDICATIONS THE DAY OF SURGERY WITH SIPS OF WATER:     ALLEGRA,PRISTIQ,INDERAL LA,ZAVZPRET AS NEEDED      - DO NOT BRING ANY MEDICATIONS WITH YOU TO THE HOSPITAL THE DAY OF SURGERY, EXCEPT IF USE INHALERS. BRING INHALERS DAY OF SURGERY       - BRING CPAP OR BIPAP TO THE HOSPITAL ONLY IF YOU ARE SPENDING THE NIGHT    - DO NOT SMOKE OR VAPE 24 HOURS PRIOR TO PROCEDURE PER ANESTHESIA REQUEST     -MAKE SURE YOU HAVE A RIDE HOME OR SOMEONE TO STAY WITH YOU THE DAY OF THE PROCEDURE AFTER YOU GO HOME     - FOLLOW ANY OTHER INSTRUCTIONS GIVEN TO YOU BY YOUR SURGEON'S OFFICE: FOLLOW DR. MODI'S ORDERS FOR  TYLENOL,GABAPENTIN, NAPROXYN, ZOFRAN AND KEFLEX TO TAKE BEFORE AND AFTER SURGERY    - DAY OF SUR[facility] _____[id number]______,[facility] [address]), YOU CAN  PARK OR SELF PARK. ENTER THE PAVILION THRU MAIN ENTRANCE, TAKE ELEVATORS TO THE FIRST FLOOR, CHECK IN AT THE DESK FOR REGISTRATION/ SURGERY.       - YOU WILL RECEIVE A PHONE CALL THE DAY PRIOR TO SURGERY BETWEEN 1PM AND 4 PM WITH ARRIVAL TIME, IF YOUR SURGERY IS ON A MONDAY YOU WILL RECEIVE A CALL THE FRIDAY PRIOR TO SURGERY DATE    - BRING CASH OR CREDIT CARD FOR COPAYMENT OF MEDICATIONS AFTER SURGERY IF YOU USE THE HOSPITAL PHARMACY (MEDS TO BED)    - PREADMISSION  TESTING NURSE FLY SAUNDERS [phone]_ IF HAVE ANY QUESTIONS     -PATIENT PROVIDED THE NUMBER FOR PREOP SURGICAL DEPT IF HAD QUESTIONS AFTER HOURS PRIOR TO [id number]__[phone]______________.  INFORMED PT IF NO ANSWER, LEAVE A MESSAGE AND SOMEONE WILL RETURN THEIR CALL       PATIENT VERBALIZED UNDERSTANDING       Clear Liquid Diet        Find out when you need to start a clear liquid diet.   Think of “clear liquids” as anything you could read a newspaper through. This includes things like water, broth, sports drinks, or tea WITHOUT any kind of milk or cream.           Once you are told to start a clear liquid diet, only drink these things until 2 hours before arrival to the hospital or when the hospital says to stop. Total volume limitation: 8 oz.       Clear liquids you CAN drink:   Water   Clear broth: beef, chicken, vegetable, or bone broth with nothing in it   Gatorade   Lemonade or Guillaume-aid   Soda   Tea, coffee (NO cream or honey)   Jell-O (without fruit)   Popsicles (without fruit or cream)   Italian ices   Juice without pulp: apple, white, grape   You may use salt, pepper, and sugar  NO RED  NO NOODLES    Do NOT drink:   Milk or cream   Soy milk, almond milk, coconut milk, or other non-dairy drinks and   creamers   Milkshakes or smoothies   Tomato juice   Orange juice   Grapefruit juice   Cream soups or any other than broth         Clear Liquid Diet:  Do NOT eat any solid food.  Do NOT eat or suck on mints or candy.  Do NOT chew gum.  Do NOT drink thick liquids like milk or juice with pulp in it.  Do NOT add milk, cream, or anything like soy milk or almond milk to coffee or tea.     PATIENT INSTRUCTED TO BE:    - NOTHING TO EAT AFTER MIDNIGHT OR CHEW, EXCEPT CAN HAVE SIPS OF WATER WITH MEDICATIONS OR CLEAR LIQUIDS 2 HOURS PRIOR TO SURGERY ARRIVAL TIME , NO MORE THAN 8 OZ. (NOTHING RED)     - TO HOLD ALL VITAMINS, SUPPLEMENTS, NSAIDS FOR ONE WEEK PRIOR TO THEIR SURGICAL PROCEDURE    - DO NOT TAKE  _(GLP/SGLT2)_____________________ 7 DAYS PRIOR TO PROCEDURE PER ANESTHESIA RECOMMENDATIONS/INSTRUCTIONS     - INSTRUCTED PT TO USE SURGICAL SOAP 1 TIME THE NIGHT PRIOR TO SURGERY ___________ OR THE AM OF SURGERY _____________   USE THE SOAP FROM NECK TO TOES, AVOID THEIR FACE, HAIR, AND PRIVATE PARTS. IF USE THE SOAP THE NIGHT PRIOR TO SURGERY, CHANGE BED LINENS AND NO PETS IN THE BED.     INSTRUCTED NO LOTIONS, JEWELRY, PIERCINGS,  NAIL POLISH, OR DEODORANT DAY OF SURGERY    - IF DIABETIC, CHECK BLOOD GLUCOSE IF LESS THAN 70 OR HAVING SYMPTOMS CALL THE PREOP AREA FOR INSTRUCTIONS ON AM OF SURGERY  (PHONE NUMBER: _____________________)    -INSTRUCTED TO TAKE THE FOLLOWING MEDICATIONS THE DAY OF SURGERY WITH SIPS OF WATER:           - DO NOT BRING ANY MEDICATIONS WITH YOU TO THE HOSPITAL THE DAY OF SURGERY, EXCEPT IF USE INHALERS. BRING INHALERS DAY OF SURGERY       - BRING CPAP OR BIPAP TO THE HOSPITAL ONLY IF YOU ARE SPENDING THE NIGHT    - DO NOT SMOKE OR VAPE 24 HOURS PRIOR TO PROCEDURE PER ANESTHESIA REQUEST     -MAKE SURE YOU HAVE A RIDE HOME OR SOMEONE TO STAY WITH YOU THE DAY OF THE PROCEDURE AFTER YOU GO HOME     - FOLLOW ANY OTHER INSTRUCTIONS GIVEN TO YOU BY YOUR SURGEON'S OFFICE.     - DAY OF SURGERY ____________,[facility] ( [address]), YOU CAN  PARK OR SELF PARK. ENTER THE PAVILION THRU MAIN ENTRANCE, TAKE ELEVATORS TO THE FIRST FLOOR, CHECK IN AT THE DESK FOR REGISTRATION/ SURGERY.                  OR    [facility]  PARK IN THE OPEN LOT, COME TO ENTRANCE / [facility], FIRST FLOOR, CHECK IN AT THE DESK FOR REGISTRATION/ SURGERY      - YOU WILL RECEIVE A PHONE CALL THE DAY PRIOR TO SURGERY BETWEEN 1PM AND 4 PM WITH ARRIVAL TIME, IF YOUR SURGERY IS ON A MONDAY YOU WILL RECEIVE A CALL THE FRIDAY PRIOR TO SURGERY DATE    - BRING CASH OR CREDIT CARD FOR COPAYMENT OF MEDICATIONS AFTER SURGERY IF YOU USE THE HOSPITAL PHARMACY (MEDS TO  BED)    - PREADMISSION TESTING NURSE FLY SAUNDERS [phone]_ IF HAVE ANY QUESTIONS     -PATIENT PROVIDED THE NUMBER FOR PREOP SURGICAL DEPT IF HAD QUESTIONS AFTER HOURS PRIOR TO SURGERY (PHONE NUMBER: ________________.  INFORMED PT IF NO ANSWER, LEAVE A MESSAGE AND SOMEONE WILL RETURN THEIR CALL       PATIENT VERBALIZED UNDERSTANDING       Clear Liquid Diet        Find out when you need to start a clear liquid diet.   Think of “clear liquids” as anything you could read a newspaper through. This includes things like water, broth, sports drinks, or tea WITHOUT any kind of milk or cream.           Once you are told to start a clear liquid diet, only drink these things until 2 hours before arrival to the hospital or when the hospital says to stop. Total volume limitation: 8 oz.       Clear liquids you CAN drink:   Water   Clear broth: beef, chicken, vegetable, or bone broth with nothing in it   Gatorade   Lemonade or Guillaume-aid   Soda   Tea, coffee (NO cream or honey)   Jell-O (without fruit)   Popsicles (without fruit or cream)   Italian ices   Juice without pulp: apple, white, grape   You may use salt, pepper, and sugar  NO RED  NO NOODLES    Do NOT drink:   Milk or cream   Soy milk, almond milk, coconut milk, or other non-dairy drinks and   creamers   Milkshakes or smoothies   Tomato juice   Orange juice   Grapefruit juice   Cream soups or any other than broth         Clear Liquid Diet:  Do NOT eat any solid food.  Do NOT eat or suck on mints or candy.  Do NOT chew gum.  Do NOT drink thick liquids like milk or juice with pulp in it.  Do NOT add milk, cream, or anything like soy milk or almond milk to coffee or tea.

## 2025-05-27 ENCOUNTER — ANESTHESIA EVENT (OUTPATIENT)
Dept: PERIOP | Facility: HOSPITAL | Age: 30
End: 2025-05-27
Payer: COMMERCIAL

## 2025-05-27 ENCOUNTER — ANESTHESIA (OUTPATIENT)
Dept: PERIOP | Facility: HOSPITAL | Age: 30
End: 2025-05-27
Payer: COMMERCIAL

## 2025-05-27 ENCOUNTER — HOSPITAL ENCOUNTER (OUTPATIENT)
Facility: HOSPITAL | Age: 30
Setting detail: HOSPITAL OUTPATIENT SURGERY
Discharge: HOME | End: 2025-05-27
Attending: SURGERY | Admitting: SURGERY
Payer: COMMERCIAL

## 2025-05-27 VITALS
HEART RATE: 72 BPM | DIASTOLIC BLOOD PRESSURE: 72 MMHG | SYSTOLIC BLOOD PRESSURE: 119 MMHG | RESPIRATION RATE: 16 BRPM | WEIGHT: 162.04 LBS | TEMPERATURE: 97.4 F | OXYGEN SATURATION: 98 % | HEIGHT: 65 IN | BODY MASS INDEX: 27 KG/M2

## 2025-05-27 DIAGNOSIS — N65.0 BREAST RECONSTRUCTION DEFORMITY: ICD-10-CM

## 2025-05-27 DIAGNOSIS — L58.9 RADIATION DERMATITIS: ICD-10-CM

## 2025-05-27 LAB
B-HCG UR QL: NEGATIVE
COTININE UR-MCNC: NEGATIVE NG/ML

## 2025-05-27 PROCEDURE — 25010000002 MIDAZOLAM PER 1MG: Performed by: ANESTHESIOLOGY

## 2025-05-27 PROCEDURE — G0480 DRUG TEST DEF 1-7 CLASSES: HCPCS | Performed by: SURGERY

## 2025-05-27 PROCEDURE — 25010000002 DEXAMETHASONE PER 1 MG

## 2025-05-27 PROCEDURE — 81025 URINE PREGNANCY TEST: CPT | Performed by: SURGERY

## 2025-05-27 PROCEDURE — 25010000002 LIDOCAINE PF 2% 2 % SOLUTION

## 2025-05-27 PROCEDURE — 25010000002 PROPOFOL 10 MG/ML EMULSION

## 2025-05-27 PROCEDURE — 25010000002 HYDROMORPHONE 1 MG/ML SOLUTION

## 2025-05-27 PROCEDURE — 25010000002 SUGAMMADEX 200 MG/2ML SOLUTION

## 2025-05-27 PROCEDURE — 25010000002 FENTANYL CITRATE (PF) 50 MCG/ML SOLUTION

## 2025-05-27 PROCEDURE — 25010000002 DEXAMETHASONE PER 1 MG: Performed by: SURGERY

## 2025-05-27 PROCEDURE — 25010000002 PHENYLEPHRINE HCL-NACL 1000-0.9 MCG/10ML-% SOLUTION PREFILLED SYRINGE

## 2025-05-27 PROCEDURE — 25010000002 BUPIVACAINE (PF) 0.5 % SOLUTION: Performed by: SURGERY

## 2025-05-27 PROCEDURE — 25810000003 LACTATED RINGERS PER 1000 ML: Performed by: ANESTHESIOLOGY

## 2025-05-27 PROCEDURE — 25010000002 EPINEPHRINE (ANAPHYLAXIS) 1 MG/ML SOLUTION: Performed by: SURGERY

## 2025-05-27 PROCEDURE — 25010000002 ONDANSETRON PER 1 MG

## 2025-05-27 PROCEDURE — 25010000002 CEFAZOLIN PER 500 MG: Performed by: SURGERY

## 2025-05-27 RX ORDER — EPINEPHRINE 1 MG/ML
INJECTION, SOLUTION INTRAMUSCULAR; SUBCUTANEOUS AS NEEDED
Status: DISCONTINUED | OUTPATIENT
Start: 2025-05-27 | End: 2025-05-27 | Stop reason: HOSPADM

## 2025-05-27 RX ORDER — TRANEXAMIC ACID 10 MG/ML
1000 INJECTION, SOLUTION INTRAVENOUS
Status: COMPLETED | OUTPATIENT
Start: 2025-05-27 | End: 2025-05-27

## 2025-05-27 RX ORDER — MIDAZOLAM HYDROCHLORIDE 2 MG/2ML
2 INJECTION, SOLUTION INTRAMUSCULAR; INTRAVENOUS ONCE
Status: COMPLETED | OUTPATIENT
Start: 2025-05-27 | End: 2025-05-27

## 2025-05-27 RX ORDER — PROMETHAZINE HYDROCHLORIDE 25 MG/1
25 SUPPOSITORY RECTAL ONCE AS NEEDED
Status: DISCONTINUED | OUTPATIENT
Start: 2025-05-27 | End: 2025-05-27 | Stop reason: HOSPADM

## 2025-05-27 RX ORDER — SCOPOLAMINE 1 MG/3D
1 PATCH, EXTENDED RELEASE TRANSDERMAL CONTINUOUS
Status: DISCONTINUED | OUTPATIENT
Start: 2025-05-27 | End: 2025-05-27 | Stop reason: HOSPADM

## 2025-05-27 RX ORDER — PETROLATUM,WHITE
OINTMENT IN PACKET (GRAM) TOPICAL AS NEEDED
Status: DISCONTINUED | OUTPATIENT
Start: 2025-05-27 | End: 2025-05-27 | Stop reason: SURG

## 2025-05-27 RX ORDER — ACETAMINOPHEN 500 MG
1000 TABLET ORAL ONCE
Status: COMPLETED | OUTPATIENT
Start: 2025-05-27 | End: 2025-05-27

## 2025-05-27 RX ORDER — SODIUM CHLORIDE, SODIUM LACTATE, POTASSIUM CHLORIDE, CALCIUM CHLORIDE 600; 310; 30; 20 MG/100ML; MG/100ML; MG/100ML; MG/100ML
9 INJECTION, SOLUTION INTRAVENOUS CONTINUOUS PRN
Status: DISCONTINUED | OUTPATIENT
Start: 2025-05-27 | End: 2025-05-27 | Stop reason: HOSPADM

## 2025-05-27 RX ORDER — MAGNESIUM HYDROXIDE 1200 MG/15ML
LIQUID ORAL AS NEEDED
Status: DISCONTINUED | OUTPATIENT
Start: 2025-05-27 | End: 2025-05-27 | Stop reason: HOSPADM

## 2025-05-27 RX ORDER — ACETAMINOPHEN 500 MG
1000 TABLET ORAL ONCE
Status: DISCONTINUED | OUTPATIENT
Start: 2025-05-27 | End: 2025-05-27 | Stop reason: SDUPTHER

## 2025-05-27 RX ORDER — PHENYLEPHRINE HCL IN 0.9% NACL 1 MG/10 ML
SYRINGE (ML) INTRAVENOUS AS NEEDED
Status: DISCONTINUED | OUTPATIENT
Start: 2025-05-27 | End: 2025-05-27 | Stop reason: SURG

## 2025-05-27 RX ORDER — PROPOFOL 10 MG/ML
VIAL (ML) INTRAVENOUS AS NEEDED
Status: DISCONTINUED | OUTPATIENT
Start: 2025-05-27 | End: 2025-05-27 | Stop reason: SURG

## 2025-05-27 RX ORDER — OXYCODONE HYDROCHLORIDE 5 MG/1
5 TABLET ORAL
Refills: 0 | Status: COMPLETED | OUTPATIENT
Start: 2025-05-27 | End: 2025-05-27

## 2025-05-27 RX ORDER — SODIUM CHLORIDE, SODIUM LACTATE, POTASSIUM CHLORIDE, AND CALCIUM CHLORIDE .6; .31; .03; .02 G/100ML; G/100ML; G/100ML; G/100ML
INJECTION, SOLUTION INTRAVENOUS AS NEEDED
Status: DISCONTINUED | OUTPATIENT
Start: 2025-05-27 | End: 2025-05-27 | Stop reason: HOSPADM

## 2025-05-27 RX ORDER — ROCURONIUM BROMIDE 10 MG/ML
INJECTION, SOLUTION INTRAVENOUS AS NEEDED
Status: DISCONTINUED | OUTPATIENT
Start: 2025-05-27 | End: 2025-05-27 | Stop reason: SURG

## 2025-05-27 RX ORDER — TRANEXAMIC ACID 100 MG/ML
1000 INJECTION, SOLUTION INTRAVENOUS ONCE
Status: DISCONTINUED | OUTPATIENT
Start: 2025-05-27 | End: 2025-05-27 | Stop reason: HOSPADM

## 2025-05-27 RX ORDER — LIDOCAINE HYDROCHLORIDE 20 MG/ML
INJECTION, SOLUTION EPIDURAL; INFILTRATION; INTRACAUDAL; PERINEURAL AS NEEDED
Status: DISCONTINUED | OUTPATIENT
Start: 2025-05-27 | End: 2025-05-27 | Stop reason: SURG

## 2025-05-27 RX ORDER — ONDANSETRON 2 MG/ML
4 INJECTION INTRAMUSCULAR; INTRAVENOUS ONCE AS NEEDED
Status: DISCONTINUED | OUTPATIENT
Start: 2025-05-27 | End: 2025-05-27 | Stop reason: HOSPADM

## 2025-05-27 RX ORDER — FENTANYL CITRATE 50 UG/ML
INJECTION, SOLUTION INTRAMUSCULAR; INTRAVENOUS AS NEEDED
Status: DISCONTINUED | OUTPATIENT
Start: 2025-05-27 | End: 2025-05-27 | Stop reason: SURG

## 2025-05-27 RX ORDER — DEXAMETHASONE SODIUM PHOSPHATE 4 MG/ML
INJECTION, SOLUTION INTRA-ARTICULAR; INTRALESIONAL; INTRAMUSCULAR; INTRAVENOUS; SOFT TISSUE AS NEEDED
Status: DISCONTINUED | OUTPATIENT
Start: 2025-05-27 | End: 2025-05-27 | Stop reason: HOSPADM

## 2025-05-27 RX ORDER — DEXMEDETOMIDINE HYDROCHLORIDE 100 UG/ML
INJECTION, SOLUTION INTRAVENOUS AS NEEDED
Status: DISCONTINUED | OUTPATIENT
Start: 2025-05-27 | End: 2025-05-27 | Stop reason: SURG

## 2025-05-27 RX ORDER — DEXAMETHASONE SODIUM PHOSPHATE 4 MG/ML
INJECTION, SOLUTION INTRA-ARTICULAR; INTRALESIONAL; INTRAMUSCULAR; INTRAVENOUS; SOFT TISSUE AS NEEDED
Status: DISCONTINUED | OUTPATIENT
Start: 2025-05-27 | End: 2025-05-27 | Stop reason: SURG

## 2025-05-27 RX ORDER — PROMETHAZINE HYDROCHLORIDE 25 MG/1
25 TABLET ORAL ONCE AS NEEDED
Status: DISCONTINUED | OUTPATIENT
Start: 2025-05-27 | End: 2025-05-27 | Stop reason: HOSPADM

## 2025-05-27 RX ORDER — BUPIVACAINE HYDROCHLORIDE 5 MG/ML
INJECTION, SOLUTION EPIDURAL; INTRACAUDAL; PERINEURAL AS NEEDED
Status: DISCONTINUED | OUTPATIENT
Start: 2025-05-27 | End: 2025-05-27 | Stop reason: HOSPADM

## 2025-05-27 RX ORDER — ONDANSETRON 2 MG/ML
INJECTION INTRAMUSCULAR; INTRAVENOUS AS NEEDED
Status: DISCONTINUED | OUTPATIENT
Start: 2025-05-27 | End: 2025-05-27 | Stop reason: SURG

## 2025-05-27 RX ADMIN — FENTANYL CITRATE 50 MCG: 50 INJECTION, SOLUTION INTRAMUSCULAR; INTRAVENOUS at 07:28

## 2025-05-27 RX ADMIN — TRANEXAMIC ACID 1000 MG: 10 INJECTION, SOLUTION INTRAVENOUS at 07:13

## 2025-05-27 RX ADMIN — FENTANYL CITRATE 50 MCG: 50 INJECTION, SOLUTION INTRAMUSCULAR; INTRAVENOUS at 07:51

## 2025-05-27 RX ADMIN — OXYCODONE HYDROCHLORIDE 5 MG: 5 TABLET ORAL at 10:43

## 2025-05-27 RX ADMIN — SODIUM CHLORIDE 2000 MG: 9 INJECTION, SOLUTION INTRAVENOUS at 07:38

## 2025-05-27 RX ADMIN — SODIUM CHLORIDE, POTASSIUM CHLORIDE, SODIUM LACTATE AND CALCIUM CHLORIDE 9 ML/HR: 600; 310; 30; 20 INJECTION, SOLUTION INTRAVENOUS at 06:54

## 2025-05-27 RX ADMIN — DEXAMETHASONE SODIUM PHOSPHATE 10 MG: 4 INJECTION, SOLUTION INTRAMUSCULAR; INTRAVENOUS at 07:36

## 2025-05-27 RX ADMIN — ROCURONIUM BROMIDE 70 MG: 10 INJECTION, SOLUTION INTRAVENOUS at 07:30

## 2025-05-27 RX ADMIN — OXYCODONE HYDROCHLORIDE 5 MG: 5 TABLET ORAL at 11:16

## 2025-05-27 RX ADMIN — Medication 100 MCG: at 08:16

## 2025-05-27 RX ADMIN — Medication 100 MCG: at 07:30

## 2025-05-27 RX ADMIN — PROPOFOL 200 MCG/KG/MIN: 10 INJECTION, EMULSION INTRAVENOUS at 07:33

## 2025-05-27 RX ADMIN — PROPOFOL 30 MG: 10 INJECTION, EMULSION INTRAVENOUS at 08:54

## 2025-05-27 RX ADMIN — PROPOFOL 120 MG: 10 INJECTION, EMULSION INTRAVENOUS at 07:30

## 2025-05-27 RX ADMIN — DEXMEDETOMIDINE 10 MCG: 100 INJECTION, SOLUTION, CONCENTRATE INTRAVENOUS at 09:07

## 2025-05-27 RX ADMIN — SUGAMMADEX 200 MG: 100 INJECTION, SOLUTION INTRAVENOUS at 09:15

## 2025-05-27 RX ADMIN — SCOLOPAMINE TRANSDERMAL SYSTEM 1 PATCH: 1 PATCH, EXTENDED RELEASE TRANSDERMAL at 07:10

## 2025-05-27 RX ADMIN — ACETAMINOPHEN 1000 MG: 500 TABLET ORAL at 07:11

## 2025-05-27 RX ADMIN — Medication 100 MCG: at 07:44

## 2025-05-27 RX ADMIN — Medication 100 MCG: at 09:12

## 2025-05-27 RX ADMIN — MIDAZOLAM HYDROCHLORIDE 2 MG: 1 INJECTION, SOLUTION INTRAMUSCULAR; INTRAVENOUS at 07:13

## 2025-05-27 RX ADMIN — LIDOCAINE HYDROCHLORIDE 40 MG: 20 INJECTION, SOLUTION INTRAVENOUS at 07:30

## 2025-05-27 RX ADMIN — SODIUM CHLORIDE, POTASSIUM CHLORIDE, SODIUM LACTATE AND CALCIUM CHLORIDE: 600; 310; 30; 20 INJECTION, SOLUTION INTRAVENOUS at 08:19

## 2025-05-27 RX ADMIN — ONDANSETRON 4 MG: 2 INJECTION INTRAMUSCULAR; INTRAVENOUS at 09:07

## 2025-05-27 RX ADMIN — HYDROMORPHONE HYDROCHLORIDE 0.5 MG: 1 INJECTION, SOLUTION INTRAMUSCULAR; INTRAVENOUS; SUBCUTANEOUS at 09:34

## 2025-05-27 RX ADMIN — Medication 1 PACKAGE: at 07:33

## 2025-05-27 RX ADMIN — HYDROMORPHONE HYDROCHLORIDE 0.25 MG: 1 INJECTION, SOLUTION INTRAMUSCULAR; INTRAVENOUS; SUBCUTANEOUS at 10:08

## 2025-05-27 RX ADMIN — PROPOFOL 50 MG: 10 INJECTION, EMULSION INTRAVENOUS at 09:05

## 2025-05-27 RX ADMIN — ROCURONIUM BROMIDE 10 MG: 10 INJECTION, SOLUTION INTRAVENOUS at 08:39

## 2025-05-27 RX ADMIN — HYDROMORPHONE HYDROCHLORIDE 0.5 MG: 1 INJECTION, SOLUTION INTRAMUSCULAR; INTRAVENOUS; SUBCUTANEOUS at 09:44

## 2025-05-27 NOTE — ANESTHESIA PREPROCEDURE EVALUATION
Anesthesia Evaluation     Patient summary reviewed and Nursing notes reviewed   no history of anesthetic complications:   NPO Solid Status: > 8 hours  NPO Liquid Status: > 2 hours           Airway   Mallampati: I  TM distance: >3 FB  Neck ROM: full  No difficulty expected  Dental      Pulmonary - normal exam    breath sounds clear to auscultation  (+) asthma,  Cardiovascular - negative cardio ROS and normal exam  Exercise tolerance: good (4-7 METS)    Rhythm: regular  Rate: normal        Neuro/Psych  (+) headaches, numbness, psychiatric history Anxiety  GI/Hepatic/Renal/Endo    (+) GERD    Musculoskeletal (-) negative ROS    Abdominal    Substance History - negative use     OB/GYN negative ob/gyn ROS         Other      history of cancer remission    ROS/Med Hx Other: TUBAL ABDOMINAL LIGATION                    Anesthesia Plan    ASA 2     general     (Patient understands anesthesia not responsible for dental damage.)  intravenous induction     Anesthetic plan, risks, benefits, and alternatives have been provided, discussed and informed consent has been obtained with: patient.  Pre-procedure education provided    CODE STATUS:

## 2025-05-27 NOTE — OP NOTE
Plastic Surgery Op Note  BILATERAL BREAST RECONSTRUCTION REVISION WITH FAT GRAFTING, RIGHT CAPSULOTOMY, SCAR REVISION ON BOTH BREASTS.     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  Her skilled assistance was necessary for the success of this case.      Estimated Blood Loss: 100ml    Specimens:   Order Name Source Comment Collection Info Order Time   PREGNANCY, URINE Urine, Clean Catch  Collected By: Melissa Hernandez 5/27/2025  6:38 AM     Release to patient   Routine Release        NICOTINE SCREEN, URINE Urine, Clean Catch  Collected By: Melissa Hernandez 5/27/2025  6:38 AM     Release to patient   Routine Release              Drains:   Closed/Suction Drain Right Breast Bulb 15 Fr. (Active)       [REMOVED] Closed/Suction Drain 1 Inferior;Lateral;Right Breast Bulb 15 Fr. (Removed)       Findings:     Right breast capsular contracture  Fat graft 280 cc  Skin excision: 10x 0.5 cm       Implants:      No new implants     Complications: none     After risks and benefits were discussed with patient and informed consent was signed, patient was taken to the OR and positioned supine. The surgical site was then prepped in a sterile fashion way and a time out was performed confirming patient's name and procedure to be performed. All surgical team was introduced by name.   I started with tumescent injection over the abdomen and with a cannula #4, lipoaspirate was obtained and fat was trapped on a fat graft  system. While the fat was being processed, I proceeded with the remaining of the surgery.  Then the right breast was opened and the implant temporarily removed. That was kept on irrisept. Then, the pocket was inspected and the pocket released. Then, the axillary depression was released and fat graft was performed. The pocket was irrigated with saline and irrisept and a drain was placed. The pocket was closed with 2-0 PDS and the skin was closed with two layers of 3-0 vicryl. On the left breast, the medial portio of the skin was removed and closed on top of fat graft and on the right port incision, the incision was excised and closed with two layers of 4-0 PDS over fat graft. Surgical dressings were placed.   Patient tolerated procedure well, there were no complications, all instruments were checked and patient was awakened and taken to PACU in stable condition.  I was present for the entire procedure.     Date: 5/27/2025  Time: 09:28 EDT           Jackie Sanders MD  05/27/25  09:28 EDT

## 2025-05-27 NOTE — ANESTHESIA POSTPROCEDURE EVALUATION
Patient: Marilee Watts    Procedure Summary       Date: 05/27/25 Room / Location: [facility] OR  / [facility] MAIN OR    Anesthesia Start: 0724 Anesthesia Stop: 0929    Procedures:       BREAST RECONSTRUCTION REVISION, right breast reconstruction revision, fat graft (Right: Breast)      FAT GRAFTING      BREAST IMPLANT REVISION AND/OR REMOVAL (Breast) Diagnosis:       Radiation dermatitis      Breast reconstruction deformity      (Radiation dermatitis [L58.9])      (Breast reconstruction deformity [N65.0])    Surgeons: Jackie Sanders MD Provider: Nathan Cain MD    Anesthesia Type: general ASA Status: 2            Anesthesia Type: general    Vitals  Vitals Value Taken Time   /69 05/27/25 10:28   Temp 36.4 °C (97.5 °F) 05/27/25 10:10   Pulse 59 05/27/25 10:30   Resp 11 05/27/25 10:25   SpO2 94 % 05/27/25 10:30   Vitals shown include unfiled device data.        Post Anesthesia Care and Evaluation    Patient location during evaluation: bedside  Patient participation: complete - patient participated  Level of consciousness: awake    Airway patency: patent  PONV Status: none  Cardiovascular status: acceptable  Respiratory status: acceptable  Hydration status: acceptable

## 2025-05-27 NOTE — INTERVAL H&P NOTE
H&P reviewed. The patient was examined and there are no changes to the H&P.    Patient is not tachycardic  Respirations are non elaborated  Vitals:    05/27/25 0639   BP: 115/81   Pulse: 74   Resp: 18   Temp: 97.9 °F (36.6 °C)   SpO2: 100%     Risks and benefits reminded to patient who agrees to proceed

## 2025-05-27 NOTE — DISCHARGE INSTRUCTIONS
DISCHARGE INSTRUCTIONS  BREAST SURGERY           For your surgery you had:  General anesthesia (you may have a sore throat for the first 24 hours)  You received a medicated patch for nausea prevention today (behind your ear). It is recommended that you remove it 24-48 hours post-operatively. It must be removed within 72 hours.  You may experience dizziness, drowsiness, or light-headedness for several hours following surgery/procedure.  Do not stay alone today or tonight.  Limit your activity for 24 hours.  You should not drive, operate machinery, drink alcohol, or sign legally binding documents for 24 hours or while you are taking pain medication.  Resume your diet slowly.  Follow whatever special dietary instructions you may have been given by your doctor.  NOTIFY YOUR DOCTOR IF YOU EXPERIENCE ANY OF THE FOLLOWING:  Temperature greater than 101 degrees Fahrenheit  Shaking Chills  Redness or excessive drainage from incision  Nausea, vomiting and/or pain that is not controlled by prescribed medications  Increase in bleeding or bleeding that is excessive  Unable to urinate in 6 hours after surgery  If unable to reach your doctor, please go to the closest Emergency Room  Medications per physician instructions as indicated on After Visit Summary.    Last dose of pain medication was given at:   oxyCODONE (ROXICODONE) immediate release tablet 5 mg AT 1043.  acetaminophen (TYLENOL) tablet 1,000 mg AT 0711. DO NOT EXCEED MORE THAN 4000MG IN 24 HOURS.    Prasad    2 Post-Op 1:15 PM  Arrive by 1:00 PM  [facility]  [facility] RECON[facility]IVE SURGERY  [address] [place] [address] [address]  [facility] [address]  [phone]     SPECIAL INSTRUCTIONS:  Ok for regular diet  Do not drive for next 2 weeks  Ok to shower in 3 days but be aware you are a fall risk so have someone with you or place a chair in the shower. It is ok to wet the breast. Keep dressings over the drains intact.  No sponge or cloths.  Wash the drain site before other parts of the body  Strip drains q 8 hours and record drain output daily. Wash hands or wear gloves when manipulating drains.  Do not use your under garments to secure your drains.   Do not exercise for the next 6 weeks.  Sleep in an upright position  No bra for 1 week. After that, wear a comfortable soft bra  Ok to move arms slowly to the shoulder level (brushing hair movement)  Do not fly for 2 months    Take post-operative medications as directed on the bottle.     If you experience any issues or concerns, please contact the office at [phone]. If after hours a call service will notify the on-call provider.

## 2025-05-28 ENCOUNTER — APPOINTMENT (OUTPATIENT)
Facility: HOSPITAL | Age: 30
End: 2025-05-28
Payer: COMMERCIAL

## 2025-06-02 ENCOUNTER — OFFICE VISIT (OUTPATIENT)
Dept: PLASTIC SURGERY | Facility: CLINIC | Age: 30
End: 2025-06-02
Payer: COMMERCIAL

## 2025-06-02 VITALS
WEIGHT: 163 LBS | DIASTOLIC BLOOD PRESSURE: 86 MMHG | BODY MASS INDEX: 27.16 KG/M2 | HEART RATE: 100 BPM | SYSTOLIC BLOOD PRESSURE: 123 MMHG | HEIGHT: 65 IN | OXYGEN SATURATION: 99 %

## 2025-06-02 DIAGNOSIS — N65.0 BREAST RECONSTRUCTION DEFORMITY: Primary | ICD-10-CM

## 2025-06-02 PROCEDURE — 1160F RVW MEDS BY RX/DR IN RCRD: CPT | Performed by: SURGERY

## 2025-06-02 PROCEDURE — 1159F MED LIST DOCD IN RCRD: CPT | Performed by: SURGERY

## 2025-06-02 PROCEDURE — 99024 POSTOP FOLLOW-UP VISIT: CPT | Performed by: SURGERY

## 2025-06-05 ENCOUNTER — PRIOR AUTHORIZATION (OUTPATIENT)
Dept: NEUROLOGY | Facility: CLINIC | Age: 30
End: 2025-06-05
Payer: COMMERCIAL

## 2025-06-05 ENCOUNTER — OFFICE VISIT (OUTPATIENT)
Dept: PLASTIC SURGERY | Facility: CLINIC | Age: 30
End: 2025-06-05
Payer: COMMERCIAL

## 2025-06-05 VITALS
OXYGEN SATURATION: 100 % | HEIGHT: 65 IN | SYSTOLIC BLOOD PRESSURE: 118 MMHG | HEART RATE: 83 BPM | DIASTOLIC BLOOD PRESSURE: 73 MMHG | WEIGHT: 163 LBS | BODY MASS INDEX: 27.16 KG/M2

## 2025-06-05 DIAGNOSIS — N65.0 BREAST RECONSTRUCTION DEFORMITY: Primary | ICD-10-CM

## 2025-06-05 PROCEDURE — 99024 POSTOP FOLLOW-UP VISIT: CPT | Performed by: SURGERY

## 2025-06-05 NOTE — PROGRESS NOTES
"Chief Complaint  Post-op Follow-up    Subjective          Initial evaluation  Marilee Watts is a 29 y.o. female who presents to [facility] P[facility] RECONSTRUCTIVE SURGERY for Postoperative Follow-Up of BREAST RECONSTRUCTION REVISION, right breast reconstruction revision, fat graft 5/27/25.    She is here for possible drain removal. Right drain intact with output of 20, 25, and less than 10. Patient states she does not have a cup to drain the output. She states the drainage has changed to brown. She is itchy. The pain on right side of breast has been getting better since last visit when we stripped the drain. No redness or odor.    History of Present Illness  The patient presents for evaluation of a drain.    She reports no allergic reactions to the materials used in her treatment. The drain occasionally becomes wet during showers, and she experiences itching at the site of the scar. She has been managing the drainage by measuring it in a cup, with readings of 20, 15, and 10 at different times. She typically performs this measurement in the morning and around 10:00 PM. She notes that the area appears to have flattened and expresses concern about whether this is a normal part of the healing process. Additionally, she reports difficulty in lifting her arm due to a sensation of tightness.    ALLERGIES  The patient has no known allergies.      Allergies: Iron, Adhesive glue [wound dressing adhesive], Pembrolizumab, and Tegaderm ag mesh [silver]  Allergies Reconciled.    Review of Systems   All review of system has been reviewed and it  is negative except the ones note above.     Objective     /73 (BP Location: Left arm, Patient Position: Sitting, Cuff Size: Adult)   Pulse 83   Ht 165.1 cm (65\")   Wt 73.9 kg (163 lb)   SpO2 100%   BMI 27.12 kg/m²     Body mass index is 27.12 kg/m².            General Inspection: No acute distress, comfortable. Incision healing well, no swelling, no erythema, " no drainage.         Result Review :         Assessment and Plan      Diagnoses and all orders for this visit:    1. Breast reconstruction deformity (Primary)        Plan:        1. Post-surgical drain management.  The presence of a knot is likely attributable to the drain. The scar is anticipated to improve over time. She was informed that the flattening of the area is a result of swelling, which should subside. She was advised to apply vitamin E oil to the scar to alleviate itching. She was also instructed to limit the amount of green on the drain to maintain suction. One of the drains will be removed today. If the drainage exceeds 15 per day, she is to contact the office for potential drain removal.    Follow-up  The patient is scheduled for a follow-up visit on Wednesday.              Follow Up     No follow-ups on file.    Patient was given instructions and counseling regarding her condition. Please see specific information pulled into the AVS if appropriate.     Jackie Sanedrs MD  06/05/2025      Patient or patient representative verbalized consent for the use of Ambient Listening during the visit with  Jackie Sanders MD for chart documentation. 6/5/2025  11:29 EDT

## 2025-06-05 NOTE — TELEPHONE ENCOUNTER
The request has been approved. The authorization is effective from 06/05/2025 to 06/05/2026, as long as the member is enrolled in their current health plan. A written notification letter will follow with additional details.  Effective Date: 6/5/2025  Authorization Expiration Date: 6/5/2026

## 2025-06-06 NOTE — PROGRESS NOTES
"Chief Complaint  Post-op Follow-up    Subjective          Initial evaluation  Marilee Watts is a 29 y.o. female who presents to [facility] for Postoperative Follow-Up of BREAST RECONSTRUCTION REVISION, right breast reconstruction revision, fat graft 5/27/25.    She is 2 week post op. She is doing well. Drain output has been 15, 5, and 5 this morning. She states the other day she had a piece of fibrous tissue inside her drain bulb so she put some water in the bulb itself to clean it and now the drain has changed to a bright yellow, cloudy color. Still complains of some tenderness on center of chest from drain being intact. No redness or open areas. Is still wearing compression binder.         History of Present Illness  The patient presents for evaluation of her breast.    She reports a decrease in the size of her breast, which she attributes to the removal of fat. She expresses concern about the visibility of her nipples when wearing a shirt. Additionally, she mentions that the area where fat was injected remains flat. She experienced an adverse reaction to the tape applied during her last visit, manifesting as redness and severe itching upon her return home.      Allergies: Iron, Adhesive glue [wound dressing adhesive], Pembrolizumab, and Tegaderm ag mesh [silver]  Allergies Reconciled.    Review of Systems   All review of system has been reviewed and it  is negative except the ones note above.     Objective     /81 (BP Location: Left arm, Patient Position: Sitting, Cuff Size: Adult)   Pulse 88   Ht 165.1 cm (65\")   Wt 73 kg (161 lb)   SpO2 99%   BMI 26.79 kg/m²     Body mass index is 26.79 kg/m².            General Inspection: No acute distress, comfortable. Incision healing well, no swelling, no erythema, no drainage.         Result Review :                Assessment and Plan      Diagnoses and all orders for this visit:    1. Radiation dermatitis " (Primary)    2. Breast reconstruction deformity        Plan:        1. Postoperative status following fat grafting.  She is advised to exercise caution and avoid strenuous activities, particularly in the absence of the primary physician. The use of a bra is permitted if she so desires. She is also encouraged to allow time for the swelling to subside and observe the subsequent changes. If needed, a referral to a partner will be provided for follow-up care during the primary physician's vacation.    Follow-up  The patient will follow up in 1 month.    PROCEDURE  The drain was removed today.          Follow Up     No follow-ups on file.    Patient was given instructions and counseling regarding her condition. Please see specific information pulled into the AVS if appropriate.     Jackie Sanders MD  06/11/2025      Patient or patient representative verbalized consent for the use of Ambient Listening during the visit with  Jackie Sanders MD for chart documentation. 6/11/2025  11:55 EDT

## 2025-06-09 ENCOUNTER — PATIENT MESSAGE (OUTPATIENT)
Dept: PLASTIC SURGERY | Facility: CLINIC | Age: 30
End: 2025-06-09
Payer: COMMERCIAL

## 2025-06-11 ENCOUNTER — OFFICE VISIT (OUTPATIENT)
Dept: PLASTIC SURGERY | Facility: CLINIC | Age: 30
End: 2025-06-11
Payer: COMMERCIAL

## 2025-06-11 VITALS
BODY MASS INDEX: 26.82 KG/M2 | DIASTOLIC BLOOD PRESSURE: 81 MMHG | WEIGHT: 161 LBS | HEART RATE: 88 BPM | OXYGEN SATURATION: 99 % | SYSTOLIC BLOOD PRESSURE: 124 MMHG | HEIGHT: 65 IN

## 2025-06-11 DIAGNOSIS — L58.9 RADIATION DERMATITIS: Primary | ICD-10-CM

## 2025-06-11 DIAGNOSIS — N65.0 BREAST RECONSTRUCTION DEFORMITY: ICD-10-CM

## 2025-06-11 PROCEDURE — 99024 POSTOP FOLLOW-UP VISIT: CPT | Performed by: SURGERY

## 2025-06-16 NOTE — PROGRESS NOTES
"Chief Complaint  Post-op Follow-up (5 week post op)    Subjective          Initial evaluation  Marilee Watts is a 29 y.o. female who presents to [facility] for Postoperative Follow-Up of BREAST RECONSTRUCTION REVISION, right breast reconstruction revision, fat graft 5/27/25.    She is 6 week post op. Doing well. Is having some swelling and discomfort on right breast end of incision that shoots up into her axilla. Has been getting harder from the fat grafting. Has been massaging. Goes to lymphedema clinic today. Is starting to go once a week now. No open areas.      History of Present Illness        Allergies: Iron, Adhesive glue [wound dressing adhesive], Pembrolizumab, and Tegaderm ag mesh [silver]  Allergies Reconciled.    Review of Systems   All review of system has been reviewed and it  is negative except the ones note above.     Objective     /74 (BP Location: Left arm, Patient Position: Sitting, Cuff Size: Adult)   Pulse 63   Ht 165.1 cm (65\")   Wt 72.1 kg (159 lb)   SpO2 98%   BMI 26.46 kg/m²     Body mass index is 26.46 kg/m².            General Inspection: No acute distress, comfortable. Incision healing well, no swelling, no erythema, no drainage. Right breast is still smaller than the left, there is fat graft induration on the right axillary tail but breasts are more malleable.          Result Review :                Assessment and Plan      Diagnoses and all orders for this visit:    1. Breast reconstruction deformity (Primary)          Plan:          Doing well, there is an area of induration on the axillary tail on the right breast. We will observe for now.             Follow Up     No follow-ups on file.    Patient was given instructions and counseling regarding her condition. Please see specific information pulled into the AVS if appropriate.     Jackie Sanders MD  07/02/2025      Patient or patient representative verbalized " consent for the use of Ambient Listening during the visit with  Jackie Sanders MD for chart documentation. 7/2/2025  11:55 EDT     WDL

## 2025-06-20 ENCOUNTER — PROCEDURE VISIT (OUTPATIENT)
Dept: NEUROLOGY | Facility: CLINIC | Age: 30
End: 2025-06-20
Payer: COMMERCIAL

## 2025-06-20 DIAGNOSIS — G43.719 INTRACTABLE CHRONIC MIGRAINE WITHOUT AURA AND WITHOUT STATUS MIGRAINOSUS: Primary | ICD-10-CM

## 2025-07-02 ENCOUNTER — OFFICE VISIT (OUTPATIENT)
Dept: PLASTIC SURGERY | Facility: CLINIC | Age: 30
End: 2025-07-02
Payer: COMMERCIAL

## 2025-07-02 ENCOUNTER — HOSPITAL ENCOUNTER (OUTPATIENT)
Facility: HOSPITAL | Age: 30
Setting detail: THERAPIES SERIES
Discharge: HOME | End: 2025-07-02
Payer: COMMERCIAL

## 2025-07-02 VITALS
BODY MASS INDEX: 26.49 KG/M2 | SYSTOLIC BLOOD PRESSURE: 106 MMHG | HEART RATE: 63 BPM | WEIGHT: 159 LBS | HEIGHT: 65 IN | OXYGEN SATURATION: 98 % | DIASTOLIC BLOOD PRESSURE: 74 MMHG

## 2025-07-02 DIAGNOSIS — Z91.89 AT RISK FOR LYMPHEDEMA: Primary | ICD-10-CM

## 2025-07-02 DIAGNOSIS — M25.60 JOINT STIFFNESS: ICD-10-CM

## 2025-07-02 DIAGNOSIS — L90.5 SCAR CONDITION AND FIBROSIS OF SKIN: ICD-10-CM

## 2025-07-02 DIAGNOSIS — N65.0 BREAST RECONSTRUCTION DEFORMITY: Primary | ICD-10-CM

## 2025-07-02 PROCEDURE — 99024 POSTOP FOLLOW-UP VISIT: CPT | Performed by: SURGERY

## 2025-07-02 PROCEDURE — 97535 SELF CARE MNGMENT TRAINING: CPT

## 2025-07-02 PROCEDURE — 93702 BIS XTRACELL FLUID ANALYSIS: CPT

## 2025-07-02 NOTE — THERAPY RE-EVALUATION
Outpatient Occupational Therapy Lymphedema Re-Evaluation   Michelle     Patient Name: Marilee Watts  : 1995  MRN: [id number]  Today's Date: 2025      Visit Date: 2025    Patient Active Problem List   Diagnosis    Allergic rhinitis    Gastroesophageal reflux disease    Metaplastic carcinoma of breast    Breast fibroadenoma, right    Disproportion between native breast and reconstructed breast    Fitting and adjustment of vascular catheter    Intractable chronic migraine without aura and without status migrainosus    Chemotherapy-induced nausea    Postoperative follow-up    S/P breast reconstruction, bilateral    Iron deficiency anemia    Malabsorption of iron    Burn of right axilla    Breast reconstruction deformity    Radiation dermatitis    Numbness and tingling of right hand        Past Medical History:   Diagnosis Date    Allergic     Anxiety     Asthma     AS CHILD    Breast cancer     CHEMO/ RADIATION COMPLETED 2024    COVID-19 vaccine administered     Headache         Past Surgical History:   Procedure Laterality Date    BREAST IMPLANT SURGERY N/A 2025    Procedure: BREAST IMPLANT REVISION AND/OR REMOVAL;  Surgeon: Jackie Sanders MD;  Location: [facility] MAIN OR;  Service: Plastics;  Laterality: N/A;    BREAST LUMPECTOMY Right     BREAST RECONSTRUCTION Bilateral 2024    Procedure: BREAST RECONSTRUCTION WITH MESH AND IMPLANTS - USE OF SPY;  Surgeon: Jackie Sanders MD;  [place] [facility] [place] [facility]  Service: Plastics;  Laterality: Bilateral;    BREAST RECONSTRUCTION Bilateral 2024    Procedure: Bilateral breast revision with fat graft and nipple reconstruction, port removal, right implant exchange,umbilical hernia repair;  Surgeon: Jackie Sanders MD;  [place] [facility] [place] [facility] [place]  Service: Plastics;  Laterality: Bilateral;    BREAST RECONSTRUCTION Right 2025    Procedure: BREAST RECONSTRUCTION REVISION, right breast reconstruction revision, fat  graft;  Surgeon: Jackie Sanders MD;  Location: [facility] MAIN OR;  Service: Plastics;  Laterality: Right;     SECTION      FAT GRAFTING N/A 2025    Procedure: FAT GRAFTING;  Surgeon: Jackie Sanders MD;  Location: [facility] MAIN OR;  Service: Plastics;  Laterality: N/A;    LYMPH NODE BIOPSY      MASTECTOMY W/ SENTINEL NODE BIOPSY Bilateral 2024    Procedure: BREAST MASTECTOMY WITH RIGHT AXILLARY SENTINEL NODE IDENTIFICATION AND EXCISION BIOPSY;  Surgeon: Christiana Whiting MD;  Location: [facility] OR [facility];  Service: General;  Laterality: Bilateral;    PORTACATH PLACEMENT      REMOVED    SKIN BIOPSY      TUBAL ABDOMINAL LIGATION      UMBILICAL HERNIA REPAIR           Visit Dx:     ICD-10-CM ICD-9-CM   1. At risk for lymphedema  Z91.89 V49.89   2. Scar condition and fibrosis of skin  L90.5 709.2   3. Joint stiffness  M25.60 719.50        Patient History       Row Name 25 0900             History    Chief Complaint --  Lymphedema surveillance program  -      Brief Description of Current Complaint Marilee is a 29-year-old female with a diagnosis of metaplastic carcinoma of the right breast.  Patient underwent bilateral mastectomy with reconstruction on 2023.  Patient had right breast revision surgery with fat grafting May 27, 2025  -         Fall Risk Assessment    Any falls in the past year: No  -MH      Does patient have a fear of falling No  -         Services    Are you currently receiving Home Health services No  -MH      Do you plan to receive Home Health services in the near future No  -         Daily Activities    Primary Language English  -      Are you able to read Yes  -MH      Are you able to write Yes  -MH      How does patient learn best? Listening;Reading;Demonstration  -         Safety    Are you being hurt, hit, or frightened by anyone at home or in your life? No  -MH      Are you being neglected by a caregiver No  -MH      Have you had any of the  "following issues with Anxiety  Pt on medication  -                User Key  (r) = Recorded By, (t) = Taken By, (c) = Cosigned By      Initials Name Provider Type     Lisa Pa OT Occupational Therapist                     Lymphedema       Row Name 07/02/25 0900             Subjective Pain    Able to rate subjective pain? yes  -      Pre-Treatment Pain Level 0  -      Post-Treatment Pain Level 0  -         Subjective    Subjective Comments Patient reports pain has improved since surgery on May 27.  Patient states she does have tightness  -         Lymphedema Assessment    Lymphedema Classification RUE:;at risk/stage 0  -      Lymphedema Cancer Related Sx bilateral;simple mastectomy;reconstructive;sentinel node biopsy  -      Lymphedema Surgery Comments 01/2024  -      Lymph Nodes Removed # 2  -      Positive Lymph Nodes # 0  -      Chemo Received yes  -      Chemo Treatments #/Timeframe 7  -      Radiation Therapy Received yes  -      Radiation Treatments #/Timeframe 28  -         LLIS - Physical Concerns    The amount of pain associated with my lymphedema is: 0  -      The amount of limb heaviness associated with my lymphedema is: 1  -      The amount of skin tightness associated with my lymphedema is: 1  -      The size of my swollen limb(s) seems: 2  -MH      Lymphedema affects the movement of my swollen limb(s): 1  -      The strength in my swollen limb(s) is: 0  -         LLIS - Psychosocial Concerns    Lymphedema affects my body image (i.e., \"how I think I look\"). 1  -MH      Lymphedema affects my socializing with others. 1  -      Lymphedema affects my intimate relations with spouse or partner (rate 0 if not applicable 1  -      Lymphedema \"gets me down\" (i.e., depression, frustration, or anger) 1  -      I must rely on others for help due to my lymphedema. 0  -      I know what to do to manage my lymphedema 3  -         LLIS - Functional Concerns    " Lymphedema affects my ability to perform self-care activities (i.e. eating, dressing, hygiene) 0  -      Lymphedema affects my ability to perform routine home or work-related activities. 0  -      Lymphedema affects my performance of preferred leisure activities. 0  -      Lymphedema affects proper fit of clothing/shoes 1  -      Lymphedema affects my sleep 0  -         Posture/Observations    Posture- WNL Posture is WNL  -         General ROM    GENERAL ROM COMMENTS BUE WFL, tight at end feel.  -         MMT (Manual Muscle Testing)    General MMT Comments --  -         Skin Changes/Observations    Location/Assessment Upper Quadrant  -      Upper Quadrant Conditions bilateral:;clean;dry  -      Upper Quadrant Color/Pigment bilateral:;normal  -      Skin Observations Comment Cording noted in axilla. Pt still had 2 stitches noted in skin  -         Manual Lymphatic Drainage    Manual Therapy Therapist completed trigger point release and MLD. Pt had lymphatic cording that was addressed.  -         L-Dex Bioimpedence Screening    L-Dex Measurement Extremity RUE  -      L-Dex Patient Position Standing  -      L-Dex UE Dominate Side Right  -      L-Dex UE At Risk Side Right  -      L-Dex UE Pre Surgical Value Yes  -      L-Dex UE Score -1.5  -      L-Dex UE Baseline Score 0  -      L-Dex UE Value Change -1.5  -      $ L-Dex Charge yes  -         Lymphedema Life Impact Scale Totals    A.  Total Q1 - Q17 (Do not include Q18) 13  -MH      B.  Total number of questions answered (Q1-Q17) 17  -      C. Divide A by B 0.76  -MH      D. Multiple C by 25 19  -                User Key  (r) = Recorded By, (t) = Taken By, (c) = Cosigned By      Initials Name Provider Type     Lisa Pa OT Occupational Therapist                    The patient had a f/u SOZO measurement which I reviewed today. The score is  WFL, see scanned to EMR. Bioimpedance spectroscopy helps identify the    onset of lymphedema in an arm or leg before patients experience noticeable swelling. Research has   shown that 92% of patients with early detection of lymphedema using L-Dex combined with   intervention do not progress to chronic lymphedema through three years. Additionally, as of March 2023, the NCCN Guidelines® for Survivorship recommend proactive screening for lymphedema using   bioimpedance spectroscopy. Whenever possible, patients are tested for baseline L-Dex score before   cancer treatment begins and then are reassessed during regular follow-up visits using the SOZO device.   Otherwise, this can be started postoperatively and continued during regular follow-up visits. If the   patient’s L-Dex score increases above normal levels, that is a sign that lymphedema is developing and a   referral is made to physical therapy for further evaluation and early compression treatment.   Lymphedema assessment with the SOZO L-Dex score is recommended to be done every 3 months for   the first 3 years and then every 6 months for years 4 and 5 followed by annually afterwards          Therapy Education  Education Details: Reviewed bye-bye exercises and MLD  Given: HEP, Symptoms/condition management, Edema management, Pain management  Program: Reinforced  How Provided: Verbal, Demonstration  Provided to: Patient  Level of Understanding: Teach back education performed, Verbalized, Demonstrated  78067 - OT Self Care/Mgmt Minutes: 39         OT Goals       Row Name 07/02/25 1053          Time Calculation    OT Goal Re-Cert Due Date 08/01/25  -               User Key  (r) = Recorded By, (t) = Taken By, (c) = Cosigned By      Initials Name Provider Type    Lisa Velez OT Occupational Therapist                1. Post Breast Surgery Care/at risk for Lymphedema  LTG 1: 90 days:  As an indicator of no exacerbation of lymphedema staging, the patient will present with an L-Dex score less than [10] points from preoperative  baseline.              STATUS: on going  STG 1a:   30 days: To prevent exacerbation of mixed edema to lymphedema, patient will utilize the 2 postsurgical compression garments daily.                 STATUS: MET, on going  STG 1b: 30 days: Patient will be independent with self-manual lymphatic massage.               STATUS: on going  STG 1c: 30 days:  Patient will be independent with identification of signs and symptoms of lymphedema exasperation per stoplight to recovery education handout.              STATUS: on going  STG 1 d: 30 days: Patient will be independent with HEP to prevent advancement in lymphedema staging.              STATUS: on going  TREATMENT:  Self Care/ADL retraining, Therapeutic Activity, Neuromuscular Re-education, Therapeutic Exercise, Bioimpedence Fluid Analysis, Post-Surgical compression garement [id number] [address]ana Zip-ST-High/ Miranda Camisole Kit [id number], Orthotic Management and training,  and Manual Therapy     OT Assessment/Plan       Row Name 07/02/25 1049          OT Assessment    Functional Limitations Limitations in functional capacity and performance  -     Impairments Impaired lymphatic circulation  -     Assessment Comments Marilee is a 29-year-old female with a diagnosis of metaplastic carcinoma of the right breast. Patient underwent a bilateral mastectomy with reconstruction on January 9, 2023.  Patient had right breast revision surgery with fat grafting on May 27, 2025.  Patient reports decreased pain in right axilla and arm since surgery.  Patient continues to report tightness with lymphatic cording noted at this date.  Patient would benefit from continued skilled occupational therapy to prevent increased staging of lymphedema, increased pain, and decreased range of motion.  -     OT Diagnosis at risk for lymphedema  -     OT Rehab Potential Good  -     Patient/caregiver participated in establishment of treatment plan and goals Yes  -     Patient would benefit from skilled  therapy intervention Yes  -        OT Plan    OT Frequency 1x/week  -     Predicted Duration of Therapy Intervention (OT) 8 weeks  -     Planned CPT's? OT EVAL LOW COMPLEXITY: 46793;OT RE-EVAL: 76922;OT THER ACT EA 15 MIN: 06229YD;OT THER PROC EA 15 MIN: [id number];OT SELF CARE/MGMT/TRAIN 15 MIN: 03705;OT MANUAL THERAPY EA 15 MIN: 52244;OT BIS XTRACELL FLUID ANALYSIS: 26731;OT CARE PLAN EA 15 MIN;OT ORTHOTIC MGMT/TRAIN EA 15 MIN: 53118;OT ORTHO/PROSTHET CHECKOUT EA 15 MIN: 74836  -     Planned Therapy Interventions (Optional Details) home exercise program;manual therapy techniques;stretching;strengthening;ROM (Range of Motion);prosthetic fitting/training;postural re-education;patient/family education;orthotic fitting/training  -     OT Plan Comments continue POC  -               User Key  (r) = Recorded By, (t) = Taken By, (c) = Cosigned By      Initials Name Provider Type     Lisa Pa OT Occupational Therapist                              Time Calculation:   Timed Charges  10864 - OT Self Care/Mgmt Minutes: 39  Total Minutes  Timed Charges Total Minutes: 39   Total Minutes: 39     Therapy Charges for Today       Code Description Service Date Service Provider Modifiers Qty    76229760986 HC PT BIS XTRACELL FLUID ANALYSIS 7/2/2025 Lisa Pa OT  1    [id number] HC OT SELF CARE/MGMT/TRAIN EA 15 MIN 7/2/2025 Lisa Pa OT GO 3                      Lisa Pa OT  7/2/2025

## 2025-07-10 ENCOUNTER — HOSPITAL ENCOUNTER (OUTPATIENT)
Facility: HOSPITAL | Age: 30
Setting detail: THERAPIES SERIES
Discharge: HOME | End: 2025-07-10
Payer: COMMERCIAL

## 2025-07-10 DIAGNOSIS — Z91.89 AT RISK FOR LYMPHEDEMA: Primary | ICD-10-CM

## 2025-07-10 DIAGNOSIS — Z17.0 MALIGNANT NEOPLASM OF UPPER-OUTER QUADRANT OF RIGHT BREAST IN FEMALE, ESTROGEN RECEPTOR POSITIVE: ICD-10-CM

## 2025-07-10 DIAGNOSIS — L90.5 SCAR CONDITION AND FIBROSIS OF SKIN: ICD-10-CM

## 2025-07-10 DIAGNOSIS — R52 PAIN: ICD-10-CM

## 2025-07-10 DIAGNOSIS — C50.411 MALIGNANT NEOPLASM OF UPPER-OUTER QUADRANT OF RIGHT BREAST IN FEMALE, ESTROGEN RECEPTOR POSITIVE: ICD-10-CM

## 2025-07-10 DIAGNOSIS — M25.60 JOINT STIFFNESS: ICD-10-CM

## 2025-07-10 PROCEDURE — 97140 MANUAL THERAPY 1/> REGIONS: CPT | Performed by: OCCUPATIONAL THERAPIST

## 2025-07-10 NOTE — THERAPY TREATMENT NOTE
Outpatient Occupational Therapy Lymphedema Treatment Note   Michelle     Patient Name: Marilee Watts  : 1995  MRN: [id number]  Today's Date: 7/10/2025      Visit Date: 07/10/2025    Patient Active Problem List   Diagnosis    Allergic rhinitis    Gastroesophageal reflux disease    Metaplastic carcinoma of breast    Breast fibroadenoma, right    Disproportion between native breast and reconstructed breast    Fitting and adjustment of vascular catheter    Intractable chronic migraine without aura and without status migrainosus    Chemotherapy-induced nausea    Postoperative follow-up    S/P breast reconstruction, bilateral    Iron deficiency anemia    Malabsorption of iron    Burn of right axilla    Breast reconstruction deformity    Radiation dermatitis    Numbness and tingling of right hand        Past Medical History:   Diagnosis Date    Allergic     Anxiety     Asthma     AS CHILD    Breast cancer     CHEMO/ RADIATION COMPLETED 2024    COVID-19 vaccine administered     Headache         Past Surgical History:   Procedure Laterality Date    BREAST IMPLANT SURGERY N/A 2025    Procedure: BREAST IMPLANT REVISION AND/OR REMOVAL;  Surgeon: Jackie Sanders MD;  Location: [facility] MAIN OR;  Service: Plastics;  Laterality: N/A;    BREAST LUMPECTOMY Right     BREAST RECONSTRUCTION Bilateral 2024    Procedure: BREAST RECONSTRUCTION WITH MESH AND IMPLANTS - USE OF SPY;  Surgeon: Jackie Sanders MD;  Location: [facility] OR [facility];  Service: Plastics;  Laterality: Bilateral;    BREAST RECONSTRUCTION Bilateral 2024    Procedure: Bilateral breast revision with fat graft and nipple reconstruction, port removal, right implant exchange,umbilical hernia repair;  Surgeon: Jackie Sanders MD;  Location: [facility] OR [facility];  Service: Plastics;  Laterality: Bilateral;    BREAST RECONSTRUCTION Right 2025    Procedure: BREAST RECONSTRUCTION REVISION, right breast reconstruction revision, fat  graft;  Surgeon: Jackie Sanders MD;  Location: [facility] OR;  Service: Plastics;  Laterality: Right;     SECTION      FAT GRAFTING N/A 2025    Procedure: FAT GRAFTING;  Surgeon: Jackie Sanders MD;  Location: [facility] MAIN OR;  Service: Plastics;  Laterality: N/A;    LYMPH NODE BIOPSY      MASTECTOMY W/ SENTINEL NODE BIOPSY Bilateral 2024    Procedure: BREAST MASTECTOMY WITH RIGHT AXILLARY SENTINEL NODE IDENTIFICATION AND EXCISION BIOPSY;  Surgeon: Christiana Whiting MD;  Location: [facility] OR [facility];  Service: General;  Laterality: Bilateral;    PORTACATH PLACEMENT      REMOVED    SKIN BIOPSY      TUBAL ABDOMINAL LIGATION      UMBILICAL HERNIA REPAIR           Visit Dx:      ICD-10-CM ICD-9-CM   1. At risk for lymphedema  Z91.89 V49.89   2. Scar condition and fibrosis of skin  L90.5 709.2   3. Joint stiffness  M25.60 719.50   4. Pain  R52 780.96   5. Malignant neoplasm of upper-outer quadrant of right breast in female, estrogen receptor positive  C50.411 174.4    Z17.0 V86.0        Lymphedema       Row Name 07/10/25 0900             Subjective Pain    Able to rate subjective pain? yes  -TD      Pre-Treatment Pain Level 4  -TD      Post-Treatment Pain Level 0  -TD         Subjective    Subjective Comments Pt reports that the lymphatic cord is causing  -TD         Lymphedema Assessment    Lymphedema Classification RUE:;at risk/stage 0  -TD      Lymphedema Cancer Related Sx bilateral;simple mastectomy;reconstructive;sentinel node biopsy  -TD      Lymphedema Surgery Comments 2024  -TD      Lymph Nodes Removed # 2  -TD      Positive Lymph Nodes # 0  -TD      Chemo Received yes  -TD      Chemo Treatments #/Timeframe 7  -TD      Radiation Therapy Received yes  -TD      Radiation Treatments #/Timeframe 28  -TD         Manual Lymphatic Drainage    Manual Therapy Therapist completed PORI protocol with trigger point release and MLD to reduce pain and decrease volume.  Therpaist was able to  access lymphatic cord and treat and used vibration for scars  -TD                User Key  (r) = Recorded By, (t) = Taken By, (c) = Cosigned By      Initials Name Provider Type    Daniela Hagen OT Occupational Therapist                             OT Assessment/Plan       Row Name 07/10/25 1026          OT Assessment    Functional Limitations Limitations in functional capacity and performance  -TD     Impairments Impaired lymphatic circulation  -TD     Assessment Comments Marilee is a 29-year-old female with a diagnosis of metaplastic carcinoma of the right breast. Patient underwent a bilateral mastectomy with reconstruction on January 9, 2023. Patient had right breast revision surgery with fat grafting on May 27, 2025.   Pt felt relief after karen treatment and pain greatly decreased. Patient would benefit from continued skilled occupational therapy to prevent increased staging of lymphedema, increased pain, and decreased range of motion.  -TD     OT Diagnosis at risk for lymphedema  -TD     OT Rehab Potential Good  -TD     Patient/caregiver participated in establishment of treatment plan and goals Yes  -TD     Patient would benefit from skilled therapy intervention Yes  -TD        OT Plan    OT Plan Comments contineu POC  -TD               User Key  (r) = Recorded By, (t) = Taken By, (c) = Cosigned By      Initials Name Provider Type    Daniela Hagen, OT Occupational Therapist                        Manual Rx (Last 36 Hours)       Manual Treatments       Row Name 07/10/25 1027             Total Minutes    37684 - OT Manual Therapy Minutes 40  -TD                User Key  (r) = Recorded By, (t) = Taken By, (c) = Cosigned By      Initials Name Provider Type    Daniela Hagen, OT Occupational Therapist                      Therapy Education  Education Details: Reviewed stetches and self MLD  Given: HEP, Symptoms/condition management, Edema management, Pain management  Program: Reinforced  How Provided: Verbal,  Demonstration  Provided to: Patient  Level of Understanding: Teach back education performed, Verbalized, Demonstrated                Time Calculation:   Timed Charges  87948 - OT Manual Therapy Minutes: 40  Total Minutes  Timed Charges Total Minutes: 40   Total Minutes: 40     Therapy Charges for Today       Code Description Service Date Service Provider Modifiers Qty    61995139079 HC OT MANUAL THERAPY EA 15 MIN 7/10/2025 Daniela Vazquez OT GO 3                        Daniela Vazquez OT  7/10/2025

## 2025-07-11 ENCOUNTER — TELEPHONE (OUTPATIENT)
Dept: PLASTIC SURGERY | Facility: CLINIC | Age: 30
End: 2025-07-11
Payer: COMMERCIAL

## 2025-07-14 ENCOUNTER — TELEPHONE (OUTPATIENT)
Dept: PLASTIC SURGERY | Facility: CLINIC | Age: 30
End: 2025-07-14
Payer: COMMERCIAL

## 2025-07-14 NOTE — PROGRESS NOTES
"Chief Complaint  Post-op Follow-up (2m post op)    Subjective          Initial evaluation  Marilee Watts is a 29 y.o. female who presents to [facility] for Postoperative Follow-Up of right breast reconstruction revision, fat graft, breast implant revision and removal 5/27/25.  History of Present Illness  The patient presents for evaluation of breast implants.    She has been receiving weekly massages from Daniela, which have significantly improved her condition. She is 3 months post-surgery and has been massaging the scar tissue. She is considering the use of an expander to stretch the skin. She is also contemplating the possibility of reducing the size of both breasts. She is interested in knowing if Medicare will cover the cost of her treatment. She has noticed a flat spot on one side and is curious about the potential use of fat grafting to fill it in. She reports that one side feels worse than the other, describing it as almost like a knot. She has discontinued the use of a binder due to bulging. She recalls that the ultrasound treatment she received last time was beneficial in softening the hardness and is interested in repeating this procedure today.    Pt presents today for 2m post op.  Pt is going to PT 1xweek to break up cords on right ; p0t feels it is getting tighter.  Pt states she has a knot on upper right breast that seems to be increasing in size.      Allergies: Iron, Adhesive glue [wound dressing adhesive], Pembrolizumab, and Tegaderm ag mesh [silver]  Allergies Reconciled.    Review of Systems   All review of system has been reviewed and it  is negative except the ones note above.     Objective     /75 (BP Location: Left arm, Patient Position: Sitting, Cuff Size: Adult)   Pulse 73   Ht 165.1 cm (65\")   Wt 73 kg (161 lb)   SpO2 99%   BMI 26.79 kg/m²     Body mass index is 26.79 kg/m².            General Inspection: No acute distress, " comfortable. Incision healing well, no swelling, no erythema, no drainage.         Result Review :                Assessment and Plan      Diagnoses and all orders for this visit:    1. Breast reconstruction deformity (Primary)        Plan:        1. Breast implants.  The patient's condition has shown significant improvement. The use of an expander is not recommended due to the risk of complications associated with stretching radiated skin. It was explained that the scar will naturally stretch over time. The possibility of upsizing the implant was discussed, but it was decided to wait for at least 6 months post-surgery before making any decisions. The patient was informed that Medicare may not cover the cost of her treatment. An ultrasound treatment will be performed today to help break up the hardness.    Follow-up  A follow-up appointment is scheduled for 3 months from now.          Follow Up     No follow-ups on file.    Patient was given instructions and counseling regarding her condition. Please see specific information pulled into the AVS if appropriate.     Jackie Sanders MD  07/21/2025      Patient or patient representative verbalized consent for the use of Ambient Listening during the visit with  Jackie Sanders MD for chart documentation. 7/27/2025  14:22 EDT

## 2025-07-16 ENCOUNTER — HOSPITAL ENCOUNTER (OUTPATIENT)
Facility: HOSPITAL | Age: 30
Setting detail: THERAPIES SERIES
Discharge: HOME | End: 2025-07-16
Payer: COMMERCIAL

## 2025-07-16 DIAGNOSIS — I89.0 LYMPHEDEMA: ICD-10-CM

## 2025-07-16 DIAGNOSIS — L90.5 SCAR CONDITION AND FIBROSIS OF SKIN: ICD-10-CM

## 2025-07-16 DIAGNOSIS — M25.60 JOINT STIFFNESS: ICD-10-CM

## 2025-07-16 DIAGNOSIS — Z17.0 MALIGNANT NEOPLASM OF UPPER-OUTER QUADRANT OF RIGHT BREAST IN FEMALE, ESTROGEN RECEPTOR POSITIVE: ICD-10-CM

## 2025-07-16 DIAGNOSIS — C50.411 MALIGNANT NEOPLASM OF UPPER-OUTER QUADRANT OF RIGHT BREAST IN FEMALE, ESTROGEN RECEPTOR POSITIVE: ICD-10-CM

## 2025-07-16 DIAGNOSIS — Z91.89 AT RISK FOR LYMPHEDEMA: Primary | ICD-10-CM

## 2025-07-16 DIAGNOSIS — R52 PAIN: ICD-10-CM

## 2025-07-16 PROCEDURE — 97140 MANUAL THERAPY 1/> REGIONS: CPT

## 2025-07-16 NOTE — THERAPY TREATMENT NOTE
Outpatient Occupational Therapy Lymphedema Treatment Note   Michelle     Patient Name: Marilee Watts  : 1995  MRN: [id number]  Today's Date: 2025      Visit Date: 2025    Patient Active Problem List   Diagnosis    Allergic rhinitis    Gastroesophageal reflux disease    Metaplastic carcinoma of breast    Breast fibroadenoma, right    Disproportion between native breast and reconstructed breast    Fitting and adjustment of vascular catheter    Intractable chronic migraine without aura and without status migrainosus    Chemotherapy-induced nausea    Postoperative follow-up    S/P breast reconstruction, bilateral    Iron deficiency anemia    Malabsorption of iron    Burn of right axilla    Breast reconstruction deformity    Radiation dermatitis    Numbness and tingling of right hand        Past Medical History:   Diagnosis Date    Allergic     Anxiety     Asthma     AS CHILD    Breast cancer     CHEMO/ RADIATION COMPLETED 2024    COVID-19 vaccine administered     Headache         Past Surgical History:   Procedure Laterality Date    BREAST IMPLANT SURGERY N/A 2025    Procedure: BREAST IMPLANT REVISION AND/OR REMOVAL;  Surgeon: Jackie Sanders MD;  Location: [facility] MAIN OR;  Service: Plastics;  Laterality: N/A;    BREAST LUMPECTOMY Right     BREAST RECONSTRUCTION Bilateral 2024    Procedure: BREAST RECONSTRUCTION WITH MESH AND IMPLANTS - USE OF SPY;  Surgeon: Jackie Sanders MD;  Location: [facility] OR [facility];  Service: Plastics;  Laterality: Bilateral;    BREAST RECONSTRUCTION Bilateral 2024    Procedure: Bilateral breast revision with fat graft and nipple reconstruction, port removal, right implant exchange,umbilical hernia repair;  Surgeon: Jackie Sanders MD;  Location: [facility];  Service: Plastics;  Laterality: Bilateral;    BREAST RECONSTRUCTION Right 2025    Procedure: BREAST RECONSTRUCTION REVISION, right breast reconstruction revision, fat  graft;  Surgeon: Jackie Sanders MD;  Location: [facility] MAIN OR;  Service: Plastics;  Laterality: Right;     SECTION      FAT GRAFTING N/A 2025    Procedure: FAT GRAFTING;  Surgeon: Jackie Sanders MD;  Location: [facility] MAIN OR;  Service: Plastics;  Laterality: N/A;    LYMPH NODE BIOPSY      MASTECTOMY W/ SENTINEL NODE BIOPSY Bilateral 2024    Procedure: BREAST MASTECTOMY WITH RIGHT AXILLARY SENTINEL NODE IDENTIFICATION AND EXCISION BIOPSY;  Surgeon: Christiana Whiting MD;  Location: [facility];  Service: General;  Laterality: Bilateral;    PORTACATH PLACEMENT      REMOVED    SKIN BIOPSY      TUBAL ABDOMINAL LIGATION      UMBILICAL HERNIA REPAIR           Visit Dx:      ICD-10-CM ICD-9-CM   1. At risk for lymphedema  Z91.89 V49.89   2. Scar condition and fibrosis of skin  L90.5 709.2   3. Joint stiffness  M25.60 719.50   4. Pain  R52 780.96   5. Malignant neoplasm of upper-outer quadrant of right breast in female, estrogen receptor positive  C50.411 174.4    Z17.0 V86.0   6. Lymphedema  I89.0 457.1        Lymphedema       Row Name 25 1000             Subjective Pain    Able to rate subjective pain? yes  -SC      Pre-Treatment Pain Level 2  -SC      Post-Treatment Pain Level 1  -SC      Subjective Pain Comment Pt c/o tightnes in R lateral chest wall with end ROM  -SC         Subjective    Subjective Comments Pt states since last treatment she has not had any issues in R axilla  -SC         Lymphedema Assessment    Lymphedema Classification RUE:;at risk/stage 0  -SC      Lymphedema Cancer Related Sx bilateral;simple mastectomy;reconstructive;sentinel node biopsy  -SC      Lymphedema Surgery Comments 2024  -SC      Lymph Nodes Removed # 2  -SC      Positive Lymph Nodes # 0  -SC      Chemo Received yes  -SC      Radiation Therapy Received yes  -SC      Radiation Treatments #/Timeframe 28  -SC         Skin Changes/Observations    Location/Assessment Upper Quadrant  -SC       Upper Quadrant Conditions bilateral:;clean;dry  -SC      Upper Quadrant Color/Pigment bilateral:;normal  -SC         Manual Lymphatic Drainage    Manual Therapy Therapist completed PORI protocol with trigger point release and MLD to reduce pain and decrease volume. Therpaist utilizing vibration assisted massage on scaring to decrease scar tissue  -SC                User Key  (r) = Recorded By, (t) = Taken By, (c) = Cosigned By      Initials Name Provider Type    Hodan Lowe COTA Occupational Therapist Assistant                             OT Assessment/Plan       Row Name 07/16/25 1048          OT Assessment    Assessment Comments Marilee is a 29-year-old female with a diagnosis of metaplastic carcinoma of the right breast. Patient underwent a bilateral mastectomy with reconstruction on January 9, 2023. Patient had right breast revision surgery with fat grafting on May 27, 2025. Pt felt decreased tightness after treatment this date. Patient would benefit from continued skilled occupational therapy to prevent increased staging of lymphedema, increased pain, and decreased range of motion.  -SC     Patient would benefit from skilled therapy intervention Yes  -SC        OT Plan    OT Plan Comments Continue POC  -SC               User Key  (r) = Recorded By, (t) = Taken By, (c) = Cosigned By      Initials Name Provider Type    Hodan Lowe COTA Occupational Therapist Assistant                        Manual Rx (Last 36 Hours)       Manual Treatments       Row Name 07/16/25 1048             Total Minutes    35105 - OT Manual Therapy Minutes 40  -SC                User Key  (r) = Recorded By, (t) = Taken By, (c) = Cosigned By      Initials Name Provider Type    Hodan Lowe COTA Occupational Therapist Assistant                                       Time Calculation:   Timed Charges  46338 - OT Manual Therapy Minutes: 40  Total Minutes  Timed Charges Total Minutes: 40   Total Minutes: 40     Therapy  Charges for Today       Code Description Service Date Service Provider Modifiers Qty    50611682292 HC OT MANUAL THERAPY EA 15 MIN 7/16/2025 Hodan Roy COTA GO 3                        KALEB Sebastian  7/16/2025

## 2025-07-21 ENCOUNTER — OFFICE VISIT (OUTPATIENT)
Dept: PLASTIC SURGERY | Facility: CLINIC | Age: 30
End: 2025-07-21
Payer: COMMERCIAL

## 2025-07-21 VITALS
SYSTOLIC BLOOD PRESSURE: 110 MMHG | BODY MASS INDEX: 26.82 KG/M2 | DIASTOLIC BLOOD PRESSURE: 75 MMHG | HEIGHT: 65 IN | WEIGHT: 161 LBS | OXYGEN SATURATION: 99 % | HEART RATE: 73 BPM

## 2025-07-21 DIAGNOSIS — N65.0 BREAST RECONSTRUCTION DEFORMITY: Primary | ICD-10-CM

## 2025-07-21 PROCEDURE — 99024 POSTOP FOLLOW-UP VISIT: CPT | Performed by: SURGERY

## 2025-07-23 ENCOUNTER — HOSPITAL ENCOUNTER (OUTPATIENT)
Facility: HOSPITAL | Age: 30
Setting detail: THERAPIES SERIES
Discharge: HOME | End: 2025-07-23
Payer: COMMERCIAL

## 2025-07-23 DIAGNOSIS — M25.60 JOINT STIFFNESS: ICD-10-CM

## 2025-07-23 DIAGNOSIS — R52 PAIN: ICD-10-CM

## 2025-07-23 DIAGNOSIS — L90.5 SCAR CONDITION AND FIBROSIS OF SKIN: ICD-10-CM

## 2025-07-23 DIAGNOSIS — Z17.0 MALIGNANT NEOPLASM OF UPPER-OUTER QUADRANT OF RIGHT BREAST IN FEMALE, ESTROGEN RECEPTOR POSITIVE: ICD-10-CM

## 2025-07-23 DIAGNOSIS — C50.411 MALIGNANT NEOPLASM OF UPPER-OUTER QUADRANT OF RIGHT BREAST IN FEMALE, ESTROGEN RECEPTOR POSITIVE: ICD-10-CM

## 2025-07-23 DIAGNOSIS — Z91.89 AT RISK FOR LYMPHEDEMA: Primary | ICD-10-CM

## 2025-07-23 PROCEDURE — 97140 MANUAL THERAPY 1/> REGIONS: CPT

## 2025-07-23 PROCEDURE — 93702 BIS XTRACELL FLUID ANALYSIS: CPT

## 2025-07-23 NOTE — THERAPY RE-EVALUATION
Outpatient Occupational Therapy Lymphedema Re-Evaluation   Michelle     Patient Name: Marilee Watts  : 1995  MRN: [id number]  Today's Date: 2025      Visit Date: 2025    Patient Active Problem List   Diagnosis    Allergic rhinitis    Gastroesophageal reflux disease    Metaplastic carcinoma of breast    Breast fibroadenoma, right    Disproportion between native breast and reconstructed breast    Fitting and adjustment of vascular catheter    Intractable chronic migraine without aura and without status migrainosus    Chemotherapy-induced nausea    Postoperative follow-up    S/P breast reconstruction, bilateral    Iron deficiency anemia    Malabsorption of iron    Burn of right axilla    Breast reconstruction deformity    Radiation dermatitis    Numbness and tingling of right hand        Past Medical History:   Diagnosis Date    Allergic     Anxiety     Asthma     AS CHILD    Breast cancer     CHEMO/ RADIATION COMPLETED 2024    COVID-19 vaccine administered     Headache         Past Surgical History:   Procedure Laterality Date    BREAST IMPLANT SURGERY N/A 2025    Procedure: BREAST IMPLANT REVISION AND/OR REMOVAL;  Surgeon: Jackie Sanders MD;  Location: [facility] MAIN OR;  Service: Plastics;  Laterality: N/A;    BREAST LUMPECTOMY Right     BREAST RECONSTRUCTION Bilateral 2024    Procedure: BREAST RECONSTRUCTION WITH MESH AND IMPLANTS - USE OF SPY;  Surgeon: Jackie Sanders MD;  Location: [facility] OR [facility] [place];  Service: Plastics;  Laterality: Bilateral;    BREAST RECONSTRUCTION Bilateral 2024    Procedure: Bilateral breast revision with fat graft and nipple reconstruction, port removal, right implant exchange,umbilical hernia repair;  Surgeon: Jackie Sanders MD;  Location: [facility] [place] [facility] [place];  Service: Plastics;  Laterality: Bilateral;    BREAST RECONSTRUCTION Right 2025    Procedure: BREAST RECONSTRUCTION REVISION, right breast reconstruction revision, fat  graft;  Surgeon: Jackie Sanders MD;  Location: [facility] MAIN OR;  Service: Plastics;  Laterality: Right;     SECTION      FAT GRAFTING N/A 2025    Procedure: FAT GRAFTING;  Surgeon: Jackie Sanders MD;  Location: [facility] MAIN OR;  Service: Plastics;  Laterality: N/A;    LYMPH NODE BIOPSY      MASTECTOMY W/ SENTINEL NODE BIOPSY Bilateral 2024    Procedure: BREAST MASTECTOMY WITH RIGHT AXILLARY SENTINEL NODE IDENTIFICATION AND EXCISION BIOPSY;  Surgeon: Christiana Whiting MD;  Location: [facility] OR [facility] [place];  Service: General;  Laterality: Bilateral;    PORTACATH PLACEMENT      REMOVED    SKIN BIOPSY      TUBAL ABDOMINAL LIGATION      UMBILICAL HERNIA REPAIR           Visit Dx:     ICD-10-CM ICD-9-CM   1. At risk for lymphedema  Z91.89 V49.89   2. Scar condition and fibrosis of skin  L90.5 709.2   3. Joint stiffness  M25.60 719.50   4. Pain  R52 780.96   5. Malignant neoplasm of upper-outer quadrant of right breast in female, estrogen receptor positive  C50.411 174.4    Z17.0 V86.0        Patient History       Row Name 25 1000             History    Chief Complaint --  Lymphedema surveillance program  -      Brief Description of Current Complaint Marilee is a 29-year-old female with a diagnosis of metaplastic carcinoma of the right breast.  Patient underwent bilateral mastectomy with reconstruction on 2023.  Patient had right breast revision surgery with fat grafting May 27, 2025  -         Fall Risk Assessment    Any falls in the past year: No  -      Does patient have a fear of falling No  -         Services    Are you currently receiving Home Health services No  -      Do you plan to receive Home Health services in the near future No  -         Daily Activities    Primary Language English  -      Are you able to read Yes  -      Are you able to write Yes  -      How does patient learn best? Listening;Reading;Demonstration  -         Safety    Are you  "being hurt, hit, or frightened by anyone at home or in your life? No  -      Are you being neglected by a caregiver No  -      Have you had any of the following issues with Anxiety  Pt on medication  -                User Key  (r) = Recorded By, (t) = Taken By, (c) = Cosigned By      Initials Name Provider Type     Lisa Pa OT Occupational Therapist                     Lymphedema       Row Name 07/23/25 1000             Subjective Pain    Able to rate subjective pain? yes  -      Pre-Treatment Pain Level 0  -      Post-Treatment Pain Level 0  -      Subjective Pain Comment reports its not pain but uncomfortable.  -         Lymphedema Assessment    Lymphedema Classification RUE:;at risk/stage 0  -      Lymphedema Cancer Related Sx bilateral;simple mastectomy;reconstructive;sentinel node biopsy  -      Lymphedema Surgery Comments 01/2024  -      Lymph Nodes Removed # 2  -      Positive Lymph Nodes # 0  -MH      Chemo Received yes  -      Chemo Treatments #/Timeframe 7  -      Radiation Therapy Received yes  -      Radiation Treatments #/Timeframe 28  -         LLIS - Physical Concerns    The amount of pain associated with my lymphedema is: 0  -      The amount of limb heaviness associated with my lymphedema is: 0  -      The amount of skin tightness associated with my lymphedema is: 1  -      The size of my swollen limb(s) seems: 0  -      Lymphedema affects the movement of my swollen limb(s): 1  -MH      The strength in my swollen limb(s) is: 0  -         LLIS - Psychosocial Concerns    Lymphedema affects my body image (i.e., \"how I think I look\"). 0  -      Lymphedema affects my socializing with others. 0  -      Lymphedema affects my intimate relations with spouse or partner (rate 0 if not applicable 0  -      Lymphedema \"gets me down\" (i.e., depression, frustration, or anger) 0  -      I must rely on others for help due to my lymphedema. 0  -      I know " what to do to manage my lymphedema 3  -MH         LLIS - Functional Concerns    Lymphedema affects my ability to perform self-care activities (i.e. eating, dressing, hygiene) 0  -MH      Lymphedema affects my ability to perform routine home or work-related activities. 0  -MH      Lymphedema affects my performance of preferred leisure activities. 0  -MH      Lymphedema affects proper fit of clothing/shoes 0  -MH      Lymphedema affects my sleep 0  -         Posture/Observations    Posture- WNL Posture is WNL  -         General ROM    GENERAL ROM COMMENTS BUE WFL with tightness at end feel  -         Skin Changes/Observations    Location/Assessment Upper Quadrant  -      Upper Quadrant Conditions bilateral:;clean;dry  -      Upper Quadrant Color/Pigment bilateral:;normal  -         Manual Lymphatic Drainage    Manual Therapy Therapist completed trigger point release and MLD to  R UE for tightness  -         L-Dex Bioimpedence Screening    L-Dex Measurement Extremity RUE  -      L-Dex Patient Position Standing  -      L-Dex UE Dominate Side Right  -      L-Dex UE At Risk Side Right  -      L-Dex UE Pre Surgical Value Yes  -      L-Dex UE Score -2.9  -      L-Dex UE Baseline Score 0  -      L-Dex UE Value Change -2.9  -MH      $ L-Dex Charge yes  -         Lymphedema Life Impact Scale Totals    A.  Total Q1 - Q17 (Do not include Q18) 5  -MH      B.  Total number of questions answered (Q1-Q17) 17  -MH      C. Divide A by B 0.29  -MH      D. Multiple C by 25 7.25  -MH                User Key  (r) = Recorded By, (t) = Taken By, (c) = Cosigned By      Initials Name Provider Type     Lisa Pa OT Occupational Therapist                      The patient had a f/u SOZO measurement which I reviewed today. The score is   WFL, see scanned to EMR. Bioimpedance spectroscopy helps identify the   onset of lymphedema in an arm or leg before patients experience noticeable swelling. Research has    shown that 92% of patients with early detection of lymphedema using L-Dex combined with   intervention do not progress to chronic lymphedema through three years. Additionally, as of March 2023, the NCCN Guidelines® for Survivorship recommend proactive screening for lymphedema using   bioimpedance spectroscopy. Whenever possible, patients are tested for baseline L-Dex score before   cancer treatment begins and then are reassessed during regular follow-up visits using the SOZO device.   Otherwise, this can be started postoperatively and continued during regular follow-up visits. If the   patient’s L-Dex score increases above normal levels, that is a sign that lymphedema is developing and a   referral is made to physical therapy for further evaluation and early compression treatment.   Lymphedema assessment with the SOZO L-Dex score is recommended to be done every 3 months for   the first 3 years and then every 6 months for years 4 and 5 followed by annually afterwards        Therapy Education  Education Details: Reviewed MLD  Given: HEP, Symptoms/condition management, Edema management, Pain management  Program: Reinforced  How Provided: Verbal, Demonstration  Provided to: Patient  Level of Understanding: Teach back education performed, Verbalized, Demonstrated      Manual Rx (Last 36 Hours)       Manual Treatments       Row Name 07/23/25 1140             Total Minutes    08147 - OT Manual Therapy Minutes 38  -                User Key  (r) = Recorded By, (t) = Taken By, (c) = Cosigned By      Initials Name Provider Type    Lisa Velez OT Occupational Therapist                   OT Goals       Row Name 07/23/25 1140          Time Calculation    OT Goal Re-Cert Due Date 08/22/25  -               User Key  (r) = Recorded By, (t) = Taken By, (c) = Cosigned By      Initials Name Provider Type    Lisa Velez OT Occupational Therapist                1. Post Breast Surgery Care/at risk for  Lymphedema  LTG 1: 90 days:  As an indicator of no exacerbation of lymphedema staging, the patient will present with an L-Dex score less than [10] points from preoperative baseline.              STATUS: on going  STG 1a:   30 days: To prevent exacerbation of mixed edema to lymphedema, patient will utilize the 2 postsurgical compression garments daily.                 STATUS: MET, on going  STG 1b: 30 days: Patient will be independent with self-manual lymphatic massage.               STATUS: on going  STG 1c: 30 days:  Patient will be independent with identification of signs and symptoms of lymphedema exasperation per stoplight to recovery education handout.              STATUS: on going  STG 1 d: 30 days: Patient will be independent with HEP to prevent advancement in lymphedema staging.              STATUS: on going  TREATMENT:  Self Care/ADL retraining, Therapeutic Activity, Neuromuscular Re-education, Therapeutic Exercise, Bioimpedence Fluid Analysis, Post-Surgical compression garement [id number] [facility]-High/ [employer] Kit 2860K, Orthotic Management and training,  and Manual Therapy        OT Assessment/Plan       Row Name 07/23/25 1139          OT Assessment    Functional Limitations Limitations in functional capacity and performance  -     Impairments Impaired lymphatic circulation  -     Assessment Comments Marilee is a 29-year-old female with a diagnosis of metaplastic carcinoma of the right breast. Patient underwent a bilateral mastectomy with reconstruction on January 9, 2023. Patient had right breast revision surgery with fat grafting on May 27, 2025. Pt felt decreased tightness after treatment this date.  She reports no pain but having some soreness and discomfort on the right side.  Patient would benefit from continued skilled occupational therapy to prevent increased staging of lymphedema, increased pain, and decreased range of motion.  -     OT Diagnosis At risk for lymphedema  -     OT  Rehab Potential Good  -     Patient/caregiver participated in establishment of treatment plan and goals Yes  -     Patient would benefit from skilled therapy intervention Yes  -        OT Plan    OT Frequency 1x/week  -     Predicted Duration of Therapy Intervention (OT) 8 weeks  -     Planned CPT's? OT EVAL LOW COMPLEXITY: 16899;OT RE-EVAL: 65649;OT THER ACT EA 15 MIN: 83534OO;OT THER PROC EA 15 MIN: 80418ON;OT SELF CARE/MGMT/TRAIN 15 MIN: 60569;OT MANUAL THERAPY EA 15 MIN: 98665;OT BIS XTRACELL FLUID ANALYSIS: 40158;OT CARE PLAN EA 15 MIN;OT ORTHOTIC MGMT/TRAIN EA 15 MIN: 30376;OT ORTHO/PROSTHET CHECKOUT EA 15 MIN: 30286  -     Planned Therapy Interventions (Optional Details) home exercise program;manual therapy techniques;stretching;strengthening;ROM (Range of Motion);prosthetic fitting/training;postural re-education;patient/family education;orthotic fitting/training  -     OT Plan Comments continue POC  -               User Key  (r) = Recorded By, (t) = Taken By, (c) = Cosigned By      Initials Name Provider Type     Lisa Pa OT Occupational Therapist                              Time Calculation:   Timed Charges  12388 - OT Manual Therapy Minutes: 38  Total Minutes  Timed Charges Total Minutes: 38   Total Minutes: 38     Therapy Charges for Today       Code Description Service Date Service Provider Modifiers Qty    60754284670 HC PT BIS XTRACELL FLUID ANALYSIS 7/23/2025 Lisa Pa OT  1    [id number] HC OT MANUAL THERAPY EA 15 MIN 7/23/2025 Lisa Pa OT GO 3                      Lisa Pa OT  7/23/2025

## 2025-07-29 ENCOUNTER — OFFICE VISIT (OUTPATIENT)
Dept: FAMILY MEDICINE CLINIC | Age: 30
End: 2025-07-29
Payer: COMMERCIAL

## 2025-07-29 ENCOUNTER — TELEPHONE (OUTPATIENT)
Dept: FAMILY MEDICINE CLINIC | Age: 30
End: 2025-07-29

## 2025-07-29 VITALS
WEIGHT: 160.6 LBS | HEART RATE: 67 BPM | BODY MASS INDEX: 26.76 KG/M2 | SYSTOLIC BLOOD PRESSURE: 107 MMHG | OXYGEN SATURATION: 98 % | HEIGHT: 65 IN | DIASTOLIC BLOOD PRESSURE: 73 MMHG | TEMPERATURE: 98.4 F

## 2025-07-29 DIAGNOSIS — F41.1 GENERALIZED ANXIETY DISORDER WITH PANIC ATTACKS: Primary | ICD-10-CM

## 2025-07-29 DIAGNOSIS — R00.2 PALPITATIONS: ICD-10-CM

## 2025-07-29 DIAGNOSIS — F39 MOOD DISORDER: ICD-10-CM

## 2025-07-29 DIAGNOSIS — R41.840 ATTENTION AND CONCENTRATION DEFICIT: ICD-10-CM

## 2025-07-29 DIAGNOSIS — F41.0 ANXIETY ATTACK: ICD-10-CM

## 2025-07-29 DIAGNOSIS — F41.0 GENERALIZED ANXIETY DISORDER WITH PANIC ATTACKS: Primary | ICD-10-CM

## 2025-07-29 PROCEDURE — 99214 OFFICE O/P EST MOD 30 MIN: CPT | Performed by: NURSE PRACTITIONER

## 2025-07-29 PROCEDURE — 1126F AMNT PAIN NOTED NONE PRSNT: CPT | Performed by: NURSE PRACTITIONER

## 2025-07-29 NOTE — TELEPHONE ENCOUNTER
Pt dropped off [place] paperwork, her card was declined so she is going to pay when they are ready to be picked up. Paperwork was placed in [place]'s mailbox and attached to this encounter

## 2025-08-01 ENCOUNTER — CLINICAL SUPPORT (OUTPATIENT)
Dept: PLASTIC SURGERY | Facility: CLINIC | Age: 30
End: 2025-08-01
Payer: COMMERCIAL

## 2025-08-01 DIAGNOSIS — L58.9 RADIATION DERMATITIS: Primary | ICD-10-CM

## 2025-08-01 NOTE — PROGRESS NOTES
Theraputic Ultrasound done in office today to right breast for 7 min, 1.1w/cm2 3.3mhz. Patient tolerated well and will return for next scheduled ultrasound/appt.    Patient has a palpable marble sized knot just below armpit right breast. She is coming in on Monday to follow up with Dr. Sanders

## 2025-08-04 ENCOUNTER — HOSPITAL ENCOUNTER (OUTPATIENT)
Facility: HOSPITAL | Age: 30
Setting detail: THERAPIES SERIES
Discharge: HOME | End: 2025-08-04
Payer: COMMERCIAL

## 2025-08-04 ENCOUNTER — OFFICE VISIT (OUTPATIENT)
Dept: PLASTIC SURGERY | Facility: CLINIC | Age: 30
End: 2025-08-04
Payer: COMMERCIAL

## 2025-08-04 VITALS
OXYGEN SATURATION: 99 % | HEIGHT: 65 IN | WEIGHT: 161 LBS | HEART RATE: 64 BPM | DIASTOLIC BLOOD PRESSURE: 74 MMHG | SYSTOLIC BLOOD PRESSURE: 108 MMHG | BODY MASS INDEX: 26.82 KG/M2

## 2025-08-04 DIAGNOSIS — M25.60 JOINT STIFFNESS: ICD-10-CM

## 2025-08-04 DIAGNOSIS — Z17.0 MALIGNANT NEOPLASM OF UPPER-OUTER QUADRANT OF RIGHT BREAST IN FEMALE, ESTROGEN RECEPTOR POSITIVE: ICD-10-CM

## 2025-08-04 DIAGNOSIS — R52 PAIN: ICD-10-CM

## 2025-08-04 DIAGNOSIS — Z91.89 AT RISK FOR LYMPHEDEMA: Primary | ICD-10-CM

## 2025-08-04 DIAGNOSIS — L90.5 SCAR CONDITION AND FIBROSIS OF SKIN: ICD-10-CM

## 2025-08-04 DIAGNOSIS — N65.0 BREAST RECONSTRUCTION DEFORMITY: Primary | ICD-10-CM

## 2025-08-04 DIAGNOSIS — C50.411 MALIGNANT NEOPLASM OF UPPER-OUTER QUADRANT OF RIGHT BREAST IN FEMALE, ESTROGEN RECEPTOR POSITIVE: ICD-10-CM

## 2025-08-04 PROCEDURE — 99024 POSTOP FOLLOW-UP VISIT: CPT | Performed by: SURGERY

## 2025-08-04 PROCEDURE — 1159F MED LIST DOCD IN RCRD: CPT | Performed by: SURGERY

## 2025-08-04 PROCEDURE — 1160F RVW MEDS BY RX/DR IN RCRD: CPT | Performed by: SURGERY

## 2025-08-04 PROCEDURE — 97140 MANUAL THERAPY 1/> REGIONS: CPT | Performed by: OCCUPATIONAL THERAPIST

## 2025-08-05 ENCOUNTER — TELEPHONE (OUTPATIENT)
Dept: ONCOLOGY | Facility: HOSPITAL | Age: 30
End: 2025-08-05
Payer: COMMERCIAL

## 2025-08-05 DIAGNOSIS — R22.31 MASS OF RIGHT AXILLA: Primary | ICD-10-CM

## 2025-08-08 ENCOUNTER — CLINICAL SUPPORT (OUTPATIENT)
Dept: PLASTIC SURGERY | Facility: CLINIC | Age: 30
End: 2025-08-08
Payer: COMMERCIAL

## 2025-08-08 DIAGNOSIS — L58.9 RADIATION DERMATITIS: Primary | ICD-10-CM

## 2025-08-11 ENCOUNTER — HOSPITAL ENCOUNTER (OUTPATIENT)
Facility: HOSPITAL | Age: 30
Setting detail: THERAPIES SERIES
Discharge: HOME | End: 2025-08-11
Payer: COMMERCIAL

## 2025-08-11 DIAGNOSIS — M25.60 JOINT STIFFNESS: ICD-10-CM

## 2025-08-11 DIAGNOSIS — C50.411 MALIGNANT NEOPLASM OF UPPER-OUTER QUADRANT OF RIGHT BREAST IN FEMALE, ESTROGEN RECEPTOR POSITIVE: ICD-10-CM

## 2025-08-11 DIAGNOSIS — Z91.89 AT RISK FOR LYMPHEDEMA: Primary | ICD-10-CM

## 2025-08-11 DIAGNOSIS — Z17.0 MALIGNANT NEOPLASM OF UPPER-OUTER QUADRANT OF RIGHT BREAST IN FEMALE, ESTROGEN RECEPTOR POSITIVE: ICD-10-CM

## 2025-08-11 DIAGNOSIS — R52 PAIN: ICD-10-CM

## 2025-08-11 DIAGNOSIS — L90.5 SCAR CONDITION AND FIBROSIS OF SKIN: ICD-10-CM

## 2025-08-11 PROCEDURE — 97535 SELF CARE MNGMENT TRAINING: CPT | Performed by: OCCUPATIONAL THERAPIST

## 2025-08-12 ENCOUNTER — PATIENT MESSAGE (OUTPATIENT)
Dept: FAMILY MEDICINE CLINIC | Age: 30
End: 2025-08-12
Payer: COMMERCIAL

## 2025-08-12 ENCOUNTER — HOSPITAL ENCOUNTER (OUTPATIENT)
Dept: ULTRASOUND IMAGING | Facility: HOSPITAL | Age: 30
Discharge: HOME | End: 2025-08-12
Payer: COMMERCIAL

## 2025-08-12 ENCOUNTER — HOSPITAL ENCOUNTER (OUTPATIENT)
Dept: MAMMOGRAPHY | Facility: HOSPITAL | Age: 30
Discharge: HOME | End: 2025-08-12
Payer: COMMERCIAL

## 2025-08-12 DIAGNOSIS — R22.31 MASS OF RIGHT AXILLA: ICD-10-CM

## 2025-08-12 PROCEDURE — 76642 ULTRASOUND BREAST LIMITED: CPT

## 2025-08-12 PROCEDURE — 77065 DX MAMMO INCL CAD UNI: CPT

## 2025-08-12 PROCEDURE — G0279 TOMOSYNTHESIS, MAMMO: HCPCS

## 2025-08-15 ENCOUNTER — CLINICAL SUPPORT (OUTPATIENT)
Dept: PLASTIC SURGERY | Facility: CLINIC | Age: 30
End: 2025-08-15
Payer: COMMERCIAL

## 2025-08-15 DIAGNOSIS — L58.9 RADIATION DERMATITIS: Primary | ICD-10-CM

## 2025-08-18 ENCOUNTER — HOSPITAL ENCOUNTER (OUTPATIENT)
Facility: HOSPITAL | Age: 30
Setting detail: THERAPIES SERIES
Discharge: HOME | End: 2025-08-18
Payer: COMMERCIAL

## 2025-08-18 DIAGNOSIS — M25.60 JOINT STIFFNESS: ICD-10-CM

## 2025-08-18 DIAGNOSIS — C50.411 MALIGNANT NEOPLASM OF UPPER-OUTER QUADRANT OF RIGHT BREAST IN FEMALE, ESTROGEN RECEPTOR POSITIVE: ICD-10-CM

## 2025-08-18 DIAGNOSIS — Z91.89 AT RISK FOR LYMPHEDEMA: Primary | ICD-10-CM

## 2025-08-18 DIAGNOSIS — Z17.0 MALIGNANT NEOPLASM OF UPPER-OUTER QUADRANT OF RIGHT BREAST IN FEMALE, ESTROGEN RECEPTOR POSITIVE: ICD-10-CM

## 2025-08-18 DIAGNOSIS — R52 PAIN: ICD-10-CM

## 2025-08-18 DIAGNOSIS — L90.5 SCAR CONDITION AND FIBROSIS OF SKIN: ICD-10-CM

## 2025-08-18 PROCEDURE — 97140 MANUAL THERAPY 1/> REGIONS: CPT | Performed by: OCCUPATIONAL THERAPIST

## 2025-08-22 ENCOUNTER — CLINICAL SUPPORT (OUTPATIENT)
Dept: PLASTIC SURGERY | Facility: CLINIC | Age: 30
End: 2025-08-22
Payer: COMMERCIAL

## 2025-08-22 DIAGNOSIS — L58.9 RADIATION DERMATITIS: Primary | ICD-10-CM

## 2025-08-25 ENCOUNTER — HOSPITAL ENCOUNTER (OUTPATIENT)
Facility: HOSPITAL | Age: 30
Setting detail: THERAPIES SERIES
Discharge: HOME | End: 2025-08-25
Payer: COMMERCIAL

## 2025-08-25 ENCOUNTER — CLINICAL SUPPORT (OUTPATIENT)
Dept: PLASTIC SURGERY | Facility: CLINIC | Age: 30
End: 2025-08-25
Payer: COMMERCIAL

## 2025-08-25 DIAGNOSIS — R52 PAIN: ICD-10-CM

## 2025-08-25 DIAGNOSIS — Z17.0 MALIGNANT NEOPLASM OF UPPER-OUTER QUADRANT OF RIGHT BREAST IN FEMALE, ESTROGEN RECEPTOR POSITIVE: ICD-10-CM

## 2025-08-25 DIAGNOSIS — C50.411 MALIGNANT NEOPLASM OF UPPER-OUTER QUADRANT OF RIGHT BREAST IN FEMALE, ESTROGEN RECEPTOR POSITIVE: ICD-10-CM

## 2025-08-25 DIAGNOSIS — L58.9 RADIATION DERMATITIS: Primary | ICD-10-CM

## 2025-08-25 DIAGNOSIS — L90.5 SCAR CONDITION AND FIBROSIS OF SKIN: ICD-10-CM

## 2025-08-25 DIAGNOSIS — Z91.89 AT RISK FOR LYMPHEDEMA: Primary | ICD-10-CM

## 2025-08-25 DIAGNOSIS — M25.60 JOINT STIFFNESS: ICD-10-CM

## 2025-08-25 PROCEDURE — 97140 MANUAL THERAPY 1/> REGIONS: CPT | Performed by: OCCUPATIONAL THERAPIST

## 2025-08-25 PROCEDURE — 76999 ECHO EXAMINATION PROCEDURE: CPT | Performed by: SURGERY

## (undated) DEVICE — GLV SURG DERMASSURE GRN LF PF SZ 6.5

## (undated) DEVICE — INTENDED FOR TISSUE SEPARATION, AND OTHER PROCEDURES THAT REQUIRE A SHARP SURGICAL BLADE TO PUNCTURE OR CUT.: Brand: BARD-PARKER ® CARBON RIB-BACK BLADES

## (undated) DEVICE — RESERVOIR,SUCTION,100CC,SILICONE: Brand: MEDLINE

## (undated) DEVICE — SYR LL TP 10ML STRL

## (undated) DEVICE — GAUZE,SPONGE,4"X4",16PLY,STRL,LF,10/TRAY: Brand: MEDLINE

## (undated) DEVICE — DRSNG SURESITE123 8X12IN

## (undated) DEVICE — GOWN,REINFORCE,POLY,SIRUS,BREATH SLV,XLG: Brand: MEDLINE

## (undated) DEVICE — SUT MNCRYL PLS ANTIB UD 4/0 PS2 18IN

## (undated) DEVICE — Device

## (undated) DEVICE — NDL HYPO PRECISIONGLIDE REG 22G 1 1/2

## (undated) DEVICE — PROXIMATE RH ROTATING HEAD SKIN STAPLERS (35 WIDE) CONTAINS 35 STAINLESS STEEL STAPLES: Brand: PROXIMATE

## (undated) DEVICE — SLV SCD KN/LEN ADJ EXPRSS BLENDED MD 1P/U

## (undated) DEVICE — MARKER,SKIN,WI/RULER AND LABELS: Brand: MEDLINE

## (undated) DEVICE — SUT ETHLN 3-0 FS118IN 663H

## (undated) DEVICE — PICO 7 10CM X 20CM: Brand: PICO™ 7

## (undated) DEVICE — SUT PDS 2/0 CT1 27IN DYED Z339H

## (undated) DEVICE — PLASTIC MAJOR-LF: Brand: MEDLINE INDUSTRIES, INC.

## (undated) DEVICE — DRSNG WND GZ CURAD OIL EMULSION 3X8IN LF STRL 1PK

## (undated) DEVICE — ELECTRD BLD EXT EDGE/INSUL 6IN

## (undated) DEVICE — NON-WOVEN ADHESIVE WOUND DRESSING: Brand: PRIMAPORE ADHESIVE WOUND DRSG 7.2*5CM

## (undated) DEVICE — CVR HNDL LT SURG ACCSSRY BLU STRL

## (undated) DEVICE — DEV DEL BRST/IMP GEL KELLER2 FUNNEL EA/5

## (undated) DEVICE — APPL CHLORAPREP HI/LITE 26ML ORNG

## (undated) DEVICE — NON-WOVEN ADHESIVE WOUND DRESSING: Brand: PRIMAPORE ADHESIVE DRESSING 10*8CM

## (undated) DEVICE — STRIP CLS WND SUTURESTRIP/PLS 0.5X4IN TP1103

## (undated) DEVICE — STERILE POLYISOPRENE POWDER-FREE SURGICAL GLOVES: Brand: PROTEXIS

## (undated) DEVICE — KT LINEN QUICKSQUITE SAHARA STD DRW/LIFT 60X78IN

## (undated) DEVICE — SYR LUERLOK 30CC

## (undated) DEVICE — SUT PERMAHAND SILK 0 FSL 30IN BLK

## (undated) DEVICE — JACKSON-PRATT 100CC BULB RESERVOIR: Brand: CARDINAL HEALTH

## (undated) DEVICE — SYS FAT GRAFTING REVOLVE SGL

## (undated) DEVICE — SYR LUERLOK 50ML

## (undated) DEVICE — GOWN,SIRUS,POLYRNF,BRTHSLV,XL,30/CS: Brand: MEDLINE

## (undated) DEVICE — GLOVE,SURG,SENSICARE SLT,LF,PF,6.5: Brand: MEDLINE

## (undated) DEVICE — DEV OPN LIGASURE DISSCT EXACT 40DEG 21.6MM BX/1

## (undated) DEVICE — BIOPATCH™ ANTIMICROBIAL DRESSING WITH CHLORHEXIDINE GLUCONATE IS A HYDROPHILLIC POLYURETHANE ABSORPTIVE FOAM WITH CHLORHEXIDINE GLUCONATE (CHG) WHICH INHIBITS BACTERIAL GROWTH UNDER THE DRESSING. THE DRESSING IS INTENDED TO BE USED TO ABSORB EXUDATE, COVER A WOUND CAUSED BY VASCULAR AND NONVASCULAR PERCUTANEOUS MEDICAL DEVICES DURING SURGERY, AS WELL AS REDUCE LOCAL INFECTION AND COLONIZATION OF MICROORGANISMS.: Brand: BIOPATCH

## (undated) DEVICE — SUT MNCRYL 4/0 PS2 18 IN

## (undated) DEVICE — GOWN,REINFRCE,POLY,SIRUS,BREATH SLV,XXLG: Brand: MEDLINE

## (undated) DEVICE — PENCL SMOKE/EVAC MEGADYNE TELESCP 10FT

## (undated) DEVICE — SUT VIC 3/0 SH 27IN J416H

## (undated) DEVICE — STPLR SKIN SUBCUTICULAR INSORB 2030

## (undated) DEVICE — PENCL E/S SMOKEEVAC W/TELESCP CANN

## (undated) DEVICE — 3M™ TEGADERM™ TRANSPARENT FILM DRESSING FRAME STYLE, 1629, 8 IN X 12 IN (20 CM X 30 CM), 10/CT 8CT/CASE: Brand: 3M™ TEGADERM™

## (undated) DEVICE — 450 ML BOTTLE OF 0.05% CHLORHEXIDINE GLUCONATE IN 99.95% STERILE WATER FOR IRRIGATION, USP AND APPLICATOR.: Brand: IRRISEPT ANTIMICROBIAL WOUND LAVAGE

## (undated) DEVICE — STERILE POLYISOPRENE POWDER-FREE SURGICAL GLOVES WITH EMOLLIENT COATING: Brand: PROTEXIS

## (undated) DEVICE — GLOVE,SURG,SENSICARE SLT,LF,PF,5.5: Brand: MEDLINE

## (undated) DEVICE — SYR CATH/TIP 50ML 2OZ STRL 1P/U

## (undated) DEVICE — GLV SURG SENSICARE PI ORTHO SZ8 LF STRL

## (undated) DEVICE — GLV SURG SENSICARE PI PF LF 7 GRN STRL

## (undated) DEVICE — CVR PROB 96IN LF STRL

## (undated) DEVICE — DRSNG SURESITE WNDW 4X4.5

## (undated) DEVICE — ELECTRD BLD EDGE COAT 3IN

## (undated) DEVICE — BLAKE SILICONE DRAIN, 15 FR ROUND, HUBLESS WITH 3/16" TROCAR: Brand: BLAKE

## (undated) DEVICE — SOL IRR NACL 0.9PCT BT 1000ML

## (undated) DEVICE — SYS CLS SKIN PREMIERPRO EXOFINFUSION 22CM

## (undated) DEVICE — ANTIBACTERIAL UNDYED BRAIDED (POLYGLACTIN 910), SYNTHETIC ABSORBABLE SUTURE: Brand: COATED VICRYL

## (undated) DEVICE — KT DRP SPY/PHI W/ICG 25MG STRL

## (undated) DEVICE — INTENDED FOR TISSUE SEPARATION, AND OTHER PROCEDURES THAT REQUIRE A SHARP SURGICAL BLADE TO PUNCTURE OR CUT.: Brand: BARD-PARKER ® STAINLESS STEEL BLADES

## (undated) DEVICE — CONTAINER,SPECIMEN,O.R.STRL,4.5OZ: Brand: MEDLINE

## (undated) DEVICE — THE STERILE LIGHT HANDLE COVER IS USED WITH STERIS SURGICAL LIGHTING AND VISUALIZATION SYSTEMS.

## (undated) DEVICE — ABDOMINAL BINDER: Brand: DEROYAL

## (undated) DEVICE — PRESSURE MONITORING ACCESSORY: Brand: TRUWAVE

## (undated) DEVICE — MINOR-LF: Brand: MEDLINE INDUSTRIES, INC.